# Patient Record
Sex: FEMALE | Race: WHITE | NOT HISPANIC OR LATINO | Employment: OTHER | URBAN - METROPOLITAN AREA
[De-identification: names, ages, dates, MRNs, and addresses within clinical notes are randomized per-mention and may not be internally consistent; named-entity substitution may affect disease eponyms.]

---

## 2017-02-15 ENCOUNTER — OFFICE VISIT (OUTPATIENT)
Dept: PULMONOLOGY | Facility: HOSPICE | Age: 67
End: 2017-02-15
Payer: MEDICARE

## 2017-02-15 VITALS
HEIGHT: 64 IN | SYSTOLIC BLOOD PRESSURE: 124 MMHG | RESPIRATION RATE: 16 BRPM | OXYGEN SATURATION: 90 % | WEIGHT: 136 LBS | DIASTOLIC BLOOD PRESSURE: 80 MMHG | TEMPERATURE: 98.8 F | HEART RATE: 74 BPM | BODY MASS INDEX: 23.22 KG/M2

## 2017-02-15 DIAGNOSIS — R93.89 ABNORMAL CT SCAN, CHEST: ICD-10-CM

## 2017-02-15 DIAGNOSIS — J44.9 CHRONIC OBSTRUCTIVE PULMONARY DISEASE, UNSPECIFIED COPD TYPE (HCC): ICD-10-CM

## 2017-02-15 DIAGNOSIS — Z14.8 ALPHA-1-ANTITRYPSIN DEFICIENCY CARRIER: ICD-10-CM

## 2017-02-15 DIAGNOSIS — J30.9 ALLERGIC RHINITIS, UNSPECIFIED ALLERGIC RHINITIS TRIGGER, UNSPECIFIED RHINITIS SEASONALITY: ICD-10-CM

## 2017-02-15 PROCEDURE — 1036F TOBACCO NON-USER: CPT | Performed by: NURSE PRACTITIONER

## 2017-02-15 PROCEDURE — 4040F PNEUMOC VAC/ADMIN/RCVD: CPT | Mod: 8P | Performed by: NURSE PRACTITIONER

## 2017-02-15 PROCEDURE — 99214 OFFICE O/P EST MOD 30 MIN: CPT | Performed by: NURSE PRACTITIONER

## 2017-02-15 PROCEDURE — G8420 CALC BMI NORM PARAMETERS: HCPCS | Performed by: NURSE PRACTITIONER

## 2017-02-15 PROCEDURE — 3014F SCREEN MAMMO DOC REV: CPT | Mod: 8P | Performed by: NURSE PRACTITIONER

## 2017-02-15 PROCEDURE — G8432 DEP SCR NOT DOC, RNG: HCPCS | Performed by: NURSE PRACTITIONER

## 2017-02-15 PROCEDURE — G8482 FLU IMMUNIZE ORDER/ADMIN: HCPCS | Performed by: NURSE PRACTITIONER

## 2017-02-15 PROCEDURE — 1101F PT FALLS ASSESS-DOCD LE1/YR: CPT | Mod: 8P | Performed by: NURSE PRACTITIONER

## 2017-02-15 PROCEDURE — 3017F COLORECTAL CA SCREEN DOC REV: CPT | Mod: 8P | Performed by: NURSE PRACTITIONER

## 2017-02-15 RX ORDER — RAMIPRIL 10 MG/1
CAPSULE ORAL
COMMUNITY
Start: 2008-12-09 | End: 2017-02-15

## 2017-02-15 RX ORDER — ALENDRONATE SODIUM 70 MG/1
70 TABLET ORAL
COMMUNITY
Start: 2010-08-26 | End: 2017-02-15

## 2017-02-15 RX ORDER — VARENICLINE TARTRATE 1 MG/1
TABLET, FILM COATED ORAL
COMMUNITY
Start: 2010-01-28 | End: 2017-02-15

## 2017-02-15 RX ORDER — ROSUVASTATIN CALCIUM 40 MG/1
TABLET, COATED ORAL
COMMUNITY
Start: 2008-12-09 | End: 2017-02-15

## 2017-02-15 RX ORDER — ALPRAZOLAM 0.5 MG/1
0.5 TABLET ORAL
COMMUNITY
Start: 2010-03-09 | End: 2017-02-15

## 2017-02-15 RX ORDER — ZOLPIDEM TARTRATE 10 MG/1
10 TABLET ORAL
COMMUNITY
Start: 2012-04-05 | End: 2017-02-15

## 2017-02-15 RX ORDER — OMEGA-3-ACID ETHYL ESTERS 1 G/1
CAPSULE, LIQUID FILLED ORAL
COMMUNITY
Start: 2008-12-09 | End: 2017-02-15

## 2017-02-15 RX ORDER — BUPROPION HYDROCHLORIDE 300 MG/1
TABLET ORAL
COMMUNITY
Start: 2010-01-12 | End: 2017-02-15

## 2017-02-15 RX ORDER — OMEGA-3-ACID ETHYL ESTERS 1 G/1
CAPSULE, LIQUID FILLED ORAL
COMMUNITY
End: 2018-01-01 | Stop reason: SDUPTHER

## 2017-02-15 RX ORDER — CARVEDILOL 25 MG/1
TABLET ORAL
COMMUNITY
End: 2017-02-15

## 2017-02-15 NOTE — PROGRESS NOTES
Chief Complaint   Patient presents with   • COPD       HPI:  Zuly Christian is a 66 y.o. year old female here today for follow-up on her chronic obstructive pulmonary disease and abnormal CT scan of the chest. CT scan of the chest was repeated on 8/16/2016 and indicates stable appearance of a 6 mm left upper lobe nodule dating back to August 2014. There is stable diffuse centrilobular emphysema and no pulmonary consolidation or adenopathy. PFT's at her last office visit 8/22/2016 indicated an FEV1 of 1.59 L, 67% predicted with an FEV1/FVC ratio of 59 and a DLCO of 67% predicted. She is compliant on Spiriva respimat daily. She denies significant dyspnea. She states she is somewhat limited in activity due to knee pain. She will note mild dyspnea when carrying heavy objects. She denies current cough, mucous or wheeze. She notes occasional runny nose which she feels is allergy triggered. She does use an OTC antihistamine as needed. She denies any recent respiratory infections.       Past Medical History   Diagnosis Date   • Allergic rhinitis    • COPD (chronic obstructive pulmonary disease) (CMS-HCC)    • Hyperlipidemia    • Hypertension    • Peripheral vascular disease (CMS-HCC)        Past Surgical History   Procedure Laterality Date   • Tonsillectomy         Family History   Problem Relation Age of Onset   • Cancer Father      lung   • Cancer Mother      lung       Social History     Social History   • Marital Status:      Spouse Name: N/A   • Number of Children: N/A   • Years of Education: N/A     Occupational History   • Not on file.     Social History Main Topics   • Smoking status: Former Smoker -- 0.50 packs/day for 40 years     Types: Cigarettes     Quit date: 05/01/2014   • Smokeless tobacco: Never Used   • Alcohol Use: Yes      Comment: Socially   • Drug Use: No   • Sexual Activity: Not on file     Other Topics Concern   • Not on file     Social History Narrative       ROS:  Constitutional: Denies  "fevers, chills, sweats, fatigue, weight loss  Eyes: Denies vision loss, pain, drainage, double vision  Ears/Nose/Mouth/Throat: Denies ear ache, difficulty hearing, sore throat, persistent hoarseness, decayed teeth/toothache  Cardiovascular: Denies chest pain, tightness, palpitations, swelling in feet/legs, fainting, difficulty breathing when laying down  Respiratory: See HPI   GI: Denies heartburn, difficulty swallowing, nausea, vomiting, abdominal pain, diarrhea, constipation  : Denies frequent urination, painful urination  Integumentary: Denies rashes, lumps or color changes  MSK: Knee pain   Neurological: Denies frequent headaches, dizziness, weakness      Current Outpatient Prescriptions on File Prior to Visit   Medication Sig Dispense Refill   • SPIRIVA RESPIMAT 2.5 MCG/ACT Aero Soln USE 2 INHALATIONS ONCE DAILY, ASSEMBLE AND PRIME 3 Inhaler 3   • Multiple Vitamin (MULTI VITAMIN DAILY PO) Take  by mouth.     • Esomeprazole Magnesium 20 MG Pack Take 20 mg by mouth. 1 tab po qd     • aspirin 81 MG tablet Take 81 mg by mouth every day. One tablet by mouth daily     • Coenzyme Q10 (COQ10) 100 MG Cap Take 100 mg by mouth.     • ezetimibe (ZETIA) 10 MG Tab Take 10 mg by mouth every day.     • rosuvastatin (CRESTOR) 40 MG tablet Take 40 mg by mouth every day.     • carvedilol (COREG) 25 MG Tab Take 25 mg by mouth 2 times a day, with meals.     • ramipril (ALTACE) 10 MG capsule Take 10 mg by mouth every day.     • albuterol 108 (90 BASE) MCG/ACT Aero Soln inhalation aerosol Inhale 2 Puffs by mouth every four hours as needed for Shortness of Breath.     • alprazolam (XANAX) 0.5 MG Tab Take 0.5 mg by mouth at bedtime as needed for Sleep.     • tiotropium (SPIRIVA) 18 MCG Cap Inhale 18 mcg by mouth every day.       No current facility-administered medications on file prior to visit.     Sulfa drugs    Blood pressure 124/80, pulse 74, temperature 37.1 °C (98.8 °F), resp. rate 16, height 1.613 m (5' 3.5\"), weight 61.689 " kg (136 lb), SpO2 90 %.  PE:   Appearance: Well developed, well nourished, no acute distress  Eyes: PERRL, EOM intact, sclera white, conjunctiva moist  Ears: no lesions or deformities  Hearing: grossly intact  Nose: no lesions or deformities  Oropharynx: tongue normal, posterior pharynx without erythema or exudate  Neck: supple, trachea midline, no masses   Respiratory effort: no intercostal retractions or use of accessory muscles  Lung auscultation: no rales, rhonchi or wheezes  Heart auscultation: no murmur rub or gallop  Extremities: no cyanosis or edema  Abdomen: soft ,non tender, no masses  Gait and Station: normal  Digits and nails: no clubbing, cyanosis, petechiae or nodes.  Cranial nerves: grossly intact  Skin: no rashes, lesions or ulcers noted  Orientation: Oriented to time, person and place  Mood and affect: mood and affect appropriate, normal interaction with examiner  Judgement: Intact          Assessment:  1. Chronic obstructive pulmonary disease, unspecified COPD type (CMS-HCC)     2. Allergic rhinitis, unspecified allergic rhinitis trigger, unspecified rhinitis seasonality     3. Abnormal CT scan, chest      Nodule, stable x 2 years duration    4. Alpha-1-antitrypsin deficiency carrier (CMS-HCC)           Plan:    1) Change Spiriva to Anoro 1 dose INH daily. Samples and RX provided.   2) Reviewed results of Alpha 1 test indicating alpha 1 carrier with MF phenotype. Order for Alpha 1 level. We discussed carrier status and if her level is significantly low or if her PFT's decline we may consider replacement. She is also going to talk with her daughter about getting tested. Handout provided on Alpha 1.   3) She is up to date on Prevnar 13, Pneumovax 23 and Influenza vaccines.  4) Encouraged routine exercise.   5) CT is stable for 2 years. No further testing unless symptoms warrant.   6) Continue prn OTC antihistamine.  7) 6 month follow up with updated PFT's, sooner if needed.

## 2017-02-15 NOTE — PATIENT INSTRUCTIONS
Plan:    1) Change Spiriva to Anoro 1 dose INH daily. Samples and RX provided.   2) Reviewed results of Alpha 1 test indicating alpha 1 carrier with MF phenotype. Order for Alpha 1 level. We discussed carrier status and if her level is significantly low or if her PFT's decline we may consider replacement. She is also going to talk with her daughter about getting tested. Handout provided on Alpha 1.   3) She is up to date on Prevnar 13, Pneumovax 23 and Influenza vaccines.  4) Encouraged routine exercise.   5) CT is stable for 2 years. No further testing unless symptoms warrant.   6) Continue prn OTC antihistamine.  7) 6 month follow up with updated PFT's, sooner if needed.

## 2017-02-15 NOTE — MR AVS SNAPSHOT
"        Zuly Reilly Gino   2/15/2017 10:00 AM   Office Visit   MRN: [id number]    Department:  [facility]   Dept Phone:  [phone]    Description:  Female : 1950   Provider:  ADRIA Garcia           Reason for Visit     COPD           Allergies as of 2/15/2017     Allergen Noted Reactions    Sulfa Drugs 2016         You were diagnosed with     Chronic obstructive pulmonary disease, unspecified COPD type (CMS-HCC)   [5401669]       Allergic rhinitis, unspecified allergic rhinitis trigger, unspecified rhinitis seasonality   [7739138]       Abnormal CT scan, chest   [422292]   Nodule, stable x 2 years duration     Alpha-1-antitrypsin deficiency carrier (CMS-HCC)   [650727]         Vital Signs     Blood Pressure Pulse Temperature Respirations Height Weight    124/80 mmHg 74 37.1 °C (98.8 °F) 16 1.613 m (5' 3.5\") 61.689 kg (136 lb)    Body Mass Index Oxygen Saturation Smoking Status             23.71 kg/m2 90% Former Smoker         Basic Information     Date Of Birth Sex Race Ethnicity Preferred Language    1950 Female White Non- English      Your appointments     Aug 16, 2017 10:00 AM   Pulmonary Function Test with PFT-RM2   [facility] (--)    [address]  [address]  [place] [id number] [phone]            Aug 16, 2017 11:00 AM   Established Patient Pul with ROBERTO GarciaPPEDRO   [facility] (--)    [address]  [address]  [place] [address] [phone]              Problem List              ICD-10-CM Priority Class Noted - Resolved    Abnormal CT scan, chest R93.8   2016 - Present    COPD (chronic obstructive pulmonary disease) (CMS-HCC) J44.9   2016 - Present    Essential hypertension I10   2016 - Present    PVD (peripheral vascular disease) (CMS-HCC) I73.9   2016 - Present    Dyslipidemia E78.5   2016 - Present    Allergic rhinitis J30.9   2016 - Present      Health " Maintenance        Date Due Completion Dates    IMM DTaP/Tdap/Td Vaccine (1 - Tdap) 9/11/1969 ---    PAP SMEAR 9/11/1971 ---    COLONOSCOPY 9/11/2000 ---    MAMMOGRAM 5/24/2007 5/24/2006, 3/7/2005    IMM ZOSTER VACCINE 9/11/2010 ---    BONE DENSITY 9/11/2015 3/7/2005    IMM PNEUMOCOCCAL 65+ (ADULT) LOW/MEDIUM RISK SERIES (1 of 2 - PCV13) 9/11/2015 ---            Current Immunizations     Influenza Vaccine Adult HD 9/6/2016 10:47 AM      Below and/or attached are the medications your provider expects you to take. Review all of your home medications and newly ordered medications with your provider and/or pharmacist. Follow medication instructions as directed by your provider and/or pharmacist. Please keep your medication list with you and share with your provider. Update the information when medications are discontinued, doses are changed, or new medications (including over-the-counter products) are added; and carry medication information at all times in the event of emergency situations     Allergies:  SULFA DRUGS - (reactions not documented)               Medications  Valid as of: February 15, 2017 - 10:35 AM    Generic Name Brand Name Tablet Size Instructions for use    Albuterol Sulfate (Aero Soln) albuterol 108 (90 BASE) MCG/ACT Inhale 2 Puffs by mouth every four hours as needed for Shortness of Breath.        Alendronate Sodium (Tab) FOSAMAX 70 MG Take 70 mg by mouth.        ALPRAZolam (Tab) XANAX 0.5 MG Take 0.5 mg by mouth at bedtime as needed for Sleep.        ALPRAZolam (Tab) XANAX 0.5 MG Take 0.5 mg by mouth.        Aspirin (Tab) aspirin 81 MG Take 81 mg by mouth every day. One tablet by mouth daily        Aspirin (Tab) aspirin 81 MG Take  by mouth.        BuPROPion HCl (TABLET SR 24 HR) WELLBUTRIN  MG Take  by mouth.        Carvedilol (Tab) COREG 25 MG Take 25 mg by mouth 2 times a day, with meals.        Carvedilol (Tab) COREG 25 MG Take  by mouth.        Coenzyme Q10 (Cap) CoQ10 100 MG Take 100 mg  by mouth.        Esomeprazole Magnesium (Pack) Esomeprazole Magnesium 20 MG Take 20 mg by mouth. 1 tab po qd        Ezetimibe (Tab) ZETIA 10 MG Take 10 mg by mouth every day.        Multiple Vitamin   Take  by mouth.        Omega-3-acid Ethyl Esters (Cap) LOVAZA 1 GM Take  by mouth.        Omega-3-acid Ethyl Esters (Cap) LOVAZA 1 GM Take  by mouth.        Ramipril (Cap) ALTACE 10 MG Take 10 mg by mouth every day.        Ramipril (Cap) ALTACE 10 MG Take  by mouth.        Rosuvastatin Calcium (Tab) CRESTOR 40 MG Take 40 mg by mouth every day.        Rosuvastatin Calcium (Tab) CRESTOR 40 MG Take  by mouth.        Tiotropium Bromide Monohydrate (Cap) SPIRIVA 18 MCG Inhale 18 mcg by mouth every day.        Tiotropium Bromide Monohydrate (Aero Soln) SPIRIVA RESPIMAT 2.5 MCG/ACT USE 2 INHALATIONS ONCE DAILY, ASSEMBLE AND PRIME        Umeclidinium-Vilanterol (AEROSOL POWDER, BREATH ACTIVATED) Umeclidinium-Vilanterol 62.5-25 MCG/INH Take 1 Inhalation by mouth every day.        Varenicline Tartrate (Tab) CHANTIX 1 MG Take  by mouth.        Zolpidem Tartrate (Tab) AMBIEN 10 MG Take 10 mg by mouth.        .                 Medicines prescribed today were sent to:     [facility] - [place], [place] - [address] [facility] [place] [address]    Phone: [phone] Fax: [phone]    Open 24 Hours?: No    [facility] - [address] [address]    Phone: [phone] Fax: [phone]    Open 24 Hours?: No      Medication refill instructions:       If your prescription bottle indicates you have medication refills left, it is not necessary to call your provider’s office. Please contact your pharmacy and they will refill your medication.    If your prescription bottle indicates you do not have any refills left, you may request refills at any time through one of the following ways: The online [employer] system (except Urgent Care), by calling your  provider’s office, or by asking your pharmacy to contact your provider’s office with a refill request. Medication refills are processed only during regular business hours and may not be available until the next business day. Your provider may request additional information or to have a follow-up visit with you prior to refilling your medication.   *Please Note: Medication refills are assigned a new Rx number when refilled electronically. Your pharmacy may indicate that no refills were authorized even though a new prescription for the same medication is available at the pharmacy. Please request the medicine by name with the pharmacy before contacting your provider for a refill.        Your To Do List     Future Labs/Procedures Complete By Expires    AMB PULMONARY FUNCTION TEST/LAB  8/15/2017 (Approximate) 2/15/2018    Comments:    In 6 months at follow up visit         Tymphanyhart Access Code: Activation code not generated  Current [facility] Medical Status: Active

## 2017-07-20 ENCOUNTER — HOSPITAL ENCOUNTER (OUTPATIENT)
Facility: MEDICAL CENTER | Age: 67
End: 2017-07-20
Attending: SURGERY | Admitting: SURGERY
Payer: MEDICARE

## 2017-07-20 ENCOUNTER — APPOINTMENT (OUTPATIENT)
Dept: RADIOLOGY | Facility: MEDICAL CENTER | Age: 67
End: 2017-07-20
Attending: SURGERY
Payer: MEDICARE

## 2017-07-20 VITALS
HEART RATE: 78 BPM | TEMPERATURE: 98.3 F | WEIGHT: 130.73 LBS | BODY MASS INDEX: 23.16 KG/M2 | OXYGEN SATURATION: 95 % | HEIGHT: 63 IN | RESPIRATION RATE: 16 BRPM

## 2017-07-20 PROBLEM — I65.29 OCCLUSION AND STENOSIS OF UNSPECIFIED CAROTID ARTERY: Status: ACTIVE | Noted: 2017-07-20

## 2017-07-20 LAB
ABO GROUP BLD: NORMAL
ANION GAP SERPL CALC-SCNC: 8 MMOL/L (ref 0–11.9)
BLD GP AB SCN SERPL QL: NORMAL
BUN SERPL-MCNC: 13 MG/DL (ref 8–22)
CALCIUM SERPL-MCNC: 9.3 MG/DL (ref 8.5–10.5)
CHLORIDE SERPL-SCNC: 112 MMOL/L (ref 96–112)
CO2 SERPL-SCNC: 23 MMOL/L (ref 20–33)
CREAT SERPL-MCNC: 0.61 MG/DL (ref 0.5–1.4)
EKG IMPRESSION: NORMAL
ERYTHROCYTE [DISTWIDTH] IN BLOOD BY AUTOMATED COUNT: 46.5 FL (ref 35.9–50)
GFR SERPL CREATININE-BSD FRML MDRD: >60 ML/MIN/1.73 M 2
GLUCOSE SERPL-MCNC: 92 MG/DL (ref 65–99)
HCT VFR BLD AUTO: 43.5 % (ref 37–47)
HGB BLD-MCNC: 14.1 G/DL (ref 12–16)
MCH RBC QN AUTO: 29.4 PG (ref 27–33)
MCHC RBC AUTO-ENTMCNC: 32.4 G/DL (ref 33.6–35)
MCV RBC AUTO: 90.6 FL (ref 81.4–97.8)
PLATELET # BLD AUTO: 224 K/UL (ref 164–446)
PMV BLD AUTO: 10.5 FL (ref 9–12.9)
POTASSIUM SERPL-SCNC: 3.9 MMOL/L (ref 3.6–5.5)
RBC # BLD AUTO: 4.8 M/UL (ref 4.2–5.4)
RH BLD: NORMAL
SODIUM SERPL-SCNC: 143 MMOL/L (ref 135–145)
WBC # BLD AUTO: 7.5 K/UL (ref 4.8–10.8)

## 2017-07-20 PROCEDURE — 85027 COMPLETE CBC AUTOMATED: CPT

## 2017-07-20 PROCEDURE — 99153 MOD SED SAME PHYS/QHP EA: CPT

## 2017-07-20 PROCEDURE — 700102 HCHG RX REV CODE 250 W/ 637 OVERRIDE(OP)

## 2017-07-20 PROCEDURE — A9270 NON-COVERED ITEM OR SERVICE: HCPCS

## 2017-07-20 PROCEDURE — 36215 PLACE CATHETER IN ARTERY: CPT

## 2017-07-20 PROCEDURE — 93010 ELECTROCARDIOGRAM REPORT: CPT | Performed by: INTERNAL MEDICINE

## 2017-07-20 PROCEDURE — 160002 HCHG RECOVERY MINUTES (STAT): Performed by: SURGERY

## 2017-07-20 PROCEDURE — 80048 BASIC METABOLIC PNL TOTAL CA: CPT

## 2017-07-20 PROCEDURE — 86901 BLOOD TYPING SEROLOGIC RH(D): CPT

## 2017-07-20 PROCEDURE — 75710 ARTERY X-RAYS ARM/LEG: CPT | Mod: XU

## 2017-07-20 PROCEDURE — 37246 TRLUML BALO ANGIOP 1ST ART: CPT

## 2017-07-20 PROCEDURE — 37236 OPEN/PERQ PLACE STENT 1ST: CPT

## 2017-07-20 PROCEDURE — 700111 HCHG RX REV CODE 636 W/ 250 OVERRIDE (IP)

## 2017-07-20 PROCEDURE — 93005 ELECTROCARDIOGRAM TRACING: CPT | Performed by: SURGERY

## 2017-07-20 PROCEDURE — 86850 RBC ANTIBODY SCREEN: CPT

## 2017-07-20 PROCEDURE — 76937 US GUIDE VASCULAR ACCESS: CPT

## 2017-07-20 PROCEDURE — 86900 BLOOD TYPING SEROLOGIC ABO: CPT

## 2017-07-20 RX ORDER — MIDAZOLAM HYDROCHLORIDE 1 MG/ML
INJECTION INTRAMUSCULAR; INTRAVENOUS
Status: COMPLETED
Start: 2017-07-20 | End: 2017-07-20

## 2017-07-20 RX ORDER — CLOPIDOGREL BISULFATE 75 MG/1
150 TABLET ORAL ONCE
Status: COMPLETED | OUTPATIENT
Start: 2017-07-20 | End: 2017-07-20

## 2017-07-20 RX ORDER — LIDOCAINE AND PRILOCAINE 25; 25 MG/G; MG/G
1 CREAM TOPICAL
Status: DISCONTINUED | OUTPATIENT
Start: 2017-07-20 | End: 2017-07-20 | Stop reason: HOSPADM

## 2017-07-20 RX ORDER — ASCORBIC ACID 500 MG
500 TABLET ORAL DAILY
COMMUNITY
End: 2018-01-01

## 2017-07-20 RX ORDER — PROTAMINE SULFATE 10 MG/ML
INJECTION, SOLUTION INTRAVENOUS
Status: COMPLETED
Start: 2017-07-20 | End: 2017-07-20

## 2017-07-20 RX ORDER — SODIUM CHLORIDE 9 MG/ML
500 INJECTION, SOLUTION INTRAVENOUS
Status: ACTIVE | OUTPATIENT
Start: 2017-07-20 | End: 2017-07-20

## 2017-07-20 RX ORDER — IODIXANOL 270 MG/ML
25 INJECTION, SOLUTION INTRAVASCULAR ONCE
Status: DISCONTINUED | OUTPATIENT
Start: 2017-07-20 | End: 2017-07-20 | Stop reason: HOSPADM

## 2017-07-20 RX ORDER — LIDOCAINE HYDROCHLORIDE 10 MG/ML
0.5 INJECTION, SOLUTION INFILTRATION; PERINEURAL
Status: DISCONTINUED | OUTPATIENT
Start: 2017-07-20 | End: 2017-07-20 | Stop reason: HOSPADM

## 2017-07-20 RX ORDER — IODIXANOL 270 MG/ML
15 INJECTION, SOLUTION INTRAVASCULAR ONCE
Status: COMPLETED | OUTPATIENT
Start: 2017-07-20 | End: 2017-07-20

## 2017-07-20 RX ORDER — MIDAZOLAM HYDROCHLORIDE 1 MG/ML
.5-2 INJECTION INTRAMUSCULAR; INTRAVENOUS PRN
Status: ACTIVE | OUTPATIENT
Start: 2017-07-20 | End: 2017-07-20

## 2017-07-20 RX ORDER — IODIXANOL 270 MG/ML
56 INJECTION, SOLUTION INTRAVASCULAR ONCE
Status: DISCONTINUED | OUTPATIENT
Start: 2017-07-20 | End: 2017-07-20

## 2017-07-20 RX ORDER — CLOPIDOGREL BISULFATE 75 MG/1
TABLET ORAL
Status: COMPLETED
Start: 2017-07-20 | End: 2017-07-20

## 2017-07-20 RX ORDER — SODIUM CHLORIDE, SODIUM LACTATE, POTASSIUM CHLORIDE, CALCIUM CHLORIDE 600; 310; 30; 20 MG/100ML; MG/100ML; MG/100ML; MG/100ML
INJECTION, SOLUTION INTRAVENOUS CONTINUOUS
Status: DISCONTINUED | OUTPATIENT
Start: 2017-07-20 | End: 2017-07-20 | Stop reason: HOSPADM

## 2017-07-20 RX ORDER — SODIUM CHLORIDE 9 MG/ML
INJECTION, SOLUTION INTRAVENOUS CONTINUOUS
Status: DISCONTINUED | OUTPATIENT
Start: 2017-07-20 | End: 2017-07-20 | Stop reason: HOSPADM

## 2017-07-20 RX ADMIN — MIDAZOLAM 1 MG: 1 INJECTION INTRAMUSCULAR; INTRAVENOUS at 10:52

## 2017-07-20 RX ADMIN — CLOPIDOGREL BISULFATE 150 MG: 75 TABLET ORAL at 12:17

## 2017-07-20 RX ADMIN — MIDAZOLAM HYDROCHLORIDE 1 MG: 1 INJECTION INTRAMUSCULAR; INTRAVENOUS at 11:15

## 2017-07-20 RX ADMIN — FENTANYL CITRATE 50 MCG: 50 INJECTION, SOLUTION INTRAMUSCULAR; INTRAVENOUS at 10:52

## 2017-07-20 RX ADMIN — MIDAZOLAM HYDROCHLORIDE 1 MG: 1 INJECTION INTRAMUSCULAR; INTRAVENOUS at 11:28

## 2017-07-20 RX ADMIN — SODIUM CHLORIDE, SODIUM LACTATE, POTASSIUM CHLORIDE, CALCIUM CHLORIDE: 600; 310; 30; 20 INJECTION, SOLUTION INTRAVENOUS at 10:40

## 2017-07-20 RX ADMIN — MIDAZOLAM HYDROCHLORIDE 1 MG: 1 INJECTION INTRAMUSCULAR; INTRAVENOUS at 11:04

## 2017-07-20 RX ADMIN — MIDAZOLAM HYDROCHLORIDE 1 MG: 1 INJECTION INTRAMUSCULAR; INTRAVENOUS at 10:52

## 2017-07-20 RX ADMIN — CLOPIDOGREL 150 MG: 75 TABLET, FILM COATED ORAL at 12:17

## 2017-07-20 RX ADMIN — IODIXANOL 15 ML: 270 INJECTION, SOLUTION INTRAVASCULAR at 11:00

## 2017-07-20 RX ADMIN — MIDAZOLAM HYDROCHLORIDE 1 MG: 1 INJECTION INTRAMUSCULAR; INTRAVENOUS at 10:57

## 2017-07-20 ASSESSMENT — PAIN SCALES - GENERAL
PAINLEVEL_OUTOF10: 0

## 2017-07-20 NOTE — IP AVS SNAPSHOT
7/20/2017    Zuly Christian  [address] [facility] [address]    Dear Zuly:    [facility] wants to ensure your discharge home is safe and you or your loved ones have had all of your questions answered regarding your care after you leave the hospital.    Below is a list of resources and contact information should you have any questions regarding your hospital stay, follow-up instructions, or active medical symptoms.    Questions or Concerns Regarding… Contact   Medical Questions Related to Your Discharge  (7 days a week, 8am-5pm) Contact a Nurse Care Coordinator   [phone]   Medical Questions Not Related to Your Discharge  (24 hours a day / 7 days a week)  Contact the Nurse Health Line   [phone]    Medications or Discharge Instructions Refer to your discharge packet   or contact your [facility] Primary Care Provider   [phone]   Follow-up Appointment(s) Schedule your appointment via [employer]   or contact Scheduling [phone]   Billing Review your statement via [employer]  or contact Billing [phone]   Medical Records Review your records via [employer]   or contact Medical Records [phone]     You may receive a telephone call within two days of discharge. This call is to make certain you understand your discharge instructions and have the opportunity to have any questions answered. You can also easily access your medical information, test results and upcoming appointments via the ContextWeb free online health management tool. You can learn more and sign up at [email or web address]. For assistance setting up your ContextWeb account, please call [phone].    Once again, we want to ensure your discharge home is safe and that you have a clear understanding of any next steps in your care. If you have any questions or concerns, please do not hesitate to contact us, we are here for you. Thank you for choosing [facility] for your healthcare needs.    Sincerely,    Your [facility] Healthcare Team

## 2017-07-20 NOTE — DISCHARGE INSTRUCTIONS
ACTIVITY: Rest and take it easy for the first 24 hours.  A responsible adult is recommended to remain with you during that time.  It is normal to feel sleepy.  We encourage you to not do anything that requires balance, judgment or coordination.    MILD FLU-LIKE SYMPTOMS ARE NORMAL. YOU MAY EXPERIENCE GENERALIZED MUSCLE ACHES, THROAT IRRITATION, HEADACHE AND/OR SOME NAUSEA.    FOR 24 HOURS DO NOT:  Drive, operate machinery or run household appliances.  Drink beer or alcoholic beverages.   Make important decisions or sign legal documents.    SPECIAL INSTRUCTIONS: ***    DIET: To avoid nausea, slowly advance diet as tolerated, avoiding spicy or greasy foods for the first day.  Add more substantial food to your diet according to your physician's instructions.  Babies can be fed formula or breast milk as soon as they are hungry.  INCREASE FLUIDS AND FIBER TO AVOID CONSTIPATION.    SURGICAL DRESSING/BATHING: *May remove dressing in 48 hours and shower**    FOLLOW-UP APPOINTMENT:  A follow-up appointment should be arranged with your doctor in *1-2 weeks**; call to schedule.    You should CALL YOUR PHYSICIAN if you develop:  Fever greater than 101 degrees F.  Pain not relieved by medication, or persistent nausea or vomiting.  Excessive bleeding (blood soaking through dressing) or unexpected drainage from the wound.  Extreme redness or swelling around the incision site, drainage of pus or foul smelling drainage.  Inability to urinate or empty your bladder within 8 hours.  Problems with breathing or chest pain.    You should call 911 if you develop problems with breathing or chest pain.  If you are unable to contact your doctor or surgical center, you should go to the nearest emergency room or urgent care center.  Physician's telephone #: *[phone]**    If any questions arise, call your doctor.  If your doctor is not available, please feel free to call the Surgical Center at [phone].  The Center is open Monday through  Friday from 7AM to 7PM.  You can also call the HEALTH HOTLINE open 24 hours/day, 7 days/week and speak to a nurse at [phone], or toll free at [phone].    A registered nurse may call you a few days after your surgery to see how you are doing after your procedure.    MEDICATIONS: Resume taking daily medication.  Take prescribed pain medication with food.  If no medication is prescribed, you may take non-aspirin pain medication if needed.  PAIN MEDICATION CAN BE VERY CONSTIPATING.  Take a stool softener or laxative such as senokot, pericolace, or milk of magnesia if needed.    Prescription given for ***.  Last pain medication given at ***.    If your physician has prescribed pain medication that includes Acetaminophen (Tylenol), do not take additional Acetaminophen (Tylenol) while taking the prescribed medication.    Depression / Suicide Risk    As you are discharged from this [facility] facility, it is important to learn how to keep safe from harming yourself.    Recognize the warning signs:  · Abrupt changes in personality, positive or negative- including increase in energy   · Giving away possessions  · Change in eating patterns- significant weight changes-  positive or negative  · Change in sleeping patterns- unable to sleep or sleeping all the time   · Unwillingness or inability to communicate  · Depression  · Unusual sadness, discouragement and loneliness  · Talk of wanting to die  · Neglect of personal appearance   · Rebelliousness- reckless behavior  · Withdrawal from people/activities they love  · Confusion- inability to concentrate     If you or a loved one observes any of these behaviors or has concerns about self-harm, here's what you can do:  · Talk about it- your feelings and reasons for harming yourself  · Remove any means that you might use to hurt yourself (examples: pills, rope, extension cords, firearm)  · Get professional help from the community (Mental Health, Substance Abuse,  psychological counseling)  · Do not be alone:Call your Safe Contact- someone whom you trust who will be there for you.  · Call your local CRISIS HOTLINE 107-5624 or 621-752-3224  · Call your local Children's Mobile Crisis Response Team [place] (832) 817-8647 or www.[employer]  · Call the toll free National Suicide Prevention Hotlines   · National Suicide Prevention Lifeline 850-953-RIMR (1026)  · National Hope Line Network 800-SUICIDE (114-2020)

## 2017-07-20 NOTE — PROGRESS NOTES
Pt underwent a Left Subclavian Angiogram with stent placement performed by Dr Saldana. Pt remained hemodynamically stable throughout the procedure. No adverse reaction noted. Band-Aid to procedure site cdi. Report to ***. Pt taken back to [facility] in stable condition.     Left Subclavian Stent - Omnilink Elite, 6mm x 29 mm, ref # [id number], lot # [id number]

## 2017-07-20 NOTE — OP REPORT
Date of procedure: 7/20/2017    Preoperative diagnosis: Bilateral subclavian artery stenoses, left critical    Postprocedure diagnosis: the same    Procedure: Ultrasound-guided brachial artery access  Left subclavian artery angiogram, angioplasty and 6 mm stent placement    Surgeon: Dinora Saldana M.D.    Anesthesia: Conscious sedation    Indication: Jody Christian is a 66-year-old woman who underwent routine follow-up of a known subclavian artery stenosis. Most recent results show progression to a significant stenosis. For preservation of flow to the left arm and to avoid reversed vertebral artery flow she's brought to the Angio suite for treatment. She has some vague symptoms suggestive of left arm claudication. Risks and benefits were explained and include bleeding, infection, access site complications, arterial occlusion and the rare risk of possible surgery.    Description of procedure: Ms. Christian was taken to the angios suite and placed in the supine position. The left arm was extended and prepped with Chloraseptic prep. Cloth towels and paper drapes were placed. A timeout was called and the proposed procedure confirmed with all team members. Using a sterile ultrasound probe, local anesthesia was instilled and using a micropuncture needle the brachial artery entered. 018 wire was used to insert a micropuncture sheath and then used to exchange for a 4 Citizen of [place] sheath. A Kumpe catheter was advanced proximally and using a Glidewire, an angiogram was performed. Hemodynamically significant stenosis was identified. I then passed an 018 wire through the area of stenosis and using a 5 mm x 4 cm balloon, angioplasty was performed. Heparin was requested but unfortunately, not given to due to communication breakdown.    The 4 Citizen of [place] sheath was exchanged for a 6 Citizen [place] sheath after changing for the Glidewire. Using the Kumpe catheter for road mapping, a 6 mm x 29 mm stent was deployed carefully in the subclavian artery,  avoiding the orifice of the vertebral artery. The stent was post dilated both proximally and distally. Final images showed excellent resolution of the area of stenosis with preserved forward flow through the vertebral artery and no evidence of residual stenosis. The 6 [place] sheath was removed and gentle pressure held on the brachial artery. No evidence of hematoma was identified with forward flow through the brachial artery into the radial. A palpable radial pulse was identified at the conclusion of the procedure.    Findings: The left subclavian artery shows an area of stenosis just proximal to the vertebral artery. Bulky mixed plaque is identified in the proximal subclavian artery. This responded well to a balloon expandable stent, dilating the subclavian artery to 6 mm.

## 2017-07-20 NOTE — IP AVS SNAPSHOT
" Home Care Instructions                                                                                                                Name:Zuly Christian  Medical Record Number:[id number]  CSN: [id number]    YOB: 1950   Age: 66 y.o.  Sex: female  HT:1.588 m (5' 2.5\") WT: 59.3 kg (130 lb 11.7 oz)          Admit Date: 7/20/2017     Discharge Date:   Today's Date: 7/20/2017  Attending Doctor:  Dinora Saldana M.D.                  Allergies:  Sulfa drugs                Discharge Instructions         ACTIVITY: Rest and take it easy for the first 24 hours.  A responsible adult is recommended to remain with you during that time.  It is normal to feel sleepy.  We encourage you to not do anything that requires balance, judgment or coordination.    MILD FLU-LIKE SYMPTOMS ARE NORMAL. YOU MAY EXPERIENCE GENERALIZED MUSCLE ACHES, THROAT IRRITATION, HEADACHE AND/OR SOME NAUSEA.    FOR 24 HOURS DO NOT:  Drive, operate machinery or run household appliances.  Drink beer or alcoholic beverages.   Make important decisions or sign legal documents.    SPECIAL INSTRUCTIONS: ***    DIET: To avoid nausea, slowly advance diet as tolerated, avoiding spicy or greasy foods for the first day.  Add more substantial food to your diet according to your physician's instructions.  Babies can be fed formula or breast milk as soon as they are hungry.  INCREASE FLUIDS AND FIBER TO AVOID CONSTIPATION.    SURGICAL DRESSING/BATHING: *May remove dressing in 48 hours and shower**    FOLLOW-UP APPOINTMENT:  A follow-up appointment should be arranged with your doctor in *1-2 weeks**; call to schedule.    You should CALL YOUR PHYSICIAN if you develop:  Fever greater than 101 degrees F.  Pain not relieved by medication, or persistent nausea or vomiting.  Excessive bleeding (blood soaking through dressing) or unexpected drainage from the wound.  Extreme redness or swelling around the incision site, drainage of pus or foul smelling " drainage.  Inability to urinate or empty your bladder within 8 hours.  Problems with breathing or chest pain.    You should call 911 if you develop problems with breathing or chest pain.  If you are unable to contact your doctor or surgical center, you should go to the nearest emergency room or urgent care center.  Physician's telephone #: *[phone]**    If any questions arise, call your doctor.  If your doctor is not available, please feel free to call the Surgical Center at [phone].  The Center is open Monday through Friday from 7AM to 7PM.  You can also call the HEALTH HOTLINE open 24 hours/day, 7 days/week and speak to a nurse at [phone], or toll free at [phone].    A registered nurse may call you a few days after your surgery to see how you are doing after your procedure.    MEDICATIONS: Resume taking daily medication.  Take prescribed pain medication with food.  If no medication is prescribed, you may take non-aspirin pain medication if needed.  PAIN MEDICATION CAN BE VERY CONSTIPATING.  Take a stool softener or laxative such as senokot, pericolace, or milk of magnesia if needed.    Prescription given for ***.  Last pain medication given at ***.    If your physician has prescribed pain medication that includes Acetaminophen (Tylenol), do not take additional Acetaminophen (Tylenol) while taking the prescribed medication.    Depression / Suicide Risk    As you are discharged from this [facility] facility, it is important to learn how to keep safe from harming yourself.    Recognize the warning signs:  · Abrupt changes in personality, positive or negative- including increase in energy   · Giving away possessions  · Change in eating patterns- significant weight changes-  positive or negative  · Change in sleeping patterns- unable to sleep or sleeping all the time   · Unwillingness or inability to communicate  · Depression  · Unusual sadness, discouragement and loneliness  · Talk of wanting  to die  · Neglect of personal appearance   · Rebelliousness- reckless behavior  · Withdrawal from people/activities they love  · Confusion- inability to concentrate     If you or a loved one observes any of these behaviors or has concerns about self-harm, here's what you can do:  · Talk about it- your feelings and reasons for harming yourself  · Remove any means that you might use to hurt yourself (examples: pills, rope, extension cords, firearm)  · Get professional help from the community (Mental Health, Substance Abuse, psychological counseling)  · Do not be alone:Call your Safe Contact- someone whom you trust who will be there for you.  · Call your local CRISIS HOTLINE 707-0806 or 158-540-2226  · Call your local Children's Mobile Crisis Response Team [place] (198) 352-1975 or www.[employer]  · Call the toll free National Suicide Prevention Hotlines   · National Suicide Prevention Lifeline 279-473-NXCF (3433)  · [employer] Line Network 800-SUICIDE (363-4950)       Medication List      ASK your doctor about these medications        Instructions    Morning Afternoon Evening Bedtime    albuterol 108 (90 BASE) MCG/ACT Aers inhalation aerosol        Inhale 2 Puffs by mouth every four hours as needed for Shortness of Breath.   Dose:  2 Puff                        alprazolam 0.5 MG Tabs   Commonly known as:  XANAX        Take 0.5 mg by mouth at bedtime as needed for Sleep.   Dose:  0.5 mg                        ascorbic acid 500 MG Tabs   Commonly known as:  ascorbic acid        Take 500 mg by mouth every day.   Dose:  500 mg                        aspirin 81 MG tablet        Take 81 mg by mouth every day. One tablet by mouth daily   Dose:  81 mg                        carvedilol 25 MG Tabs   Commonly known as:  COREG        Take 25 mg by mouth 2 times a day, with meals.   Dose:  25 mg                        CoQ10 100 MG Caps        Take 100 mg by mouth.   Dose:  100 mg                        ezetimibe 10  MG Tabs   Commonly known as:  ZETIA        Take 10 mg by mouth every day.   Dose:  10 mg                        MULTI VITAMIN DAILY PO        Take  by mouth.                        omega-3 acid ethyl esters 1 GM capsule   Commonly known as:  LOVAZA        Take  by mouth.                        ramipril 10 MG capsule   Commonly known as:  ALTACE        Take 10 mg by mouth every day.   Dose:  10 mg                        rosuvastatin 40 MG tablet   Commonly known as:  CRESTOR        Take 40 mg by mouth every day.   Dose:  40 mg                        Umeclidinium-Vilanterol 62.5-25 MCG/INH Aepb   Commonly known as:  ANORO ELLIPTA        Take 1 Inhalation by mouth every day.   Dose:  1 Inhalation                                Medication Information     Above and/or attached are the medications your physician expects you to take upon discharge. Review all of your home medications and newly ordered medications with your doctor and/or pharmacist. Follow medication instructions as directed by your doctor and/or pharmacist. Please keep your medication list with you and share with your physician. Update the information when medications are discontinued, doses are changed, or new medications (including over-the-counter products) are added; and carry medication information at all times in the event of emergency situations.        Resources     Quit Smoking / Tobacco Use:    I understand the use of any tobacco products increases my chance of suffering from future heart disease or stroke and could cause other illnesses which may shorten my life. Quitting the use of tobacco products is the single most important thing I can do to improve my health. For further information on smoking / tobacco cessation call a Toll Free Quit Line at 1-257.185.6405 (*National [employer] [place]) or 1-392.924.2860 (American Lung Association) or you can access the web based program at www.lungusa.org.    [place] Tobacco Users Help Line:  (654) 612-1995        Toll Free: 7-857-581-6238  Quit Tobacco Program [facility] (741)486-8977    Crisis Hotline:    [place] Crisis Hotline:  2-634-IOPYFQE or 1-935.802.8024    [place] Crisis Hotline:    1-678.346.9010 or 900-874-7999    Discharge Survey:   Thank you for choosing [facility]. We hope we did everything we could to make your hospital stay a pleasant one. You may be receiving a survey and we would appreciate your time and participation in answering the questions. Your input is very valuable to us in our efforts to improve our service to our patients and their families.            Signatures     My signature on this form indicates that:    1. I acknowledge receipt and understanding of these Home Care Instruction.  2. My questions regarding this information have been answered to my satisfaction.  3. I have formulated a plan with my discharge nurse to obtain my prescribed medications for home.    __________________________________      __________________________________                   Patient Signature                                 Guardian/Responsible Adult Signature      __________________________________                 __________       ________                       Nurse Signature                                               Date                 Time

## 2017-07-25 ENCOUNTER — OFFICE VISIT (OUTPATIENT)
Dept: URGENT CARE | Facility: PHYSICIAN GROUP | Age: 67
End: 2017-07-25
Payer: MEDICARE

## 2017-07-25 ENCOUNTER — HOSPITAL ENCOUNTER (OUTPATIENT)
Dept: RADIOLOGY | Facility: MEDICAL CENTER | Age: 67
End: 2017-07-25
Attending: EMERGENCY MEDICINE
Payer: MEDICARE

## 2017-07-25 VITALS
OXYGEN SATURATION: 93 % | HEIGHT: 63 IN | WEIGHT: 131.6 LBS | SYSTOLIC BLOOD PRESSURE: 126 MMHG | RESPIRATION RATE: 17 BRPM | HEART RATE: 85 BPM | BODY MASS INDEX: 23.32 KG/M2 | DIASTOLIC BLOOD PRESSURE: 67 MMHG | TEMPERATURE: 97.7 F

## 2017-07-25 DIAGNOSIS — I80.8 THROMBOPHLEBITIS ARM: ICD-10-CM

## 2017-07-25 DIAGNOSIS — S50.02XA TRAUMATIC HEMATOMA OF LEFT ELBOW, INITIAL ENCOUNTER: ICD-10-CM

## 2017-07-25 PROCEDURE — 93971 EXTREMITY STUDY: CPT | Mod: LT

## 2017-07-25 PROCEDURE — 99203 OFFICE O/P NEW LOW 30 MIN: CPT | Performed by: EMERGENCY MEDICINE

## 2017-07-25 ASSESSMENT — ENCOUNTER SYMPTOMS
SHORTNESS OF BREATH: 0
FEVER: 0
NUMBNESS: 0
SENSORY CHANGE: 0
TINGLING: 0
FOCAL WEAKNESS: 0
MUSCLE WEAKNESS: 0
CHILLS: 0

## 2017-07-25 ASSESSMENT — PAIN SCALES - GENERAL: PAINLEVEL: 2=MINIMAL-SLIGHT

## 2017-07-25 ASSESSMENT — PATIENT HEALTH QUESTIONNAIRE - PHQ9: CLINICAL INTERPRETATION OF PHQ2 SCORE: 0

## 2017-07-26 ENCOUNTER — TELEPHONE (OUTPATIENT)
Dept: URGENT CARE | Facility: PHYSICIAN GROUP | Age: 67
End: 2017-07-26

## 2017-07-26 NOTE — PROGRESS NOTES
Subjective:      Zuly Christian is a 66 y.o. female who presents with Arm Pain            Arm Pain   Incident onset: 5 days ago. Injury mechanism: left elbow arterial puncture. The pain is present in the left forearm and left elbow. The pain does not radiate. The pain is mild. Pertinent negatives include no chest pain, muscle weakness, numbness or tingling. The symptoms are aggravated by movement. She has tried rest for the symptoms. The treatment provided no relief.    patient had left subclavian arterial puncture for carotid procedure; noted swelling and bruising after the procedure. Notes gradually left forearm increased swelling, bruising. No other trauma noted.    Review of Systems   Constitutional: Negative for fever, chills and malaise/fatigue.   Respiratory: Negative for shortness of breath.    Cardiovascular: Negative for chest pain.   Musculoskeletal:        No pain proximal to the site.   Skin: Negative for itching.   Neurological: Negative for tingling, sensory change, focal weakness and numbness.     PMH:  has a past medical history of Allergic rhinitis; COPD (chronic obstructive pulmonary disease) (CMS-HCC); Hyperlipidemia; Hypertension; and Peripheral vascular disease (CMS-HCC).  MEDS:   Current outpatient prescriptions:   •  ascorbic acid (ASCORBIC ACID) 500 MG Tab, Take 500 mg by mouth every day., Disp: , Rfl:   •  omega-3 acid ethyl esters (LOVAZA) 1 GM capsule, Take  by mouth., Disp: , Rfl:   •  Umeclidinium-Vilanterol (ANORO ELLIPTA) 62.5-25 MCG/INH AEROSOL POWDER, BREATH ACTIVATED, Take 1 Inhalation by mouth every day., Disp: 3 Each, Rfl: 3  •  Multiple Vitamin (MULTI VITAMIN DAILY PO), Take  by mouth., Disp: , Rfl:   •  aspirin 81 MG tablet, Take 81 mg by mouth every day. One tablet by mouth daily, Disp: , Rfl:   •  Coenzyme Q10 (COQ10) 100 MG Cap, Take 100 mg by mouth., Disp: , Rfl:   •  ezetimibe (ZETIA) 10 MG Tab, Take 10 mg by mouth every day., Disp: , Rfl:   •  rosuvastatin (CRESTOR) 40  "MG tablet, Take 40 mg by mouth every day., Disp: , Rfl:   •  carvedilol (COREG) 25 MG Tab, Take 25 mg by mouth 2 times a day, with meals., Disp: , Rfl:   •  ramipril (ALTACE) 10 MG capsule, Take 10 mg by mouth every day., Disp: , Rfl:   •  albuterol 108 (90 BASE) MCG/ACT Aero Soln inhalation aerosol, Inhale 2 Puffs by mouth every four hours as needed for Shortness of Breath., Disp: , Rfl:   •  alprazolam (XANAX) 0.5 MG Tab, Take 0.5 mg by mouth at bedtime as needed for Sleep., Disp: , Rfl:   ALLERGIES:   Allergies   Allergen Reactions   • Sulfa Drugs      SURGHX:   Past Surgical History   Procedure Laterality Date   • Tonsillectomy     • Carotid endarterectomy  7/20/2017     Procedure: CAROTID ENDARTERECTOMY FOR : LEFT SUBCLAVIAN ARTERY ANGIOPLASTY / STENT BRACHIAL APPROACH WITH ULTRASOUND;  Surgeon: Dinora Saldana M.D.;  Location: SURGERY [facility];  Service:      SOCHX:  reports that she quit smoking about 3 years ago. Her smoking use included Cigarettes. She has a 20 pack-year smoking history. She has never used smokeless tobacco. She reports that she drinks alcohol. She reports that she does not use illicit drugs.  FH: family history includes Cancer in her father and mother.       Objective:     /67 mmHg  Pulse 85  Temp(Src) 36.5 °C (97.7 °F)  Resp 17  Ht 1.594 m (5' 2.75\")  Wt 59.693 kg (131 lb 9.6 oz)  BMI 23.49 kg/m2  SpO2 93%     Physical Exam   Constitutional: Vital signs are normal. She appears well-developed and well-nourished. She is cooperative. She does not have a sickly appearance. She does not appear ill. No distress.   Neck: Normal range of motion and phonation normal. Neck supple.   Cardiovascular: Normal rate, regular rhythm and normal heart sounds.    Pulses:       Carotid pulses are 1+ on the left side.  No left carotid bruit noted. No significant wrist/hand edema.   Pulmonary/Chest: Effort normal and breath sounds normal.   Musculoskeletal:        Left elbow: She exhibits " swelling. She exhibits normal range of motion, no effusion and no deformity. Tenderness found. Medial epicondyle tenderness noted.        Left forearm: She exhibits tenderness, swelling and edema. She exhibits no bony tenderness.   Lymphadenopathy:        Left axillary: No pectoral and no lateral adenopathy present.  No lymphangitis proximally.   Neurological: She is alert. She has normal strength. No sensory deficit.   Distal sensation to light touch and pressure intact.   Skin:               Suspect thrombophlebitis secondary to hematoma compression; low suspicion for septic process.     Assessment/Plan:     1. Traumatic hematoma of left elbow, initial encounter  Advised compression sleeve, rest and elevate    2. Thrombophlebitis arm  - US-EXTREMITY VENOUS UNILATERAL-UPPER LEFT; per radiologist:  1. No evidence of left upper extremity deep venous thrombosis.  2. Thrombus in the superficial vein (medial cubital vein).  Advised need for recheck in 2-3 days; advised contact vascular surgeon for follow-up, if not available return to urgent care.

## 2017-07-26 NOTE — PATIENT INSTRUCTIONS
Go to the nearest hospital Emergency Department if any worsening condition.  Plan recheck with your physician in 2 days; return to Urgent Care if needed.  You should contact your vascular surgeon for follow-up as soon as available.    Phlebitis  Phlebitis is soreness and puffiness (swelling) in a vein.   HOME CARE  · Only take medicine as told by your doctor.  · Raise (elevate) the affected limb on a pillow as told by your doctor.  · Keep a warm pack on the affected vein as told by your doctor. Do not sleep with a heating pad.  · Use special stockings or bandages around the area of the affected vein as told by your doctor. These will speed healing and keep the condition from coming back.  · Talk to your doctor about all the medicines you take.  · Get follow-up blood tests as told by your doctor.  · If the phlebitis is in your legs:  ¨ Avoid standing or resting for long periods.  ¨ Keep your legs moving. Raise your legs when you sit or lie.  · Do not smoke.  · Follow-up with your doctor as told.  GET HELP IF:  · You have strange bruises or bleeding.  · Your puffiness or pain in the affected area is not getting better.  · You are taking medicine to lessen puffiness (anti-inflammatory medicine), and you get belly pain.  · You have a fever.  GET HELP RIGHT AWAY IF:   · The phlebitis gets worse and you have more pain, puffiness (swelling), or redness.  · You have trouble breathing or have chest pain.  MAKE SURE YOU:   · Understand these instructions.  · Will watch your condition.  · Will get help right away if you are not doing well or get worse.     This information is not intended to replace advice given to you by your health care provider. Make sure you discuss any questions you have with your health care provider.     Document Released: 12/06/2010 Document Revised: 12/23/2014 Document Reviewed: 08/25/2014  Elsevier Interactive Patient Education ©2016 [employer] Inc[employer]

## 2017-08-16 ENCOUNTER — NON-PROVIDER VISIT (OUTPATIENT)
Dept: PULMONOLOGY | Facility: HOSPICE | Age: 67
End: 2017-08-16
Payer: MEDICARE

## 2017-08-16 ENCOUNTER — OFFICE VISIT (OUTPATIENT)
Dept: PULMONOLOGY | Facility: HOSPICE | Age: 67
End: 2017-08-16
Payer: MEDICARE

## 2017-08-16 VITALS
RESPIRATION RATE: 16 BRPM | DIASTOLIC BLOOD PRESSURE: 76 MMHG | SYSTOLIC BLOOD PRESSURE: 122 MMHG | WEIGHT: 130 LBS | HEIGHT: 63 IN | OXYGEN SATURATION: 96 % | BODY MASS INDEX: 23.04 KG/M2 | HEART RATE: 72 BPM

## 2017-08-16 DIAGNOSIS — Z87.891 HISTORY OF TOBACCO ABUSE: ICD-10-CM

## 2017-08-16 DIAGNOSIS — Z14.8 ALPHA-1-ANTITRYPSIN DEFICIENCY CARRIER: ICD-10-CM

## 2017-08-16 DIAGNOSIS — J44.9 CHRONIC OBSTRUCTIVE PULMONARY DISEASE, UNSPECIFIED COPD TYPE (HCC): ICD-10-CM

## 2017-08-16 DIAGNOSIS — R93.89 ABNORMAL CT SCAN, CHEST: ICD-10-CM

## 2017-08-16 DIAGNOSIS — J30.9 ALLERGIC RHINITIS, UNSPECIFIED ALLERGIC RHINITIS TRIGGER, UNSPECIFIED RHINITIS SEASONALITY: ICD-10-CM

## 2017-08-16 PROCEDURE — 99214 OFFICE O/P EST MOD 30 MIN: CPT | Performed by: NURSE PRACTITIONER

## 2017-08-16 PROCEDURE — 94060 EVALUATION OF WHEEZING: CPT | Performed by: INTERNAL MEDICINE

## 2017-08-16 PROCEDURE — 94726 PLETHYSMOGRAPHY LUNG VOLUMES: CPT | Performed by: INTERNAL MEDICINE

## 2017-08-16 PROCEDURE — 94729 DIFFUSING CAPACITY: CPT | Performed by: INTERNAL MEDICINE

## 2017-08-16 RX ORDER — ALBUTEROL SULFATE 90 UG/1
2 AEROSOL, METERED RESPIRATORY (INHALATION) EVERY 4 HOURS PRN
Qty: 1 INHALER | Refills: 2 | Status: SHIPPED | OUTPATIENT
Start: 2017-08-16 | End: 2018-01-01 | Stop reason: SDUPTHER

## 2017-08-16 ASSESSMENT — PULMONARY FUNCTION TESTS
FEV1/FVC_PERCENT_PREDICTED: 74
FVC_PERCENT_PREDICTED: 100
FEV1: 1.57
FEV1/FVC_PERCENT_CHANGE: -80
FEV1_PREDICTED: 2.31
FEV1/FVC_PREDICTED: 75.99
FEV1/FVC: 51
FEV1_PERCENT_CHANGE: 4
FEV1_PREDICTED: 67
FEV1/FVC_PERCENT_PREDICTED: 67
FEV1_PERCENT_PREDICTED: 67
FEV1/FVC: 51
FEV1_PREDICTED: 2.31
FEV1: 1.57
FEV1/FVC_PERCENT_PREDICTED: 76
FVC: 3.06
FVC_PERCENT_PREDICTED: 100
FEV1_PERCENT_CHANGE: -5
FEV1: 1.64
FEV1_PERCENT_PREDICTED: 70
FEV1/FVC: 56.75
FVC_PERCENT_PREDICTED: 94
FEV1/FVC_PERCENT_PREDICTED: 67
FVC_PREDICTED: 3.04
FVC: 2.89
FVC_PREDICTED: 3.04
FVC: 3.06

## 2017-08-16 NOTE — MR AVS SNAPSHOT
"        Zuly Reilly Gino   2017 11:00 AM   Office Visit   MRN: [id number]    Department:  [facility]   Dept Phone:  [phone]    Description:  Female : 1950   Provider:  CALLI Garcia.           Reason for Visit     COPD           Allergies as of 2017     Allergen Noted Reactions    Sulfa Drugs 2016         You were diagnosed with     Chronic obstructive pulmonary disease, unspecified COPD type (CMS-HCC)   [4773083]       Allergic rhinitis, unspecified allergic rhinitis trigger, unspecified rhinitis seasonality   [0175417]       Abnormal CT scan, chest   [438100]   Nodule stable x 2 years     Alpha-1-antitrypsin deficiency carrier   [873242]       History of tobacco abuse   [606298]         Vital Signs     Blood Pressure Pulse Respirations Height Weight Body Mass Index    122/76 mmHg 72 16 1.594 m (5' 2.75\") 58.968 kg (130 lb) 23.21 kg/m2    Oxygen Saturation Smoking Status                96% Former Smoker          Basic Information     Date Of Birth Sex Race Ethnicity Preferred Language    1950 Female White Non- English      Problem List              ICD-10-CM Priority Class Noted - Resolved    Abnormal CT scan, chest R93.8   2016 - Present    COPD (chronic obstructive pulmonary disease) (CMS-HCC) J44.9   2016 - Present    Essential hypertension I10   2016 - Present    PVD (peripheral vascular disease) (CMS-HCC) I73.9   2016 - Present    Dyslipidemia E78.5   2016 - Present    Allergic rhinitis J30.9   2016 - Present    Occlusion and stenosis of unspecified carotid artery I65.29   2017 - Present      Health Maintenance        Date Due Completion Dates    IMM DTaP/Tdap/Td Vaccine (1 - Tdap) 1969 ---    PAP SMEAR 1971 ---    COLONOSCOPY 2000 ---    MAMMOGRAM 2007, 3/7/2005    IMM ZOSTER VACCINE 2010 ---    BONE DENSITY 2015 3/7/2005    IMM PNEUMOCOCCAL 65+ (ADULT) LOW/MEDIUM RISK " SERIES (1 of 2 - PCV13) 9/11/2015 ---    IMM INFLUENZA (1) 9/1/2017 9/6/2016            Current Immunizations     13-VALENT PCV PREVNAR 11/1/2015    Influenza Vaccine Adult HD 9/6/2016 10:47 AM      Below and/or attached are the medications your provider expects you to take. Review all of your home medications and newly ordered medications with your provider and/or pharmacist. Follow medication instructions as directed by your provider and/or pharmacist. Please keep your medication list with you and share with your provider. Update the information when medications are discontinued, doses are changed, or new medications (including over-the-counter products) are added; and carry medication information at all times in the event of emergency situations     Allergies:  SULFA DRUGS - (reactions not documented)               Medications  Valid as of: August 16, 2017 - 11:50 AM    Generic Name Brand Name Tablet Size Instructions for use    Albuterol Sulfate (Aero Soln) albuterol 108 (90 BASE) MCG/ACT Inhale 2 Puffs by mouth every four hours as needed for Shortness of Breath (wheezing).        ALPRAZolam (Tab) XANAX 0.5 MG Take 0.5 mg by mouth at bedtime as needed for Sleep.        Ascorbic Acid (Tab) ascorbic acid 500 MG Take 500 mg by mouth every day.        Aspirin (Tab) aspirin 81 MG Take 81 mg by mouth every day. One tablet by mouth daily        Carvedilol (Tab) COREG 25 MG Take 25 mg by mouth 2 times a day, with meals.        Coenzyme Q10 (Cap) CoQ10 100 MG Take 100 mg by mouth.        Ezetimibe (Tab) ZETIA 10 MG Take 10 mg by mouth every day.        Multiple Vitamin   Take  by mouth.        Omega-3-acid Ethyl Esters (Cap) LOVAZA 1 GM Take  by mouth.        Ramipril (Cap) ALTACE 10 MG Take 10 mg by mouth every day.        Rosuvastatin Calcium (Tab) CRESTOR 40 MG Take 40 mg by mouth every day.        Umeclidinium-Vilanterol (AEROSOL POWDER, BREATH ACTIVATED) Umeclidinium-Vilanterol 62.5-25 MCG/INH Take 1 Inhalation by  mouth every day.        .                 Medicines prescribed today were sent to:     [facility] - [place], [place] - [address]    [address] [place] [address]    Phone: [phone] Fax: [phone]    Open 24 Hours?: No    [facility] - [place], [place] - [address]    [address] [address]    Phone: [phone] Fax: [phone]    Open 24 Hours?: No      Medication refill instructions:       If your prescription bottle indicates you have medication refills left, it is not necessary to call your provider’s office. Please contact your pharmacy and they will refill your medication.    If your prescription bottle indicates you do not have any refills left, you may request refills at any time through one of the following ways: The online [employer] system (except Urgent Care), by calling your provider’s office, or by asking your pharmacy to contact your provider’s office with a refill request. Medication refills are processed only during regular business hours and may not be available until the next business day. Your provider may request additional information or to have a follow-up visit with you prior to refilling your medication.   *Please Note: Medication refills are assigned a new Rx number when refilled electronically. Your pharmacy may indicate that no refills were authorized even though a new prescription for the same medication is available at the pharmacy. Please request the medicine by name with the pharmacy before contacting your provider for a refill.        Instructions    Plan:    1) Her CT scan has been stable x 2 years. However, patient states she smoked 1 PPD for 40 years and only quit 3 years ago. She should continue to have annual low dose screening CT's. This was discussed with the patient and a repeat CT chest was ordered. She does have a family history for lung cancer.   2) Continue Anoro 1 dose INH daily. Continue Proair HFA inhaler 2  puffs Q 4 hours prn. RX refill sent to local pharmacy.   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 However, level was not performed. Lab slip was provided at her last office visit for Alpha 1 level. She states she had this performed at [facility] lab. Requested results.  4) Encouraged routine walking/exercise.  5) She is up to date on Prevnar 13 and Pneumovax 23 vaccinations. Recommend an updated Influenza vaccine in the Fall.   6) Continue prn OTC antihistamine.  7) Follow up in 4 weeks after CT chest and will Alpha 1 level results, sooner if needed.           [employer] Access Code: Activation code not generated  Current [employer] Status: Active

## 2017-08-16 NOTE — MR AVS SNAPSHOT
Zuly Christian   2017 10:00 AM   Non-Provider Visit   MRN: [id number]    Department:  [facility]   Dept Phone:  [phone]    Description:  Female : 1950   Provider:  [id number]           Reason for Visit     COPD           Allergies as of 2017     Allergen Noted Reactions    Sulfa Drugs 2016         Vital Signs     Smoking Status                   Former Smoker           Basic Information     Date Of Birth Sex Race Ethnicity Preferred Language    1950 Female White Non- English      Problem List              ICD-10-CM Priority Class Noted - Resolved    Abnormal CT scan, chest R93.8   2016 - Present    COPD (chronic obstructive pulmonary disease) (CMS-HCC) J44.9   2016 - Present    Essential hypertension I10   2016 - Present    PVD (peripheral vascular disease) (CMS-HCC) I73.9   2016 - Present    Dyslipidemia E78.5   2016 - Present    Allergic rhinitis J30.9   2016 - Present    Occlusion and stenosis of unspecified carotid artery I65.29   2017 - Present      Health Maintenance        Date Due Completion Dates    IMM DTaP/Tdap/Td Vaccine (1 - Tdap) 1969 ---    PAP SMEAR 1971 ---    COLONOSCOPY 2000 ---    MAMMOGRAM 2007, 3/7/2005    IMM ZOSTER VACCINE 2010 ---    BONE DENSITY 2015 3/7/2005    IMM PNEUMOCOCCAL 65+ (ADULT) LOW/MEDIUM RISK SERIES (1 of 2 - PCV13) 2015 ---    IMM INFLUENZA (1) 2017            Current Immunizations     Influenza Vaccine Adult HD 2016 10:47 AM      Below and/or attached are the medications your provider expects you to take. Review all of your home medications and newly ordered medications with your provider and/or pharmacist. Follow medication instructions as directed by your provider and/or pharmacist. Please keep your medication list with you and share with your provider. Update the information when medications are discontinued, doses are  changed, or new medications (including over-the-counter products) are added; and carry medication information at all times in the event of emergency situations     Allergies:  SULFA DRUGS - (reactions not documented)               Medications  Valid as of: August 16, 2017 - 11:03 AM    Generic Name Brand Name Tablet Size Instructions for use    Albuterol Sulfate (Aero Soln) albuterol 108 (90 BASE) MCG/ACT Inhale 2 Puffs by mouth every four hours as needed for Shortness of Breath.        ALPRAZolam (Tab) XANAX 0.5 MG Take 0.5 mg by mouth at bedtime as needed for Sleep.        Ascorbic Acid (Tab) ascorbic acid 500 MG Take 500 mg by mouth every day.        Aspirin (Tab) aspirin 81 MG Take 81 mg by mouth every day. One tablet by mouth daily        Carvedilol (Tab) COREG 25 MG Take 25 mg by mouth 2 times a day, with meals.        Coenzyme Q10 (Cap) CoQ10 100 MG Take 100 mg by mouth.        Ezetimibe (Tab) ZETIA 10 MG Take 10 mg by mouth every day.        Multiple Vitamin   Take  by mouth.        Omega-3-acid Ethyl Esters (Cap) LOVAZA 1 GM Take  by mouth.        Ramipril (Cap) ALTACE 10 MG Take 10 mg by mouth every day.        Rosuvastatin Calcium (Tab) CRESTOR 40 MG Take 40 mg by mouth every day.        Umeclidinium-Vilanterol (AEROSOL POWDER, BREATH ACTIVATED) Umeclidinium-Vilanterol 62.5-25 MCG/INH Take 1 Inhalation by mouth every day.        .                 Medicines prescribed today were sent to:     [facility] - [place], [place] - [id number] [place] [address]    Phone: [phone] Fax: [phone]    Open 24 Hours?: No    [facility] - [place], [place] - [address]    [address] [address]    Phone: [phone] Fax: [phone]    Open 24 Hours?: No      Medication refill instructions:       If your prescription bottle indicates you have medication refills left, it is not necessary to call your provider’s office. Please contact your pharmacy  and they will refill your medication.    If your prescription bottle indicates you do not have any refills left, you may request refills at any time through one of the following ways: The online to be system (except Urgent Care), by calling your provider’s office, or by asking your pharmacy to contact your provider’s office with a refill request. Medication refills are processed only during regular business hours and may not be available until the next business day. Your provider may request additional information or to have a follow-up visit with you prior to refilling your medication.   *Please Note: Medication refills are assigned a new Rx number when refilled electronically. Your pharmacy may indicate that no refills were authorized even though a new prescription for the same medication is available at the pharmacy. Please request the medicine by name with the pharmacy before contacting your provider for a refill.           to be Access Code: Activation code not generated  Current to be Status: Active

## 2017-08-16 NOTE — PATIENT INSTRUCTIONS
Plan:    1) Her CT scan has been stable x 2 years. However, patient states she smoked 1 PPD for 40 years and only quit 3 years ago. She should continue to have annual low dose screening CT's. This was discussed with the patient and a repeat CT chest was ordered. She does have a family history for lung cancer.   2) Continue Anoro 1 dose INH daily. Continue Proair HFA inhaler 2 puffs Q 4 hours prn. RX refill sent to local pharmacy.   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 However, level was not performed. Lab slip was provided at her last office visit for Alpha 1 level. She states she had this performed at [facility] lab. Requested results.  4) Encouraged routine walking/exercise.  5) She is up to date on Prevnar 13 and Pneumovax 23 vaccinations. Recommend an updated Influenza vaccine in the Fall.   6) Continue prn OTC antihistamine.  7) Follow up in 4 weeks after CT chest and will Alpha 1 level results, sooner if needed.

## 2017-08-16 NOTE — PROGRESS NOTES
Chief Complaint   Patient presents with   • COPD       HPI:  Zuly Christian is a 66 y.o. year old female here today for follow-up on her chronic obstructive pulmonary disease and abnormal CT scan of the chest. CT scan of the chest was repeated on 8/16/2016 and indicates stable appearance of a 6 mm left upper lobe nodule dating back to August 2014. There is stable diffuse centrilobular emphysema and no pulmonary consolidation or adenopathy. Alpha 1 testing indicates an MF phenotype. Level was ordered through the lab. However, I do not see this has been completed. PFT's 8/22/2016 indicated an FEV1 of 1.59 L, 67% predicted with an FEV1/FVC ratio of 59 and a DLCO of 67% predicted. PFT's were updated 8/16/2017 and indicates an FEV1 of 1.57 L, 67% predicted with an FEV1/FVC ratio of 51 with a DLCO of 82% predicted. She has a history of tobacco abuse and quit smoking 3 years ago. She was switched from Spiriva to Anoro at her last office visit. She feels this is working well for her. She rarely requires use of her Albuterol inhaler.     She denies significant dyspnea with normal activity. She notes dyspnea with exercise or when carrying heavy objects. She denies current cough, mucous or wheeze. She notes occasional runny nose which she feels is allergy triggered. She does use an OTC antihistamine as needed.       Past Medical History   Diagnosis Date   • Allergic rhinitis    • COPD (chronic obstructive pulmonary disease) (CMS-[facility])    • Hyperlipidemia    • Hypertension    • Peripheral vascular disease (CMS-[facility])        Past Surgical History   Procedure Laterality Date   • Tonsillectomy     • Carotid endarterectomy  7/20/2017     Procedure: CAROTID ENDARTERECTOMY FOR : LEFT SUBCLAVIAN ARTERY ANGIOPLASTY / STENT BRACHIAL APPROACH WITH ULTRASOUND;  Surgeon: Dinora Saldana M.D.;  Location: SURGERY [facility];  Service:        Family History   Problem Relation Age of Onset   • Cancer Father      lung   • Cancer Mother       lung       Social History     Social History   • Marital Status:      Spouse Name: N/A   • Number of Children: N/A   • Years of Education: N/A     Occupational History   • Not on file.     Social History Main Topics   • Smoking status: Former Smoker -- 0.50 packs/day for 40 years     Types: Cigarettes     Quit date: 05/01/2014   • Smokeless tobacco: Never Used   • Alcohol Use: Yes      Comment: Socially   • Drug Use: No   • Sexual Activity: No     Other Topics Concern   • Not on file     Social History Narrative     ROS:  Constitutional: Denies fevers, chills, sweats, fatigue, weight loss  Eyes: Denies vision loss, pain, drainage, double vision. Wears glasses  Ears/Nose/Mouth/Throat: Denies ear ache, difficulty hearing, sore throat, persistent hoarseness, decayed teeth/toothache. Positive rhinitis   Cardiovascular: Denies chest pain, tightness, palpitations, swelling in feet/legs, fainting, difficulty breathing when laying down  Respiratory: See HPI   GI: Denies heartburn, difficulty swallowing, nausea, vomiting, abdominal pain, diarrhea, constipation  : Denies frequent urination, painful urination  Integumentary: Denies rashes, lumps or color changes  MSK: Denies painful joints, sore muscles, and back pain.   Neurological: Denies frequent headaches, dizziness, weakness        Current Outpatient Prescriptions on File Prior to Visit   Medication Sig Dispense Refill   • ascorbic acid (ASCORBIC ACID) 500 MG Tab Take 500 mg by mouth every day.     • omega-3 acid ethyl esters (LOVAZA) 1 GM capsule Take  by mouth.     • Umeclidinium-Vilanterol (ANORO ELLIPTA) 62.5-25 MCG/INH AEROSOL POWDER, BREATH ACTIVATED Take 1 Inhalation by mouth every day. 3 Each 3   • Multiple Vitamin (MULTI VITAMIN DAILY PO) Take  by mouth.     • albuterol 108 (90 BASE) MCG/ACT Aero Soln inhalation aerosol Inhale 2 Puffs by mouth every four hours as needed for Shortness of Breath.     • aspirin 81 MG tablet Take 81 mg by mouth every  "day. One tablet by mouth daily     • Coenzyme Q10 (COQ10) 100 MG Cap Take 100 mg by mouth.     • alprazolam (XANAX) 0.5 MG Tab Take 0.5 mg by mouth at bedtime as needed for Sleep.     • ezetimibe (ZETIA) 10 MG Tab Take 10 mg by mouth every day.     • rosuvastatin (CRESTOR) 40 MG tablet Take 40 mg by mouth every day.     • carvedilol (COREG) 25 MG Tab Take 25 mg by mouth 2 times a day, with meals.     • ramipril (ALTACE) 10 MG capsule Take 10 mg by mouth every day.       No current facility-administered medications on file prior to visit.     Sulfa drugs    Blood pressure 122/76, pulse 72, resp. rate 16, height 1.594 m (5' 2.75\"), weight 58.968 kg (130 lb), SpO2 96 %.  PE:   Appearance: Well developed, well nourished, no acute distress  Eyes: PERRL, EOM intact, sclera white, conjunctiva moist  Ears: no lesions or deformities  Hearing: grossly intact  Nose: no lesions or deformities  Oropharynx: tongue normal, posterior pharynx without erythema or exudate  Neck: supple, trachea midline, no masses   Respiratory effort: no intercostal retractions or use of accessory muscles  Lung auscultation: no rales, rhonchi or wheezes  Heart auscultation: no murmur rub or gallop  Extremities: no cyanosis or edema  Abdomen: soft ,non tender, no masses  Gait and Station: normal  Digits and nails: no clubbing, cyanosis, petechiae or nodes.  Cranial nerves: grossly intact  Skin: no rashes, lesions or ulcers noted  Orientation: Oriented to time, person and place  Mood and affect: mood and affect appropriate, normal interaction with examiner  Judgement: Intact          Assessment:  1. Chronic obstructive pulmonary disease, unspecified COPD type (CMS-[facility])  albuterol (PROAIR HFA) 108 (90 BASE) MCG/ACT Aero Soln inhalation aerosol   2. Allergic rhinitis, unspecified allergic rhinitis trigger, unspecified rhinitis seasonality     3. Abnormal CT scan, chest  CT-CHEST W/O LOW DOSE    Nodule stable x 2 years    4. Alpha-1-antitrypsin deficiency " carrier     5. History of tobacco abuse  CT-CHEST W/O LOW DOSE         Plan:    1) Her CT scan has been stable x 2 years. However, patient states she smoked 1 PPD for 40 years and only quit 3 years ago. She should continue to have annual low dose screening CT's. This was discussed with the patient and a repeat CT chest was ordered. She does have a family history for lung cancer.   2) Continue Anoro 1 dose INH daily. Continue Proair HFA inhaler 2 puffs Q 4 hours prn. RX refill sent to local pharmacy.   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 However, level was not performed. Lab slip was provided at her last office visit for Alpha 1 level. She states she had this performed at [facility] lab. Requested results.  4) Encouraged routine walking/exercise.  5) She is up to date on Prevnar 13 and Pneumovax 23 vaccinations. Recommend an updated Influenza vaccine in the Fall.   6) Continue prn OTC antihistamine.  7) Follow up in 4 weeks after CT chest and will Alpha 1 level results, sooner if needed.

## 2017-08-16 NOTE — PROCEDURES
Good patient effort & cooperation.  The results of this test meet the ATS standards for acceptability and repeatability.  A bronchodilator of Ventolin HFA- 2puffs via spacer was administered.    The FVC is 2.89 L or 94%, FEV1 is 1.64 L or 70%, FEV1/FVC 57%. Increased TLC and RV. DLCO of 82%. No bronchodilator response.    Interpretation:  PFTs are consistent with stage II COPD.

## 2017-10-05 ENCOUNTER — TELEPHONE (OUTPATIENT)
Dept: HEMATOLOGY ONCOLOGY | Facility: MEDICAL CENTER | Age: 67
End: 2017-10-05

## 2017-10-05 DIAGNOSIS — F17.211 CIGARETTE NICOTINE DEPENDENCE IN REMISSION: ICD-10-CM

## 2017-10-05 NOTE — TELEPHONE ENCOUNTER
Received referral to lung cancer screening program.  Chart review to assess for lung cancer screening program eligibility.   1. Age 55-77 yrs of age? Yes 67 y.o.  2. 30 pack year hx of smoking, or greater? Yes 1 ppdx 40 yrs= 40 pkyr hx  3. Current smoker or if quit, has pt quit within last 15 yrs?Yes  Quit 2016  4. Any signs or symptoms of lung cancer? None noted- COPD  5. Previous history of lung cancer? None noted  6. Chest CT within past 12 mos.? None noted  Patient does meet eligibility criteria. [facility] scheduling notified to schedule the shared decision making visit.

## 2017-10-11 ENCOUNTER — OFFICE VISIT (OUTPATIENT)
Dept: HEMATOLOGY ONCOLOGY | Facility: MEDICAL CENTER | Age: 67
End: 2017-10-11
Payer: MEDICARE

## 2017-10-11 VITALS
HEART RATE: 78 BPM | DIASTOLIC BLOOD PRESSURE: 71 MMHG | WEIGHT: 124 LBS | SYSTOLIC BLOOD PRESSURE: 118 MMHG | OXYGEN SATURATION: 93 % | TEMPERATURE: 98.1 F | HEIGHT: 62 IN | BODY MASS INDEX: 22.82 KG/M2 | RESPIRATION RATE: 18 BRPM

## 2017-10-11 DIAGNOSIS — Z87.891 PERSONAL HISTORY OF TOBACCO USE, PRESENTING HAZARDS TO HEALTH: ICD-10-CM

## 2017-10-11 PROCEDURE — G0296 VISIT TO DETERM LDCT ELIG: HCPCS | Performed by: NURSE PRACTITIONER

## 2017-10-11 ASSESSMENT — ENCOUNTER SYMPTOMS
WHEEZING: 0
SPUTUM PRODUCTION: 0
SHORTNESS OF BREATH: 0
HEMOPTYSIS: 0
COUGH: 0
WEIGHT LOSS: 0

## 2017-10-11 NOTE — PROGRESS NOTES
Subjective:      Zuly Christian is a 67 y.o. female who presents for Follow-Up (lung CA INT Screen- ref Lauri DX: former smoker.), for lung cancer screening shared decision making visit.         HPI   Patient seen today for initial lung cancer screening visit. Patient referred by Arlen Carreon, Pulmonology.     The patient meets eligibility criteria including age, smoking history (30+ pack years), if former smoker, quit in the last 15 years, and absence of signs or symptoms of lung cancer.    - Age - 67  - Smoking history - Patient has smoked for 50 years at an average of 1 ppd = 50 pack year smoking history.  - Current smoking status - Fomer smoker, quit in 2014  - No symptoms of lung cancer and no previous history of lung cancer       Patient reports distant history of monitoring lung nodules per pulmonary, no malignancy reported.      Allergies   Allergen Reactions   • Sulfa Drugs        Current Outpatient Prescriptions on File Prior to Visit   Medication Sig Dispense Refill   • ascorbic acid (ASCORBIC ACID) 500 MG Tab Take 500 mg by mouth every day.     • omega-3 acid ethyl esters (LOVAZA) 1 GM capsule Take  by mouth.     • Umeclidinium-Vilanterol (ANORO ELLIPTA) 62.5-25 MCG/INH AEROSOL POWDER, BREATH ACTIVATED Take 1 Inhalation by mouth every day. 3 Each 3   • Multiple Vitamin (MULTI VITAMIN DAILY PO) Take  by mouth.     • aspirin 81 MG tablet Take 81 mg by mouth every day. One tablet by mouth daily     • Coenzyme Q10 (COQ10) 100 MG Cap Take 100 mg by mouth.     • alprazolam (XANAX) 0.5 MG Tab Take 0.5 mg by mouth at bedtime as needed for Sleep.     • ezetimibe (ZETIA) 10 MG Tab Take 10 mg by mouth every day.     • carvedilol (COREG) 25 MG Tab Take 25 mg by mouth 2 times a day, with meals.     • ramipril (ALTACE) 10 MG capsule Take 10 mg by mouth every day.     • albuterol (PROAIR HFA) 108 (90 BASE) MCG/ACT Aero Soln inhalation aerosol Inhale 2 Puffs by mouth every four hours as needed for Shortness  "of Breath (wheezing). 1 Inhaler 2   • rosuvastatin (CRESTOR) 40 MG tablet Take 40 mg by mouth every day.       No current facility-administered medications on file prior to visit.        Review of Systems   Constitutional: Negative for malaise/fatigue and weight loss.   Respiratory: Negative for cough, hemoptysis, sputum production, shortness of breath and wheezing.           Objective:     /71   Pulse 78   Temp 36.7 °C (98.1 °F)   Resp 18   Ht 1.575 m (5' 2\")   Wt 56.2 kg (124 lb)   SpO2 93%   BMI 22.68 kg/m²      Physical Exam   Constitutional: She is oriented to person, place, and time. She appears well-developed and well-nourished. No distress.   Cardiovascular: Normal rate, regular rhythm and normal heart sounds.  Exam reveals no gallop and no friction rub.    No murmur heard.  Pulmonary/Chest: Effort normal and breath sounds normal. No respiratory distress. She has no wheezes.   Musculoskeletal: Normal range of motion.   Neurological: She is alert and oriented to person, place, and time.   Skin: Skin is warm and dry. She is not diaphoretic.   Vitals reviewed.          Assessment/Plan:     1. Personal history of tobacco use, presenting hazards to health  CT-LUNG CANCER-SCREENING       We conducted a shared decision-making process using a decision aid. We reviewed benefits and harms of screening, including false positives and potential need for additional diagnostic testing, the possibility of over diagnosis, and total radiation exposure.    We discussed the importance of adhering to annual LDCT screening. We also discussed the impact of comorbities on the patient's the ability or willingness to undergo diagnostic procedure(s) and treatment.    Counseling on the importance of maintaining cigarette smoking abstinence if former smoker; or the importance of smoking cessation if current smoker and, if appropriate, furnishing of information about tobacco cessation interventions.    Based on our " discussion, we have decided to begin annual lung cancer screening starting now.

## 2017-10-25 ENCOUNTER — PATIENT MESSAGE (OUTPATIENT)
Dept: HEALTH INFORMATION MANAGEMENT | Facility: OTHER | Age: 67
End: 2017-10-25

## 2017-10-25 NOTE — TELEPHONE ENCOUNTER
Pt phoned Oncology Nurse Navigation on call line.  She states that she was told she qualified for the lung cancer screening program but has not heard back to schedule.     Offered to assist and told pt to expect a call back once more information was obtained.  Spoke with scheduling who did indeed try to contact the pt but the number she has provided is disconnected.    Sent message via [employer] to make pt aware and to request an alternate number.

## 2017-11-02 ENCOUNTER — HOSPITAL ENCOUNTER (OUTPATIENT)
Dept: RADIOLOGY | Facility: MEDICAL CENTER | Age: 67
End: 2017-11-02
Attending: NURSE PRACTITIONER
Payer: MEDICARE

## 2017-11-02 DIAGNOSIS — Z87.891 PERSONAL HISTORY OF TOBACCO USE, PRESENTING HAZARDS TO HEALTH: ICD-10-CM

## 2017-11-02 PROCEDURE — G0297 LDCT FOR LUNG CA SCREEN: HCPCS

## 2017-11-07 ENCOUNTER — TELEPHONE (OUTPATIENT)
Dept: HEMATOLOGY ONCOLOGY | Facility: MEDICAL CENTER | Age: 67
End: 2017-11-07

## 2017-11-07 DIAGNOSIS — R91.8 ABNORMAL CT LUNG SCREENING: ICD-10-CM

## 2017-11-07 NOTE — TELEPHONE ENCOUNTER
Call placed to patient to update as to Lung Cancer Screening CT results: after discussion of results with MD, she will be referred to [facility]  Her home number is disconnected, mobile is same as home number    Message left on her emergency contact (Jose D Beaver): [phone]

## 2017-11-07 NOTE — TELEPHONE ENCOUNTER
Spoke with patient and updated as to referral to [facility] - she expresses understanding    She will add the outgoing [facility] line to her phone so that calls can get through  Her grandson is still a good back-up though

## 2017-11-09 ENCOUNTER — OFFICE VISIT (OUTPATIENT)
Dept: PULMONOLOGY | Facility: HOSPICE | Age: 67
End: 2017-11-09
Payer: MEDICARE

## 2017-11-09 VITALS
BODY MASS INDEX: 23.15 KG/M2 | HEART RATE: 83 BPM | RESPIRATION RATE: 16 BRPM | WEIGHT: 125.8 LBS | OXYGEN SATURATION: 95 % | DIASTOLIC BLOOD PRESSURE: 66 MMHG | HEIGHT: 62 IN | SYSTOLIC BLOOD PRESSURE: 124 MMHG

## 2017-11-09 DIAGNOSIS — Z87.891 HISTORY OF TOBACCO ABUSE: ICD-10-CM

## 2017-11-09 DIAGNOSIS — Z14.8 ALPHA-1-ANTITRYPSIN DEFICIENCY CARRIER: ICD-10-CM

## 2017-11-09 DIAGNOSIS — R93.89 ABNORMAL CT SCAN, CHEST: ICD-10-CM

## 2017-11-09 DIAGNOSIS — J44.9 CHRONIC OBSTRUCTIVE PULMONARY DISEASE, UNSPECIFIED COPD TYPE (HCC): ICD-10-CM

## 2017-11-09 DIAGNOSIS — J30.9 ALLERGIC RHINITIS, UNSPECIFIED CHRONICITY, UNSPECIFIED SEASONALITY, UNSPECIFIED TRIGGER: ICD-10-CM

## 2017-11-09 PROCEDURE — 99214 OFFICE O/P EST MOD 30 MIN: CPT | Performed by: NURSE PRACTITIONER

## 2017-11-09 NOTE — PROGRESS NOTES
Chief Complaint   Patient presents with   • COPD       HPI:  Zuly Christian is a 67 y.o. year old female here today for follow-up on her chronic obstructive pulmonary disease and abnormal CT scan of the chest. CT scan of the chest was repeated on 8/16/2016 and indicates stable appearance of a 6 mm left upper lobe nodule dating back to August 2014. There is stable diffuse centrilobular emphysema and no pulmonary consolidation or adenopathy. She has over a 40 PYH of tobacco abuse, quitting in 2014. She was recommend annual CT imaging. She also has a family history of lung cancer. CT chest was updated 11/2/2017 and indicates;   Despite suspicious features, lingular 7 mm noncalcified nodule is stable  New left subclavian artery origin stent  Small pericardial effusion, borderline slight enlargement  Advanced emphysema    Alpha 1 testing indicates an MF phenotype. Level was ordered through the lab and is normal 154 mg/dl. PFT's 8/22/2016 indicated an FEV1 of 1.59 L, 67% predicted with an FEV1/FVC ratio of 59 and a DLCO of 67% predicted. PFT's were updated 8/16/2017 and indicates an FEV1 of 1.57 L, 67% predicted with an FEV1/FVC ratio of 51 with a DLCO of 82% predicted. She was switched from Spiriva to Anoro at her last office visit. She feels this is working well for her. She rarely requires use of her Albuterol inhaler.      She denies significant dyspnea with normal activity. She notes dyspnea with exercise or when carrying heavy objects. She recently purchased a treadmill and is walking more routinely. She feels this has helped. She denies current cough, mucous or wheeze. She notes occasional runny nose which she feels is allergy triggered. She does use an OTC antihistamine as needed. .       Past Medical History:   Diagnosis Date   • Allergic rhinitis    • COPD (chronic obstructive pulmonary disease) (CMS-HCC)    • Hyperlipidemia    • Hypertension    • Peripheral vascular disease (CMS-[facility])        Past Surgical  History:   Procedure Laterality Date   • CAROTID ENDARTERECTOMY  7/20/2017    Procedure: CAROTID ENDARTERECTOMY FOR : LEFT SUBCLAVIAN ARTERY ANGIOPLASTY / STENT BRACHIAL APPROACH WITH ULTRASOUND;  Surgeon: Dinora Saldana M.D.;  Location: SURGERY [facility];  Service:    • TONSILLECTOMY         Family History   Problem Relation Age of Onset   • Cancer Father      lung   • Cancer Mother      lung       Social History     Social History   • Marital status:      Spouse name: N/A   • Number of children: N/A   • Years of education: N/A     Occupational History   • Not on file.     Social History Main Topics   • Smoking status: Former Smoker     Packs/day: 1.00     Years: 50.00     Types: Cigarettes     Quit date: 5/1/2014   • Smokeless tobacco: Never Used   • Alcohol use Yes      Comment: Socially   • Drug use: No   • Sexual activity: No     Other Topics Concern   • Not on file     Social History Narrative   • No narrative on file         ROS:  Constitutional: Denies fevers, chills, sweats, fatigue, weight loss  Eyes: Denies vision loss, pain, drainage, double vision. Wears glasses  Ears/Nose/Mouth/Throat: Denies ear ache, difficulty hearing, sore throat, persistent hoarseness, decayed teeth/toothache  Cardiovascular: Denies chest pain, tightness, palpitations, swelling in feet/legs, fainting, difficulty breathing when laying down  Respiratory: See HPI   GI: Denies heartburn, difficulty swallowing, nausea, vomiting, abdominal pain, diarrhea, constipation  : Denies frequent urination, painful urination  Integumentary: Denies rashes, lumps or color changes  MSK: Denies painful joints, sore muscles, and back pain.   Neurological: Denies frequent headaches, dizziness, weakness        Current Outpatient Prescriptions   Medication Sig Dispense Refill   • ascorbic acid (ASCORBIC ACID) 500 MG Tab Take 500 mg by mouth every day.     • omega-3 acid ethyl esters (LOVAZA) 1 GM capsule Take  by mouth.     •  "Umeclidinium-Vilanterol (ANORO ELLIPTA) 62.5-25 MCG/INH AEROSOL POWDER, BREATH ACTIVATED Take 1 Inhalation by mouth every day. 3 Each 3   • Multiple Vitamin (MULTI VITAMIN DAILY PO) Take  by mouth.     • aspirin 81 MG tablet Take 81 mg by mouth every day. One tablet by mouth daily     • Coenzyme Q10 (COQ10) 100 MG Cap Take 100 mg by mouth.     • alprazolam (XANAX) 0.5 MG Tab Take 0.5 mg by mouth at bedtime as needed for Sleep.     • ezetimibe (ZETIA) 10 MG Tab Take 10 mg by mouth every day.     • rosuvastatin (CRESTOR) 40 MG tablet Take 40 mg by mouth every day.     • carvedilol (COREG) 25 MG Tab Take 25 mg by mouth 2 times a day, with meals.     • ramipril (ALTACE) 10 MG capsule Take 10 mg by mouth every day.     • albuterol (PROAIR HFA) 108 (90 BASE) MCG/ACT Aero Soln inhalation aerosol Inhale 2 Puffs by mouth every four hours as needed for Shortness of Breath (wheezing). 1 Inhaler 2     No current facility-administered medications for this visit.        Allergies   Allergen Reactions   • Sulfa Drugs        Blood pressure 124/66, pulse 83, resp. rate 16, height 1.575 m (5' 2\"), weight 57.1 kg (125 lb 12.8 oz), SpO2 95 %.    PE:   Appearance: Well developed, well nourished, no acute distress  Eyes: PERRL, EOM intact, sclera white, conjunctiva moist  Ears: no lesions or deformities  Hearing: grossly intact  Nose: no lesions or deformities  Oropharynx: tongue normal, posterior pharynx without erythema or exudate  Neck: supple, trachea midline, no masses   Respiratory effort: no intercostal retractions or use of accessory muscles  Lung auscultation: no rales, rhonchi or wheezes  Heart auscultation: no murmur rub or gallop  Extremities: no cyanosis or edema  Abdomen: soft ,non tender, no masses  Gait and Station: normal  Digits and nails: no clubbing, cyanosis, petechiae or nodes.  Cranial nerves: grossly intact  Skin: no rashes, lesions or ulcers noted  Orientation: Oriented to time, person and place  Mood and affect: " mood and affect appropriate, normal interaction with examiner  Judgement: Intact          Assessment:  1. Chronic obstructive pulmonary disease, unspecified COPD type (CMS-[facility])     2. Allergic rhinitis, unspecified chronicity, unspecified seasonality, unspecified trigger     3. Abnormal CT scan, chest  CT-CHEST W/O LOW DOSE   4. Alpha-1-antitrypsin deficiency carrier (CMS-[facility])     5. History of tobacco abuse  CT-CHEST W/O LOW DOSE         Plan:    1) Her CT scan has been stable for over 2 years. However, patient states she smoked 1 PPD for 40 years and only quit 3 years ago. She should continue to have annual low dose screening CT's. She does have a family history for lung cancer. Repeat low dose CT chest ordered for 1 year.   2) Continue Anoro 1 dose INH daily. Continue Proair HFA inhaler 2 puffs Q 4 hours prn.   3) Alpha 1 test indicated an MF phenotype with a normal level.   4) Encouraged to continue routine walking/exercise. She is walking daily on her treadmill.   5) She is up to date on Prevnar 13, Pneumovax 23 and Influenza vaccinations.   6) Continue prn OTC antihistamine.  7) Follow up in 6 months, sooner if needed.

## 2017-11-09 NOTE — PATIENT INSTRUCTIONS
Plan:    1) Her CT scan has been stable for over 2 years. However, patient states she smoked 1 PPD for 40 years and only quit 3 years ago. She should continue to have annual low dose screening CT's. She does have a family history for lung cancer. Repeat low dose CT chest ordered for 1 year.   2) Continue Anoro 1 dose INH daily. Continue Proair HFA inhaler 2 puffs Q 4 hours prn.   3) Alpha 1 test indicated an MF phenotype with a normal level.   4) Encouraged to continue routine walking/exercise. She is walking daily on her treadmill.   5) She is up to date on Prevnar 13, Pneumovax 23 and Influenza vaccinations.   6) Continue prn OTC antihistamine.  7) Follow up in 6 months, sooner if needed.

## 2018-01-01 ENCOUNTER — HOSPITAL ENCOUNTER (OUTPATIENT)
Dept: RADIATION ONCOLOGY | Facility: MEDICAL CENTER | Age: 68
End: 2018-06-30
Attending: RADIOLOGY
Payer: MEDICARE

## 2018-01-01 ENCOUNTER — OUTPATIENT INFUSION SERVICES (OUTPATIENT)
Dept: ONCOLOGY | Facility: MEDICAL CENTER | Age: 68
End: 2018-01-01
Attending: RADIOLOGY
Payer: MEDICARE

## 2018-01-01 ENCOUNTER — NON-PROVIDER VISIT (OUTPATIENT)
Dept: HEMATOLOGY ONCOLOGY | Facility: MEDICAL CENTER | Age: 68
End: 2018-01-01
Payer: MEDICARE

## 2018-01-01 ENCOUNTER — HOSPITAL ENCOUNTER (OUTPATIENT)
Facility: MEDICAL CENTER | Age: 68
End: 2018-07-31
Attending: NURSE PRACTITIONER
Payer: MEDICARE

## 2018-01-01 ENCOUNTER — TELEPHONE (OUTPATIENT)
Dept: HEMATOLOGY ONCOLOGY | Facility: MEDICAL CENTER | Age: 68
End: 2018-01-01

## 2018-01-01 ENCOUNTER — PATIENT OUTREACH (OUTPATIENT)
Dept: OTHER | Facility: MEDICAL CENTER | Age: 68
End: 2018-01-01

## 2018-01-01 ENCOUNTER — HOSPITAL ENCOUNTER (OUTPATIENT)
Dept: RADIATION ONCOLOGY | Facility: MEDICAL CENTER | Age: 68
End: 2018-07-11

## 2018-01-01 ENCOUNTER — HOME STUDY (OUTPATIENT)
Dept: SLEEP MEDICINE | Facility: MEDICAL CENTER | Age: 68
End: 2018-01-01
Attending: NURSE PRACTITIONER
Payer: MEDICARE

## 2018-01-01 ENCOUNTER — OFFICE VISIT (OUTPATIENT)
Dept: HEMATOLOGY ONCOLOGY | Facility: MEDICAL CENTER | Age: 68
End: 2018-01-01
Payer: MEDICARE

## 2018-01-01 ENCOUNTER — DOCUMENTATION (OUTPATIENT)
Dept: HEMATOLOGY ONCOLOGY | Facility: MEDICAL CENTER | Age: 68
End: 2018-01-01

## 2018-01-01 ENCOUNTER — PHYSICAL THERAPY (OUTPATIENT)
Dept: PHYSICAL THERAPY | Facility: REHABILITATION | Age: 68
End: 2018-01-01
Attending: RADIOLOGY
Payer: MEDICARE

## 2018-01-01 ENCOUNTER — HOSPITAL ENCOUNTER (OUTPATIENT)
Dept: RADIATION ONCOLOGY | Facility: MEDICAL CENTER | Age: 68
End: 2018-12-31
Attending: RADIOLOGY
Payer: MEDICARE

## 2018-01-01 ENCOUNTER — HOSPITAL ENCOUNTER (OUTPATIENT)
Dept: RADIOLOGY | Facility: MEDICAL CENTER | Age: 68
End: 2018-06-06
Attending: FAMILY MEDICINE
Payer: MEDICARE

## 2018-01-01 ENCOUNTER — OUTPATIENT INFUSION SERVICES (OUTPATIENT)
Dept: ONCOLOGY | Facility: MEDICAL CENTER | Age: 68
End: 2018-01-01
Attending: INTERNAL MEDICINE
Payer: MEDICARE

## 2018-01-01 ENCOUNTER — HOSPITAL ENCOUNTER (OUTPATIENT)
Facility: MEDICAL CENTER | Age: 68
End: 2018-09-11
Attending: INTERNAL MEDICINE
Payer: MEDICARE

## 2018-01-01 ENCOUNTER — PHYSICAL THERAPY (OUTPATIENT)
Dept: PHYSICAL THERAPY | Facility: REHABILITATION | Age: 68
End: 2018-01-01
Attending: NURSE PRACTITIONER
Payer: MEDICARE

## 2018-01-01 ENCOUNTER — PATIENT MESSAGE (OUTPATIENT)
Dept: HEALTH INFORMATION MANAGEMENT | Facility: OTHER | Age: 68
End: 2018-01-01

## 2018-01-01 ENCOUNTER — HOSPITAL ENCOUNTER (OUTPATIENT)
Dept: RADIATION ONCOLOGY | Facility: MEDICAL CENTER | Age: 68
End: 2018-07-24

## 2018-01-01 ENCOUNTER — OFFICE VISIT (OUTPATIENT)
Dept: PULMONOLOGY | Facility: HOSPICE | Age: 68
End: 2018-01-01
Payer: MEDICARE

## 2018-01-01 ENCOUNTER — HOSPITAL ENCOUNTER (OUTPATIENT)
Dept: RADIATION ONCOLOGY | Facility: MEDICAL CENTER | Age: 68
End: 2018-07-13

## 2018-01-01 ENCOUNTER — APPOINTMENT (OUTPATIENT)
Dept: RADIOLOGY | Facility: MEDICAL CENTER | Age: 68
End: 2018-01-01
Attending: INTERNAL MEDICINE
Payer: MEDICARE

## 2018-01-01 ENCOUNTER — OFFICE VISIT (OUTPATIENT)
Dept: URGENT CARE | Facility: PHYSICIAN GROUP | Age: 68
End: 2018-01-01
Payer: MEDICARE

## 2018-01-01 ENCOUNTER — NON-PROVIDER VISIT (OUTPATIENT)
Dept: PULMONOLOGY | Facility: HOSPICE | Age: 68
End: 2018-01-01
Payer: MEDICARE

## 2018-01-01 ENCOUNTER — HOSPITAL ENCOUNTER (OUTPATIENT)
Facility: MEDICAL CENTER | Age: 68
End: 2018-08-21
Attending: INTERNAL MEDICINE
Payer: MEDICARE

## 2018-01-01 ENCOUNTER — HOSPITAL ENCOUNTER (OUTPATIENT)
Facility: MEDICAL CENTER | Age: 68
End: 2018-11-14
Attending: INTERNAL MEDICINE
Payer: MEDICARE

## 2018-01-01 ENCOUNTER — HOSPITAL ENCOUNTER (OUTPATIENT)
Facility: MEDICAL CENTER | Age: 68
End: 2018-08-07
Attending: INTERNAL MEDICINE
Payer: MEDICARE

## 2018-01-01 ENCOUNTER — HOSPITAL ENCOUNTER (OUTPATIENT)
Dept: RADIOLOGY | Facility: MEDICAL CENTER | Age: 68
End: 2018-06-27
Attending: INTERNAL MEDICINE
Payer: MEDICARE

## 2018-01-01 ENCOUNTER — PATIENT MESSAGE (OUTPATIENT)
Dept: PULMONOLOGY | Facility: HOSPICE | Age: 68
End: 2018-01-01

## 2018-01-01 ENCOUNTER — HOSPITAL ENCOUNTER (OUTPATIENT)
Dept: RADIOLOGY | Facility: MEDICAL CENTER | Age: 68
End: 2018-06-29

## 2018-01-01 ENCOUNTER — HOSPITAL ENCOUNTER (OUTPATIENT)
Dept: RADIATION ONCOLOGY | Facility: MEDICAL CENTER | Age: 68
End: 2018-07-10

## 2018-01-01 ENCOUNTER — OFFICE VISIT (OUTPATIENT)
Dept: CARDIOLOGY | Facility: MEDICAL CENTER | Age: 68
End: 2018-01-01
Payer: MEDICARE

## 2018-01-01 ENCOUNTER — HOSPITAL ENCOUNTER (OUTPATIENT)
Dept: RADIATION ONCOLOGY | Facility: MEDICAL CENTER | Age: 68
End: 2018-07-31
Attending: RADIOLOGY
Payer: MEDICARE

## 2018-01-01 ENCOUNTER — HOSPITAL ENCOUNTER (OUTPATIENT)
Dept: RADIOLOGY | Facility: MEDICAL CENTER | Age: 68
End: 2018-09-18
Attending: INTERNAL MEDICINE
Payer: MEDICARE

## 2018-01-01 ENCOUNTER — PATIENT OUTREACH (OUTPATIENT)
Dept: HEALTH INFORMATION MANAGEMENT | Facility: OTHER | Age: 68
End: 2018-01-01

## 2018-01-01 ENCOUNTER — HOSPITAL ENCOUNTER (OUTPATIENT)
Dept: RADIATION ONCOLOGY | Facility: MEDICAL CENTER | Age: 68
End: 2018-07-23

## 2018-01-01 ENCOUNTER — HOSPITAL ENCOUNTER (OUTPATIENT)
Facility: MEDICAL CENTER | Age: 68
End: 2018-12-05
Attending: NURSE PRACTITIONER
Payer: MEDICARE

## 2018-01-01 ENCOUNTER — HOSPITAL ENCOUNTER (OUTPATIENT)
Dept: RADIOLOGY | Facility: MEDICAL CENTER | Age: 68
End: 2018-11-19
Attending: INTERNAL MEDICINE
Payer: MEDICARE

## 2018-01-01 ENCOUNTER — HOSPITAL ENCOUNTER (OUTPATIENT)
Dept: CARDIOLOGY | Facility: MEDICAL CENTER | Age: 68
End: 2018-06-27
Attending: INTERNAL MEDICINE
Payer: MEDICARE

## 2018-01-01 ENCOUNTER — HOSPITAL ENCOUNTER (OUTPATIENT)
Facility: MEDICAL CENTER | Age: 68
End: 2018-07-13
Attending: NURSE PRACTITIONER
Payer: MEDICARE

## 2018-01-01 ENCOUNTER — HOSPITAL ENCOUNTER (OUTPATIENT)
Facility: MEDICAL CENTER | Age: 68
End: 2018-10-02
Attending: INTERNAL MEDICINE
Payer: MEDICARE

## 2018-01-01 ENCOUNTER — HOSPITAL ENCOUNTER (OUTPATIENT)
Dept: RADIATION ONCOLOGY | Facility: MEDICAL CENTER | Age: 68
End: 2018-07-20

## 2018-01-01 ENCOUNTER — HOSPITAL ENCOUNTER (OUTPATIENT)
Facility: MEDICAL CENTER | Age: 68
End: 2018-06-19
Attending: INTERNAL MEDICINE
Payer: MEDICARE

## 2018-01-01 ENCOUNTER — HOSPITAL ENCOUNTER (OUTPATIENT)
Dept: RADIOLOGY | Facility: MEDICAL CENTER | Age: 68
End: 2018-10-02
Attending: INTERNAL MEDICINE
Payer: MEDICARE

## 2018-01-01 ENCOUNTER — HOSPITAL ENCOUNTER (OUTPATIENT)
Dept: RADIOLOGY | Facility: MEDICAL CENTER | Age: 68
End: 2018-12-31
Attending: RADIOLOGY
Payer: MEDICARE

## 2018-01-01 ENCOUNTER — APPOINTMENT (OUTPATIENT)
Dept: RADIOLOGY | Facility: MEDICAL CENTER | Age: 68
End: 2018-01-01
Attending: EMERGENCY MEDICINE
Payer: MEDICARE

## 2018-01-01 ENCOUNTER — NON-PROVIDER VISIT (OUTPATIENT)
Dept: CARDIOLOGY | Facility: MEDICAL CENTER | Age: 68
End: 2018-01-01
Payer: MEDICARE

## 2018-01-01 ENCOUNTER — HOSPITAL ENCOUNTER (OUTPATIENT)
Dept: RADIOLOGY | Facility: MEDICAL CENTER | Age: 68
End: 2018-07-12
Attending: RADIOLOGY
Payer: MEDICARE

## 2018-01-01 ENCOUNTER — HOSPITAL ENCOUNTER (OUTPATIENT)
Dept: RADIATION ONCOLOGY | Facility: MEDICAL CENTER | Age: 68
End: 2018-07-19

## 2018-01-01 ENCOUNTER — TELEPHONE (OUTPATIENT)
Dept: RADIATION ONCOLOGY | Facility: MEDICAL CENTER | Age: 68
End: 2018-01-01

## 2018-01-01 ENCOUNTER — HOSPITAL ENCOUNTER (OUTPATIENT)
Dept: RADIATION ONCOLOGY | Facility: MEDICAL CENTER | Age: 68
End: 2018-07-09

## 2018-01-01 ENCOUNTER — HOSPITAL ENCOUNTER (OUTPATIENT)
Dept: RADIATION ONCOLOGY | Facility: MEDICAL CENTER | Age: 68
End: 2018-07-17

## 2018-01-01 ENCOUNTER — HOSPITAL ENCOUNTER (OUTPATIENT)
Dept: RADIATION ONCOLOGY | Facility: MEDICAL CENTER | Age: 68
End: 2018-07-12

## 2018-01-01 ENCOUNTER — HOSPITAL ENCOUNTER (OUTPATIENT)
Dept: CARDIOLOGY | Facility: MEDICAL CENTER | Age: 68
End: 2018-10-02
Attending: INTERNAL MEDICINE
Payer: MEDICARE

## 2018-01-01 ENCOUNTER — SLEEP STUDY (OUTPATIENT)
Dept: SLEEP MEDICINE | Facility: MEDICAL CENTER | Age: 68
End: 2018-01-01
Attending: PHYSICIAN ASSISTANT
Payer: MEDICARE

## 2018-01-01 ENCOUNTER — HOSPITAL ENCOUNTER (OUTPATIENT)
Dept: RADIATION ONCOLOGY | Facility: MEDICAL CENTER | Age: 68
End: 2018-07-16

## 2018-01-01 ENCOUNTER — HOSPITAL ENCOUNTER (OUTPATIENT)
Facility: MEDICAL CENTER | Age: 68
End: 2018-10-24
Attending: NURSE PRACTITIONER
Payer: MEDICARE

## 2018-01-01 ENCOUNTER — HOSPITAL ENCOUNTER (OUTPATIENT)
Facility: MEDICAL CENTER | Age: 68
End: 2018-12-27
Attending: NURSE PRACTITIONER
Payer: MEDICARE

## 2018-01-01 ENCOUNTER — HOSPITAL ENCOUNTER (OUTPATIENT)
Facility: MEDICAL CENTER | Age: 68
End: 2018-08-14
Attending: NURSE PRACTITIONER
Payer: MEDICARE

## 2018-01-01 ENCOUNTER — APPOINTMENT (OUTPATIENT)
Dept: HEMATOLOGY ONCOLOGY | Facility: MEDICAL CENTER | Age: 68
End: 2018-01-01
Payer: MEDICARE

## 2018-01-01 ENCOUNTER — HOSPITAL ENCOUNTER (OUTPATIENT)
Dept: RADIATION ONCOLOGY | Facility: MEDICAL CENTER | Age: 68
End: 2018-09-30
Attending: RADIOLOGY
Payer: MEDICARE

## 2018-01-01 ENCOUNTER — HOSPITAL ENCOUNTER (OUTPATIENT)
Facility: MEDICAL CENTER | Age: 68
End: 2018-08-14
Attending: INTERNAL MEDICINE
Payer: MEDICARE

## 2018-01-01 ENCOUNTER — OFFICE VISIT (OUTPATIENT)
Dept: INTERNAL MEDICINE | Facility: MEDICAL CENTER | Age: 68
End: 2018-01-01
Payer: MEDICARE

## 2018-01-01 ENCOUNTER — HOSPITAL ENCOUNTER (OUTPATIENT)
Dept: LAB | Facility: MEDICAL CENTER | Age: 68
End: 2018-12-27
Attending: NURSE PRACTITIONER
Payer: MEDICARE

## 2018-01-01 ENCOUNTER — HOSPITAL ENCOUNTER (OUTPATIENT)
Facility: MEDICAL CENTER | Age: 68
End: 2018-06-25
Attending: INTERNAL MEDICINE | Admitting: INTERNAL MEDICINE
Payer: MEDICARE

## 2018-01-01 ENCOUNTER — APPOINTMENT (OUTPATIENT)
Dept: PULMONOLOGY | Facility: HOSPICE | Age: 68
End: 2018-01-01
Payer: MEDICARE

## 2018-01-01 ENCOUNTER — HOSPITAL ENCOUNTER (OUTPATIENT)
Facility: MEDICAL CENTER | Age: 68
End: 2018-07-24
Attending: INTERNAL MEDICINE
Payer: MEDICARE

## 2018-01-01 ENCOUNTER — HOSPITAL ENCOUNTER (OUTPATIENT)
Dept: RADIATION ONCOLOGY | Facility: MEDICAL CENTER | Age: 68
End: 2018-07-02

## 2018-01-01 ENCOUNTER — HOSPITAL ENCOUNTER (OUTPATIENT)
Dept: RADIOLOGY | Facility: MEDICAL CENTER | Age: 68
End: 2018-12-27
Attending: NURSE PRACTITIONER
Payer: MEDICARE

## 2018-01-01 ENCOUNTER — APPOINTMENT (OUTPATIENT)
Dept: ADMISSIONS | Facility: MEDICAL CENTER | Age: 68
End: 2018-01-01
Attending: INTERNAL MEDICINE
Payer: MEDICARE

## 2018-01-01 ENCOUNTER — HOSPITAL ENCOUNTER (EMERGENCY)
Facility: MEDICAL CENTER | Age: 68
End: 2018-11-02
Attending: EMERGENCY MEDICINE
Payer: MEDICARE

## 2018-01-01 VITALS
TEMPERATURE: 97.3 F | SYSTOLIC BLOOD PRESSURE: 113 MMHG | WEIGHT: 130.29 LBS | BODY MASS INDEX: 23.98 KG/M2 | HEART RATE: 81 BPM | RESPIRATION RATE: 16 BRPM | DIASTOLIC BLOOD PRESSURE: 65 MMHG | OXYGEN SATURATION: 94 % | HEIGHT: 62 IN

## 2018-01-01 VITALS
WEIGHT: 123.02 LBS | HEART RATE: 85 BPM | TEMPERATURE: 97.6 F | OXYGEN SATURATION: 91 % | TEMPERATURE: 97.6 F | WEIGHT: 121.8 LBS | BODY MASS INDEX: 22.41 KG/M2 | SYSTOLIC BLOOD PRESSURE: 114 MMHG | RESPIRATION RATE: 18 BRPM | RESPIRATION RATE: 19 BRPM | DIASTOLIC BLOOD PRESSURE: 66 MMHG | HEART RATE: 93 BPM | OXYGEN SATURATION: 92 % | HEIGHT: 62 IN | SYSTOLIC BLOOD PRESSURE: 102 MMHG | HEIGHT: 61 IN | DIASTOLIC BLOOD PRESSURE: 50 MMHG | BODY MASS INDEX: 23.23 KG/M2

## 2018-01-01 VITALS
HEIGHT: 61 IN | RESPIRATION RATE: 18 BRPM | RESPIRATION RATE: 18 BRPM | SYSTOLIC BLOOD PRESSURE: 104 MMHG | HEART RATE: 107 BPM | BODY MASS INDEX: 23.1 KG/M2 | DIASTOLIC BLOOD PRESSURE: 40 MMHG | OXYGEN SATURATION: 93 % | WEIGHT: 122.36 LBS | TEMPERATURE: 97.9 F | BODY MASS INDEX: 22.31 KG/M2 | WEIGHT: 118.17 LBS | DIASTOLIC BLOOD PRESSURE: 48 MMHG | HEIGHT: 61 IN | TEMPERATURE: 97.6 F | OXYGEN SATURATION: 94 % | SYSTOLIC BLOOD PRESSURE: 107 MMHG | HEART RATE: 101 BPM

## 2018-01-01 VITALS
HEART RATE: 94 BPM | HEIGHT: 62 IN | SYSTOLIC BLOOD PRESSURE: 106 MMHG | BODY MASS INDEX: 23.27 KG/M2 | WEIGHT: 126.43 LBS | OXYGEN SATURATION: 94 % | DIASTOLIC BLOOD PRESSURE: 40 MMHG | RESPIRATION RATE: 16 BRPM | TEMPERATURE: 97.5 F

## 2018-01-01 VITALS
DIASTOLIC BLOOD PRESSURE: 47 MMHG | RESPIRATION RATE: 18 BRPM | HEART RATE: 97 BPM | SYSTOLIC BLOOD PRESSURE: 92 MMHG | WEIGHT: 119.71 LBS | OXYGEN SATURATION: 93 % | TEMPERATURE: 97.9 F | BODY MASS INDEX: 23.28 KG/M2 | HEART RATE: 61 BPM | RESPIRATION RATE: 18 BRPM | TEMPERATURE: 97.3 F | DIASTOLIC BLOOD PRESSURE: 53 MMHG | WEIGHT: 125.22 LBS | HEIGHT: 61 IN | SYSTOLIC BLOOD PRESSURE: 108 MMHG | BODY MASS INDEX: 22.6 KG/M2

## 2018-01-01 VITALS
RESPIRATION RATE: 18 BRPM | TEMPERATURE: 97.2 F | SYSTOLIC BLOOD PRESSURE: 144 MMHG | HEART RATE: 69 BPM | BODY MASS INDEX: 23.96 KG/M2 | OXYGEN SATURATION: 99 % | HEIGHT: 62 IN | DIASTOLIC BLOOD PRESSURE: 89 MMHG | WEIGHT: 130.18 LBS

## 2018-01-01 VITALS
SYSTOLIC BLOOD PRESSURE: 98 MMHG | HEART RATE: 86 BPM | OXYGEN SATURATION: 90 % | RESPIRATION RATE: 16 BRPM | DIASTOLIC BLOOD PRESSURE: 53 MMHG | BODY MASS INDEX: 22.31 KG/M2 | HEIGHT: 62 IN | TEMPERATURE: 97.2 F | WEIGHT: 121.25 LBS

## 2018-01-01 VITALS
SYSTOLIC BLOOD PRESSURE: 120 MMHG | BODY MASS INDEX: 22.45 KG/M2 | OXYGEN SATURATION: 94 % | HEIGHT: 62 IN | WEIGHT: 122 LBS | TEMPERATURE: 97.3 F | RESPIRATION RATE: 18 BRPM | HEART RATE: 122 BPM | DIASTOLIC BLOOD PRESSURE: 55 MMHG

## 2018-01-01 VITALS
SYSTOLIC BLOOD PRESSURE: 109 MMHG | HEIGHT: 62 IN | WEIGHT: 122.25 LBS | BODY MASS INDEX: 22.5 KG/M2 | HEART RATE: 94 BPM | OXYGEN SATURATION: 92 % | RESPIRATION RATE: 16 BRPM | TEMPERATURE: 97.7 F | DIASTOLIC BLOOD PRESSURE: 64 MMHG

## 2018-01-01 VITALS
TEMPERATURE: 97.3 F | RESPIRATION RATE: 18 BRPM | WEIGHT: 122.58 LBS | DIASTOLIC BLOOD PRESSURE: 55 MMHG | HEART RATE: 112 BPM | OXYGEN SATURATION: 94 % | HEIGHT: 62 IN | BODY MASS INDEX: 22.56 KG/M2 | SYSTOLIC BLOOD PRESSURE: 120 MMHG

## 2018-01-01 VITALS
WEIGHT: 124.34 LBS | HEIGHT: 62 IN | HEART RATE: 97 BPM | HEART RATE: 87 BPM | SYSTOLIC BLOOD PRESSURE: 120 MMHG | DIASTOLIC BLOOD PRESSURE: 76 MMHG | OXYGEN SATURATION: 96 % | OXYGEN SATURATION: 94 % | WEIGHT: 123.79 LBS | RESPIRATION RATE: 16 BRPM | BODY MASS INDEX: 23.48 KG/M2 | TEMPERATURE: 97.6 F | SYSTOLIC BLOOD PRESSURE: 98 MMHG | HEIGHT: 61 IN | BODY MASS INDEX: 22.78 KG/M2 | RESPIRATION RATE: 16 BRPM | TEMPERATURE: 96.8 F | DIASTOLIC BLOOD PRESSURE: 46 MMHG

## 2018-01-01 VITALS
BODY MASS INDEX: 23.96 KG/M2 | SYSTOLIC BLOOD PRESSURE: 144 MMHG | WEIGHT: 130.18 LBS | RESPIRATION RATE: 18 BRPM | DIASTOLIC BLOOD PRESSURE: 89 MMHG | HEART RATE: 69 BPM | OXYGEN SATURATION: 99 % | HEIGHT: 62 IN | TEMPERATURE: 97.2 F

## 2018-01-01 VITALS
HEIGHT: 61 IN | BODY MASS INDEX: 24.14 KG/M2 | OXYGEN SATURATION: 94 % | RESPIRATION RATE: 18 BRPM | DIASTOLIC BLOOD PRESSURE: 73 MMHG | HEIGHT: 61 IN | BODY MASS INDEX: 23.22 KG/M2 | OXYGEN SATURATION: 94 % | HEART RATE: 96 BPM | WEIGHT: 123 LBS | HEART RATE: 87 BPM | TEMPERATURE: 98 F | SYSTOLIC BLOOD PRESSURE: 93 MMHG | DIASTOLIC BLOOD PRESSURE: 46 MMHG | RESPIRATION RATE: 16 BRPM | TEMPERATURE: 97.7 F | WEIGHT: 127.87 LBS | SYSTOLIC BLOOD PRESSURE: 125 MMHG

## 2018-01-01 VITALS
OXYGEN SATURATION: 92 % | WEIGHT: 122.47 LBS | TEMPERATURE: 97.4 F | HEIGHT: 62 IN | HEART RATE: 91 BPM | WEIGHT: 122 LBS | DIASTOLIC BLOOD PRESSURE: 63 MMHG | RESPIRATION RATE: 18 BRPM | BODY MASS INDEX: 22.45 KG/M2 | BODY MASS INDEX: 22.54 KG/M2 | HEART RATE: 92 BPM | DIASTOLIC BLOOD PRESSURE: 62 MMHG | TEMPERATURE: 97.7 F | SYSTOLIC BLOOD PRESSURE: 94 MMHG | SYSTOLIC BLOOD PRESSURE: 114 MMHG | RESPIRATION RATE: 16 BRPM | HEIGHT: 62 IN | OXYGEN SATURATION: 97 %

## 2018-01-01 VITALS
SYSTOLIC BLOOD PRESSURE: 108 MMHG | BODY MASS INDEX: 23.69 KG/M2 | HEIGHT: 62 IN | TEMPERATURE: 97.2 F | DIASTOLIC BLOOD PRESSURE: 68 MMHG | RESPIRATION RATE: 16 BRPM | HEART RATE: 105 BPM | WEIGHT: 128.75 LBS | OXYGEN SATURATION: 92 %

## 2018-01-01 VITALS
DIASTOLIC BLOOD PRESSURE: 70 MMHG | SYSTOLIC BLOOD PRESSURE: 118 MMHG | RESPIRATION RATE: 16 BRPM | HEIGHT: 62 IN | HEART RATE: 91 BPM | OXYGEN SATURATION: 93 % | BODY MASS INDEX: 22.76 KG/M2 | TEMPERATURE: 98 F | WEIGHT: 123.68 LBS

## 2018-01-01 VITALS
BODY MASS INDEX: 23.27 KG/M2 | TEMPERATURE: 97.8 F | HEART RATE: 99 BPM | WEIGHT: 123.24 LBS | RESPIRATION RATE: 18 BRPM | OXYGEN SATURATION: 92 % | SYSTOLIC BLOOD PRESSURE: 99 MMHG | HEIGHT: 61 IN | DIASTOLIC BLOOD PRESSURE: 42 MMHG

## 2018-01-01 VITALS
BODY MASS INDEX: 23.48 KG/M2 | SYSTOLIC BLOOD PRESSURE: 98 MMHG | WEIGHT: 124.34 LBS | HEART RATE: 97 BPM | TEMPERATURE: 97.6 F | OXYGEN SATURATION: 96 % | DIASTOLIC BLOOD PRESSURE: 52 MMHG | HEIGHT: 61 IN | RESPIRATION RATE: 16 BRPM

## 2018-01-01 VITALS
BODY MASS INDEX: 21.66 KG/M2 | DIASTOLIC BLOOD PRESSURE: 50 MMHG | TEMPERATURE: 98.8 F | SYSTOLIC BLOOD PRESSURE: 98 MMHG | HEIGHT: 62 IN | WEIGHT: 117.73 LBS | OXYGEN SATURATION: 93 % | RESPIRATION RATE: 16 BRPM | HEART RATE: 108 BPM

## 2018-01-01 VITALS
WEIGHT: 126.43 LBS | HEART RATE: 94 BPM | TEMPERATURE: 97.5 F | DIASTOLIC BLOOD PRESSURE: 40 MMHG | SYSTOLIC BLOOD PRESSURE: 106 MMHG | HEIGHT: 61 IN | BODY MASS INDEX: 23.87 KG/M2 | RESPIRATION RATE: 16 BRPM | OXYGEN SATURATION: 94 %

## 2018-01-01 VITALS
BODY MASS INDEX: 22.45 KG/M2 | HEIGHT: 62 IN | DIASTOLIC BLOOD PRESSURE: 70 MMHG | HEART RATE: 110 BPM | WEIGHT: 122 LBS | SYSTOLIC BLOOD PRESSURE: 120 MMHG

## 2018-01-01 VITALS
BODY MASS INDEX: 23.89 KG/M2 | WEIGHT: 129.8 LBS | OXYGEN SATURATION: 90 % | DIASTOLIC BLOOD PRESSURE: 65 MMHG | HEIGHT: 62 IN | TEMPERATURE: 97.5 F | RESPIRATION RATE: 17 BRPM | HEART RATE: 85 BPM | SYSTOLIC BLOOD PRESSURE: 151 MMHG

## 2018-01-01 VITALS
OXYGEN SATURATION: 93 % | BODY MASS INDEX: 23.92 KG/M2 | WEIGHT: 130 LBS | HEIGHT: 62 IN | HEART RATE: 87 BPM | TEMPERATURE: 98.3 F | DIASTOLIC BLOOD PRESSURE: 88 MMHG | SYSTOLIC BLOOD PRESSURE: 124 MMHG | HEART RATE: 86 BPM | SYSTOLIC BLOOD PRESSURE: 138 MMHG | RESPIRATION RATE: 16 BRPM | OXYGEN SATURATION: 94 % | TEMPERATURE: 97.8 F | DIASTOLIC BLOOD PRESSURE: 78 MMHG | WEIGHT: 125 LBS | HEIGHT: 62 IN | BODY MASS INDEX: 23 KG/M2

## 2018-01-01 VITALS
BODY MASS INDEX: 22.08 KG/M2 | WEIGHT: 120 LBS | DIASTOLIC BLOOD PRESSURE: 62 MMHG | TEMPERATURE: 97.7 F | RESPIRATION RATE: 15 BRPM | HEIGHT: 62 IN | OXYGEN SATURATION: 95 % | SYSTOLIC BLOOD PRESSURE: 104 MMHG | HEART RATE: 102 BPM

## 2018-01-01 VITALS
WEIGHT: 123.4 LBS | HEIGHT: 61 IN | SYSTOLIC BLOOD PRESSURE: 125 MMHG | OXYGEN SATURATION: 94 % | DIASTOLIC BLOOD PRESSURE: 73 MMHG | TEMPERATURE: 97.7 F | HEART RATE: 87 BPM | BODY MASS INDEX: 23.3 KG/M2

## 2018-01-01 VITALS
HEIGHT: 63 IN | RESPIRATION RATE: 18 BRPM | SYSTOLIC BLOOD PRESSURE: 102 MMHG | DIASTOLIC BLOOD PRESSURE: 70 MMHG | WEIGHT: 124.34 LBS | BODY MASS INDEX: 22.03 KG/M2 | HEART RATE: 119 BPM | TEMPERATURE: 97.9 F | OXYGEN SATURATION: 95 %

## 2018-01-01 VITALS
HEIGHT: 62 IN | SYSTOLIC BLOOD PRESSURE: 98 MMHG | RESPIRATION RATE: 16 BRPM | TEMPERATURE: 98.8 F | DIASTOLIC BLOOD PRESSURE: 50 MMHG | OXYGEN SATURATION: 93 % | BODY MASS INDEX: 21.9 KG/M2 | HEART RATE: 108 BPM | WEIGHT: 119 LBS

## 2018-01-01 VITALS
OXYGEN SATURATION: 97 % | BODY MASS INDEX: 21.66 KG/M2 | HEART RATE: 104 BPM | DIASTOLIC BLOOD PRESSURE: 46 MMHG | TEMPERATURE: 98.4 F | HEIGHT: 62 IN | WEIGHT: 117.7 LBS | SYSTOLIC BLOOD PRESSURE: 120 MMHG

## 2018-01-01 VITALS
BODY MASS INDEX: 23.85 KG/M2 | HEIGHT: 62 IN | HEART RATE: 78 BPM | RESPIRATION RATE: 16 BRPM | OXYGEN SATURATION: 91 % | DIASTOLIC BLOOD PRESSURE: 74 MMHG | WEIGHT: 129.63 LBS | SYSTOLIC BLOOD PRESSURE: 138 MMHG | TEMPERATURE: 98.1 F

## 2018-01-01 VITALS — BODY MASS INDEX: 22.31 KG/M2 | WEIGHT: 122 LBS

## 2018-01-01 VITALS
TEMPERATURE: 97.4 F | HEIGHT: 62 IN | RESPIRATION RATE: 16 BRPM | WEIGHT: 130.4 LBS | SYSTOLIC BLOOD PRESSURE: 113 MMHG | DIASTOLIC BLOOD PRESSURE: 65 MMHG | BODY MASS INDEX: 24 KG/M2 | OXYGEN SATURATION: 94 % | HEART RATE: 81 BPM

## 2018-01-01 VITALS
HEART RATE: 99 BPM | DIASTOLIC BLOOD PRESSURE: 52 MMHG | BODY MASS INDEX: 22.88 KG/M2 | TEMPERATURE: 97.6 F | OXYGEN SATURATION: 92 % | HEIGHT: 62 IN | RESPIRATION RATE: 18 BRPM | SYSTOLIC BLOOD PRESSURE: 124 MMHG | WEIGHT: 124.34 LBS

## 2018-01-01 VITALS
HEART RATE: 91 BPM | RESPIRATION RATE: 16 BRPM | TEMPERATURE: 97.9 F | HEIGHT: 62 IN | OXYGEN SATURATION: 94 % | DIASTOLIC BLOOD PRESSURE: 78 MMHG | BODY MASS INDEX: 23.55 KG/M2 | SYSTOLIC BLOOD PRESSURE: 148 MMHG | WEIGHT: 128 LBS

## 2018-01-01 VITALS
BODY MASS INDEX: 22.84 KG/M2 | DIASTOLIC BLOOD PRESSURE: 80 MMHG | HEIGHT: 61 IN | RESPIRATION RATE: 16 BRPM | HEART RATE: 114 BPM | OXYGEN SATURATION: 94 % | SYSTOLIC BLOOD PRESSURE: 120 MMHG | WEIGHT: 121 LBS | TEMPERATURE: 97.9 F

## 2018-01-01 VITALS
BODY MASS INDEX: 22.08 KG/M2 | RESPIRATION RATE: 12 BRPM | OXYGEN SATURATION: 95 % | HEIGHT: 62 IN | SYSTOLIC BLOOD PRESSURE: 112 MMHG | DIASTOLIC BLOOD PRESSURE: 59 MMHG | WEIGHT: 120 LBS | HEART RATE: 18 BPM

## 2018-01-01 VITALS
DIASTOLIC BLOOD PRESSURE: 40 MMHG | HEIGHT: 61 IN | TEMPERATURE: 97.8 F | WEIGHT: 125.66 LBS | RESPIRATION RATE: 18 BRPM | SYSTOLIC BLOOD PRESSURE: 92 MMHG | BODY MASS INDEX: 23.73 KG/M2 | HEART RATE: 94 BPM | OXYGEN SATURATION: 93 %

## 2018-01-01 VITALS
HEART RATE: 97 BPM | SYSTOLIC BLOOD PRESSURE: 92 MMHG | DIASTOLIC BLOOD PRESSURE: 53 MMHG | BODY MASS INDEX: 23.64 KG/M2 | RESPIRATION RATE: 18 BRPM | TEMPERATURE: 97.9 F | WEIGHT: 125.22 LBS | HEIGHT: 61 IN | OXYGEN SATURATION: 93 %

## 2018-01-01 VITALS
WEIGHT: 122 LBS | DIASTOLIC BLOOD PRESSURE: 56 MMHG | HEART RATE: 107 BPM | RESPIRATION RATE: 16 BRPM | SYSTOLIC BLOOD PRESSURE: 144 MMHG | BODY MASS INDEX: 22.45 KG/M2 | TEMPERATURE: 98.1 F | OXYGEN SATURATION: 96 % | HEIGHT: 62 IN

## 2018-01-01 VITALS
WEIGHT: 129.41 LBS | RESPIRATION RATE: 18 BRPM | TEMPERATURE: 97.2 F | DIASTOLIC BLOOD PRESSURE: 55 MMHG | OXYGEN SATURATION: 92 % | HEART RATE: 91 BPM | HEIGHT: 62 IN | SYSTOLIC BLOOD PRESSURE: 115 MMHG | BODY MASS INDEX: 23.81 KG/M2

## 2018-01-01 VITALS
WEIGHT: 125.66 LBS | BODY MASS INDEX: 23.73 KG/M2 | HEART RATE: 91 BPM | OXYGEN SATURATION: 93 % | TEMPERATURE: 97.7 F | RESPIRATION RATE: 18 BRPM | DIASTOLIC BLOOD PRESSURE: 51 MMHG | SYSTOLIC BLOOD PRESSURE: 107 MMHG | HEIGHT: 61 IN

## 2018-01-01 VITALS
HEIGHT: 62 IN | OXYGEN SATURATION: 92 % | SYSTOLIC BLOOD PRESSURE: 108 MMHG | WEIGHT: 128.75 LBS | TEMPERATURE: 97.2 F | RESPIRATION RATE: 16 BRPM | HEART RATE: 105 BPM | BODY MASS INDEX: 23.69 KG/M2 | DIASTOLIC BLOOD PRESSURE: 68 MMHG

## 2018-01-01 VITALS
BODY MASS INDEX: 22.64 KG/M2 | SYSTOLIC BLOOD PRESSURE: 126 MMHG | DIASTOLIC BLOOD PRESSURE: 62 MMHG | OXYGEN SATURATION: 96 % | TEMPERATURE: 98 F | HEART RATE: 84 BPM | WEIGHT: 121.8 LBS

## 2018-01-01 VITALS
HEIGHT: 62 IN | RESPIRATION RATE: 16 BRPM | OXYGEN SATURATION: 92 % | SYSTOLIC BLOOD PRESSURE: 109 MMHG | WEIGHT: 122.24 LBS | DIASTOLIC BLOOD PRESSURE: 64 MMHG | TEMPERATURE: 97.7 F | BODY MASS INDEX: 22.5 KG/M2 | HEART RATE: 94 BPM

## 2018-01-01 VITALS
OXYGEN SATURATION: 95 % | SYSTOLIC BLOOD PRESSURE: 102 MMHG | RESPIRATION RATE: 18 BRPM | WEIGHT: 124.34 LBS | BODY MASS INDEX: 22.03 KG/M2 | DIASTOLIC BLOOD PRESSURE: 70 MMHG | HEART RATE: 119 BPM | HEIGHT: 63 IN | TEMPERATURE: 97.9 F

## 2018-01-01 VITALS
RESPIRATION RATE: 16 BRPM | BODY MASS INDEX: 22.26 KG/M2 | HEART RATE: 86 BPM | SYSTOLIC BLOOD PRESSURE: 98 MMHG | DIASTOLIC BLOOD PRESSURE: 53 MMHG | TEMPERATURE: 97.2 F | HEIGHT: 62 IN | WEIGHT: 121 LBS | OXYGEN SATURATION: 90 %

## 2018-01-01 VITALS
HEART RATE: 69 BPM | TEMPERATURE: 97.8 F | WEIGHT: 128.75 LBS | RESPIRATION RATE: 18 BRPM | BODY MASS INDEX: 23.69 KG/M2 | OXYGEN SATURATION: 90 % | HEIGHT: 62 IN | DIASTOLIC BLOOD PRESSURE: 62 MMHG | SYSTOLIC BLOOD PRESSURE: 133 MMHG

## 2018-01-01 DIAGNOSIS — C79.51 BONE METASTASES: ICD-10-CM

## 2018-01-01 DIAGNOSIS — J44.9 CHRONIC OBSTRUCTIVE PULMONARY DISEASE, UNSPECIFIED COPD TYPE (HCC): ICD-10-CM

## 2018-01-01 DIAGNOSIS — C79.51 SECONDARY MALIGNANT NEOPLASM OF BONE AND BONE MARROW (HCC): ICD-10-CM

## 2018-01-01 DIAGNOSIS — R12 HEARTBURN: ICD-10-CM

## 2018-01-01 DIAGNOSIS — Z51.11 ENCOUNTER FOR ANTINEOPLASTIC CHEMOTHERAPY: ICD-10-CM

## 2018-01-01 DIAGNOSIS — I89.0 LYMPHEDEMA OF LEFT UPPER EXTREMITY: ICD-10-CM

## 2018-01-01 DIAGNOSIS — C50.919 METASTATIC BREAST CANCER: ICD-10-CM

## 2018-01-01 DIAGNOSIS — C79.51 MALIGNANT NEOPLASM METASTATIC TO BONE (HCC): ICD-10-CM

## 2018-01-01 DIAGNOSIS — M25.612 STIFFNESS OF LEFT SHOULDER JOINT: ICD-10-CM

## 2018-01-01 DIAGNOSIS — M25.551 RIGHT HIP PAIN: ICD-10-CM

## 2018-01-01 DIAGNOSIS — R30.0 DYSURIA: ICD-10-CM

## 2018-01-01 DIAGNOSIS — R93.89 ABNORMAL CT SCAN, CHEST: ICD-10-CM

## 2018-01-01 DIAGNOSIS — Z78.9 POOR TOLERANCE FOR ACTIVITY: ICD-10-CM

## 2018-01-01 DIAGNOSIS — M84.58XG: ICD-10-CM

## 2018-01-01 DIAGNOSIS — R06.02 SOB (SHORTNESS OF BREATH): ICD-10-CM

## 2018-01-01 DIAGNOSIS — R11.2 CHEMOTHERAPY INDUCED NAUSEA AND VOMITING: Primary | ICD-10-CM
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
## 2018-01-01 DIAGNOSIS — J30.9 ALLERGIC RHINITIS, UNSPECIFIED SEASONALITY, UNSPECIFIED TRIGGER: ICD-10-CM

## 2018-01-01 DIAGNOSIS — E78.5 HYPERLIPIDEMIA, UNSPECIFIED HYPERLIPIDEMIA TYPE: ICD-10-CM

## 2018-01-01 DIAGNOSIS — C50.912: ICD-10-CM

## 2018-01-01 DIAGNOSIS — Z65.8 PSYCHOSOCIAL DISTRESS: ICD-10-CM

## 2018-01-01 DIAGNOSIS — J43.2 CENTRILOBULAR EMPHYSEMA (HCC): ICD-10-CM

## 2018-01-01 DIAGNOSIS — Z92.21 S/P CHEMOTHERAPY, TIME SINCE LESS THAN 4 WEEKS: ICD-10-CM

## 2018-01-01 DIAGNOSIS — T45.1X5A CHEMOTHERAPY INDUCED NAUSEA AND VOMITING: Primary | ICD-10-CM

## 2018-01-01 DIAGNOSIS — J44.1 COPD EXACERBATION (HCC): ICD-10-CM

## 2018-01-01 DIAGNOSIS — C50.919 METASTATIC BREAST CANCER: Primary | ICD-10-CM

## 2018-01-01 DIAGNOSIS — J22 LRTI (LOWER RESPIRATORY TRACT INFECTION): ICD-10-CM

## 2018-01-01 DIAGNOSIS — C79.51 BONE METASTASIS: ICD-10-CM

## 2018-01-01 DIAGNOSIS — Z79.891 CHRONIC USE OF OPIATE DRUGS THERAPEUTIC PURPOSES: ICD-10-CM

## 2018-01-01 DIAGNOSIS — C79.52 SECONDARY MALIGNANT NEOPLASM OF BONE AND BONE MARROW (HCC): ICD-10-CM

## 2018-01-01 DIAGNOSIS — Z99.81 DEPENDENCE ON NOCTURNAL OXYGEN THERAPY: ICD-10-CM

## 2018-01-01 DIAGNOSIS — G89.3 PAIN DUE TO MALIGNANT NEOPLASM METASTATIC TO BONE (HCC): ICD-10-CM

## 2018-01-01 DIAGNOSIS — R91.1 SOLITARY PULMONARY NODULE: ICD-10-CM

## 2018-01-01 DIAGNOSIS — G47.34 NOCTURNAL HYPOXIA: ICD-10-CM

## 2018-01-01 DIAGNOSIS — T45.1X5A CHEMOTHERAPY-INDUCED NAUSEA: ICD-10-CM

## 2018-01-01 DIAGNOSIS — C79.51 PAIN DUE TO MALIGNANT NEOPLASM METASTATIC TO BONE (HCC): ICD-10-CM

## 2018-01-01 DIAGNOSIS — M25.512 ACUTE PAIN OF LEFT SHOULDER: ICD-10-CM

## 2018-01-01 DIAGNOSIS — M94.0 COSTOCHONDRITIS: Primary | ICD-10-CM

## 2018-01-01 DIAGNOSIS — M84.454A PATHOLOGICAL FRACTURE OF PELVIS, UNSPECIFIED PATHOLOGICAL CAUSE, INITIAL ENCOUNTER: ICD-10-CM

## 2018-01-01 DIAGNOSIS — F41.8 MIXED ANXIETY DEPRESSIVE DISORDER: ICD-10-CM

## 2018-01-01 DIAGNOSIS — M79.89 ARM SWELLING: ICD-10-CM

## 2018-01-01 DIAGNOSIS — C50.912 MALIGNANT NEOPLASM OF LEFT FEMALE BREAST, UNSPECIFIED ESTROGEN RECEPTOR STATUS, UNSPECIFIED SITE OF BREAST (HCC): ICD-10-CM

## 2018-01-01 DIAGNOSIS — S42.032A DISPLACED FRACTURE OF LATERAL END OF LEFT CLAVICLE, INITIAL ENCOUNTER FOR CLOSED FRACTURE: ICD-10-CM

## 2018-01-01 DIAGNOSIS — I10 ESSENTIAL HYPERTENSION: ICD-10-CM

## 2018-01-01 DIAGNOSIS — I27.20 PULMONARY HYPERTENSION (HCC): ICD-10-CM

## 2018-01-01 DIAGNOSIS — Z14.8 ALPHA-1-ANTITRYPSIN DEFICIENCY CARRIER: ICD-10-CM

## 2018-01-01 DIAGNOSIS — G47.33 OSA (OBSTRUCTIVE SLEEP APNEA): ICD-10-CM

## 2018-01-01 DIAGNOSIS — S42.021A CLOSED DISPLACED FRACTURE OF SHAFT OF RIGHT CLAVICLE, INITIAL ENCOUNTER: ICD-10-CM

## 2018-01-01 DIAGNOSIS — S09.90XA CLOSED HEAD INJURY, INITIAL ENCOUNTER: ICD-10-CM

## 2018-01-01 DIAGNOSIS — R09.02 HYPOXEMIA: ICD-10-CM

## 2018-01-01 DIAGNOSIS — I71.40 ABDOMINAL AORTIC ANEURYSM (AAA) WITHOUT RUPTURE (HCC): ICD-10-CM

## 2018-01-01 DIAGNOSIS — Z87.891 HISTORY OF TOBACCO ABUSE: ICD-10-CM

## 2018-01-01 DIAGNOSIS — Z87.891 HISTORY OF TOBACCO USE: ICD-10-CM

## 2018-01-01 DIAGNOSIS — G89.3 NEOPLASM RELATED PAIN: ICD-10-CM

## 2018-01-01 DIAGNOSIS — Z01.812 PRE-OPERATIVE LABORATORY EXAMINATION: ICD-10-CM

## 2018-01-01 DIAGNOSIS — C79.51 PAIN FROM BONE METASTASES (HCC): ICD-10-CM

## 2018-01-01 DIAGNOSIS — R40.20 LOSS OF CONSCIOUSNESS (HCC): ICD-10-CM

## 2018-01-01 DIAGNOSIS — R55 SYNCOPE, UNSPECIFIED SYNCOPE TYPE: ICD-10-CM

## 2018-01-01 DIAGNOSIS — G45.8 SUBCLAVIAN STEAL SYNDROME: ICD-10-CM

## 2018-01-01 DIAGNOSIS — M85.89 OSTEOPENIA OF MULTIPLE SITES: ICD-10-CM

## 2018-01-01 DIAGNOSIS — R06.09 DYSPNEA ON EXERTION: ICD-10-CM

## 2018-01-01 DIAGNOSIS — R05.9 COUGH: ICD-10-CM

## 2018-01-01 DIAGNOSIS — G89.3 PAIN FROM BONE METASTASES (HCC): ICD-10-CM

## 2018-01-01 DIAGNOSIS — I65.29 STENOSIS OF CAROTID ARTERY, UNSPECIFIED LATERALITY: ICD-10-CM

## 2018-01-01 DIAGNOSIS — R00.0 SINUS TACHYCARDIA: ICD-10-CM

## 2018-01-01 DIAGNOSIS — R53.83 FATIGUE, UNSPECIFIED TYPE: ICD-10-CM

## 2018-01-01 DIAGNOSIS — R11.0 CHEMOTHERAPY-INDUCED NAUSEA: ICD-10-CM

## 2018-01-01 DIAGNOSIS — K59.03 CONSTIPATION DUE TO OPIOID THERAPY: ICD-10-CM

## 2018-01-01 DIAGNOSIS — T40.2X5A CONSTIPATION DUE TO OPIOID THERAPY: ICD-10-CM

## 2018-01-01 LAB
ALBUMIN SERPL BCP-MCNC: 3.4 G/DL (ref 3.2–4.9)
ALBUMIN SERPL BCP-MCNC: 3.5 G/DL (ref 3.2–4.9)
ALBUMIN SERPL BCP-MCNC: 3.5 G/DL (ref 3.2–4.9)
ALBUMIN SERPL BCP-MCNC: 3.6 G/DL (ref 3.2–4.9)
ALBUMIN SERPL BCP-MCNC: 3.7 G/DL (ref 3.2–4.9)
ALBUMIN SERPL BCP-MCNC: 3.7 G/DL (ref 3.2–4.9)
ALBUMIN SERPL BCP-MCNC: 3.8 G/DL (ref 3.2–4.9)
ALBUMIN SERPL BCP-MCNC: 3.9 G/DL (ref 3.2–4.9)
ALBUMIN SERPL BCP-MCNC: 3.9 G/DL (ref 3.2–4.9)
ALBUMIN SERPL BCP-MCNC: 4.1 G/DL (ref 3.2–4.9)
ALBUMIN SERPL BCP-MCNC: 4.1 G/DL (ref 3.2–4.9)
ALBUMIN SERPL BCP-MCNC: 4.2 G/DL (ref 3.2–4.9)
ALBUMIN/GLOB SERPL: 1 G/DL
ALBUMIN/GLOB SERPL: 1.3 G/DL
ALBUMIN/GLOB SERPL: 1.4 G/DL
ALBUMIN/GLOB SERPL: 1.5 G/DL
ALBUMIN/GLOB SERPL: 1.6 G/DL
ALBUMIN/GLOB SERPL: 1.7 G/DL
ALBUMIN/GLOB SERPL: 1.8 G/DL
ALP SERPL-CCNC: 103 U/L (ref 30–99)
ALP SERPL-CCNC: 104 U/L (ref 30–99)
ALP SERPL-CCNC: 107 U/L (ref 30–99)
ALP SERPL-CCNC: 109 U/L (ref 30–99)
ALP SERPL-CCNC: 83 U/L (ref 30–99)
ALP SERPL-CCNC: 87 U/L (ref 30–99)
ALP SERPL-CCNC: 88 U/L (ref 30–99)
ALP SERPL-CCNC: 93 U/L (ref 30–99)
ALP SERPL-CCNC: 93 U/L (ref 30–99)
ALP SERPL-CCNC: 94 U/L (ref 30–99)
ALP SERPL-CCNC: 95 U/L (ref 30–99)
ALP SERPL-CCNC: 98 U/L (ref 30–99)
ALT SERPL-CCNC: 10 U/L (ref 2–50)
ALT SERPL-CCNC: 11 U/L (ref 2–50)
ALT SERPL-CCNC: 12 U/L (ref 2–50)
ALT SERPL-CCNC: 12 U/L (ref 2–50)
ALT SERPL-CCNC: 13 U/L (ref 2–50)
ALT SERPL-CCNC: 14 U/L (ref 2–50)
ALT SERPL-CCNC: 15 U/L (ref 2–50)
ALT SERPL-CCNC: 17 U/L (ref 2–50)
ALT SERPL-CCNC: 9 U/L (ref 2–50)
ANION GAP SERPL CALC-SCNC: 10 MMOL/L (ref 0–11.9)
ANION GAP SERPL CALC-SCNC: 11 MMOL/L (ref 0–11.9)
ANION GAP SERPL CALC-SCNC: 13 MMOL/L (ref 0–11.9)
ANION GAP SERPL CALC-SCNC: 7 MMOL/L (ref 0–11.9)
ANION GAP SERPL CALC-SCNC: 8 MMOL/L (ref 0–11.9)
ANION GAP SERPL CALC-SCNC: 9 MMOL/L (ref 0–11.9)
ANISOCYTOSIS BLD QL SMEAR: ABNORMAL
APPEARANCE UR: ABNORMAL
APPEARANCE UR: CLEAR
AST SERPL-CCNC: 14 U/L (ref 12–45)
AST SERPL-CCNC: 15 U/L (ref 12–45)
AST SERPL-CCNC: 17 U/L (ref 12–45)
AST SERPL-CCNC: 18 U/L (ref 12–45)
AST SERPL-CCNC: 18 U/L (ref 12–45)
AST SERPL-CCNC: 20 U/L (ref 12–45)
AST SERPL-CCNC: 20 U/L (ref 12–45)
AST SERPL-CCNC: 24 U/L (ref 12–45)
BACTERIA #/AREA URNS HPF: NEGATIVE /HPF
BASOPHILS # BLD AUTO: 0 % (ref 0–1.8)
BASOPHILS # BLD AUTO: 0.3 % (ref 0–1.8)
BASOPHILS # BLD AUTO: 0.4 % (ref 0–1.8)
BASOPHILS # BLD AUTO: 0.5 % (ref 0–1.8)
BASOPHILS # BLD AUTO: 0.8 % (ref 0–1.8)
BASOPHILS # BLD AUTO: 0.8 % (ref 0–1.8)
BASOPHILS # BLD AUTO: 1 % (ref 0–1.8)
BASOPHILS # BLD: 0 K/UL (ref 0–0.12)
BASOPHILS # BLD: 0.02 K/UL (ref 0–0.12)
BASOPHILS # BLD: 0.02 K/UL (ref 0–0.12)
BASOPHILS # BLD: 0.03 K/UL (ref 0–0.12)
BASOPHILS # BLD: 0.03 K/UL (ref 0–0.12)
BASOPHILS # BLD: 0.04 K/UL (ref 0–0.12)
BASOPHILS # BLD: 0.05 K/UL (ref 0–0.12)
BASOPHILS # BLD: 0.07 K/UL (ref 0–0.12)
BILIRUB SERPL-MCNC: 0.3 MG/DL (ref 0.1–1.5)
BILIRUB SERPL-MCNC: 0.4 MG/DL (ref 0.1–1.5)
BILIRUB SERPL-MCNC: 0.5 MG/DL (ref 0.1–1.5)
BILIRUB UR QL STRIP.AUTO: NEGATIVE
BILIRUB UR STRIP-MCNC: NORMAL MG/DL
BUN SERPL-MCNC: 15 MG/DL (ref 8–22)
BUN SERPL-MCNC: 18 MG/DL (ref 8–22)
BUN SERPL-MCNC: 18 MG/DL (ref 8–22)
BUN SERPL-MCNC: 19 MG/DL (ref 8–22)
BUN SERPL-MCNC: 19 MG/DL (ref 8–22)
BUN SERPL-MCNC: 20 MG/DL (ref 8–22)
BUN SERPL-MCNC: 21 MG/DL (ref 8–22)
BUN SERPL-MCNC: 22 MG/DL (ref 8–22)
BUN SERPL-MCNC: 22 MG/DL (ref 8–22)
BUN SERPL-MCNC: 24 MG/DL (ref 8–22)
BUN SERPL-MCNC: 25 MG/DL (ref 8–22)
BUN SERPL-MCNC: 26 MG/DL (ref 8–22)
BURR CELLS BLD QL SMEAR: NORMAL
BURR CELLS BLD QL SMEAR: NORMAL
CALCIUM SERPL-MCNC: 10 MG/DL (ref 8.5–10.5)
CALCIUM SERPL-MCNC: 10.3 MG/DL (ref 8.5–10.5)
CALCIUM SERPL-MCNC: 8.4 MG/DL (ref 8.5–10.5)
CALCIUM SERPL-MCNC: 8.9 MG/DL (ref 8.5–10.5)
CALCIUM SERPL-MCNC: 8.9 MG/DL (ref 8.5–10.5)
CALCIUM SERPL-MCNC: 9 MG/DL (ref 8.5–10.5)
CALCIUM SERPL-MCNC: 9 MG/DL (ref 8.5–10.5)
CALCIUM SERPL-MCNC: 9.2 MG/DL (ref 8.5–10.5)
CALCIUM SERPL-MCNC: 9.4 MG/DL (ref 8.5–10.5)
CALCIUM SERPL-MCNC: 9.5 MG/DL (ref 8.5–10.5)
CALCIUM SERPL-MCNC: 9.7 MG/DL (ref 8.5–10.5)
CALCIUM SERPL-MCNC: 9.9 MG/DL (ref 8.5–10.5)
CANCER AG15-3 SERPL-ACNC: 36 U/ML (ref 0–31)
CANCER AG27-29 SERPL-ACNC: 100.9 U/ML (ref 0–40)
CANCER AG27-29 SERPL-ACNC: 102.9 U/ML (ref 0–40)
CANCER AG27-29 SERPL-ACNC: 144.9 U/ML (ref 0–40)
CANCER AG27-29 SERPL-ACNC: 49.8 U/ML (ref 0–40)
CANCER AG27-29 SERPL-ACNC: 62.2 U/ML (ref 0–40)
CANCER AG27-29 SERPL-ACNC: 62.6 U/ML (ref 0–40)
CANCER AG27-29 SERPL-ACNC: 71.9 U/ML (ref 0–40)
CEA SERPL-MCNC: 2.3 NG/ML (ref 0–3)
CHLORIDE SERPL-SCNC: 102 MMOL/L (ref 96–112)
CHLORIDE SERPL-SCNC: 102 MMOL/L (ref 96–112)
CHLORIDE SERPL-SCNC: 103 MMOL/L (ref 96–112)
CHLORIDE SERPL-SCNC: 103 MMOL/L (ref 96–112)
CHLORIDE SERPL-SCNC: 104 MMOL/L (ref 96–112)
CHLORIDE SERPL-SCNC: 104 MMOL/L (ref 96–112)
CHLORIDE SERPL-SCNC: 105 MMOL/L (ref 96–112)
CHLORIDE SERPL-SCNC: 106 MMOL/L (ref 96–112)
CHLORIDE SERPL-SCNC: 106 MMOL/L (ref 96–112)
CHLORIDE SERPL-SCNC: 107 MMOL/L (ref 96–112)
CHLORIDE SERPL-SCNC: 107 MMOL/L (ref 96–112)
CO2 SERPL-SCNC: 18 MMOL/L (ref 20–33)
CO2 SERPL-SCNC: 20 MMOL/L (ref 20–33)
CO2 SERPL-SCNC: 20 MMOL/L (ref 20–33)
CO2 SERPL-SCNC: 21 MMOL/L (ref 20–33)
CO2 SERPL-SCNC: 22 MMOL/L (ref 20–33)
CO2 SERPL-SCNC: 23 MMOL/L (ref 20–33)
CO2 SERPL-SCNC: 23 MMOL/L (ref 20–33)
CO2 SERPL-SCNC: 25 MMOL/L (ref 20–33)
CO2 SERPL-SCNC: 25 MMOL/L (ref 20–33)
CO2 SERPL-SCNC: 26 MMOL/L (ref 20–33)
COLOR UR AUTO: YELLOW
COLOR UR: YELLOW
COMMENT 1642: NORMAL
CREAT SERPL-MCNC: 0.47 MG/DL (ref 0.5–1.4)
CREAT SERPL-MCNC: 0.48 MG/DL (ref 0.5–1.4)
CREAT SERPL-MCNC: 0.48 MG/DL (ref 0.5–1.4)
CREAT SERPL-MCNC: 0.51 MG/DL (ref 0.5–1.4)
CREAT SERPL-MCNC: 0.53 MG/DL (ref 0.5–1.4)
CREAT SERPL-MCNC: 0.55 MG/DL (ref 0.5–1.4)
CREAT SERPL-MCNC: 0.55 MG/DL (ref 0.5–1.4)
CREAT SERPL-MCNC: 0.57 MG/DL (ref 0.5–1.4)
CREAT SERPL-MCNC: 0.58 MG/DL (ref 0.5–1.4)
CREAT SERPL-MCNC: 0.58 MG/DL (ref 0.5–1.4)
CREAT SERPL-MCNC: 0.6 MG/DL (ref 0.5–1.4)
CREAT SERPL-MCNC: 0.61 MG/DL (ref 0.5–1.4)
CREAT SERPL-MCNC: 0.61 MG/DL (ref 0.5–1.4)
CREAT SERPL-MCNC: 0.62 MG/DL (ref 0.5–1.4)
EKG IMPRESSION: NORMAL
EOSINOPHIL # BLD AUTO: 0.12 K/UL (ref 0–0.51)
EOSINOPHIL # BLD AUTO: 0.16 K/UL (ref 0–0.51)
EOSINOPHIL # BLD AUTO: 0.2 K/UL (ref 0–0.51)
EOSINOPHIL # BLD AUTO: 0.22 K/UL (ref 0–0.51)
EOSINOPHIL # BLD AUTO: 0.31 K/UL (ref 0–0.51)
EOSINOPHIL # BLD AUTO: 0.34 K/UL (ref 0–0.51)
EOSINOPHIL # BLD AUTO: 0.42 K/UL (ref 0–0.51)
EOSINOPHIL # BLD AUTO: 0.44 K/UL (ref 0–0.51)
EOSINOPHIL # BLD AUTO: 0.44 K/UL (ref 0–0.51)
EOSINOPHIL # BLD AUTO: 0.5 K/UL (ref 0–0.51)
EOSINOPHIL # BLD AUTO: 0.52 K/UL (ref 0–0.51)
EOSINOPHIL # BLD AUTO: 0.62 K/UL (ref 0–0.51)
EOSINOPHIL # BLD AUTO: 0.81 K/UL (ref 0–0.51)
EOSINOPHIL # BLD AUTO: 1.26 K/UL (ref 0–0.51)
EOSINOPHIL NFR BLD: 1.7 % (ref 0–6.9)
EOSINOPHIL NFR BLD: 1.7 % (ref 0–6.9)
EOSINOPHIL NFR BLD: 1.9 % (ref 0–6.9)
EOSINOPHIL NFR BLD: 11.6 % (ref 0–6.9)
EOSINOPHIL NFR BLD: 3.2 % (ref 0–6.9)
EOSINOPHIL NFR BLD: 3.5 % (ref 0–6.9)
EOSINOPHIL NFR BLD: 4.3 % (ref 0–6.9)
EOSINOPHIL NFR BLD: 4.7 % (ref 0–6.9)
EOSINOPHIL NFR BLD: 4.8 % (ref 0–6.9)
EOSINOPHIL NFR BLD: 5.3 % (ref 0–6.9)
EOSINOPHIL NFR BLD: 5.3 % (ref 0–6.9)
EOSINOPHIL NFR BLD: 6.1 % (ref 0–6.9)
EOSINOPHIL NFR BLD: 7.2 % (ref 0–6.9)
EOSINOPHIL NFR BLD: 7.9 % (ref 0–6.9)
EPI CELLS #/AREA URNS HPF: NEGATIVE /HPF
ERYTHROCYTE [DISTWIDTH] IN BLOOD BY AUTOMATED COUNT: 43.5 FL (ref 35.9–50)
ERYTHROCYTE [DISTWIDTH] IN BLOOD BY AUTOMATED COUNT: 43.8 FL (ref 35.9–50)
ERYTHROCYTE [DISTWIDTH] IN BLOOD BY AUTOMATED COUNT: 45.2 FL (ref 35.9–50)
ERYTHROCYTE [DISTWIDTH] IN BLOOD BY AUTOMATED COUNT: 46 FL (ref 35.9–50)
ERYTHROCYTE [DISTWIDTH] IN BLOOD BY AUTOMATED COUNT: 46.4 FL (ref 35.9–50)
ERYTHROCYTE [DISTWIDTH] IN BLOOD BY AUTOMATED COUNT: 47.3 FL (ref 35.9–50)
ERYTHROCYTE [DISTWIDTH] IN BLOOD BY AUTOMATED COUNT: 47.9 FL (ref 35.9–50)
ERYTHROCYTE [DISTWIDTH] IN BLOOD BY AUTOMATED COUNT: 53.1 FL (ref 35.9–50)
ERYTHROCYTE [DISTWIDTH] IN BLOOD BY AUTOMATED COUNT: 53.7 FL (ref 35.9–50)
ERYTHROCYTE [DISTWIDTH] IN BLOOD BY AUTOMATED COUNT: 54 FL (ref 35.9–50)
ERYTHROCYTE [DISTWIDTH] IN BLOOD BY AUTOMATED COUNT: 54.1 FL (ref 35.9–50)
ERYTHROCYTE [DISTWIDTH] IN BLOOD BY AUTOMATED COUNT: 54.1 FL (ref 35.9–50)
ERYTHROCYTE [DISTWIDTH] IN BLOOD BY AUTOMATED COUNT: 54.2 FL (ref 35.9–50)
ERYTHROCYTE [DISTWIDTH] IN BLOOD BY AUTOMATED COUNT: 56.4 FL (ref 35.9–50)
GLOBULIN SER CALC-MCNC: 2.1 G/DL (ref 1.9–3.5)
GLOBULIN SER CALC-MCNC: 2.4 G/DL (ref 1.9–3.5)
GLOBULIN SER CALC-MCNC: 2.5 G/DL (ref 1.9–3.5)
GLOBULIN SER CALC-MCNC: 2.6 G/DL (ref 1.9–3.5)
GLOBULIN SER CALC-MCNC: 2.7 G/DL (ref 1.9–3.5)
GLOBULIN SER CALC-MCNC: 2.7 G/DL (ref 1.9–3.5)
GLOBULIN SER CALC-MCNC: 2.8 G/DL (ref 1.9–3.5)
GLOBULIN SER CALC-MCNC: 2.9 G/DL (ref 1.9–3.5)
GLOBULIN SER CALC-MCNC: 3.3 G/DL (ref 1.9–3.5)
GLUCOSE SERPL-MCNC: 103 MG/DL (ref 65–99)
GLUCOSE SERPL-MCNC: 67 MG/DL (ref 65–99)
GLUCOSE SERPL-MCNC: 68 MG/DL (ref 65–99)
GLUCOSE SERPL-MCNC: 73 MG/DL (ref 65–99)
GLUCOSE SERPL-MCNC: 73 MG/DL (ref 65–99)
GLUCOSE SERPL-MCNC: 74 MG/DL (ref 65–99)
GLUCOSE SERPL-MCNC: 79 MG/DL (ref 65–99)
GLUCOSE SERPL-MCNC: 79 MG/DL (ref 65–99)
GLUCOSE SERPL-MCNC: 85 MG/DL (ref 65–99)
GLUCOSE SERPL-MCNC: 87 MG/DL (ref 65–99)
GLUCOSE SERPL-MCNC: 89 MG/DL (ref 65–99)
GLUCOSE SERPL-MCNC: 92 MG/DL (ref 65–99)
GLUCOSE SERPL-MCNC: 93 MG/DL (ref 65–99)
GLUCOSE SERPL-MCNC: 99 MG/DL (ref 65–99)
GLUCOSE UR STRIP.AUTO-MCNC: NEGATIVE MG/DL
GLUCOSE UR STRIP.AUTO-MCNC: NORMAL MG/DL
HCT VFR BLD AUTO: 33.9 % (ref 37–47)
HCT VFR BLD AUTO: 34 % (ref 37–47)
HCT VFR BLD AUTO: 34.9 % (ref 37–47)
HCT VFR BLD AUTO: 35.1 % (ref 37–47)
HCT VFR BLD AUTO: 37 % (ref 37–47)
HCT VFR BLD AUTO: 37.9 % (ref 37–47)
HCT VFR BLD AUTO: 38.7 % (ref 37–47)
HCT VFR BLD AUTO: 39.2 % (ref 37–47)
HCT VFR BLD AUTO: 41.3 % (ref 37–47)
HCT VFR BLD AUTO: 43.2 % (ref 37–47)
HCT VFR BLD AUTO: 45.5 % (ref 37–47)
HCT VFR BLD AUTO: 45.8 % (ref 37–47)
HGB BLD-MCNC: 10.1 G/DL (ref 12–16)
HGB BLD-MCNC: 10.3 G/DL (ref 12–16)
HGB BLD-MCNC: 11.1 G/DL (ref 12–16)
HGB BLD-MCNC: 11.1 G/DL (ref 12–16)
HGB BLD-MCNC: 11.2 G/DL (ref 12–16)
HGB BLD-MCNC: 11.9 G/DL (ref 12–16)
HGB BLD-MCNC: 12.1 G/DL (ref 12–16)
HGB BLD-MCNC: 12.5 G/DL (ref 12–16)
HGB BLD-MCNC: 13.4 G/DL (ref 12–16)
HGB BLD-MCNC: 14 G/DL (ref 12–16)
HGB BLD-MCNC: 14.4 G/DL (ref 12–16)
HGB BLD-MCNC: 14.4 G/DL (ref 12–16)
HYALINE CASTS #/AREA URNS LPF: NORMAL /LPF
IMM GRANULOCYTES # BLD AUTO: 0.04 K/UL (ref 0–0.11)
IMM GRANULOCYTES # BLD AUTO: 0.04 K/UL (ref 0–0.11)
IMM GRANULOCYTES # BLD AUTO: 0.06 K/UL (ref 0–0.11)
IMM GRANULOCYTES # BLD AUTO: 0.1 K/UL (ref 0–0.11)
IMM GRANULOCYTES # BLD AUTO: 0.11 K/UL (ref 0–0.11)
IMM GRANULOCYTES # BLD AUTO: 0.13 K/UL (ref 0–0.11)
IMM GRANULOCYTES # BLD AUTO: 0.18 K/UL (ref 0–0.11)
IMM GRANULOCYTES # BLD AUTO: 0.3 K/UL (ref 0–0.11)
IMM GRANULOCYTES # BLD AUTO: 0.3 K/UL (ref 0–0.11)
IMM GRANULOCYTES # BLD AUTO: 0.46 K/UL (ref 0–0.11)
IMM GRANULOCYTES # BLD AUTO: 0.47 K/UL (ref 0–0.11)
IMM GRANULOCYTES NFR BLD AUTO: 0.5 % (ref 0–0.9)
IMM GRANULOCYTES NFR BLD AUTO: 0.6 % (ref 0–0.9)
IMM GRANULOCYTES NFR BLD AUTO: 0.6 % (ref 0–0.9)
IMM GRANULOCYTES NFR BLD AUTO: 1 % (ref 0–0.9)
IMM GRANULOCYTES NFR BLD AUTO: 1.2 % (ref 0–0.9)
IMM GRANULOCYTES NFR BLD AUTO: 1.3 % (ref 0–0.9)
IMM GRANULOCYTES NFR BLD AUTO: 2.9 % (ref 0–0.9)
IMM GRANULOCYTES NFR BLD AUTO: 3.3 % (ref 0–0.9)
IMM GRANULOCYTES NFR BLD AUTO: 3.9 % (ref 0–0.9)
IMM GRANULOCYTES NFR BLD AUTO: 6 % (ref 0–0.9)
IMM GRANULOCYTES NFR BLD AUTO: 6.4 % (ref 0–0.9)
INR PPP: 0.96 (ref 0.87–1.13)
KETONES UR STRIP.AUTO-MCNC: NEGATIVE MG/DL
KETONES UR STRIP.AUTO-MCNC: NORMAL MG/DL
LEUKOCYTE ESTERASE UR QL STRIP.AUTO: NEGATIVE
LEUKOCYTE ESTERASE UR QL STRIP.AUTO: NORMAL
LV EJECT FRACT  99904: 60
LV EJECT FRACT  99904: 60
LV EJECT FRACT MOD 2C 99903: 59.55
LV EJECT FRACT MOD 2C 99903: 64.67
LV EJECT FRACT MOD 4C 99902: 53.96
LV EJECT FRACT MOD 4C 99902: 59.1
LV EJECT FRACT MOD BP 99901: 55.76
LV EJECT FRACT MOD BP 99901: 58.62
LYMPHOCYTES # BLD AUTO: 0.28 K/UL (ref 1–4.8)
LYMPHOCYTES # BLD AUTO: 0.51 K/UL (ref 1–4.8)
LYMPHOCYTES # BLD AUTO: 0.67 K/UL (ref 1–4.8)
LYMPHOCYTES # BLD AUTO: 0.71 K/UL (ref 1–4.8)
LYMPHOCYTES # BLD AUTO: 0.86 K/UL (ref 1–4.8)
LYMPHOCYTES # BLD AUTO: 0.96 K/UL (ref 1–4.8)
LYMPHOCYTES # BLD AUTO: 0.98 K/UL (ref 1–4.8)
LYMPHOCYTES # BLD AUTO: 1.02 K/UL (ref 1–4.8)
LYMPHOCYTES # BLD AUTO: 1.31 K/UL (ref 1–4.8)
LYMPHOCYTES # BLD AUTO: 1.38 K/UL (ref 1–4.8)
LYMPHOCYTES # BLD AUTO: 1.45 K/UL (ref 1–4.8)
LYMPHOCYTES # BLD AUTO: 1.46 K/UL (ref 1–4.8)
LYMPHOCYTES # BLD AUTO: 2 K/UL (ref 1–4.8)
LYMPHOCYTES # BLD AUTO: 2.96 K/UL (ref 1–4.8)
LYMPHOCYTES NFR BLD: 10.4 % (ref 22–41)
LYMPHOCYTES NFR BLD: 10.5 % (ref 22–41)
LYMPHOCYTES NFR BLD: 10.8 % (ref 22–41)
LYMPHOCYTES NFR BLD: 11.4 % (ref 22–41)
LYMPHOCYTES NFR BLD: 11.6 % (ref 22–41)
LYMPHOCYTES NFR BLD: 12 % (ref 22–41)
LYMPHOCYTES NFR BLD: 12.5 % (ref 22–41)
LYMPHOCYTES NFR BLD: 13.3 % (ref 22–41)
LYMPHOCYTES NFR BLD: 14.8 % (ref 22–41)
LYMPHOCYTES NFR BLD: 25.3 % (ref 22–41)
LYMPHOCYTES NFR BLD: 31.4 % (ref 22–41)
LYMPHOCYTES NFR BLD: 4.2 % (ref 22–41)
LYMPHOCYTES NFR BLD: 4.8 % (ref 22–41)
LYMPHOCYTES NFR BLD: 9.4 % (ref 22–41)
MACROCYTES BLD QL SMEAR: ABNORMAL
MANUAL DIFF BLD: ABNORMAL
MANUAL DIFF BLD: NORMAL
MANUAL DIFF BLD: NORMAL
MCH RBC QN AUTO: 25.1 PG (ref 27–33)
MCH RBC QN AUTO: 25.3 PG (ref 27–33)
MCH RBC QN AUTO: 25.9 PG (ref 27–33)
MCH RBC QN AUTO: 27.1 PG (ref 27–33)
MCH RBC QN AUTO: 27.3 PG (ref 27–33)
MCH RBC QN AUTO: 27.8 PG (ref 27–33)
MCH RBC QN AUTO: 27.9 PG (ref 27–33)
MCH RBC QN AUTO: 28 PG (ref 27–33)
MCH RBC QN AUTO: 28 PG (ref 27–33)
MCH RBC QN AUTO: 28.1 PG (ref 27–33)
MCH RBC QN AUTO: 28.2 PG (ref 27–33)
MCH RBC QN AUTO: 28.3 PG (ref 27–33)
MCH RBC QN AUTO: 28.3 PG (ref 27–33)
MCH RBC QN AUTO: 28.5 PG (ref 27–33)
MCHC RBC AUTO-ENTMCNC: 29.7 G/DL (ref 33.6–35)
MCHC RBC AUTO-ENTMCNC: 30 G/DL (ref 33.6–35)
MCHC RBC AUTO-ENTMCNC: 30.4 G/DL (ref 33.6–35)
MCHC RBC AUTO-ENTMCNC: 31.3 G/DL (ref 33.6–35)
MCHC RBC AUTO-ENTMCNC: 31.4 G/DL (ref 33.6–35)
MCHC RBC AUTO-ENTMCNC: 31.6 G/DL (ref 33.6–35)
MCHC RBC AUTO-ENTMCNC: 31.8 G/DL (ref 33.6–35)
MCHC RBC AUTO-ENTMCNC: 31.9 G/DL (ref 33.6–35)
MCHC RBC AUTO-ENTMCNC: 32.2 G/DL (ref 33.6–35)
MCHC RBC AUTO-ENTMCNC: 32.4 G/DL (ref 33.6–35)
MCHC RBC AUTO-ENTMCNC: 32.4 G/DL (ref 33.6–35)
MCHC RBC AUTO-ENTMCNC: 32.7 G/DL (ref 33.6–35)
MCV RBC AUTO: 82.7 FL (ref 81.4–97.8)
MCV RBC AUTO: 85 FL (ref 81.4–97.8)
MCV RBC AUTO: 86 FL (ref 81.4–97.8)
MCV RBC AUTO: 86.2 FL (ref 81.4–97.8)
MCV RBC AUTO: 86.2 FL (ref 81.4–97.8)
MCV RBC AUTO: 86.6 FL (ref 81.4–97.8)
MCV RBC AUTO: 86.7 FL (ref 81.4–97.8)
MCV RBC AUTO: 87.1 FL (ref 81.4–97.8)
MCV RBC AUTO: 87.3 FL (ref 81.4–97.8)
MCV RBC AUTO: 87.5 FL (ref 81.4–97.8)
MCV RBC AUTO: 87.9 FL (ref 81.4–97.8)
MCV RBC AUTO: 88.5 FL (ref 81.4–97.8)
MCV RBC AUTO: 88.6 FL (ref 81.4–97.8)
MCV RBC AUTO: 89.8 FL (ref 81.4–97.8)
METAMYELOCYTES NFR BLD MANUAL: 1.7 %
MICRO URNS: ABNORMAL
MICROCYTES BLD QL SMEAR: ABNORMAL
MONOCYTES # BLD AUTO: 0.57 K/UL (ref 0–0.85)
MONOCYTES # BLD AUTO: 0.6 K/UL (ref 0–0.85)
MONOCYTES # BLD AUTO: 0.62 K/UL (ref 0–0.85)
MONOCYTES # BLD AUTO: 0.68 K/UL (ref 0–0.85)
MONOCYTES # BLD AUTO: 0.72 K/UL (ref 0–0.85)
MONOCYTES # BLD AUTO: 0.84 K/UL (ref 0–0.85)
MONOCYTES # BLD AUTO: 0.93 K/UL (ref 0–0.85)
MONOCYTES # BLD AUTO: 1 K/UL (ref 0–0.85)
MONOCYTES # BLD AUTO: 1.02 K/UL (ref 0–0.85)
MONOCYTES # BLD AUTO: 1.07 K/UL (ref 0–0.85)
MONOCYTES # BLD AUTO: 1.13 K/UL (ref 0–0.85)
MONOCYTES # BLD AUTO: 1.16 K/UL (ref 0–0.85)
MONOCYTES # BLD AUTO: 1.68 K/UL (ref 0–0.85)
MONOCYTES # BLD AUTO: 1.75 K/UL (ref 0–0.85)
MONOCYTES NFR BLD AUTO: 10.1 % (ref 0–13.4)
MONOCYTES NFR BLD AUTO: 10.6 % (ref 0–13.4)
MONOCYTES NFR BLD AUTO: 11.1 % (ref 0–13.4)
MONOCYTES NFR BLD AUTO: 11.8 % (ref 0–13.4)
MONOCYTES NFR BLD AUTO: 13.1 % (ref 0–13.4)
MONOCYTES NFR BLD AUTO: 13.8 % (ref 0–13.4)
MONOCYTES NFR BLD AUTO: 16.7 % (ref 0–13.4)
MONOCYTES NFR BLD AUTO: 19.1 % (ref 0–13.4)
MONOCYTES NFR BLD AUTO: 5.8 % (ref 0–13.4)
MONOCYTES NFR BLD AUTO: 6.1 % (ref 0–13.4)
MONOCYTES NFR BLD AUTO: 8.6 % (ref 0–13.4)
MONOCYTES NFR BLD AUTO: 8.7 % (ref 0–13.4)
MONOCYTES NFR BLD AUTO: 8.8 % (ref 0–13.4)
MONOCYTES NFR BLD AUTO: 9.9 % (ref 0–13.4)
MORPHOLOGY BLD-IMP: NORMAL
MYELOCYTES NFR BLD MANUAL: 0.9 %
MYELOCYTES NFR BLD MANUAL: 3.5 %
NEUTROPHILS # BLD AUTO: 10 K/UL (ref 2–7.15)
NEUTROPHILS # BLD AUTO: 3.02 K/UL (ref 2–7.15)
NEUTROPHILS # BLD AUTO: 4.07 K/UL (ref 2–7.15)
NEUTROPHILS # BLD AUTO: 4.49 K/UL (ref 2–7.15)
NEUTROPHILS # BLD AUTO: 4.86 K/UL (ref 2–7.15)
NEUTROPHILS # BLD AUTO: 4.92 K/UL (ref 2–7.15)
NEUTROPHILS # BLD AUTO: 5.07 K/UL (ref 2–7.15)
NEUTROPHILS # BLD AUTO: 5.4 K/UL (ref 2–7.15)
NEUTROPHILS # BLD AUTO: 5.64 K/UL (ref 2–7.15)
NEUTROPHILS # BLD AUTO: 7.15 K/UL (ref 2–7.15)
NEUTROPHILS # BLD AUTO: 7.37 K/UL (ref 2–7.15)
NEUTROPHILS # BLD AUTO: 8.12 K/UL (ref 2–7.15)
NEUTROPHILS # BLD AUTO: 9.13 K/UL (ref 2–7.15)
NEUTROPHILS # BLD AUTO: 9.26 K/UL (ref 2–7.15)
NEUTROPHILS NFR BLD: 46.1 % (ref 44–72)
NEUTROPHILS NFR BLD: 51.6 % (ref 44–72)
NEUTROPHILS NFR BLD: 56.8 % (ref 44–72)
NEUTROPHILS NFR BLD: 60 % (ref 44–72)
NEUTROPHILS NFR BLD: 61.8 % (ref 44–72)
NEUTROPHILS NFR BLD: 64.9 % (ref 44–72)
NEUTROPHILS NFR BLD: 66.7 % (ref 44–72)
NEUTROPHILS NFR BLD: 66.8 % (ref 44–72)
NEUTROPHILS NFR BLD: 67.8 % (ref 44–72)
NEUTROPHILS NFR BLD: 68.8 % (ref 44–72)
NEUTROPHILS NFR BLD: 72.2 % (ref 44–72)
NEUTROPHILS NFR BLD: 78.1 % (ref 44–72)
NEUTROPHILS NFR BLD: 81.1 % (ref 44–72)
NEUTROPHILS NFR BLD: 85.8 % (ref 44–72)
NEUTS BAND NFR BLD MANUAL: 0.8 % (ref 0–10)
NEUTS BAND NFR BLD MANUAL: 23.5 % (ref 0–10)
NITRITE UR QL STRIP.AUTO: NEGATIVE
NITRITE UR QL STRIP.AUTO: NORMAL
NRBC # BLD AUTO: 0 K/UL
NRBC # BLD AUTO: 0.02 K/UL
NRBC # BLD AUTO: 0.04 K/UL
NRBC # BLD AUTO: 0.05 K/UL
NRBC BLD-RTO: 0 /100 WBC
NRBC BLD-RTO: 0.2 /100 WBC
NRBC BLD-RTO: 0.4 /100 WBC
NRBC BLD-RTO: 0.4 /100 WBC
OVALOCYTES BLD QL SMEAR: NORMAL
PH UR STRIP.AUTO: 6.5 [PH]
PH UR STRIP.AUTO: 7 [PH] (ref 5–8)
PLATELET # BLD AUTO: 283 K/UL (ref 164–446)
PLATELET # BLD AUTO: 315 K/UL (ref 164–446)
PLATELET # BLD AUTO: 319 K/UL (ref 164–446)
PLATELET # BLD AUTO: 322 K/UL (ref 164–446)
PLATELET # BLD AUTO: 328 K/UL (ref 164–446)
PLATELET # BLD AUTO: 375 K/UL (ref 164–446)
PLATELET # BLD AUTO: 385 K/UL (ref 164–446)
PLATELET # BLD AUTO: 393 K/UL (ref 164–446)
PLATELET # BLD AUTO: 406 K/UL (ref 164–446)
PLATELET # BLD AUTO: 408 K/UL (ref 164–446)
PLATELET # BLD AUTO: 434 K/UL (ref 164–446)
PLATELET # BLD AUTO: 458 K/UL (ref 164–446)
PLATELET # BLD AUTO: 471 K/UL (ref 164–446)
PLATELET # BLD AUTO: 488 K/UL (ref 164–446)
PLATELET BLD QL SMEAR: NORMAL
PMV BLD AUTO: 10 FL (ref 9–12.9)
PMV BLD AUTO: 10.1 FL (ref 9–12.9)
PMV BLD AUTO: 10.1 FL (ref 9–12.9)
PMV BLD AUTO: 10.2 FL (ref 9–12.9)
PMV BLD AUTO: 10.7 FL (ref 9–12.9)
PMV BLD AUTO: 9.1 FL (ref 9–12.9)
PMV BLD AUTO: 9.3 FL (ref 9–12.9)
PMV BLD AUTO: 9.4 FL (ref 9–12.9)
PMV BLD AUTO: 9.4 FL (ref 9–12.9)
PMV BLD AUTO: 9.5 FL (ref 9–12.9)
PMV BLD AUTO: 9.6 FL (ref 9–12.9)
PMV BLD AUTO: 9.6 FL (ref 9–12.9)
POIKILOCYTOSIS BLD QL SMEAR: NORMAL
POLYCHROMASIA BLD QL SMEAR: NORMAL
POTASSIUM SERPL-SCNC: 4.1 MMOL/L (ref 3.6–5.5)
POTASSIUM SERPL-SCNC: 4.2 MMOL/L (ref 3.6–5.5)
POTASSIUM SERPL-SCNC: 4.3 MMOL/L (ref 3.6–5.5)
POTASSIUM SERPL-SCNC: 4.5 MMOL/L (ref 3.6–5.5)
POTASSIUM SERPL-SCNC: 4.6 MMOL/L (ref 3.6–5.5)
POTASSIUM SERPL-SCNC: 4.7 MMOL/L (ref 3.6–5.5)
POTASSIUM SERPL-SCNC: 4.8 MMOL/L (ref 3.6–5.5)
POTASSIUM SERPL-SCNC: 4.8 MMOL/L (ref 3.6–5.5)
POTASSIUM SERPL-SCNC: 4.9 MMOL/L (ref 3.6–5.5)
PROMYELOCYTES NFR BLD MANUAL: 1.7 %
PROT SERPL-MCNC: 5.9 G/DL (ref 6–8.2)
PROT SERPL-MCNC: 6.2 G/DL (ref 6–8.2)
PROT SERPL-MCNC: 6.2 G/DL (ref 6–8.2)
PROT SERPL-MCNC: 6.3 G/DL (ref 6–8.2)
PROT SERPL-MCNC: 6.5 G/DL (ref 6–8.2)
PROT SERPL-MCNC: 6.6 G/DL (ref 6–8.2)
PROT SERPL-MCNC: 6.7 G/DL (ref 6–8.2)
PROT SERPL-MCNC: 6.7 G/DL (ref 6–8.2)
PROT SERPL-MCNC: 6.9 G/DL (ref 6–8.2)
PROT SERPL-MCNC: 7 G/DL (ref 6–8.2)
PROT UR QL STRIP: NEGATIVE MG/DL
PROT UR QL STRIP: NORMAL MG/DL
PROTHROMBIN TIME: 12.5 SEC (ref 12–14.6)
RBC # BLD AUTO: 4 M/UL (ref 4.2–5.4)
RBC # BLD AUTO: 4.01 M/UL (ref 4.2–5.4)
RBC # BLD AUTO: 4.06 M/UL (ref 4.2–5.4)
RBC # BLD AUTO: 4.1 M/UL (ref 4.2–5.4)
RBC # BLD AUTO: 4.21 M/UL (ref 4.2–5.4)
RBC # BLD AUTO: 4.25 M/UL (ref 4.2–5.4)
RBC # BLD AUTO: 4.28 M/UL (ref 4.2–5.4)
RBC # BLD AUTO: 4.29 M/UL (ref 4.2–5.4)
RBC # BLD AUTO: 4.47 M/UL (ref 4.2–5.4)
RBC # BLD AUTO: 4.49 M/UL (ref 4.2–5.4)
RBC # BLD AUTO: 4.79 M/UL (ref 4.2–5.4)
RBC # BLD AUTO: 4.98 M/UL (ref 4.2–5.4)
RBC # BLD AUTO: 5.1 M/UL (ref 4.2–5.4)
RBC # BLD AUTO: 5.14 M/UL (ref 4.2–5.4)
RBC # URNS HPF: NORMAL /HPF
RBC BLD AUTO: PRESENT
RBC UR QL AUTO: NEGATIVE
RBC UR QL AUTO: NORMAL
SCHISTOCYTES BLD QL SMEAR: NORMAL
SODIUM SERPL-SCNC: 131 MMOL/L (ref 135–145)
SODIUM SERPL-SCNC: 132 MMOL/L (ref 135–145)
SODIUM SERPL-SCNC: 133 MMOL/L (ref 135–145)
SODIUM SERPL-SCNC: 135 MMOL/L (ref 135–145)
SODIUM SERPL-SCNC: 135 MMOL/L (ref 135–145)
SODIUM SERPL-SCNC: 136 MMOL/L (ref 135–145)
SODIUM SERPL-SCNC: 137 MMOL/L (ref 135–145)
SODIUM SERPL-SCNC: 138 MMOL/L (ref 135–145)
SODIUM SERPL-SCNC: 140 MMOL/L (ref 135–145)
SODIUM SERPL-SCNC: 142 MMOL/L (ref 135–145)
SP GR UR STRIP.AUTO: 1.01
SP GR UR STRIP.AUTO: 1.01
UROBILINOGEN UR STRIP-MCNC: NORMAL MG/DL
UROBILINOGEN UR STRIP.AUTO-MCNC: 0.2 MG/DL
WBC # BLD AUTO: 10.6 K/UL (ref 4.8–10.8)
WBC # BLD AUTO: 10.9 K/UL (ref 4.8–10.8)
WBC # BLD AUTO: 12.2 K/UL (ref 4.8–10.8)
WBC # BLD AUTO: 12.8 K/UL (ref 4.8–10.8)
WBC # BLD AUTO: 13.3 K/UL (ref 4.8–10.8)
WBC # BLD AUTO: 5 K/UL (ref 4.8–10.8)
WBC # BLD AUTO: 6.1 K/UL (ref 4.8–10.8)
WBC # BLD AUTO: 6.7 K/UL (ref 4.8–10.8)
WBC # BLD AUTO: 7.2 K/UL (ref 4.8–10.8)
WBC # BLD AUTO: 7.8 K/UL (ref 4.8–10.8)
WBC # BLD AUTO: 7.9 K/UL (ref 4.8–10.8)
WBC # BLD AUTO: 9.1 K/UL (ref 4.8–10.8)
WBC # BLD AUTO: 9.4 K/UL (ref 4.8–10.8)
WBC # BLD AUTO: 9.8 K/UL (ref 4.8–10.8)
WBC #/AREA URNS HPF: NORMAL /HPF

## 2018-01-01 PROCEDURE — 85025 COMPLETE CBC W/AUTO DIFF WBC: CPT

## 2018-01-01 PROCEDURE — 73000 X-RAY EXAM OF COLLAR BONE: CPT | Mod: LT

## 2018-01-01 PROCEDURE — 77334 RADIATION TREATMENT AID(S): CPT | Mod: 26 | Performed by: RADIOLOGY

## 2018-01-01 PROCEDURE — 77336 RADIATION PHYSICS CONSULT: CPT | Mod: XU | Performed by: RADIOLOGY

## 2018-01-01 PROCEDURE — 86300 IMMUNOASSAY TUMOR CA 15-3: CPT

## 2018-01-01 PROCEDURE — 99203 OFFICE O/P NEW LOW 30 MIN: CPT | Performed by: INTERNAL MEDICINE

## 2018-01-01 PROCEDURE — 160002 HCHG RECOVERY MINUTES (STAT)

## 2018-01-01 PROCEDURE — 77336 RADIATION PHYSICS CONSULT: CPT | Performed by: RADIOLOGY

## 2018-01-01 PROCEDURE — 77014 PR CT GUIDANCE PLACEMENT RAD THERAPY FIELDS: CPT | Mod: 26 | Performed by: RADIOLOGY

## 2018-01-01 PROCEDURE — 80053 COMPREHEN METABOLIC PANEL: CPT

## 2018-01-01 PROCEDURE — 85027 COMPLETE CBC AUTOMATED: CPT

## 2018-01-01 PROCEDURE — 99205 OFFICE O/P NEW HI 60 MIN: CPT | Performed by: INTERNAL MEDICINE

## 2018-01-01 PROCEDURE — 77387 GUIDANCE FOR RADJ TX DLVR: CPT | Performed by: RADIOLOGY

## 2018-01-01 PROCEDURE — 700105 HCHG RX REV CODE 258: Performed by: NURSE PRACTITIONER

## 2018-01-01 PROCEDURE — 97140 MANUAL THERAPY 1/> REGIONS: CPT

## 2018-01-01 PROCEDURE — 96375 TX/PRO/DX INJ NEW DRUG ADDON: CPT

## 2018-01-01 PROCEDURE — 96415 CHEMO IV INFUSION ADDL HR: CPT

## 2018-01-01 PROCEDURE — 36415 COLL VENOUS BLD VENIPUNCTURE: CPT | Performed by: INTERNAL MEDICINE

## 2018-01-01 PROCEDURE — 97161 PT EVAL LOW COMPLEX 20 MIN: CPT

## 2018-01-01 PROCEDURE — A4212 NON CORING NEEDLE OR STYLET: HCPCS

## 2018-01-01 PROCEDURE — G8978 MOBILITY CURRENT STATUS: HCPCS | Mod: CL

## 2018-01-01 PROCEDURE — 99214 OFFICE O/P EST MOD 30 MIN: CPT | Performed by: NURSE PRACTITIONER

## 2018-01-01 PROCEDURE — 304540 HCHG NITRO SET VENT 2ND TUB

## 2018-01-01 PROCEDURE — 96413 CHEMO IV INFUSION 1 HR: CPT

## 2018-01-01 PROCEDURE — 97110 THERAPEUTIC EXERCISES: CPT

## 2018-01-01 PROCEDURE — 96401 CHEMO ANTI-NEOPL SQ/IM: CPT | Mod: XU

## 2018-01-01 PROCEDURE — 99214 OFFICE O/P EST MOD 30 MIN: CPT | Performed by: INTERNAL MEDICINE

## 2018-01-01 PROCEDURE — 700117 HCHG RX CONTRAST REV CODE 255: Performed by: RADIOLOGY

## 2018-01-01 PROCEDURE — 77295 3-D RADIOTHERAPY PLAN: CPT | Performed by: RADIOLOGY

## 2018-01-01 PROCEDURE — 77334 RADIATION TREATMENT AID(S): CPT | Performed by: RADIOLOGY

## 2018-01-01 PROCEDURE — 77280 THER RAD SIMULAJ FIELD SMPL: CPT | Performed by: RADIOLOGY

## 2018-01-01 PROCEDURE — 77412 RADIATION TX DELIVERY LVL 3: CPT | Performed by: RADIOLOGY

## 2018-01-01 PROCEDURE — 700111 HCHG RX REV CODE 636 W/ 250 OVERRIDE (IP): Performed by: INTERNAL MEDICINE

## 2018-01-01 PROCEDURE — 93306 TTE W/DOPPLER COMPLETE: CPT

## 2018-01-01 PROCEDURE — 99214 OFFICE O/P EST MOD 30 MIN: CPT | Performed by: FAMILY MEDICINE

## 2018-01-01 PROCEDURE — 93306 TTE W/DOPPLER COMPLETE: CPT | Mod: 26,GZ | Performed by: INTERNAL MEDICINE

## 2018-01-01 PROCEDURE — 99213 OFFICE O/P EST LOW 20 MIN: CPT | Mod: GC | Performed by: INTERNAL MEDICINE

## 2018-01-01 PROCEDURE — 700105 HCHG RX REV CODE 258

## 2018-01-01 PROCEDURE — 99212 OFFICE O/P EST SF 10 MIN: CPT | Performed by: RADIOLOGY

## 2018-01-01 PROCEDURE — 88341 IMHCHEM/IMCYTCHM EA ADD ANTB: CPT

## 2018-01-01 PROCEDURE — 700111 HCHG RX REV CODE 636 W/ 250 OVERRIDE (IP)

## 2018-01-01 PROCEDURE — 77300 RADIATION THERAPY DOSE PLAN: CPT | Mod: 26 | Performed by: RADIOLOGY

## 2018-01-01 PROCEDURE — 77280 THER RAD SIMULAJ FIELD SMPL: CPT | Mod: 26 | Performed by: RADIOLOGY

## 2018-01-01 PROCEDURE — 77263 THER RADIOLOGY TX PLNG CPLX: CPT | Performed by: RADIOLOGY

## 2018-01-01 PROCEDURE — 700117 HCHG RX CONTRAST REV CODE 255: Performed by: INTERNAL MEDICINE

## 2018-01-01 PROCEDURE — 93000 ELECTROCARDIOGRAM COMPLETE: CPT | Performed by: INTERNAL MEDICINE

## 2018-01-01 PROCEDURE — 700111 HCHG RX REV CODE 636 W/ 250 OVERRIDE (IP): Mod: JG | Performed by: NURSE PRACTITIONER

## 2018-01-01 PROCEDURE — 85007 BL SMEAR W/DIFF WBC COUNT: CPT

## 2018-01-01 PROCEDURE — 77427 RADIATION TX MANAGEMENT X5: CPT | Performed by: RADIOLOGY

## 2018-01-01 PROCEDURE — 700105 HCHG RX REV CODE 258: Performed by: INTERNAL MEDICINE

## 2018-01-01 PROCEDURE — 96376 TX/PRO/DX INJ SAME DRUG ADON: CPT

## 2018-01-01 PROCEDURE — 99205 OFFICE O/P NEW HI 60 MIN: CPT | Performed by: RADIOLOGY

## 2018-01-01 PROCEDURE — 71046 X-RAY EXAM CHEST 2 VIEWS: CPT

## 2018-01-01 PROCEDURE — 70450 CT HEAD/BRAIN W/O DYE: CPT

## 2018-01-01 PROCEDURE — 97530 THERAPEUTIC ACTIVITIES: CPT | Mod: XU

## 2018-01-01 PROCEDURE — 96401 CHEMO ANTI-NEOPL SQ/IM: CPT

## 2018-01-01 PROCEDURE — 77290 THER RAD SIMULAJ FIELD CPLX: CPT | Mod: 26 | Performed by: RADIOLOGY

## 2018-01-01 PROCEDURE — 99215 OFFICE O/P EST HI 40 MIN: CPT | Mod: Q6 | Performed by: INTERNAL MEDICINE

## 2018-01-01 PROCEDURE — G8979 MOBILITY GOAL STATUS: HCPCS | Mod: CK

## 2018-01-01 PROCEDURE — 77290 THER RAD SIMULAJ FIELD CPLX: CPT | Performed by: RADIOLOGY

## 2018-01-01 PROCEDURE — G8988 SELF CARE GOAL STATUS: HCPCS | Mod: CI

## 2018-01-01 PROCEDURE — 700111 HCHG RX REV CODE 636 W/ 250 OVERRIDE (IP): Performed by: NURSE PRACTITIONER

## 2018-01-01 PROCEDURE — 99215 OFFICE O/P EST HI 40 MIN: CPT | Performed by: NURSE PRACTITIONER

## 2018-01-01 PROCEDURE — 36415 COLL VENOUS BLD VENIPUNCTURE: CPT

## 2018-01-01 PROCEDURE — 20225 BONE BIOPSY TROCAR/NDL DEEP: CPT

## 2018-01-01 PROCEDURE — 77300 RADIATION THERAPY DOSE PLAN: CPT | Mod: 59 | Performed by: RADIOLOGY

## 2018-01-01 PROCEDURE — 77295 3-D RADIOTHERAPY PLAN: CPT | Mod: 26 | Performed by: RADIOLOGY

## 2018-01-01 PROCEDURE — 99153 MOD SED SAME PHYS/QHP EA: CPT

## 2018-01-01 PROCEDURE — 700111 HCHG RX REV CODE 636 W/ 250 OVERRIDE (IP): Performed by: RADIOLOGY

## 2018-01-01 PROCEDURE — 99214 OFFICE O/P EST MOD 30 MIN: CPT | Performed by: PHYSICIAN ASSISTANT

## 2018-01-01 PROCEDURE — 700101 HCHG RX REV CODE 250

## 2018-01-01 PROCEDURE — 99284 EMERGENCY DEPT VISIT MOD MDM: CPT

## 2018-01-01 PROCEDURE — 97530 THERAPEUTIC ACTIVITIES: CPT

## 2018-01-01 PROCEDURE — 36415 COLL VENOUS BLD VENIPUNCTURE: CPT | Performed by: NURSE PRACTITIONER

## 2018-01-01 PROCEDURE — 77295 3-D RADIOTHERAPY PLAN: CPT | Mod: 59 | Performed by: RADIOLOGY

## 2018-01-01 PROCEDURE — 99215 OFFICE O/P EST HI 40 MIN: CPT | Performed by: INTERNAL MEDICINE

## 2018-01-01 PROCEDURE — 0298T PR EXT ECG > 48HR TO 21 DAY REVIEW AND INTERPRETATN: CPT | Performed by: INTERNAL MEDICINE

## 2018-01-01 PROCEDURE — 86300 IMMUNOASSAY TUMOR CA 15-3: CPT | Mod: GZ

## 2018-01-01 PROCEDURE — G0399 HOME SLEEP TEST/TYPE 3 PORTA: HCPCS | Performed by: FAMILY MEDICINE

## 2018-01-01 PROCEDURE — 99213 OFFICE O/P EST LOW 20 MIN: CPT | Performed by: INTERNAL MEDICINE

## 2018-01-01 PROCEDURE — 96372 THER/PROPH/DIAG INJ SC/IM: CPT | Mod: XU

## 2018-01-01 PROCEDURE — 72197 MRI PELVIS W/O & W/DYE: CPT

## 2018-01-01 PROCEDURE — 99215 OFFICE O/P EST HI 40 MIN: CPT | Performed by: RADIOLOGY

## 2018-01-01 PROCEDURE — 93306 TTE W/DOPPLER COMPLETE: CPT | Mod: 26 | Performed by: INTERNAL MEDICINE

## 2018-01-01 PROCEDURE — 77334 RADIATION TREATMENT AID(S): CPT | Mod: 26,59 | Performed by: RADIOLOGY

## 2018-01-01 PROCEDURE — 99215 OFFICE O/P EST HI 40 MIN: CPT | Mod: GC | Performed by: INTERNAL MEDICINE

## 2018-01-01 PROCEDURE — 78306 BONE IMAGING WHOLE BODY: CPT

## 2018-01-01 PROCEDURE — 94060 EVALUATION OF WHEEZING: CPT | Performed by: INTERNAL MEDICINE

## 2018-01-01 PROCEDURE — 36415 COLL VENOUS BLD VENIPUNCTURE: CPT | Mod: Q6 | Performed by: INTERNAL MEDICINE

## 2018-01-01 PROCEDURE — 88307 TISSUE EXAM BY PATHOLOGIST: CPT

## 2018-01-01 PROCEDURE — 97535 SELF CARE MNGMENT TRAINING: CPT

## 2018-01-01 PROCEDURE — 88342 IMHCHEM/IMCYTCHM 1ST ANTB: CPT

## 2018-01-01 PROCEDURE — G8987 SELF CARE CURRENT STATUS: HCPCS | Mod: CJ

## 2018-01-01 PROCEDURE — A9585 GADOBUTROL INJECTION: HCPCS | Performed by: RADIOLOGY

## 2018-01-01 PROCEDURE — 77470 SPECIAL RADIATION TREATMENT: CPT | Mod: 26 | Performed by: RADIOLOGY

## 2018-01-01 PROCEDURE — 94729 DIFFUSING CAPACITY: CPT | Performed by: INTERNAL MEDICINE

## 2018-01-01 PROCEDURE — 77300 RADIATION THERAPY DOSE PLAN: CPT | Performed by: RADIOLOGY

## 2018-01-01 PROCEDURE — 81002 URINALYSIS NONAUTO W/O SCOPE: CPT | Performed by: INTERNAL MEDICINE

## 2018-01-01 PROCEDURE — 77470 SPECIAL RADIATION TREATMENT: CPT | Performed by: RADIOLOGY

## 2018-01-01 PROCEDURE — 99000 SPECIMEN HANDLING OFFICE-LAB: CPT | Performed by: INTERNAL MEDICINE

## 2018-01-01 PROCEDURE — 82378 CARCINOEMBRYONIC ANTIGEN: CPT | Mod: GZ

## 2018-01-01 PROCEDURE — A9579 GAD-BASE MR CONTRAST NOS,1ML: HCPCS | Performed by: INTERNAL MEDICINE

## 2018-01-01 PROCEDURE — 77334 RADIATION TREATMENT AID(S): CPT | Mod: 59 | Performed by: RADIOLOGY

## 2018-01-01 PROCEDURE — 0296T PR EXT ECG > 48HR TO 21 DAY RCRD W/CONECT INTL RCRD: CPT | Performed by: INTERNAL MEDICINE

## 2018-01-01 PROCEDURE — 99214 OFFICE O/P EST MOD 30 MIN: CPT | Mod: 25 | Performed by: PHYSICIAN ASSISTANT

## 2018-01-01 PROCEDURE — 77295 3-D RADIOTHERAPY PLAN: CPT | Mod: 26,59 | Performed by: RADIOLOGY

## 2018-01-01 PROCEDURE — A9503 TC99M MEDRONATE: HCPCS

## 2018-01-01 PROCEDURE — 99213 OFFICE O/P EST LOW 20 MIN: CPT | Mod: GE | Performed by: INTERNAL MEDICINE

## 2018-01-01 PROCEDURE — 97535 SELF CARE MNGMENT TRAINING: CPT | Mod: XU

## 2018-01-01 PROCEDURE — 95811 POLYSOM 6/>YRS CPAP 4/> PARM: CPT | Performed by: INTERNAL MEDICINE

## 2018-01-01 PROCEDURE — 73030 X-RAY EXAM OF SHOULDER: CPT | Mod: LT

## 2018-01-01 PROCEDURE — 88311 DECALCIFY TISSUE: CPT

## 2018-01-01 PROCEDURE — 99214 OFFICE O/P EST MOD 30 MIN: CPT | Performed by: RADIOLOGY

## 2018-01-01 PROCEDURE — 77300 RADIATION THERAPY DOSE PLAN: CPT | Mod: 26,59 | Performed by: RADIOLOGY

## 2018-01-01 PROCEDURE — 70553 MRI BRAIN STEM W/O & W/DYE: CPT

## 2018-01-01 PROCEDURE — 71275 CT ANGIOGRAPHY CHEST: CPT

## 2018-01-01 PROCEDURE — A9552 F18 FDG: HCPCS

## 2018-01-01 PROCEDURE — 85610 PROTHROMBIN TIME: CPT

## 2018-01-01 PROCEDURE — 94726 PLETHYSMOGRAPHY LUNG VOLUMES: CPT | Performed by: INTERNAL MEDICINE

## 2018-01-01 PROCEDURE — 81001 URINALYSIS AUTO W/SCOPE: CPT

## 2018-01-01 RX ORDER — 0.9 % SODIUM CHLORIDE 0.9 %
5 VIAL (ML) INJECTION PRN
Status: CANCELLED | OUTPATIENT
Start: 2018-01-01

## 2018-01-01 RX ORDER — ONDANSETRON 2 MG/ML
4 INJECTION INTRAMUSCULAR; INTRAVENOUS
Status: CANCELLED | OUTPATIENT
Start: 2018-01-01

## 2018-01-01 RX ORDER — PROCHLORPERAZINE MALEATE 10 MG
10 TABLET ORAL EVERY 6 HOURS PRN
Status: CANCELLED | OUTPATIENT
Start: 2018-01-01

## 2018-01-01 RX ORDER — 0.9 % SODIUM CHLORIDE 0.9 %
VIAL (ML) INJECTION PRN
Status: CANCELLED | OUTPATIENT
Start: 2018-01-01

## 2018-01-01 RX ORDER — AZITHROMYCIN 250 MG/1
TABLET, FILM COATED ORAL
Qty: 1 QUANTITY SUFFICIENT | Refills: 0 | Status: SHIPPED | OUTPATIENT
Start: 2018-01-01 | End: 2018-01-01

## 2018-01-01 RX ORDER — SODIUM CHLORIDE 9 MG/ML
INJECTION, SOLUTION INTRAVENOUS CONTINUOUS
Status: DISCONTINUED | OUTPATIENT
Start: 2018-01-01 | End: 2018-01-01 | Stop reason: HOSPADM

## 2018-01-01 RX ORDER — LIDOCAINE HYDROCHLORIDE 10 MG/ML
INJECTION, SOLUTION EPIDURAL; INFILTRATION; INTRACAUDAL; PERINEURAL
Status: COMPLETED
Start: 2018-01-01 | End: 2018-01-01

## 2018-01-01 RX ORDER — LIDOCAINE HYDROCHLORIDE AND EPINEPHRINE 10; 10 MG/ML; UG/ML
INJECTION, SOLUTION INFILTRATION; PERINEURAL
Status: COMPLETED
Start: 2018-01-01 | End: 2018-01-01

## 2018-01-01 RX ORDER — ONDANSETRON 8 MG/1
8 TABLET, ORALLY DISINTEGRATING ORAL
Status: CANCELLED | OUTPATIENT
Start: 2018-01-01

## 2018-01-01 RX ORDER — OXYCODONE HYDROCHLORIDE 5 MG/1
5 TABLET ORAL
Status: DISCONTINUED | OUTPATIENT
Start: 2018-01-01 | End: 2018-01-01 | Stop reason: HOSPADM

## 2018-01-01 RX ORDER — NAPROXEN 500 MG/1
500 TABLET ORAL 2 TIMES DAILY WITH MEALS
Qty: 60 TAB | Refills: 1 | Status: SHIPPED | OUTPATIENT
Start: 2018-01-01

## 2018-01-01 RX ORDER — AMITRIPTYLINE HYDROCHLORIDE 100 MG/1
100 TABLET ORAL
Qty: 90 TAB | Refills: 1 | Status: SHIPPED | OUTPATIENT
Start: 2018-01-01

## 2018-01-01 RX ORDER — PROCHLORPERAZINE MALEATE 10 MG
10 TABLET ORAL EVERY 6 HOURS PRN
Qty: 30 TAB | Refills: 6 | Status: SHIPPED | OUTPATIENT
Start: 2018-01-01 | End: 2018-01-01

## 2018-01-01 RX ORDER — AMITRIPTYLINE HYDROCHLORIDE 75 MG/1
TABLET ORAL
COMMUNITY
Start: 2018-01-01 | End: 2018-01-01 | Stop reason: SDUPTHER

## 2018-01-01 RX ORDER — SODIUM CHLORIDE 9 MG/ML
INJECTION, SOLUTION INTRAVENOUS CONTINUOUS
Status: CANCELLED | OUTPATIENT
Start: 2018-01-01

## 2018-01-01 RX ORDER — LIFITEGRAST 50 MG/ML
SOLUTION/ DROPS OPHTHALMIC
COMMUNITY
Start: 2018-01-01 | End: 2018-01-01

## 2018-01-01 RX ORDER — FLUOXETINE HYDROCHLORIDE 20 MG/1
20 CAPSULE ORAL DAILY
Qty: 90 CAP | Refills: 0 | Status: SHIPPED | OUTPATIENT
Start: 2018-01-01 | End: 2018-01-01 | Stop reason: SDUPTHER

## 2018-01-01 RX ORDER — POLYETHYLENE GLYCOL 3350 17 G/17G
17 POWDER, FOR SOLUTION ORAL DAILY
Qty: 30 EACH | Refills: 0 | Status: SHIPPED | OUTPATIENT
Start: 2018-01-01

## 2018-01-01 RX ORDER — ALBUTEROL SULFATE 90 UG/1
2 AEROSOL, METERED RESPIRATORY (INHALATION) EVERY 4 HOURS PRN
Qty: 3 INHALER | Refills: 3 | Status: SHIPPED | OUTPATIENT
Start: 2018-01-01

## 2018-01-01 RX ORDER — ONDANSETRON 4 MG/1
4 TABLET, FILM COATED ORAL EVERY 4 HOURS PRN
Qty: 30 TAB | Refills: 6 | Status: SHIPPED | OUTPATIENT
Start: 2018-01-01 | End: 2018-01-01 | Stop reason: SDUPTHER

## 2018-01-01 RX ORDER — 0.9 % SODIUM CHLORIDE 0.9 %
VIAL (ML) INJECTION PRN
Status: DISCONTINUED | OUTPATIENT
Start: 2018-01-01 | End: 2018-01-01 | Stop reason: HOSPADM

## 2018-01-01 RX ORDER — ONDANSETRON 2 MG/ML
4 INJECTION INTRAMUSCULAR; INTRAVENOUS PRN
Status: ACTIVE | OUTPATIENT
Start: 2018-01-01 | End: 2018-01-01

## 2018-01-01 RX ORDER — SENNA AND DOCUSATE SODIUM 50; 8.6 MG/1; MG/1
1 TABLET, FILM COATED ORAL
Qty: 30 TAB | Refills: 3 | Status: SHIPPED | OUTPATIENT
Start: 2018-01-01

## 2018-01-01 RX ORDER — BENZONATATE 100 MG/1
100 CAPSULE ORAL 3 TIMES DAILY PRN
Qty: 60 CAP | Refills: 0 | Status: SHIPPED | OUTPATIENT
Start: 2018-01-01

## 2018-01-01 RX ORDER — HYDROCODONE BITARTRATE AND ACETAMINOPHEN 5; 325 MG/1; MG/1
1 TABLET ORAL EVERY 6 HOURS PRN
Qty: 15 TAB | Refills: 0 | Status: SHIPPED | OUTPATIENT
Start: 2018-01-01 | End: 2018-01-01

## 2018-01-01 RX ORDER — OMEPRAZOLE 20 MG/1
20 CAPSULE, DELAYED RELEASE ORAL DAILY
Qty: 30 CAP | Refills: 4 | Status: SHIPPED | OUTPATIENT
Start: 2018-01-01 | End: 2018-01-01 | Stop reason: CLARIF

## 2018-01-01 RX ORDER — RANITIDINE 150 MG/1
150 TABLET ORAL 2 TIMES DAILY
Qty: 60 TAB | Refills: 3 | Status: SHIPPED | OUTPATIENT
Start: 2018-01-01 | End: 2019-01-01 | Stop reason: SDUPTHER

## 2018-01-01 RX ORDER — SODIUM CHLORIDE 9 MG/ML
500 INJECTION, SOLUTION INTRAVENOUS
Status: DISCONTINUED | OUTPATIENT
Start: 2018-01-01 | End: 2018-01-01 | Stop reason: HOSPADM

## 2018-01-01 RX ORDER — PREDNISONE 10 MG/1
TABLET ORAL
Qty: 18 TAB | Refills: 1 | Status: SHIPPED | OUTPATIENT
Start: 2018-01-01 | End: 2018-01-01

## 2018-01-01 RX ORDER — GLUCOSAMINE SULFATE 500 MG
CAPSULE ORAL DAILY
COMMUNITY
End: 2018-01-01

## 2018-01-01 RX ORDER — ONDANSETRON 2 MG/ML
4 INJECTION INTRAMUSCULAR; INTRAVENOUS EVERY 8 HOURS PRN
Status: DISCONTINUED | OUTPATIENT
Start: 2018-01-01 | End: 2018-01-01 | Stop reason: HOSPADM

## 2018-01-01 RX ORDER — AMITRIPTYLINE HYDROCHLORIDE 25 MG/1
TABLET, FILM COATED ORAL
COMMUNITY
Start: 2018-01-01 | End: 2018-01-01

## 2018-01-01 RX ORDER — TRAMADOL HYDROCHLORIDE 50 MG/1
TABLET ORAL
COMMUNITY
Start: 2018-01-01 | End: 2019-01-01

## 2018-01-01 RX ORDER — GADOBUTROL 604.72 MG/ML
5 INJECTION INTRAVENOUS ONCE
Status: COMPLETED | OUTPATIENT
Start: 2018-01-01 | End: 2018-01-01

## 2018-01-01 RX ORDER — LIDOCAINE HYDROCHLORIDE 10 MG/ML
INJECTION, SOLUTION INFILTRATION; PERINEURAL
Status: COMPLETED
Start: 2018-01-01 | End: 2018-01-01

## 2018-01-01 RX ORDER — DIPHENOXYLATE HYDROCHLORIDE AND ATROPINE SULFATE 2.5; .025 MG/1; MG/1
1 TABLET ORAL 4 TIMES DAILY PRN
COMMUNITY
End: 2018-01-01

## 2018-01-01 RX ORDER — ONDANSETRON 4 MG/1
4 TABLET, FILM COATED ORAL EVERY 4 HOURS PRN
Qty: 30 TAB | Refills: 6 | Status: SHIPPED | OUTPATIENT
Start: 2018-01-01 | End: 2018-01-01

## 2018-01-01 RX ORDER — GUAIFENESIN 600 MG/1
600 TABLET, EXTENDED RELEASE ORAL EVERY 12 HOURS
COMMUNITY
End: 2018-01-01

## 2018-01-01 RX ORDER — AZITHROMYCIN 250 MG/1
TABLET, FILM COATED ORAL
Qty: 6 TAB | Refills: 0 | Status: SHIPPED | OUTPATIENT
Start: 2018-01-01 | End: 2018-01-01

## 2018-01-01 RX ORDER — MIDAZOLAM HYDROCHLORIDE 1 MG/ML
INJECTION INTRAMUSCULAR; INTRAVENOUS
Status: COMPLETED
Start: 2018-01-01 | End: 2018-01-01

## 2018-01-01 RX ORDER — MORPHINE SULFATE 4 MG/ML
4 INJECTION, SOLUTION INTRAMUSCULAR; INTRAVENOUS
Status: DISCONTINUED | OUTPATIENT
Start: 2018-01-01 | End: 2018-01-01 | Stop reason: HOSPADM

## 2018-01-01 RX ORDER — PREDNISONE 10 MG/1
20 TABLET ORAL DAILY
Qty: 10 TAB | Refills: 0 | Status: SHIPPED | OUTPATIENT
Start: 2018-01-01 | End: 2018-01-01

## 2018-01-01 RX ORDER — SODIUM CHLORIDE 9 MG/ML
500 INJECTION, SOLUTION INTRAVENOUS
Status: ACTIVE | OUTPATIENT
Start: 2018-01-01 | End: 2018-01-01

## 2018-01-01 RX ORDER — FLUOXETINE HYDROCHLORIDE 20 MG/1
20 CAPSULE ORAL DAILY
COMMUNITY
End: 2018-01-01 | Stop reason: SDUPTHER

## 2018-01-01 RX ORDER — TRAMADOL HYDROCHLORIDE 50 MG/1
50 TABLET ORAL EVERY 12 HOURS PRN
Qty: 60 TAB | Refills: 0 | Status: SHIPPED | OUTPATIENT
Start: 2018-01-01 | End: 2018-01-01

## 2018-01-01 RX ORDER — SODIUM CHLORIDE 9 MG/ML
INJECTION, SOLUTION INTRAVENOUS
Status: DISCONTINUED | OUTPATIENT
Start: 2018-01-01 | End: 2018-01-01 | Stop reason: HOSPADM

## 2018-01-01 RX ORDER — MIDAZOLAM HYDROCHLORIDE 1 MG/ML
.5-2 INJECTION INTRAMUSCULAR; INTRAVENOUS PRN
Status: DISCONTINUED | OUTPATIENT
Start: 2018-01-01 | End: 2018-01-01 | Stop reason: HOSPADM

## 2018-01-01 RX ORDER — ONDANSETRON 4 MG/1
4 TABLET, FILM COATED ORAL EVERY 4 HOURS PRN
Qty: 30 TAB | Refills: 3 | Status: SHIPPED | OUTPATIENT
Start: 2018-01-01 | End: 2018-01-01

## 2018-01-01 RX ORDER — MIDAZOLAM HYDROCHLORIDE 1 MG/ML
.5-2 INJECTION INTRAMUSCULAR; INTRAVENOUS PRN
Status: ACTIVE | OUTPATIENT
Start: 2018-01-01 | End: 2018-01-01

## 2018-01-01 RX ORDER — AMITRIPTYLINE HYDROCHLORIDE 75 MG/1
TABLET ORAL
COMMUNITY
Start: 2018-01-01 | End: 2018-01-01

## 2018-01-01 RX ORDER — ZOLPIDEM TARTRATE 5 MG/1
TABLET ORAL
Qty: 2 TAB | Refills: 0 | Status: SHIPPED
Start: 2018-01-01 | End: 2018-01-01

## 2018-01-01 RX ORDER — OMEGA-3-ACID ETHYL ESTERS 1 G/1
1 CAPSULE, LIQUID FILLED ORAL 2 TIMES DAILY
Qty: 180 CAP | Refills: 3 | Status: SHIPPED | OUTPATIENT
Start: 2018-01-01 | End: 2019-01-01

## 2018-01-01 RX ORDER — OXYCODONE HYDROCHLORIDE 5 MG/1
10 TABLET ORAL
Status: DISCONTINUED | OUTPATIENT
Start: 2018-01-01 | End: 2018-01-01 | Stop reason: HOSPADM

## 2018-01-01 RX ORDER — TRAMADOL HYDROCHLORIDE 50 MG/1
50 TABLET ORAL EVERY 6 HOURS PRN
COMMUNITY
End: 2018-01-01 | Stop reason: SDUPTHER

## 2018-01-01 RX ORDER — LIDOCAINE AND PRILOCAINE 25; 25 MG/G; MG/G
CREAM TOPICAL
Qty: 1 TUBE | Refills: 3 | Status: SHIPPED | OUTPATIENT
Start: 2018-01-01

## 2018-01-01 RX ORDER — GUAIFENESIN 600 MG/1
600 TABLET, EXTENDED RELEASE ORAL EVERY 12 HOURS
COMMUNITY

## 2018-01-01 RX ORDER — ONDANSETRON 2 MG/ML
4 INJECTION INTRAMUSCULAR; INTRAVENOUS PRN
Status: DISCONTINUED | OUTPATIENT
Start: 2018-01-01 | End: 2018-01-01 | Stop reason: HOSPADM

## 2018-01-01 RX ORDER — METHYLPREDNISOLONE 4 MG/1
TABLET ORAL
Qty: 21 TAB | Refills: 1 | Status: SHIPPED | OUTPATIENT
Start: 2018-01-01 | End: 2018-01-01

## 2018-01-01 RX ORDER — PROCHLORPERAZINE MALEATE 10 MG
10 TABLET ORAL EVERY 6 HOURS PRN
Qty: 30 TAB | Refills: 3 | Status: SHIPPED | OUTPATIENT
Start: 2018-01-01 | End: 2018-01-01

## 2018-01-01 RX ADMIN — DEXAMETHASONE SODIUM PHOSPHATE 12 MG: 4 INJECTION, SOLUTION INTRAMUSCULAR; INTRAVENOUS at 14:12

## 2018-01-01 RX ADMIN — DENOSUMAB 120 MG: 120 INJECTION SUBCUTANEOUS at 14:26

## 2018-01-01 RX ADMIN — DENOSUMAB 120 MG: 120 INJECTION SUBCUTANEOUS at 16:32

## 2018-01-01 RX ADMIN — DIPHENHYDRAMINE HYDROCHLORIDE 25 MG: 50 INJECTION INTRAMUSCULAR; INTRAVENOUS at 14:00

## 2018-01-01 RX ADMIN — PACLITAXEL 125.6 MG: 6 INJECTION, SOLUTION INTRAVENOUS at 14:45

## 2018-01-01 RX ADMIN — SODIUM CHLORIDE: 9 INJECTION, SOLUTION INTRAVENOUS at 11:51

## 2018-01-01 RX ADMIN — DEXAMETHASONE SODIUM PHOSPHATE 20 MG: 4 INJECTION, SOLUTION INTRAMUSCULAR; INTRAVENOUS at 14:20

## 2018-01-01 RX ADMIN — FAMOTIDINE 20 MG: 10 INJECTION INTRAVENOUS at 13:53

## 2018-01-01 RX ADMIN — MIDAZOLAM 2 MG: 1 INJECTION INTRAMUSCULAR; INTRAVENOUS at 13:13

## 2018-01-01 RX ADMIN — DEXAMETHASONE SODIUM PHOSPHATE 20 MG: 4 INJECTION, SOLUTION INTRAMUSCULAR; INTRAVENOUS at 14:34

## 2018-01-01 RX ADMIN — PACLITAXEL 273 MG: 6 INJECTION, SOLUTION INTRAVENOUS at 14:53

## 2018-01-01 RX ADMIN — DIPHENHYDRAMINE HYDROCHLORIDE 25 MG: 50 INJECTION INTRAMUSCULAR; INTRAVENOUS at 14:10

## 2018-01-01 RX ADMIN — GADODIAMIDE 10 ML: 287 INJECTION INTRAVENOUS at 13:45

## 2018-01-01 RX ADMIN — FAMOTIDINE 20 MG: 10 INJECTION INTRAVENOUS at 13:44

## 2018-01-01 RX ADMIN — PACLITAXEL 273 MG: 6 INJECTION, SOLUTION INTRAVENOUS at 12:27

## 2018-01-01 RX ADMIN — PACLITAXEL 267.8 MG: 300 INJECTION, SOLUTION INTRAVENOUS at 13:13

## 2018-01-01 RX ADMIN — DIPHENHYDRAMINE HYDROCHLORIDE 25 MG: 50 INJECTION INTRAMUSCULAR; INTRAVENOUS at 14:07

## 2018-01-01 RX ADMIN — DIPHENHYDRAMINE HYDROCHLORIDE 25 MG: 50 INJECTION INTRAMUSCULAR; INTRAVENOUS at 13:49

## 2018-01-01 RX ADMIN — DEXAMETHASONE SODIUM PHOSPHATE 20 MG: 4 INJECTION, SOLUTION INTRAMUSCULAR; INTRAVENOUS at 11:49

## 2018-01-01 RX ADMIN — HEPARIN 500 UNITS: 100 SYRINGE at 15:46

## 2018-01-01 RX ADMIN — PACLITAXEL 127.2 MG: 6 INJECTION, SOLUTION INTRAVENOUS at 14:20

## 2018-01-01 RX ADMIN — FAMOTIDINE 20 MG: 10 INJECTION INTRAVENOUS at 12:24

## 2018-01-01 RX ADMIN — FAMOTIDINE 20 MG: 10 INJECTION INTRAVENOUS at 11:13

## 2018-01-01 RX ADMIN — PACLITAXEL 125.6 MG: 6 INJECTION, SOLUTION INTRAVENOUS at 14:26

## 2018-01-01 RX ADMIN — FAMOTIDINE 20 MG: 10 INJECTION INTRAVENOUS at 13:43

## 2018-01-01 RX ADMIN — LIDOCAINE HYDROCHLORIDE: 10 INJECTION, SOLUTION EPIDURAL; INFILTRATION; INTRACAUDAL; PERINEURAL at 10:50

## 2018-01-01 RX ADMIN — HEPARIN: 100 SYRINGE at 18:10

## 2018-01-01 RX ADMIN — DIPHENHYDRAMINE HYDROCHLORIDE 25 MG: 50 INJECTION INTRAMUSCULAR; INTRAVENOUS at 14:41

## 2018-01-01 RX ADMIN — HEPARIN 500 UNITS: 100 SYRINGE at 16:35

## 2018-01-01 RX ADMIN — FAMOTIDINE 20 MG: 10 INJECTION INTRAVENOUS at 14:06

## 2018-01-01 RX ADMIN — DEXAMETHASONE SODIUM PHOSPHATE 20 MG: 4 INJECTION, SOLUTION INTRAMUSCULAR; INTRAVENOUS at 14:10

## 2018-01-01 RX ADMIN — Medication 0.5 ML: at 10:22

## 2018-01-01 RX ADMIN — HEPARIN 500 UNITS: 100 SYRINGE at 15:29

## 2018-01-01 RX ADMIN — DENOSUMAB 120 MG: 120 INJECTION SUBCUTANEOUS at 15:41

## 2018-01-01 RX ADMIN — DEXAMETHASONE SODIUM PHOSPHATE 12 MG: 4 INJECTION, SOLUTION INTRAMUSCULAR; INTRAVENOUS at 14:01

## 2018-01-01 RX ADMIN — HEPARIN 500 UNITS: 100 SYRINGE at 18:10

## 2018-01-01 RX ADMIN — HEPARIN 500 UNITS: 100 SYRINGE at 14:45

## 2018-01-01 RX ADMIN — PACLITAXEL 267.8 MG: 300 INJECTION, SOLUTION INTRAVENOUS at 14:48

## 2018-01-01 RX ADMIN — DIPHENHYDRAMINE HYDROCHLORIDE 25 MG: 50 INJECTION INTRAMUSCULAR; INTRAVENOUS at 12:24

## 2018-01-01 RX ADMIN — FENTANYL CITRATE 50 MCG: 50 INJECTION, SOLUTION INTRAMUSCULAR; INTRAVENOUS at 14:34

## 2018-01-01 RX ADMIN — DIPHENHYDRAMINE HYDROCHLORIDE 25 MG: 50 INJECTION INTRAMUSCULAR; INTRAVENOUS at 13:45

## 2018-01-01 RX ADMIN — FAMOTIDINE 20 MG: 10 INJECTION INTRAVENOUS at 14:02

## 2018-01-01 RX ADMIN — PACLITAXEL 271.3 MG: 300 INJECTION, SOLUTION INTRAVENOUS at 14:59

## 2018-01-01 RX ADMIN — HEPARIN 500 UNITS: 100 SYRINGE at 18:58

## 2018-01-01 RX ADMIN — MIDAZOLAM HYDROCHLORIDE 2 MG: 1 INJECTION INTRAMUSCULAR; INTRAVENOUS at 13:17

## 2018-01-01 RX ADMIN — DEXAMETHASONE SODIUM PHOSPHATE 20 MG: 4 INJECTION, SOLUTION INTRAMUSCULAR; INTRAVENOUS at 12:35

## 2018-01-01 RX ADMIN — FAMOTIDINE 20 MG: 10 INJECTION INTRAVENOUS at 13:52

## 2018-01-01 RX ADMIN — SODIUM CHLORIDE: 9 INJECTION, SOLUTION INTRAVENOUS at 14:02

## 2018-01-01 RX ADMIN — MIDAZOLAM HYDROCHLORIDE 2 MG: 1 INJECTION INTRAMUSCULAR; INTRAVENOUS at 13:13

## 2018-01-01 RX ADMIN — SODIUM CHLORIDE: 9 INJECTION, SOLUTION INTRAVENOUS at 12:15

## 2018-01-01 RX ADMIN — FENTANYL CITRATE 25 MCG: 50 INJECTION, SOLUTION INTRAMUSCULAR; INTRAVENOUS at 13:13

## 2018-01-01 RX ADMIN — GADOBUTROL 7.5 ML: 604.72 INJECTION INTRAVENOUS at 16:08

## 2018-01-01 RX ADMIN — PACLITAXEL 273 MG: 6 INJECTION, SOLUTION INTRAVENOUS at 15:20

## 2018-01-01 RX ADMIN — HEPARIN 500 UNITS: 100 SYRINGE at 18:13

## 2018-01-01 RX ADMIN — LIDOCAINE HYDROCHLORIDE,EPINEPHRINE BITARTRATE: 10; .01 INJECTION, SOLUTION INFILTRATION; PERINEURAL at 14:40

## 2018-01-01 RX ADMIN — FAMOTIDINE 20 MG: 10 INJECTION INTRAVENOUS at 14:33

## 2018-01-01 RX ADMIN — MIDAZOLAM 1 MG: 1 INJECTION INTRAMUSCULAR; INTRAVENOUS at 14:34

## 2018-01-01 RX ADMIN — DIPHENHYDRAMINE HYDROCHLORIDE 25 MG: 50 INJECTION INTRAMUSCULAR; INTRAVENOUS at 11:13

## 2018-01-01 RX ADMIN — LIDOCAINE HYDROCHLORIDE: 10 INJECTION, SOLUTION INFILTRATION; PERINEURAL at 13:28

## 2018-01-01 RX ADMIN — SODIUM CHLORIDE: 9 INJECTION, SOLUTION INTRAVENOUS at 13:40

## 2018-01-01 RX ADMIN — PACLITAXEL 128 MG: 6 INJECTION, SOLUTION INTRAVENOUS at 14:31

## 2018-01-01 RX ADMIN — HEPARIN 500 UNITS: 100 SYRINGE at 15:50

## 2018-01-01 RX ADMIN — DEXAMETHASONE SODIUM PHOSPHATE 12 MG: 4 INJECTION, SOLUTION INTRAMUSCULAR; INTRAVENOUS at 13:59

## 2018-01-01 RX ADMIN — IOHEXOL 50 ML: 350 INJECTION, SOLUTION INTRAVENOUS at 18:38

## 2018-01-01 RX ADMIN — DENOSUMAB 120 MG: 120 INJECTION SUBCUTANEOUS at 14:47

## 2018-01-01 RX ADMIN — HEPARIN 500 UNITS: 100 SYRINGE at 16:15

## 2018-01-01 RX ADMIN — MIDAZOLAM 1 MG: 1 INJECTION INTRAMUSCULAR; INTRAVENOUS at 14:40

## 2018-01-01 RX ADMIN — DEXAMETHASONE SODIUM PHOSPHATE 20 MG: 4 INJECTION, SOLUTION INTRAMUSCULAR; INTRAVENOUS at 13:53

## 2018-01-01 RX ADMIN — DIPHENHYDRAMINE HYDROCHLORIDE 25 MG: 50 INJECTION INTRAMUSCULAR; INTRAVENOUS at 13:44

## 2018-01-01 RX ADMIN — DENOSUMAB 120 MG: 120 INJECTION SUBCUTANEOUS at 12:29

## 2018-01-01 RX ADMIN — DIPHENHYDRAMINE HYDROCHLORIDE 25 MG: 50 INJECTION INTRAMUSCULAR; INTRAVENOUS at 14:53

## 2018-01-01 RX ADMIN — FAMOTIDINE 20 MG: 10 INJECTION INTRAVENOUS at 14:10

## 2018-01-01 RX ADMIN — HEPARIN 500 UNITS: 100 SYRINGE at 16:08

## 2018-01-01 SDOH — ECONOMIC STABILITY: GENERAL: QUALITY OF LIFE: FAIR

## 2018-01-01 ASSESSMENT — ENCOUNTER SYMPTOMS
SHORTNESS OF BREATH: 1
MYALGIAS: 0
DIZZINESS: 0
ABDOMINAL PAIN: 0
INSOMNIA: 1
MYALGIAS: 0
SHORTNESS OF BREATH: 1
PALPITATIONS: 0
PALPITATIONS: 0
CONSTIPATION: 0
FEVER: 0
PALPITATIONS: 0
CHILLS: 0
NERVOUS/ANXIOUS: 1
FEVER: 0
WHEEZING: 1
INSOMNIA: 0
COUGH: 1
INSOMNIA: 1
DIZZINESS: 1
TINGLING: 1
HEADACHES: 0
COUGH: 0
QUALITY: ACHING
QUALITY: DISCOMFORT
NECK PAIN: 0
PAIN SCALE AT LOWEST: 3
FEVER: 0
ALLEVIATING FACTORS: ELEVATION
VOMITING: 0
FALLS: 0
CHILLS: 0
WEIGHT LOSS: 0
DIAPHORESIS: 0
MYALGIAS: 0
FEVER: 0
DIARRHEA: 0
SHORTNESS OF BREATH: 0
PALPITATIONS: 0
ABDOMINAL PAIN: 1
CHILLS: 0
FEVER: 0
DIARRHEA: 0
VOMITING: 0
CHILLS: 0
WHEEZING: 0
PALPITATIONS: 0
SORE THROAT: 1
PAIN SCALE: 2
PALPITATIONS: 0
NAUSEA: 0
TINGLING: 0
HEARTBURN: 1
SPUTUM PRODUCTION: 1
SORE THROAT: 1
BACK PAIN: 0
VOMITING: 0
COUGH: 0
NAUSEA: 0
WHEEZING: 0
DIARRHEA: 0
SORE THROAT: 0
TINGLING: 1
CHILLS: 0
DIARRHEA: 0
HEADACHES: 0
WEIGHT LOSS: 0
SHORTNESS OF BREATH: 1
WHEEZING: 0
CONSTIPATION: 0
EYES NEGATIVE: 1
LOSS OF CONSCIOUSNESS: 0
MYALGIAS: 0
PALPITATIONS: 0
DIARRHEA: 0
BLOOD IN STOOL: 0
BLURRED VISION: 0
MYALGIAS: 0
HEADACHES: 0
EXACERBATED BY: HOUSEWORK
FEVER: 0
DEPRESSION: 0
DIARRHEA: 0
MYALGIAS: 0
MYALGIAS: 0
DIZZINESS: 0
VOMITING: 1
CONSTIPATION: 0
VOMITING: 0
DIZZINESS: 0
ABDOMINAL PAIN: 0
SHORTNESS OF BREATH: 1
COUGH: 0
COUGH: 0
PND: 0
PAIN TIMING: CONTINUOUSLY
RHINORRHEA: 0
EXACERBATED BY: DRESSING
EXACERBATED BY: ACTIVITY
HEARTBURN: 0
HEADACHES: 1
VOMITING: 0
EXACERBATED BY: OVERHEAD ACTIVITY
SHORTNESS OF BREATH: 0
DIARRHEA: 0
FEVER: 0
ORTHOPNEA: 0
WEIGHT LOSS: 1
CONSTIPATION: 0
NERVOUS/ANXIOUS: 1
BLOOD IN STOOL: 0
WHEEZING: 0
WEIGHT LOSS: 0
WEIGHT LOSS: 0
NECK PAIN: 1
DIZZINESS: 0
NAUSEA: 1
CONSTIPATION: 1
COUGH: 1
CHILLS: 0
ABDOMINAL PAIN: 0
FEVER: 0
CONSTITUTIONAL NEGATIVE: 1
NAUSEA: 0
SHORTNESS OF BREATH: 0
TINGLING: 0
WEAKNESS: 1
DIZZINESS: 0
NAUSEA: 0
NAUSEA: 0
WHEEZING: 0
COUGH: 1
CONSTIPATION: 1
HEADACHES: 0
SHORTNESS OF BREATH: 0
DIZZINESS: 1
SORE THROAT: 0
DOUBLE VISION: 0
HEADACHES: 0
PAIN SCALE AT HIGHEST: 6
COUGH: 0
SHORTNESS OF BREATH: 1
VOMITING: 0
CHILLS: 0
NAUSEA: 0
CHILLS: 0

## 2018-01-01 ASSESSMENT — PULMONARY FUNCTION TESTS
FEV1/FVC_PERCENT_CHANGE: 0
FEV1/FVC: 59.77
FEV1_LLN: 1.90
FEV1: 1.48
FVC_PREDICTED: 3
FVC: 2.47
FVC_LLN: 2.51
FEV1/FVC_PREDICTED: 76
FEV1_PERCENT_CHANGE: 7
FEV1_PERCENT_PREDICTED: 65
FVC: 2.66
FEV1/FVC: 60
FVC_PERCENT_PREDICTED: 88
FEV1: 1.59
FEV1/FVC_PERCENT_CHANGE: 100
FEV1_PERCENT_CHANGE: 7
FEV1/FVC_PERCENT_PREDICTED: 76
FEV1/FVC_PERCENT_PREDICTED: 78
FEV1/FVC: 60
FEV1_PREDICTED: 2.28
FEV1_PERCENT_PREDICTED: 69
FEV1/FVC: 60
FVC_PERCENT_PREDICTED: 82
FEV1/FVC_PERCENT_PREDICTED: 78
FEV1/FVC_PERCENT_PREDICTED: 79
FEV1/FVC_PERCENT_LLN: 63
FEV1/FVC_PERCENT_PREDICTED: 79

## 2018-01-01 ASSESSMENT — PAIN SCALES - GENERAL
PAINLEVEL: 7=MODERATE-SEVERE PAIN
PAINLEVEL: 6=MODERATE PAIN
PAINLEVEL_OUTOF10: 3
PAINLEVEL: 4=SLIGHT-MODERATE PAIN
PAINLEVEL_OUTOF10: 0
PAINLEVEL_OUTOF10: 3
PAINLEVEL: 5=MODERATE PAIN
PAINLEVEL: 4=SLIGHT-MODERATE PAIN
PAINLEVEL: NO PAIN
PAINLEVEL_OUTOF10: 0
PAINLEVEL: 6=MODERATE PAIN
PAINLEVEL_OUTOF10: 5
PAINLEVEL_OUTOF10: 4
PAINLEVEL_OUTOF10: 0
PAINLEVEL: 7=MODERATE-SEVERE PAIN
PAINLEVEL: 7=MODERATE-SEVERE PAIN
PAINLEVEL: 3=SLIGHT PAIN
PAINLEVEL: NO PAIN
PAINLEVEL: NO PAIN
PAINLEVEL_OUTOF10: 0
PAINLEVEL: NO PAIN
PAINLEVEL: NO PAIN
PAINLEVEL_OUTOF10: 5
PAINLEVEL_OUTOF10: 5
PAINLEVEL_OUTOF10: 0
PAINLEVEL: 4=SLIGHT-MODERATE PAIN
PAINLEVEL: 6=MODERATE PAIN
PAINLEVEL: 8=MODERATE-SEVERE PAIN
PAINLEVEL: 7=MODERATE-SEVERE PAIN
PAINLEVEL_OUTOF10: 0
PAINLEVEL: 6=MODERATE PAIN
PAINLEVEL: NO PAIN
PAINLEVEL: NO PAIN
PAINLEVEL_OUTOF10: 8
PAINLEVEL: 6=MODERATE PAIN
PAINLEVEL_OUTOF10: 0
PAINLEVEL: 4=SLIGHT-MODERATE PAIN
PAINLEVEL_OUTOF10: 0
PAINLEVEL: 8=MODERATE-SEVERE PAIN
PAINLEVEL: NO PAIN
PAINLEVEL: NO PAIN
PAINLEVEL_OUTOF10: 5
PAINLEVEL: 6=MODERATE PAIN
PAINLEVEL: 6=MODERATE PAIN
PAINLEVEL: 4=SLIGHT-MODERATE PAIN
PAINLEVEL: 7=MODERATE-SEVERE PAIN
PAINLEVEL_OUTOF10: 3
PAINLEVEL: NO PAIN
PAINLEVEL: 6=MODERATE PAIN
PAINLEVEL: 5=MODERATE PAIN
PAINLEVEL_OUTOF10: 5
PAINLEVEL_OUTOF10: 0

## 2018-01-01 ASSESSMENT — LIFESTYLE VARIABLES: DO YOU DRINK ALCOHOL: NO

## 2018-01-01 ASSESSMENT — PATIENT HEALTH QUESTIONNAIRE - PHQ9
CLINICAL INTERPRETATION OF PHQ2 SCORE: 0

## 2018-02-18 ENCOUNTER — OFFICE VISIT (OUTPATIENT)
Dept: URGENT CARE | Facility: PHYSICIAN GROUP | Age: 68
End: 2018-02-18
Payer: MEDICARE

## 2018-02-18 VITALS
TEMPERATURE: 97.4 F | WEIGHT: 125 LBS | HEART RATE: 84 BPM | DIASTOLIC BLOOD PRESSURE: 86 MMHG | SYSTOLIC BLOOD PRESSURE: 132 MMHG | OXYGEN SATURATION: 96 % | RESPIRATION RATE: 16 BRPM | HEIGHT: 62 IN | BODY MASS INDEX: 23 KG/M2

## 2018-02-18 DIAGNOSIS — J44.9 CHRONIC OBSTRUCTIVE PULMONARY DISEASE, UNSPECIFIED COPD TYPE (HCC): ICD-10-CM

## 2018-02-18 DIAGNOSIS — J01.00 ACUTE NON-RECURRENT MAXILLARY SINUSITIS: ICD-10-CM

## 2018-02-18 PROCEDURE — 99214 OFFICE O/P EST MOD 30 MIN: CPT | Performed by: FAMILY MEDICINE

## 2018-02-18 RX ORDER — DOXYCYCLINE HYCLATE 100 MG
100 TABLET ORAL 2 TIMES DAILY
Qty: 20 TAB | Refills: 0 | Status: SHIPPED | OUTPATIENT
Start: 2018-02-18 | End: 2018-02-28

## 2018-02-18 RX ORDER — AMOXICILLIN 500 MG/1
500 CAPSULE ORAL 3 TIMES DAILY
COMMUNITY
End: 2018-01-01

## 2018-02-18 ASSESSMENT — ENCOUNTER SYMPTOMS
HEADACHES: 1
SINUS PRESSURE: 1

## 2018-02-18 NOTE — PROGRESS NOTES
"Subjective:   Zuly Christian is a 67 y.o. female who presents for Sinus Problem ([id number], sinus pain/pressure, R ear plugged, )        Sinus Problem   This is a new problem. The current episode started 1 to 4 weeks ago. The problem has been gradually worsening since onset. There has been no fever. The pain is moderate. Associated symptoms include congestion, headaches and sinus pressure. Pertinent negatives include no ear pain.     Review of Systems   HENT: Positive for congestion and sinus pressure. Negative for ear pain.    Neurological: Positive for headaches.     Allergies   Allergen Reactions   • Sulfa Drugs       Objective:   /86   Pulse 84   Temp 36.3 °C (97.4 °F)   Resp 16   Ht 1.575 m (5' 2\")   Wt 56.7 kg (125 lb)   SpO2 96%   BMI 22.86 kg/m²   Physical Exam   Constitutional: She is oriented to person, place, and time. She appears well-developed and well-nourished. No distress.   HENT:   Head: Normocephalic and atraumatic.   Nose: Mucosal edema and rhinorrhea present. Right sinus exhibits maxillary sinus tenderness. Left sinus exhibits maxillary sinus tenderness.   Eyes: Conjunctivae and EOM are normal. Pupils are equal, round, and reactive to light.   Cardiovascular: Normal rate, regular rhythm, normal heart sounds and intact distal pulses.    No murmur heard.  Pulmonary/Chest: Effort normal and breath sounds normal. No respiratory distress.   Abdominal: Soft. Bowel sounds are normal. She exhibits no distension. There is no tenderness.   Musculoskeletal: Normal range of motion.   Neurological: She is alert and oriented to person, place, and time. She has normal reflexes. No sensory deficit.   Skin: Skin is warm and dry.   Psychiatric: She has a normal mood and affect. Her behavior is normal.         Assessment/Plan:   Assessment    1. Acute non-recurrent maxillary sinusitis  - doxycycline (VIBRAMYCIN) 100 MG Tab; Take 1 Tab by mouth 2 times a day for 10 days.  Dispense: 20 Tab; Refill: " 0  Differential diagnosis, natural history, supportive care, and indications for immediate follow-up discussed.

## 2018-02-18 NOTE — PATIENT INSTRUCTIONS

## 2018-04-13 NOTE — PROGRESS NOTES
Zuly Christian is a 67 y.o. female who presents for Pain (left side pain off and on since january )       She is a 67-year-old female presents today with left-sided pain. Patient states the pain has been intermittent for the last several months. Patient states it's worse when she moves in certain directions. Patient states that she's had no cough, no shortness of breath, no calf tenderness. No dysuria. No blood in her urine. No diarrhea. Patient has had a colonoscopy and has had the diagnosis of diverticulosis. Patient denies any fever shakes or chills. No cough. No chest pain or palpitations. No other complaints.      PMH:  has a past medical history of Allergic rhinitis; COPD (chronic obstructive pulmonary disease) (CMS-HCC); Hyperlipidemia; Hypertension; and Peripheral vascular disease (CMS-HCC).  MEDS:   Current Outpatient Prescriptions:   •  Lifitegrast (XIIDRA OP), by Ophthalmic route., Disp: , Rfl:   •  naproxen (NAPROSYN) 500 MG Tab, Take 1 Tab by mouth 2 times a day, with meals., Disp: 60 Tab, Rfl: 1  •  ascorbic acid (ASCORBIC ACID) 500 MG Tab, Take 500 mg by mouth every day., Disp: , Rfl:   •  omega-3 acid ethyl esters (LOVAZA) 1 GM capsule, Take  by mouth., Disp: , Rfl:   •  Multiple Vitamin (MULTI VITAMIN DAILY PO), Take  by mouth., Disp: , Rfl:   •  aspirin 81 MG tablet, Take 81 mg by mouth every day. One tablet by mouth daily, Disp: , Rfl:   •  Coenzyme Q10 (COQ10) 100 MG Cap, Take 100 mg by mouth., Disp: , Rfl:   •  alprazolam (XANAX) 0.5 MG Tab, Take 0.5 mg by mouth at bedtime as needed for Sleep., Disp: , Rfl:   •  ezetimibe (ZETIA) 10 MG Tab, Take 10 mg by mouth every day., Disp: , Rfl:   •  rosuvastatin (CRESTOR) 40 MG tablet, Take 40 mg by mouth every day., Disp: , Rfl:   •  carvedilol (COREG) 25 MG Tab, Take 25 mg by mouth 2 times a day, with meals., Disp: , Rfl:   •  ramipril (ALTACE) 10 MG capsule, Take 10 mg by mouth every day., Disp: , Rfl:   •  ANORO ELLIPTA 62.5-25 MCG/INH AEROSOL  "POWDER, BREATH ACTIVATED inhaler, USE 1 INHALATION EVERY DAY, Disp: 3 Inhaler, Rfl: 3  •  amoxicillin (AMOXIL) 500 MG Cap, Take 500 mg by mouth 3 times a day., Disp: , Rfl:   •  albuterol (PROAIR HFA) 108 (90 BASE) MCG/ACT Aero Soln inhalation aerosol, Inhale 2 Puffs by mouth every four hours as needed for Shortness of Breath (wheezing)., Disp: 1 Inhaler, Rfl: 2  ALLERGIES:   Allergies   Allergen Reactions   • Sulfa Drugs      SURGHX:   Past Surgical History:   Procedure Laterality Date   • CAROTID ENDARTERECTOMY  7/20/2017    Procedure: CAROTID ENDARTERECTOMY FOR : LEFT SUBCLAVIAN ARTERY ANGIOPLASTY / STENT BRACHIAL APPROACH WITH ULTRASOUND;  Surgeon: Dinora Saldana M.D.;  Location: SURGERY [facility];  Service:    • TONSILLECTOMY       SOCHX:  reports that she quit smoking about 3 years ago. Her smoking use included Cigarettes. She has a 50.00 pack-year smoking history. She has never used smokeless tobacco. She reports that she drinks alcohol. She reports that she does not use drugs.  FH: family history includes Cancer in her father and mother.    Review of Systems   Constitutional: Negative.  Negative for chills, fever and weight loss.   HENT: Negative for sore throat.    Eyes: Negative.    Respiratory: Negative for cough and shortness of breath.    Cardiovascular: Negative for chest pain, palpitations and leg swelling.   Gastrointestinal: Positive for abdominal pain. Negative for blood in stool, constipation, diarrhea, melena, nausea and vomiting.   Genitourinary: Negative for dysuria.   Musculoskeletal: Negative for myalgias.   Skin: Negative for rash.   Neurological: Negative for dizziness (negative headache) and headaches.   All other systems reviewed and are negative.    Allergies   Allergen Reactions   • Sulfa Drugs       Objective:   /78   Pulse 87   Temp 36.6 °C (97.8 °F)   Resp 16   Ht 1.575 m (5' 2\")   Wt 56.7 kg (125 lb)   SpO2 93%   BMI 22.86 kg/m²   Physical Exam "   Constitutional: She is oriented to person, place, and time. She appears well-developed and well-nourished. She is active. No distress.   HENT:   Head: Normocephalic and atraumatic.   Right Ear: External ear normal.   Left Ear: External ear normal.   Mouth/Throat: Oropharynx is clear and moist. No oropharyngeal exudate.   Eyes: Conjunctivae and EOM are normal. Pupils are equal, round, and reactive to light. Right eye exhibits no discharge. Left eye exhibits no discharge. No scleral icterus.   Neck: Normal range of motion. Neck supple. No JVD present. Carotid bruit is not present. No thyroid mass and no thyromegaly present.   Cardiovascular: Normal rate, regular rhythm, S1 normal, S2 normal and normal heart sounds.  Exam reveals no friction rub.    No murmur heard.  Pulmonary/Chest: Effort normal and breath sounds normal. No respiratory distress. She has no wheezes. She has no rales. Chest wall is not dull to percussion. She exhibits tenderness and bony tenderness. She exhibits no mass, no laceration, no crepitus, no edema, no deformity and no retraction.       Abdominal: Soft. Bowel sounds are normal. She exhibits no distension and no mass. There is no hepatosplenomegaly. There is no tenderness. There is no rebound and no guarding.   Musculoskeletal: Normal range of motion. She exhibits no edema.        Cervical back: Normal.   Lymphadenopathy:        Head (right side): No submental, no submandibular and no occipital adenopathy present.        Head (left side): No submental, no submandibular and no occipital adenopathy present.     She has no cervical adenopathy.   Neurological: She is alert and oriented to person, place, and time. She has normal strength. No cranial nerve deficit.   Skin: Skin is warm and dry. No rash noted. No erythema.   Psychiatric: She has a normal mood and affect. Her behavior is normal. Thought content normal.   Nursing note and vitals reviewed.        Assessment/Plan:   Assessment    1.  Costochondritis  - naproxen (NAPROSYN) 500 MG Tab; Take 1 Tab by mouth 2 times a day, with meals.  Dispense: 60 Tab; Refill: 1  - POCT Urinalysis  Differential diagnosis, natural history, supportive care, and indications for immediate follow-up discussed.    Follow-up with PCP  Return to clinic for any worsening symptoms or ER for any worsening chest pain, shortness of breath etc.

## 2018-04-27 NOTE — PROGRESS NOTES
"Subjective:   Zuly Christian is a 67 y.o. female who presents for Cough (Congestion. Onset last friday. Not improving.)        Cough   This is a new problem. The current episode started in the past 7 days. The problem has been gradually worsening. The problem occurs every few minutes. The cough is productive of sputum. Associated symptoms include shortness of breath and wheezing. Pertinent negatives include no chills, fever, nasal congestion, postnasal drip or rhinorrhea.     Review of Systems   Constitutional: Negative for chills and fever.   HENT: Negative for postnasal drip and rhinorrhea.    Respiratory: Positive for cough, shortness of breath and wheezing.      Allergies   Allergen Reactions   • Sulfa Drugs       Objective:   /88   Pulse 86   Temp 36.8 °C (98.3 °F)   Ht 1.575 m (5' 2\")   Wt 59 kg (130 lb)   SpO2 94%   BMI 23.78 kg/m²   Physical Exam   Constitutional: She is oriented to person, place, and time. She appears well-developed and well-nourished. No distress.   HENT:   Head: Normocephalic and atraumatic.   Eyes: Conjunctivae and EOM are normal. Pupils are equal, round, and reactive to light.   Cardiovascular: Normal rate and regular rhythm.    No murmur heard.  Pulmonary/Chest: Effort normal. No respiratory distress. She has wheezes. She has no rales.   Abdominal: Soft. She exhibits no distension. There is no tenderness.   Neurological: She is alert and oriented to person, place, and time. She has normal reflexes. No sensory deficit.   Skin: Skin is warm and dry.   Psychiatric: She has a normal mood and affect.         Assessment/Plan:   Assessment    1. LRTI (lower respiratory tract infection)  - azithromycin (ZITHROMAX) 250 MG Tab; Take 2 tablets by mouth on day one. Take one tablet by mouth the remaining days until gone  Dispense: 6 Tab; Refill: 0  - predniSONE (DELTASONE) 10 MG Tab; Take 2 Tabs by mouth every day for 5 days.  Dispense: 10 Tab; Refill: 0  Differential diagnosis, " natural history, supportive care, and indications for immediate follow-up discussed.

## 2018-05-02 NOTE — PATIENT INSTRUCTIONS
Plan:    1) Prednisone taper 30 mg x 3 days, 20 mg x 3 days then 10 mg x 3 days. Add Mucinex OTC and recommend use of her Proair with spacer every 4-6 hours to help assist in mucous clearance.   2) Continue Anoro 1 dose INH daily. Continue Proair HFA inhaler 2 puffs Q 4 hours prn.   3) Her CT scan has been stable for over 2 years. However, patient states she smoked 1 PPD for 40 years and only quit 3 years ago. She should continue to have annual low dose screening CT's. She does have a family history for lung cancer. Repeat low dose CT chest ordered for 1 year which is November 2018.   4) Encouraged routine walking/exercise. She would like to get back on her treadmill once she gets her hip pain under better control.   5) She is up to date on Prevnar 13, Pneumovax 23 and Influenza vaccinations.   6) Continue prn OTC antihistamine.  7) Follow up in 6 months after CT chest, sooner if needed. She will call the office if her cough persists once she completes her Prednisone taper.

## 2018-05-07 NOTE — TELEPHONE ENCOUNTER
RX for Medrol dose pack signed. Courtney, Can someone call her pharmacy to clarify? Prednisone is generally cheaper and should be covered.

## 2018-05-07 NOTE — TELEPHONE ENCOUNTER
From: Zuly Christian  To: ADRIA Garcia  Sent: 5/7/2018 9:31 AM PDT  Subject: Prescription Question    I sent you a message on 5/2 after my appt. stating that my insurance would not cover the Prednisone you prescribed. I am still fighting with them. Could we see if they would cover MethylPREDNisolone Tablets, USP 4mg? A friend has taken this.    My breathing is still raspy, my right lung and shoulder is painful and my right sinus throbs. Does this mean the infection is coming back? Do I need another antibiotic? Please respond.

## 2018-05-07 NOTE — TELEPHONE ENCOUNTER
Called pharmacy. Insurance did deny the Prednisone but approved the medrol. Pt has already picked it up

## 2018-05-08 NOTE — TELEPHONE ENCOUNTER
From: Zuly Christian  To: ADRIA Garcia  Sent: 5/8/2018 8:52 AM PDT  Subject: Prescription Question    My breathing is still raspy, my right lung and shoulder is painful and my right sinus throbs. Does this mean the infection is coming back? Do I need another antibiotic? Please respond.     This am the phlegm I coughed up had a trace of yellow.    Thank you for the Methylprednisone prescription. I was able to pick it up yesterday.

## 2018-05-08 NOTE — TELEPHONE ENCOUNTER
Continue Proair HFA 2 puffs every 4-6 hours and Mucinex OTC to help with mucous clearance. It is going to take a few days on the Medrol dose pack before symptoms improve. If symptoms do not improve by end of week I recommend scheduling an office visit.

## 2018-05-14 NOTE — TELEPHONE ENCOUNTER
Have we ever prescribed this med? Yes.  If yes, what date? 8/16/17    Last OV: 5/2/18- TOOTIE Carreon    Next OV: 12/5/18    DX: COPD    Medications: Albuterol (Proair) HFA

## 2018-06-12 NOTE — TELEPHONE ENCOUNTER
1st attempt to contact the patient- Left voicemail for patient requesting a return call to schedule New Oncology appointment. NP/ breast cancer, bone lesions/ biopsies are being done Thursday 6/14.  Padma is requesting pt be seen the week of 6/18/2018

## 2018-06-18 NOTE — TELEPHONE ENCOUNTER
[facility] (referring provider) and requested they fax the biopsy results.  Per her she would give to MA to request and send

## 2018-06-19 PROBLEM — C50.919 METASTATIC BREAST CANCER: Status: ACTIVE | Noted: 2018-01-01

## 2018-06-19 NOTE — TELEPHONE ENCOUNTER
No response from the office- called and spoke to [facility] they are faxing the pathology results to [phone]

## 2018-06-19 NOTE — PROGRESS NOTES
Consult Note: Oncology    Date of consultation: 6/19/2018     Referring provider: The patient is here by the kind referral of Brooke Brown M.D.    Reason for consultation:   CC: Triple negative breast cancer    Oncologic history to date:  -April 6, 2018. Patient was seen by primary care physician. Patient is complaining of hip pain and nipple lesion. Patient was documented to have a normal mammogram in June 2017. X-rays of the hip and mammogram were ordered. X-ray shows advanced degenerative disease of the hip  -May 23, 2018. MRI of the hip shows numerous lesions worrisome for metastatic disease. There is a 3.8 cm lesion along the right superior acetabulum with subtle break in the bony cortex worrisome for pathological fracture. Patient is at risk for developing full-thickness cortical fracture  -June 6, 2018. PET CT scan.Small spiculated mass in the outer left breast is highly suspicious for malignancy.FDG avid adenopathy in the left axilla is consistent with metastasis.Extensive FDG avid lytic and sclerotic lesions in the axial and appendicular skeleton, consistent with bony metastasis  -June 14, 2018. Left breast biopsy revealed ER/SC HER-2 negative invasive ductal carcinoma    History of presenting illness:   First seen in on 6/19/2018  All relevant medical records available in [facility] were reviewed and are summarized above.    Zuly Christian  is a 67 y.o. female seen in clinic today for evaluation of metastatic triple negative breast cancer. The patient states she understands her diagnosis and would like to start treatment as soon as possible. She currently denies any acute complaints except for some right-sided hip pain      Review of Systems:  Constitutional: No fever, chills, weight loss, malaise/fatigue.    All other review of systems are negative except what was mentioned above in the HPI.    Past Medical History:    Past Medical History:   Diagnosis Date   • Allergic rhinitis    • COPD (chronic  obstructive pulmonary disease) (HCC)    • Hyperlipidemia    • Hypertension    • Peripheral vascular disease (HCC)        Past surgical history:    Past Surgical History:   Procedure Laterality Date   • CAROTID ENDARTERECTOMY  7/20/2017    Procedure: CAROTID ENDARTERECTOMY FOR : LEFT SUBCLAVIAN ARTERY ANGIOPLASTY / STENT BRACHIAL APPROACH WITH ULTRASOUND;  Surgeon: Dinora Saldana M.D.;  Location: SURGERY [facility];  Service:    • TONSILLECTOMY         Allergies:  Sulfa drugs    Medications:    Current Outpatient Prescriptions   Medication Sig Dispense Refill   • ANORO ELLIPTA 62.5-25 MCG/INH AEROSOL POWDER, BREATH ACTIVATED inhaler USE 1 INHALATION EVERY DAY 3 Inhaler 3   • ascorbic acid (ASCORBIC ACID) 500 MG Tab Take 500 mg by mouth every day.     • ezetimibe (ZETIA) 10 MG Tab Take 10 mg by mouth every day.     • carvedilol (COREG) 25 MG Tab Take 25 mg by mouth 2 times a day, with meals.     • albuterol (PROAIR HFA) 108 (90 Base) MCG/ACT Aero Soln inhalation aerosol Inhale 2 Puffs by mouth every four hours as needed for Shortness of Breath (wheezing). 3 Inhaler 3   • MethylPREDNISolone (MEDROL DOSEPAK) 4 MG Tablet Therapy Pack Take as directed. 21 Tab 1   • predniSONE (DELTASONE) 10 MG Tab Take 30mg x 3 days, then take 20mg x 3 days, then take 10mg x 3 days, with food, then discontinue. 18 Tab 1   • guaiFENesin LA (MUCINEX) 600 MG TABLET SR 12 HR Take 600 mg by mouth every 12 hours.     • azithromycin (ZITHROMAX) 250 MG Tab Take 2 tablets by mouth on day one. Take one tablet by mouth the remaining days until gone 6 Tab 0   • Lifitegrast (XIIDRA OP) by Ophthalmic route.     • naproxen (NAPROSYN) 500 MG Tab Take 1 Tab by mouth 2 times a day, with meals. 60 Tab 1   • omega-3 acid ethyl esters (LOVAZA) 1 GM capsule Take  by mouth.     • Multiple Vitamin (MULTI VITAMIN DAILY PO) Take  by mouth.     • aspirin 81 MG tablet Take 81 mg by mouth every day. One tablet by mouth daily     • Coenzyme Q10 (COQ10) 100 MG  "Cap Take 100 mg by mouth.     • alprazolam (XANAX) 0.5 MG Tab Take 0.5 mg by mouth at bedtime as needed for Sleep.     • rosuvastatin (CRESTOR) 40 MG tablet Take 40 mg by mouth every day.     • ramipril (ALTACE) 10 MG capsule Take 10 mg by mouth every day.       No current facility-administered medications for this visit.        Social History:     Social History     Social History   • Marital status:      Spouse name: N/A   • Number of children: N/A   • Years of education: N/A     Occupational History   • Not on file.     Social History Main Topics   • Smoking status: Former Smoker     Packs/day: 1.00     Years: 50.00     Types: Cigarettes     Quit date: 5/1/2014   • Smokeless tobacco: Never Used   • Alcohol use Yes      Comment: Socially   • Drug use: No   • Sexual activity: No     Other Topics Concern   • Not on file     Social History Narrative   • No narrative on file       Family History:     Family History   Problem Relation Age of Onset   • Cancer Father      lung   • Cancer Mother      lung       Physical Exam:  Vitals:   /89   Pulse 69   Temp 36.2 °C (97.2 °F)   Resp 18   Ht 1.575 m (5' 2\")   Wt 59.1 kg (130 lb 2.9 oz)   SpO2 99%   BMI 23.81 kg/m²     General: Not in acute distress,   HEENT: No pallor,No icterus. Oropharynx clear.   Neck: Supple, no palpable masses.  Lymph nodes: No palpable cervical, supraclavicular, axillary or inguinal lymphadenopathy.    CVS: regular rate and rhythm, no rubs or gallops  Chest: Clear to auscultate bilaterally, no wheezing or crackles.   ABD: Soft, non tender, non distended, positive bowel sounds, no palpable organomegaly  EXT: No edema or cyanosis  CNS: Alert and oriented x3, No focal deficits.  Skin- No rash      Labs:       Pathology:          Imaging:  Ct-petct-whole Body    Result Date: 6/6/2018 6/6/2018 1:14 PM HISTORY/REASON FOR EXAM:  Pathological fracture of pelvis, unspecified pathological cause, initial encounter TECHNIQUE/EXAM DESCRIPTION " AND NUMBER OF VIEWS: PET body imaging. Initially, 14.76 mCi F-18 FDG was administered intravenously under standardized conditions. Approximately 45 minutes after FDG administration, the patient was placed in the supine position on the PET CT table. Blood glucose level was 65 mg/dL. Low dose spiral CT imaging was performed from the skull base to the mid thighs. PET imaging was then performed from the skull base to the mid thighs. CT images, PET images, and PET/CT fused images were reviewed on a PACS 3D workstation. The limited non-contrast CT data are used primarily for attenuation correction and anatomic correlation.  Evaluation of solid organs and bowel are especially limited utilizing this technique. COMPARISON: None. FINDINGS: Head and neck: No abnormal focal FDG activity. Chest: A left breast mass measures 14 mm and is FDG avid with a maximum SUV of 8.2. A left axillary lymph node measures approximately 8 mm short axis with a maximum SUV of 8.1. There is mild activity in several additional left axillary lymph nodes. An irregular left axillary lymph node on image 80 measures 9 mm short axis with a maximum  SUV of 7.3. Abdomen and pelvis: Patchy activity in the right colon is likely physiologic or inflammatory. Musculoskeletal: There are scattered lytic lesions throughout the appendicular and axial skeleton are FDG avid. A focal activity in the C2 vertebral body has a maximum SUV of 8.5. Focal activity in the distal left clavicle has a maximum SUV of 7.1. Focal  activity in the T5 vertebral body is present with a maximum SUV of 6.2. A lytic lesion in the right ilium is FDG avid with a maximum SUV of 10.7. A lytic and sclerotic lesion is noted in the right acetabulum with cortical disruption. The lesion is FDG avid with a maximum SUV of 8.8 Incidental findings on CT: Scattered arterial calcifications with ectasia of the abdominal aorta. Maximum diameter is approximately 3 cm. A 7 mm nodule in the left upper lobe is  unchanged and does not demonstrate FDG activity. There are scattered diverticula in the colon.     1.  Small spiculated mass in the outer left breast is highly suspicious for malignancy. 2.  FDG avid adenopathy in the left axilla is consistent with metastasis. 3.  Extensive FDG avid lytic and sclerotic lesions in the axial and appendicular skeleton, consistent with bony metastasis. 4.  Atherosclerosis with ectasia of the abdominal aorta. 5.  Stable 7 mm left upper lobe nodule is not FDG avid. 6.  Diverticulosis.      Assessment and Plan:  -Breast cancer  -Triple negative  -Stage IV, metastatic to bone clinically    -MRI of brain to rule out CNS disease  -Bone biopsy to confirm metastatic disease  -Port placement and echocardiogram  -We discussed the natural history of disease. All treatment options will be palliative for metastatic disease  -Patient is not a candidate for hormonal manipulation  -We discussed first-line therapy with taxanes versus Xeloda. The patient states she would rather have the IV medication while she has good functional capacity and is able to drive herself.    -Bone metastases  -Patient had recent evaluation from dentistry. Her teeth are in good health, no workup needed. Patient had x-ray of her teeth past month  -We'll plan on starting Xgeva  -MRI of hip revealed possible impending fracture of the right hip, referral placed to radiation oncology      -All medical records available in [facility] were reviewed and are summarized above.  -All labs and/or imaging studies mentioned in the note above were reviewed with and explained to the patient as they pertain to medical decision making.    She agreed and verbalized her agreement and understanding with the current plan.  I answered all questions and concerns she has at this time.    Dear  Brooke Brown M.D., Thank you very much for allowing me to see  Zuly Christian today .     Please note that this dictation was created using voice  recognition software. I have made every reasonable attempt to correct obvious errors, but I expect that there are errors of grammar and possibly content that I did not discover before finalizing the note.      SIGNATURES:  Laurie Rouse    CC:  NIRMALA Chen Molly M, M.D.

## 2018-06-19 NOTE — PROGRESS NOTES
Zuly Christian is a 67 y.o. female here for a non-provider visit for: Lab Draws  on 6/19/2018 at 4:34 PM    Procedure Performed: Venipuncture     Anatomical site: Right Antecubital Area (AC)    Equipment used: 21G    Labs drawn: CBC w/diff, CMP, Estimated GFR and CA 27.29,     Ordering Provider: Dr. JESSY Rouse    Juan By: Roxana Joyner, Niall Ass't  No complications and  was present in the office the entire time I was doing the patients blood draw.

## 2018-06-20 NOTE — TELEPHONE ENCOUNTER
----- Message from Zuly Christian sent at 6/20/2018  3:17 PM PDT -----  Regarding: Procedure Question  Contact: [phone]  Re:  Echo & MRI of head    The list I print out from my chart has the MRI of head scheduled 6/27 @1:30 and the Echo @ 4:15.    When I print out the messages it shows the MRI of head scheduled 7/6 @ 9:30 (at [facility]) and the Echo @ 8:15 (at [facility]).    I am hoping the 6/27 dates are correct.  I do not want to go to [facility].  Please advise.

## 2018-06-20 NOTE — TELEPHONE ENCOUNTER
New Oncology Patient 48 hour follow up:    Called to welcome patient to [facility] and to review next steps in the patient’s plan of care.  Dr. Rouse ordered a MRI,Echo,Needle biopsy, and a port placement. Patient stated that she already saw her appointments on her mychart.I did inform the patient that [facility] her Needle biopsy and Port placement. I informed her that she needs to check in at 1100, nothing to eat or drink 6 hours prior, home, and to call [facility] pre admit to [phone].  Dr. Rouse also requested a follow up after all imaging and procedures ordered, patient scheduled to see Dr. Rouse on 6/28/18 1200. In addition, patient is scheduled for chemotherapy education class with our nurse practitioner, TIMBO Santos. Advised patient to expect this visit to be 60-90 minutes and the APRN would be educating patient on managing side effects of treatment at home and reviewing their treatment schedule. Encouraged patient to bring a family or friend to that appointment. Patient confirmed, answered all patients questions and thanked them for their time. Provided our phone number, [phone], for patient should they have any further questions or concerns.

## 2018-06-20 NOTE — TELEPHONE ENCOUNTER
"Called patient to clarify her appointments for her MRI and Echo. I explained to her that they are scheduled for 6/27/18 and that MRI is scheduled at [address] and her Echo is scheduled at [address] Patient stated \"I'm sorry honey I was all mixed up, I'm a  and a molly so I like to have everything in order\" I informed the patient that all her appointments on [facility] are accurate and she can now print out her schedule so it can be a little less confusing. Patient verbally understood and thankful for my call.  "

## 2018-06-25 NOTE — OR NURSING
1520 Patient arrived from IR, awake and alert.  Dressing CDI, soft.  1545 Patient denies any discomfort, updated on plan of care.  1620 Discharge orders received. All lines and monitors discontinued. Reviewed discharge paperwork with pt and friend. Discussed diet, activity, medications, follow up care and worsening symptoms. No questions at this time. Pt to be discharged home with friend via wheelchair by volunteer.

## 2018-06-25 NOTE — DISCHARGE INSTRUCTIONS
ACTIVITY: Rest and take it easy for the first 24 hours.  A responsible adult is recommended to remain with you during that time.  It is normal to feel sleepy.  We encourage you to not do anything that requires balance, judgment or coordination.    MILD FLU-LIKE SYMPTOMS ARE NORMAL. YOU MAY EXPERIENCE GENERALIZED MUSCLE ACHES, THROAT IRRITATION, HEADACHE AND/OR SOME NAUSEA.    FOR 24 HOURS DO NOT:  Drive, operate machinery or run household appliances.  Drink beer or alcoholic beverages.   Make important decisions or sign legal documents.    DIET: To avoid nausea, slowly advance diet as tolerated, avoiding spicy or greasy foods for the first day.  Add more substantial food to your diet according to your physician's instructions.  Babies can be fed formula or breast milk as soon as they are hungry.  INCREASE FLUIDS AND FIBER TO AVOID CONSTIPATION.    SURGICAL DRESSING/BATHING: Keep dressings and incisions dry for 24 hours, may shower and removes dressings after 3 PM on 6/26, let steri strips fall off, do not need to replace dressings    FOLLOW-UP APPOINTMENT:  A follow-up appointment should be arranged with your doctor Padma [phone]; call to schedule.    You should CALL YOUR PHYSICIAN if you develop:  Fever greater than 101 degrees F.  Pain not relieved by medication, or persistent nausea or vomiting.  Excessive bleeding (blood soaking through dressing) or unexpected drainage from the wound.  Extreme redness or swelling around the incision site, drainage of pus or foul smelling drainage.  Inability to urinate or empty your bladder within 8 hours.  Problems with breathing or chest pain.    You should call 911 if you develop problems with breathing or chest pain.  If you are unable to contact your doctor or surgical center, you should go to the nearest emergency room or urgent care center.  Physician's telephone #: [phone]    If any questions arise, call your doctor.  If your doctor is not available, please feel  free to call the Surgical Center at {Surgical Dept Numbers:[id number]}.  The Center is open Monday through Friday from 7AM to 7PM.  You can also call the HEALTH HOTLINE open 24 hours/day, 7 days/week and speak to a nurse at [phone], or toll free at [phone].    A registered nurse may call you a few days after your surgery to see how you are doing after your procedure.    MEDICATIONS: Resume taking daily medication.  Take prescribed pain medication with food.  If no medication is prescribed, you may take non-aspirin pain medication if needed.  PAIN MEDICATION CAN BE VERY CONSTIPATING.  Take a stool softener or laxative such as senokot, pericolace, or milk of magnesia if needed.    If your physician has prescribed pain medication that includes Acetaminophen (Tylenol), do not take additional Acetaminophen (Tylenol) while taking the prescribed medication.    Depression / Suicide Risk    As you are discharged from this [facility] facility, it is important to learn how to keep safe from harming yourself.    Recognize the warning signs:  · Abrupt changes in personality, positive or negative- including increase in energy   · Giving away possessions  · Change in eating patterns- significant weight changes-  positive or negative  · Change in sleeping patterns- unable to sleep or sleeping all the time   · Unwillingness or inability to communicate  · Depression  · Unusual sadness, discouragement and loneliness  · Talk of wanting to die  · Neglect of personal appearance   · Rebelliousness- reckless behavior  · Withdrawal from people/activities they love  · Confusion- inability to concentrate     If you or a loved one observes any of these behaviors or has concerns about self-harm, here's what you can do:  · Talk about it- your feelings and reasons for harming yourself  · Remove any means that you might use to hurt yourself (examples: pills, rope, extension cords, firearm)  · Get professional help from the community  (Mental Health, Substance Abuse, psychological counseling)  · Do not be alone:Call your Safe Contact- someone whom you trust who will be there for you.  · Call your local CRISIS HOTLINE 457-8465 or 070-661-2308  · Call your local Children's Mobile Crisis Response Team [place] (386) 979-8373 or www.ArtBinder  · Call the toll free National Suicide Prevention Hotlines   · National Suicide Prevention Lifeline 757-434-ICTK (5962)  · National Hope Line Network 800-SUICIDE (788-9254)

## 2018-06-25 NOTE — OR SURGEON
Immediate Post- Operative Note        PostOp Diagnosis: Osseous mets.       Procedure(s): CT guided bone biopsy.       Estimated Blood Loss: Less than 5 ml        Complications: None            6/25/2018     1315 PM     Miguel Harris

## 2018-06-25 NOTE — PROGRESS NOTES
IR RN note:    Site Marked and Confirmed with MD, patient and RN pre procedure   Right iliac bone biopsy by MD Harris assisted by RT/CT Melvin,Right posterior iliac access site;  x4 cores in formalin  End Tidal CO2 range 26-36 during procedure   Patient tolerated procedure, hemodynamically stable; pt awake and talking post procedure; report given to PPU RN Dominique;   patient transported back to PPU via IR RN monitored then transferred care to report RN

## 2018-06-25 NOTE — OR SURGEON
Immediate Post- Operative Note        PostOp Diagnosis: Probable breast ca.       Procedure(s): Port placement for chemo.       Estimated Blood Loss: Less than 5 ml        Complications: None            6/25/2018     1:29 PM     Miguel Harris

## 2018-06-27 NOTE — PROGRESS NOTES
On June 26, 2018, pt. attended Newly Diagnosed Breast Cancer class and stated her distress to be 9 related to excessive fear, excessive worry, constipation, skin dry/itchy, and sleep.  OSW Torey provided pt. with resources and OSW's contact information.

## 2018-06-27 NOTE — PROGRESS NOTES
Nurse Navigation Assessment      DX: Stage IV Breast Cancer    POC:  Chemotherapy Cycle 1 on 7/11/18    FAMHX:  Mother lung cancer.    PATIENT GOALS OF CARE:  To live to see my grandson graduate.             BARRIERS ASSESSMENT:    TRANSPORTATION denies barriers  EMPLOYMENT   retired  FINANCIAL  Denies barriers  INSURANCE  Medicare w/secondary  SUPPORT SYSTEM  Friends, brother and grandson  PSYCHOLOGICAL   Pt reports she is very overwhelmed.  She has been the primary caregiver for her grandson since the age of [age].  He is now [age].  He lives with the pt and is currently a student at [employer].  She is concerned about him as she moves forward with her tx.  The family has experienced loss and has been to counseling in years past.  Pt expresses interest in counseling for both she and her grandson.  Referral to OSW placed.    COMMUNICATION n/a   FAMILY CARE Support for grandson requested.  SELF CARE  Good performance status currently.  Pt is active despite some hip and leg pain.  Pt exhibits s/s of lymphedema and is awaiting a referral for lymphedema evaluation.  She reports she has a hard time staying asleep.  She has taken Xanax prn in the past.  However since her recent dx she has started taking it daily and she finds it is not as effective.           INTERVENTION:  Referral to OSW  Pt is registered for Mind Body Workshop next Monday.  Virtual attendance.

## 2018-06-28 NOTE — PROGRESS NOTES
Consult Note: Oncology    Date of consultation: 6/28/2018     Referring provider: The patient is here by the kind referral of Brooke Brown M.D.    Reason for consultation:   CC: Triple negative breast cancer    Oncologic history to date:  -April 6, 2018. Patient was seen by primary care physician. Patient is complaining of hip pain and nipple lesion. Patient was documented to have a normal mammogram in June 2017. X-rays of the hip and mammogram were ordered. X-ray shows advanced degenerative disease of the hip  -May 23, 2018. MRI of the hip shows numerous lesions worrisome for metastatic disease. There is a 3.8 cm lesion along the right superior acetabulum with subtle break in the bony cortex worrisome for pathological fracture. Patient is at risk for developing full-thickness cortical fracture  -June 6, 2018. PET CT scan.Small spiculated mass in the outer left breast is highly suspicious for malignancy.FDG avid adenopathy in the left axilla is consistent with metastasis.Extensive FDG avid lytic and sclerotic lesions in the axial and appendicular skeleton, consistent with bony metastasis  -June 14, 2018. Left breast biopsy revealed ER/AL HER-2 negative invasive ductal carcinoma  -June 24,2018. A. Right iliac bone biopsy, 4 cores: Metastatic carcinoma in a background of fibrotic tissue demonstrating immunohistochemical profile most consistent with breast origin.    History of presenting illness:   First seen in on 6/19/2018  All relevant medical records available in [facility] were reviewed and are summarized above.    Zuly Christian  is a 67 y.o. female seen in clinic today for evaluation of metastatic triple negative breast cancer. The patient states she understands her diagnosis and would like to start treatment as soon as possible. She currently denies any acute complaints except for some right-sided hip pain      Interval History: 6/28/2018  Zuly Christian is a 67 y.o. female seen in clinic today for  follow up. She is accompanied by family and a friend who is voice recording the consultation today. She is here for follow up of MRI brain and biopsy of bone. She denies any new complaints.      Review of Systems:  Constitutional: No fever, chills, weight loss, malaise/fatigue.    All other review of systems are negative except what was mentioned above in the HPI.    Past Medical History:    Past Medical History:   Diagnosis Date   • Allergic rhinitis    • Cancer (HCC)     breast   • Cataract    • Congestive heart failure (HCC)    • COPD (chronic obstructive pulmonary disease) (HCC)    • Emphysema of lung (HCC)    • Heart burn    • Hyperlipidemia    • Hypertension    • Peripheral vascular disease (HCC)        Past surgical history:    Past Surgical History:   Procedure Laterality Date   • CAROTID ENDARTERECTOMY  7/20/2017    Procedure: CAROTID ENDARTERECTOMY FOR : LEFT SUBCLAVIAN ARTERY ANGIOPLASTY / STENT BRACHIAL APPROACH WITH ULTRASOUND;  Surgeon: Dinora Saldana M.D.;  Location: SURGERY [facility];  Service:    • TUBAL LIGATION  1977   • GYN SURGERY      d+c 1979, 1980   • OTHER ORTHOPEDIC SURGERY      l knee   • TONSILLECTOMY         Allergies:  Sulfa drugs    Medications:    Current Outpatient Prescriptions   Medication Sig Dispense Refill   • glucosamine 500 MG Cap Take  by mouth every day.     • Calcium Polycarbophil (FIBER-CAPS PO) Take  by mouth.     • Oxymetazoline HCl (NASAL SPRAY NA) Neeses  in nose.     • CHONDROITIN SULFATE PO Take  by mouth.     • Lifitegrast (XIIDRA OP) by Ophthalmic route.     • ANORO ELLIPTA 62.5-25 MCG/INH AEROSOL POWDER, BREATH ACTIVATED inhaler USE 1 INHALATION EVERY DAY 3 Inhaler 3   • ascorbic acid (ASCORBIC ACID) 500 MG Tab Take 500 mg by mouth every day.     • omega-3 acid ethyl esters (LOVAZA) 1 GM capsule Take  by mouth.     • Multiple Vitamin (MULTI VITAMIN DAILY PO) Take  by mouth.     • aspirin 81 MG tablet Take 81 mg by mouth every day. One tablet by mouth daily  "    • Coenzyme Q10 (COQ10) 100 MG Cap Take 100 mg by mouth.     • ezetimibe (ZETIA) 10 MG Tab Take 10 mg by mouth every day.     • rosuvastatin (CRESTOR) 40 MG tablet Take 40 mg by mouth every day.     • carvedilol (COREG) 25 MG Tab Take 25 mg by mouth 2 times a day, with meals.     • ramipril (ALTACE) 10 MG capsule Take 10 mg by mouth every day.     • NON SPECIFIED l-agrinine     • albuterol (PROAIR HFA) 108 (90 Base) MCG/ACT Aero Soln inhalation aerosol Inhale 2 Puffs by mouth every four hours as needed for Shortness of Breath (wheezing). 3 Inhaler 3   • naproxen (NAPROSYN) 500 MG Tab Take 1 Tab by mouth 2 times a day, with meals. 60 Tab 1   • alprazolam (XANAX) 0.5 MG Tab Take 0.5 mg by mouth at bedtime as needed for Sleep.       No current facility-administered medications for this visit.        Social History:     Social History     Social History   • Marital status:      Spouse name: N/A   • Number of children: N/A   • Years of education: N/A     Occupational History   •  Retired     Social History Main Topics   • Smoking status: Former Smoker     Packs/day: 1.00     Years: 50.00     Types: Cigarettes     Quit date: 5/1/2014   • Smokeless tobacco: Never Used   • Alcohol use Yes      Comment: 3 a month   • Drug use: No   • Sexual activity: No     Other Topics Concern   • Not on file     Social History Narrative   • No narrative on file       Family History:     Family History   Problem Relation Age of Onset   • Lung Cancer Father    • Lung Cancer Mother        Physical Exam:  Vitals:   /74   Pulse 78   Temp 36.7 °C (98.1 °F)   Resp 16   Ht 1.575 m (5' 2\")   Wt 58.8 kg (129 lb 10.1 oz)   SpO2 91%   BMI 23.71 kg/m²     General: Not in acute distress,   HEENT: No pallor,No icterus. Oropharynx clear.   Neck: Supple, no palpable masses.  Lymph nodes: No palpable cervical, supraclavicular, axillary or inguinal lymphadenopathy.    CVS: regular rate and rhythm, no rubs or gallops  Chest: Clear to " auscultate bilaterally, no wheezing or crackles.   ABD: Soft, non tender, non distended, positive bowel sounds, no palpable organomegaly  EXT: No edema or cyanosis  CNS: Alert and oriented x3, No focal deficits.  Skin- No rash      Labs:   Results for DILEEP RODRÍGUEZ (MRN [id number]) as of 6/28/2018 14:59   6/19/2018 11:30   WBC 9.4   RBC 5.10   Hemoglobin 14.4   Hematocrit 45.8   MCV 89.8   MCH 28.2   MCHC 31.4 (L)   RDW 46.0   Platelet Count 315   MPV 10.7   Neutrophils-Polys 51.60   Neutrophils (Absolute) 4.86   Lymphocytes 31.40   Lymphs (Absolute) 2.96   Monocytes 10.60   Monos (Absolute) 1.00 (H)   Eosinophils 5.30   Eos (Absolute) 0.50   Basophils 0.50   Baso (Absolute) 0.05   Immature Granulocytes 0.60   Immature Granulocytes (abs) 0.06   Nucleated RBC 0.00   NRBC (Absolute) 0.00   Sodium 142   Potassium 4.1   Chloride 106   Co2 25   Anion Gap 11.0   Glucose 67   Bun 18   Creatinine 0.55   GFR If  >60   GFR If Non African American >60   Calcium 10.0   AST(SGOT) 15   ALT(SGPT) 13   Alkaline Phosphatase 88   Total Bilirubin 0.3   Albumin 4.1   Total Protein 6.5   Globulin 2.4   A-G Ratio 1.7       Pathology:  -June 24,2018. A. Right iliac bone biopsy, 4 cores: Metastatic carcinoma in a background of fibrotic tissue demonstrating immunohistochemical profile most consistent with breast origin.              Imaging:  Ct-petct-whole Body    Result Date: 6/6/2018 6/6/2018 1:14 PM HISTORY/REASON FOR EXAM:  Pathological fracture of pelvis, unspecified pathological cause, initial encounter TECHNIQUE/EXAM DESCRIPTION AND NUMBER OF VIEWS: PET body imaging. Initially, 14.76 mCi F-18 FDG was administered intravenously under standardized conditions. Approximately 45 minutes after FDG administration, the patient was placed in the supine position on the PET CT table. Blood glucose level was 65 mg/dL. Low dose spiral CT imaging was performed from the skull base to the mid thighs. PET imaging was then  performed from the skull base to the mid thighs. CT images, PET images, and PET/CT fused images were reviewed on a PACS 3D workstation. The limited non-contrast CT data are used primarily for attenuation correction and anatomic correlation.  Evaluation of solid organs and bowel are especially limited utilizing this technique. COMPARISON: None. FINDINGS: Head and neck: No abnormal focal FDG activity. Chest: A left breast mass measures 14 mm and is FDG avid with a maximum SUV of 8.2. A left axillary lymph node measures approximately 8 mm short axis with a maximum SUV of 8.1. There is mild activity in several additional left axillary lymph nodes. An irregular left axillary lymph node on image 80 measures 9 mm short axis with a maximum  SUV of 7.3. Abdomen and pelvis: Patchy activity in the right colon is likely physiologic or inflammatory. Musculoskeletal: There are scattered lytic lesions throughout the appendicular and axial skeleton are FDG avid. A focal activity in the C2 vertebral body has a maximum SUV of 8.5. Focal activity in the distal left clavicle has a maximum SUV of 7.1. Focal  activity in the T5 vertebral body is present with a maximum SUV of 6.2. A lytic lesion in the right ilium is FDG avid with a maximum SUV of 10.7. A lytic and sclerotic lesion is noted in the right acetabulum with cortical disruption. The lesion is FDG avid with a maximum SUV of 8.8 Incidental findings on CT: Scattered arterial calcifications with ectasia of the abdominal aorta. Maximum diameter is approximately 3 cm. A 7 mm nodule in the left upper lobe is unchanged and does not demonstrate FDG activity. There are scattered diverticula in the colon.     1.  Small spiculated mass in the outer left breast is highly suspicious for malignancy. 2.  FDG avid adenopathy in the left axilla is consistent with metastasis. 3.  Extensive FDG avid lytic and sclerotic lesions in the axial and appendicular skeleton, consistent with bony  metastasis. 4.  Atherosclerosis with ectasia of the abdominal aorta. 5.  Stable 7 mm left upper lobe nodule is not FDG avid. 6.  Diverticulosis.      Assessment and Plan:  -Breast cancer  -Triple negative  -Stage IV, metastatic to bone clinically  6/28/2018   -MRI of brain neg for CNS disease  -Bone biopsy to confirm metastatic disease  -We discussed the natural history of disease. All treatment options will be palliative for metastatic disease-Patient is not a candidate for hormonal manipulation or HER2 therapy because of triple neg status  -Patient scheduled for first available slot to start weekly paclitaxel    -Bone metastases  -Patient had recent evaluation from dentistry. Her teeth are in good health, no workup needed. Patient had x-ray of her teeth past month  6/28/2018   -Starting Xgeva q 4 weeks. Calcium and cr wnl  -MRI of hip revealed possible impending fracture of the right hip, to see radiation oncology in the am        She agreed and verbalized her agreement and understanding with the current plan.  I answered all questions and concerns she has at this time.    Dear  Brooke Brown M.D., Thank you very much for allowing me to see  Zuly Christian today .     Please note that this dictation was created using voice recognition software. I have made every reasonable attempt to correct obvious errors, but I expect that there are errors of grammar and possibly content that I did not discover before finalizing the note.      SIGNATURES:  Laurie Rouse    CC:  NIRMALA Chen Molly M, M.D.

## 2018-06-29 NOTE — CONSULTS
RADIATION ONCOLOGY CONSULT    DATE OF SERVICE: 6/29/2018    IDENTIFICATION: A 67 y.o. female with metastatic triple negative cancer with right hip pain.  She is here at the kind request of Dr. Rouse.      HISTORY OF PRESENT ILLNESS: Patient's history dates back to earlier this month when she was complaining of right hip pain. Incidentally she had a normal mammogram 6/16/2013. She underwent an MRI scan of the right hip revealing numerous lesions in the bony skeleton worrisome for metastatic disease. There was a 3.8 cm lesion along the right superior acetabulum with a subtle break in the bony cortex worrisome for a pathological fracture which is incompletely rate further workup included a PET CT 6/6/2018 showing a small spiculated mass in the outer left breast highly suspicious for malignancy there was also adenopathy in the left axilla consistent with metastases and avid lytic and sclerotic lesions in the axial and appendicular skeleton consistent with bony metastasis. On 614 she underwent a needle core biopsy of the left breast revealing a high grade invasive ductal carcinoma grade 3 estrogen progesterone and HER-2/shanell negative. She also underwent a biopsy of the right iliac bone on 625 and that was consistent with metastatic carcinoma consistent with breast primary. She seen in consultation today about palliative radiation therapy to the hip . She is going to also be to Taxol and X Chiba.    PAST MEDICAL HISTORY:   Past Medical History:   Diagnosis Date   • Allergic rhinitis    • Cancer (HCC)     breast   • Cataract    • Congestive heart failure (HCC)    • COPD (chronic obstructive pulmonary disease) (HCC)    • Emphysema of lung (HCC)    • Heart burn    • Hyperlipidemia    • Hypertension    • Osteopenia    • Peripheral vascular disease (HCC)        PAST SURGICAL HISTORY:  Past Surgical History:   Procedure Laterality Date   • CAROTID ENDARTERECTOMY  7/20/2017    Procedure: CAROTID ENDARTERECTOMY FOR : LEFT  SUBCLAVIAN ARTERY ANGIOPLASTY / STENT BRACHIAL APPROACH WITH ULTRASOUND;  Surgeon: Dinora Saldana M.D.;  Location: SURGERY [facility];  Service:    • TUBAL LIGATION     • GYN SURGERY      d+c ,    • OTHER ORTHOPEDIC SURGERY      left knee surgery   • TONSILLECTOMY         GYNECOLOGICAL STATUS:  : 1, Para: 1 and Number of Interrupted Pregnancies: 0    HORMONE USE:  Contraceptive hormone use 5 years and Post-menopause use 0 years    CURRENT MEDICATIONS:  Current Outpatient Prescriptions   Medication Sig Dispense Refill   • Oxymetazoline HCl (NASAL SPRAY NA) Spray  in nose.     • albuterol (PROAIR HFA) 108 (90 Base) MCG/ACT Aero Soln inhalation aerosol Inhale 2 Puffs by mouth every four hours as needed for Shortness of Breath (wheezing). 3 Inhaler 3   • Lifitegrast (XIIDRA OP) by Ophthalmic route.     • naproxen (NAPROSYN) 500 MG Tab Take 1 Tab by mouth 2 times a day, with meals. 60 Tab 1   • ANORO ELLIPTA 62.5-25 MCG/INH AEROSOL POWDER, BREATH ACTIVATED inhaler USE 1 INHALATION EVERY DAY 3 Inhaler 3   • ascorbic acid (ASCORBIC ACID) 500 MG Tab Take 500 mg by mouth every day.     • omega-3 acid ethyl esters (LOVAZA) 1 GM capsule Take  by mouth.     • aspirin 81 MG tablet Take 81 mg by mouth every day. One tablet by mouth daily     • ezetimibe (ZETIA) 10 MG Tab Take 10 mg by mouth every day.     • rosuvastatin (CRESTOR) 40 MG tablet Take 40 mg by mouth every day.     • carvedilol (COREG) 25 MG Tab Take 25 mg by mouth 2 times a day, with meals.     • ramipril (ALTACE) 10 MG capsule Take 10 mg by mouth every day.     • glucosamine 500 MG Cap Take  by mouth every day.     • Calcium Polycarbophil (FIBER-CAPS PO) Take  by mouth.     • CHONDROITIN SULFATE PO Take  by mouth.     • NON SPECIFIED l-agrinine     • Multiple Vitamin (MULTI VITAMIN DAILY PO) Take  by mouth.     • Coenzyme Q10 (COQ10) 100 MG Cap Take 100 mg by mouth.     • alprazolam (XANAX) 0.5 MG Tab Take 0.5 mg by mouth at bedtime as  "needed for Sleep.       No current facility-administered medications for this encounter.        ALLERGIES:    Sulfa drugs    FAMILY HISTORY:    Family History   Problem Relation Age of Onset   • Lung Cancer Father    • Lung Cancer Mother    [unfilled]        SOCIAL HISTORY:     reports that she quit smoking about 4 years ago. Her smoking use included Cigarettes. She has a 50.00 pack-year smoking history. She has never used smokeless tobacco. She reports that she drinks alcohol. She reports that she does not use drugs.    Patient is , lives in [place], [place] and has 1 child. Patient is retired - used to work in a cardiology office.    REVIEW OF SYSTEMS: Pertinent positives consist of 5 pound weight loss due to stress, fatigue she is not sleeping well, dry mouth, hoarseness from her acid reflux. She occasionally has constipation and heartburn but not now. She has right hip pain neck pain neck stiffness unsteady gait history of stroke with no residual and anxiety.  The rest of the review of systems is negative and has been reviewed by me. It is in the nursing note dated 6/29/2018 in [facility]    PHYSICAL EXAM:    1= Restricted in physically strenuous activity, but ambulatory and able to carry out work of a light sedentary nature, e.g., light housework, office work.  Vitals:    06/29/18 1038   BP: 151/65   Pulse: 85   Resp: 17   Temp: 36.4 °C (97.5 °F)   SpO2: 90%   Weight: 58.9 kg (129 lb 12.8 oz)   Height: 1.575 m (5' 2\")          Pain Scale: 0-10  Pain Assessement: 1 while sitting 6 when ambulating  Pain Location, Orientation and Scale: right groin   What makes the pain better: aleve  What makes the pain worse: ambulation  GENERAL: Well-appearing alert and oriented ×3 in no apparent distress  Breasts: Just superior to the right breast is the port placement no masses are appreciated in that breast in the left breast in the lateral aspect at about 3:00 she has about a 1 cm mass which is been biopsied. I do not " appreciate the axilla bilaterally.  Musculoskeletal: She does have pain in the right hip and some in the sacrum.  HEENT:  Pupils are equal, round, and reactive to light.  Extraocular muscles   are intact. Sclerae nonicteric.  Conjunctivae pink.  Oral cavity, tongue   protrudes midline.   NECK:  Supple without evidence of thyromegaly.  NODES:  No peripheral adenopathy of the neck, supraclavicular fossa or axillae   bilaterally.  LUNGS:  Clear to ascultation   HEART:  Regular rate and rhythm.  No murmur appreciated  ABDOMEN:  Soft. No evidence of hepatosplenomegaly.  Positive bowel sounds.  EXTREMITIES:  Without Edema.  NEUROLOGIC:  Cranial nerves II through XII were intact. Normal stance and gait motor and sensory grossly within normal limits              IMPRESSION:    A 67 y.o. with metastatic triple negative carcinoma with a symptomatic metastatic lesion at the right hip but also with substantial disease in the left hip.      RECOMMENDATIONS:   Because of these 2 sites I recommended treating both sides with a short palliative course of radiation therapy.  I've described the details of radiation along with the side effects both acute and chronic, including but not exclusive to fatigue, skin reaction, local soreness, swelling, delayed healing, scarring fibrosis discoloration. Ample time was allowed for questions, and patient understands.    She is scheduled for simulation on Monday to start soon thereafter    Thank you for the opportunity to participate in her care.  If any questions or comments, please do not hesitate in calling.    Please note that this dictation was created using voice recognition software. I have made every reasonable attempt to correct obvious errors, but I expect that there are errors of grammar and possibly content that I did not discover before finalizing the note.

## 2018-06-29 NOTE — NON-PROVIDER
"Patient was seen today in clinic with Dr. Eng for Metastatic Breast Cancer.  Vitals signs and weight were obtained and pain assessment was completed.  Allergies and medications were reviewed with the patient.  Review of systems completed.     Vitals/Pain:  Vitals:    06/29/18 1038   BP: 151/65   Pulse: 85   Resp: 17   Temp: 36.4 °C (97.5 °F)   SpO2: 90%   Weight: 58.9 kg (129 lb 12.8 oz)   Height: 1.575 m (5' 2\")        Pain Scale: 0-10  Pain Assessement: 1 while sitting 6 when ambulating  Pain Location, Orientation and Scale: right groin   What makes the pain better: aleve  What makes the pain worse: ambulation      Allergies:   Sulfa drugs    Current Medications:  Current Outpatient Prescriptions   Medication Sig Dispense Refill   • Oxymetazoline HCl (NASAL SPRAY NA) Spray  in nose.     • albuterol (PROAIR HFA) 108 (90 Base) MCG/ACT Aero Soln inhalation aerosol Inhale 2 Puffs by mouth every four hours as needed for Shortness of Breath (wheezing). 3 Inhaler 3   • Lifitegrast (XIIDRA OP) by Ophthalmic route.     • naproxen (NAPROSYN) 500 MG Tab Take 1 Tab by mouth 2 times a day, with meals. 60 Tab 1   • ANORO ELLIPTA 62.5-25 MCG/INH AEROSOL POWDER, BREATH ACTIVATED inhaler USE 1 INHALATION EVERY DAY 3 Inhaler 3   • ascorbic acid (ASCORBIC ACID) 500 MG Tab Take 500 mg by mouth every day.     • omega-3 acid ethyl esters (LOVAZA) 1 GM capsule Take  by mouth.     • aspirin 81 MG tablet Take 81 mg by mouth every day. One tablet by mouth daily     • ezetimibe (ZETIA) 10 MG Tab Take 10 mg by mouth every day.     • rosuvastatin (CRESTOR) 40 MG tablet Take 40 mg by mouth every day.     • carvedilol (COREG) 25 MG Tab Take 25 mg by mouth 2 times a day, with meals.     • ramipril (ALTACE) 10 MG capsule Take 10 mg by mouth every day.     • glucosamine 500 MG Cap Take  by mouth every day.     • Calcium Polycarbophil (FIBER-CAPS PO) Take  by mouth.     • CHONDROITIN SULFATE PO Take  by mouth.     • NON SPECIFIED l-agrinine   "   • Multiple Vitamin (MULTI VITAMIN DAILY PO) Take  by mouth.     • Coenzyme Q10 (COQ10) 100 MG Cap Take 100 mg by mouth.     • alprazolam (XANAX) 0.5 MG Tab Take 0.5 mg by mouth at bedtime as needed for Sleep.       No current facility-administered medications for this encounter.          PCP:  Stephanie Adler R.N.

## 2018-07-06 NOTE — TELEPHONE ENCOUNTER
I left patient a message to return my call in regards to getting her scheduled for chemo education, as we canceled her last one due to her treatment starting later.

## 2018-07-10 NOTE — PROGRESS NOTES
LM on home and cell phone.  Calling pt in response to request from Dr. Rouse re cooling cap and gloves. dsw

## 2018-07-12 NOTE — PROGRESS NOTES
"Pt called back to get information about cooling cap and to report a \"snap\" and new pain to her shoulder.  Pt asked several questions about the cap reported that infusion services does not have or support cooling caps or similar devices, but it is OK for pt to bring/supply and support her own device during the infusion. Reinforced that there is no data to support the use of cooling caps.  Pt verbalized understanding.  Pt then mentioned about the \"snap\" and new pain in her shoulder.  She also stated that she was told that there is cancer in her shoulder, but she didn't believe it because she thinks it is arthritis.  Strongly encouraged pt to see doctor today.  Pt stated that she would have to go to Urgent Care after her Radiation.  Enc pt to go to Radiation 30 min early to see MD prior to tx.  Called XRT and spoke with PEGGY Kellogg, and JIM Holbrook, re above.  Sheron stated she spoke with Dr. Nolasco in Dr. Eng's absence to ensure availability.  Pt is aware and is planning to arrive 30 min early.  dsw  "

## 2018-07-13 NOTE — PROGRESS NOTES
Pt called and stated that she had received the results from the scan yesterday of her clavicle, and it is not broken, but she stated that her pain there is consistently at a 4/10 whether she is perfectly still or not.  She stated that when she moves around, the pain escalates to an 8 or 9 of 10.  She asked what her next step is to help with the pain.  Enc pt to plan to meet with Dr. Eng today after radiation, and someone would call if instructions for her will change.  Spoke with Dr. Eng who stated she is planning to SIM the clavicle today after her XRT.  Dr. Eng then called the pt to inform her of the above.  Dr. Eng also stated she will plan to treat the clavicle is as few treatments as possible.  yeni

## 2018-07-13 NOTE — PROGRESS NOTES
Subjective:      Zuly Christian is a 67 y.o. female who presents with Chemo Education (Chemo Education- Taxol, Xgeva ([place])).        HPI    Patient is established with Dr. Rouse for metastatic breast cancer.  She will be starting on weekly Taxol as well as XGeva and is here today for a pre-chemotherapy teaching appointment. Patient is accompanied by her friend.     Patient is currently undergoing radiation therapy for her metastatic disease to bones. She is tolerating treatment fairly well. She is starting new treatment with radiation to her left clavicle area starting next Tuesday, July 17.    Allergies   Allergen Reactions   • Sulfa Drugs Hives     Current Outpatient Prescriptions on File Prior to Visit   Medication Sig Dispense Refill   • glucosamine 500 MG Cap Take  by mouth every day.     • Calcium Polycarbophil (FIBER-CAPS PO) Take  by mouth.     • Oxymetazoline HCl (NASAL SPRAY NA) [place]  in nose.     • CHONDROITIN SULFATE PO Take  by mouth.     • NON SPECIFIED l-agrinine     • albuterol (PROAIR HFA) 108 (90 Base) MCG/ACT Aero Soln inhalation aerosol Inhale 2 Puffs by mouth every four hours as needed for Shortness of Breath (wheezing). 3 Inhaler 3   • Lifitegrast (XIIDRA OP) by Ophthalmic route.     • naproxen (NAPROSYN) 500 MG Tab Take 1 Tab by mouth 2 times a day, with meals. 60 Tab 1   • ANORO ELLIPTA 62.5-25 MCG/INH AEROSOL POWDER, BREATH ACTIVATED inhaler USE 1 INHALATION EVERY DAY 3 Inhaler 3   • ascorbic acid (ASCORBIC ACID) 500 MG Tab Take 500 mg by mouth every day.     • omega-3 acid ethyl esters (LOVAZA) 1 GM capsule Take  by mouth.     • Multiple Vitamin (MULTI VITAMIN DAILY PO) Take  by mouth.     • aspirin 81 MG tablet Take 81 mg by mouth every day. One tablet by mouth daily     • Coenzyme Q10 (COQ10) 100 MG Cap Take 100 mg by mouth.     • alprazolam (XANAX) 0.5 MG Tab Take 0.5 mg by mouth at bedtime as needed for Sleep.     • ezetimibe (ZETIA) 10 MG Tab Take 10 mg by mouth every day.    "  • rosuvastatin (CRESTOR) 40 MG tablet Take 40 mg by mouth every day.     • carvedilol (COREG) 25 MG Tab Take 25 mg by mouth 2 times a day, with meals.     • ramipril (ALTACE) 10 MG capsule Take 10 mg by mouth every day.       No current facility-administered medications on file prior to visit.        Review of Systems   Constitutional: Positive for malaise/fatigue.   Musculoskeletal:        Bone pain from metastatic disease   Psychiatric/Behavioral: The patient is nervous/anxious (anxious to start treatment).           Objective:     /65   Pulse 81   Temp 36.3 °C (97.3 °F)   Resp 16   Ht 1.575 m (5' 2.01\")   Wt 59.1 kg (130 lb 4.7 oz)   SpO2 94%   Breastfeeding? No   BMI 23.82 kg/m²      Physical Exam   Constitutional: She is oriented to person, place, and time. She appears well-developed and well-nourished. No distress.   Cardiovascular: Normal rate.    Pulmonary/Chest: Effort normal.   Musculoskeletal: Normal range of motion.   Neurological: She is alert and oriented to person, place, and time.   Skin: Skin is warm and dry. She is not diaphoretic.   Psychiatric: She has a normal mood and affect. Her behavior is normal.   Vitals reviewed.              Assessment/Plan:     1. Metastatic breast cancer (HCC)  prochlorperazine (COMPAZINE) 10 MG Tab    ondansetron (ZOFRAN) 4 MG Tab tablet    REFERRAL TO ONCOLOGY NURSE NAVIGATOR    REFERRAL TO ONCOLOGY NUTRITION SERVICES       Patient has metastatic breast cance and will be starting on weekly Taxol with XGeva. Educated patient on chemotherapy including but not limited to: Infusion Policies and procedures, cycling of chemotherapy, length of treatment, alopecia, myelosuppression, infection prevention, fatigue, GI distress, use of specific nausea medications, avoidance of alcoholic beverages and supplement medications, use of sunscreen, chemotherapy precautions at home, and invasive procedures. Patient was provided with drug specific handouts, NCI " chemotherapy and you book, NCI eating hints book, [facility] side effects sheet.     Additional teaching includes: XGeva treatment    The following appointments, labs, and medications have been provided to the patient:    Line: Peripheral   Labs: CBC, CMP  Lab Appointments: Prior to her start of treatment  Follow up appts: Scheduled to see Dr. Rouse on July 24  Chemotherapy class: Completed today  Nausea medication: Zofran and Compazine  Nurse [place]: Referral placed and already met with [facility] nurse navigators  Dietician: Referral placed      Spent 75 minutes of continuous, non-interrupted, face-to-face patient contact in which greater than 50% of the visit was spent counseling and coordinating of care.

## 2018-07-13 NOTE — PROGRESS NOTES
Zuly Christian is a 67 y.o. female here for a non-provider visit for: Lab Draws  on 7/13/2018 at 12:04 PM    Procedure Performed: Venipuncture     Anatomical site: Right Antecubital Area (AC)    Equipment used: 21g Butterfly     Labs drawn: CBC w/diff and CMP    Ordering Provider: TIMBO Low By: Kay Raygoza R.N.

## 2018-07-24 NOTE — PROGRESS NOTES
Zuly Christian is a 67 y.o. female here for a non-provider visit for: Lab Draws  on 7/24/2018 at 11:22 AM    Procedure Performed: Venipuncture     Anatomical site: Right Antecubital Area (AC)    Equipment used: 21g Butterfly     Labs drawn: CBC w/diff and CMP    Ordering Provider: Dr. JESSY Martinez By: Arlen Rangel, Med Ass't    First attempt missed with 21g. Second attempt successful with 21g buttefly.

## 2018-07-24 NOTE — PROGRESS NOTES
Consult Note: Oncology    Date of consultation: 7/24/2018     Referring provider: The patient is here by the kind referral of Brooke Brown M.D.    Reason for consultation:   CC: Triple negative breast cancer    Oncologic history to date:  -April 6, 2018. Patient was seen by primary care physician. Patient is complaining of hip pain and nipple lesion. Patient was documented to have a normal mammogram in June 2017. X-rays of the hip and mammogram were ordered. X-ray shows advanced degenerative disease of the hip  -May 23, 2018. MRI of the hip shows numerous lesions worrisome for metastatic disease. There is a 3.8 cm lesion along the right superior acetabulum with subtle break in the bony cortex worrisome for pathological fracture. Patient is at risk for developing full-thickness cortical fracture  -June 6, 2018. PET CT scan.Small spiculated mass in the outer left breast is highly suspicious for malignancy.FDG avid adenopathy in the left axilla is consistent with metastasis.Extensive FDG avid lytic and sclerotic lesions in the axial and appendicular skeleton, consistent with bony metastasis  -June 14, 2018. Left breast biopsy revealed [place] HER-2 negative invasive ductal carcinoma  -June 24,2018. A. Right iliac bone biopsy, 4 cores: Metastatic carcinoma in a background of fibrotic tissue demonstrating immunohistochemical profile most consistent with breast origin.  -July 9-24, 2018. Radiation therapy to hip    History of presenting illness:   First seen in on 6/19/2018  All relevant medical records available in [facility] were reviewed and are summarized above.    Zuly Christian  is a 67 y.o. female seen in clinic today for evaluation of metastatic triple negative breast cancer. The patient states she understands her diagnosis and would like to start treatment as soon as possible. She currently denies any acute complaints except for some right-sided hip pain      Interval History: 6/28/2018  Zuly Christian is a  67 y.o. female seen in clinic today for follow up. She is accompanied by family and a friend who is voice recording the consultation today. She is here for follow up of MRI brain and biopsy of bone. She denies any new complaints.    Interval History: 7/24/2018  Zuly Christian is a 67 y.o. female seen in clinic today for follow up and pre chemotherapy appt. She is finishing her radiation therapy today which was extended       Review of Systems:  Constitutional: No fever, chills, weight loss, malaise/fatigue.    All other review of systems are negative except what was mentioned above in the HPI.    Past Medical History:    Past Medical History:   Diagnosis Date   • Allergic rhinitis    • Cancer (HCC)     breast   • Cataract    • Congestive heart failure (HCC)    • COPD (chronic obstructive pulmonary disease) (HCC)    • Emphysema of lung (HCC)    • Heart burn    • Hyperlipidemia    • Hypertension    • Osteopenia    • Peripheral vascular disease (HCC)        Past surgical history:    Past Surgical History:   Procedure Laterality Date   • CAROTID ENDARTERECTOMY  7/20/2017    Procedure: CAROTID ENDARTERECTOMY FOR : LEFT SUBCLAVIAN ARTERY ANGIOPLASTY / STENT BRACHIAL APPROACH WITH ULTRASOUND;  Surgeon: Dinora Saldana M.D.;  Location: SURGERY [facility];  Service:    • TUBAL LIGATION  1977   • GYN SURGERY      d+c 1979, 1980   • OTHER ORTHOPEDIC SURGERY      left knee surgery   • TONSILLECTOMY         Allergies:  Sulfa drugs    Medications:    Current Outpatient Prescriptions   Medication Sig Dispense Refill   • ondansetron (ZOFRAN) 4 MG Tab tablet Take 1 Tab by mouth every four hours as needed for Nausea/Vomiting (for nausea, vomiting). 30 Tab 6   • prochlorperazine (COMPAZINE) 10 MG Tab Take 1 Tab by mouth every 6 hours as needed (for nausea, vomiting). 30 Tab 6   • lidocaine-prilocaine (EMLA) 2.5-2.5 % Cream Apply to port one hour prior to access and cover with plastic wrap. 1 Tube 3   •  HYDROcodone-acetaminophen (NORCO) 5-325 MG Tab per tablet Take 1 Tab by mouth every 6 hours as needed for up to 30 days. 15 Tab 0   • prochlorperazine (COMPAZINE) 10 MG Tab Take 1 Tab by mouth every 6 hours as needed. 30 Tab 3   • ondansetron (ZOFRAN) 4 MG Tab tablet Take 1 Tab by mouth every four hours as needed for Nausea/Vomiting. 30 Tab 3   • glucosamine 500 MG Cap Take  by mouth every day.     • Calcium Polycarbophil (FIBER-CAPS PO) Take  by mouth.     • Oxymetazoline HCl (NASAL SPRAY NA) [place]  in nose.     • CHONDROITIN SULFATE PO Take  by mouth.     • NON SPECIFIED l-agrinine     • albuterol (PROAIR HFA) 108 (90 Base) MCG/ACT Aero Soln inhalation aerosol Inhale 2 Puffs by mouth every four hours as needed for Shortness of Breath (wheezing). 3 Inhaler 3   • Lifitegrast (XIIDRA OP) by Ophthalmic route.     • naproxen (NAPROSYN) 500 MG Tab Take 1 Tab by mouth 2 times a day, with meals. 60 Tab 1   • ANORO ELLIPTA 62.5-25 MCG/INH AEROSOL POWDER, BREATH ACTIVATED inhaler USE 1 INHALATION EVERY DAY 3 Inhaler 3   • ascorbic acid (ASCORBIC ACID) 500 MG Tab Take 500 mg by mouth every day.     • omega-3 acid ethyl esters (LOVAZA) 1 GM capsule Take  by mouth.     • Multiple Vitamin (MULTI VITAMIN DAILY PO) Take  by mouth.     • aspirin 81 MG tablet Take 81 mg by mouth every day. One tablet by mouth daily     • Coenzyme Q10 (COQ10) 100 MG Cap Take 100 mg by mouth.     • alprazolam (XANAX) 0.5 MG Tab Take 0.5 mg by mouth at bedtime as needed for Sleep.     • ezetimibe (ZETIA) 10 MG Tab Take 10 mg by mouth every day.     • rosuvastatin (CRESTOR) 40 MG tablet Take 40 mg by mouth every day.     • carvedilol (COREG) 25 MG Tab Take 25 mg by mouth 2 times a day, with meals.     • ramipril (ALTACE) 10 MG capsule Take 10 mg by mouth every day.       No current facility-administered medications for this visit.        Social History:     Social History     Social History   • Marital status:      Spouse name: N/A   • Number of  "children: N/A   • Years of education: N/A     Occupational History   • work in cardiology office Retired     Social History Main Topics   • Smoking status: Former Smoker     Packs/day: 1.00     Years: 50.00     Types: Cigarettes     Quit date: 5/1/2014   • Smokeless tobacco: Never Used   • Alcohol use Yes      Comment: 3 a month   • Drug use: No   • Sexual activity: No     Other Topics Concern   • Not on file     Social History Narrative   • No narrative on file       Family History:     Family History   Problem Relation Age of Onset   • Lung Cancer Father    • Lung Cancer Mother        Physical Exam:  Vitals:   /68   Pulse (!) 105   Temp 36.2 °C (97.2 °F)   Resp 16   Ht 1.575 m (5' 2.01\")   Wt 58.4 kg (128 lb 12 oz)   SpO2 92%   BMI 23.54 kg/m²     General: Not in acute distress,   HEENT: No pallor,No icterus. Oropharynx clear.   Neck: Supple, no palpable masses.  Lymph nodes: No palpable cervical, supraclavicular, axillary or inguinal lymphadenopathy.    CVS: regular rate and rhythm, no rubs or gallops  Chest: Clear to auscultate bilaterally, no wheezing or crackles.   ABD: Soft, non tender, non distended, positive bowel sounds, no palpable organomegaly  EXT: No edema or cyanosis  CNS: Alert and oriented x3, No focal deficits.  Skin- No rash      Labs:   Hospital Outpatient Visit on 07/24/2018   Component Date Value Ref Range Status   • WBC 07/24/2018 6.7  4.8 - 10.8 K/uL Final   • RBC 07/24/2018 4.98  4.20 - 5.40 M/uL Final   • Hemoglobin 07/24/2018 14.0  12.0 - 16.0 g/dL Final   • Hematocrit 07/24/2018 43.2  37.0 - 47.0 % Final   • MCV 07/24/2018 86.7  81.4 - 97.8 fL Final   • MCH 07/24/2018 28.1  27.0 - 33.0 pg Final   • MCHC 07/24/2018 32.4* 33.6 - 35.0 g/dL Final   • RDW 07/24/2018 43.8  35.9 - 50.0 fL Final   • Platelet Count 07/24/2018 283  164 - 446 K/uL Final   • MPV 07/24/2018 10.1  9.0 - 12.9 fL Final   • Neutrophils-Polys 07/24/2018 81.10* 44.00 - 72.00 % Final   • Lymphocytes 07/24/2018 " 4.20* 22.00 - 41.00 % Final   • Monocytes 07/24/2018 8.60  0.00 - 13.40 % Final   • Eosinophils 07/24/2018 4.70  0.00 - 6.90 % Final   • Basophils 07/24/2018 0.80  0.00 - 1.80 % Final   • Immature Granulocytes 07/24/2018 0.60  0.00 - 0.90 % Final   • Nucleated RBC 07/24/2018 0.00  /100 WBC Final   • Neutrophils (Absolute) 07/24/2018 5.40  2.00 - 7.15 K/uL Final    Includes immature neutrophils, if present.   • Lymphs (Absolute) 07/24/2018 0.28* 1.00 - 4.80 K/uL Final   • Monos (Absolute) 07/24/2018 0.57  0.00 - 0.85 K/uL Final   • Eos (Absolute) 07/24/2018 0.31  0.00 - 0.51 K/uL Final   • Baso (Absolute) 07/24/2018 0.05  0.00 - 0.12 K/uL Final   • Immature Granulocytes (abs) 07/24/2018 0.04  0.00 - 0.11 K/uL Final   • NRBC (Absolute) 07/24/2018 0.00  K/uL Final   • Sodium 07/24/2018 137  135 - 145 mmol/L Final   • Potassium 07/24/2018 4.2  3.6 - 5.5 mmol/L Final   • Chloride 07/24/2018 103  96 - 112 mmol/L Final   • Co2 07/24/2018 25  20 - 33 mmol/L Final   • Anion Gap 07/24/2018 9.0  0.0 - 11.9 Final   • Glucose 07/24/2018 99  65 - 99 mg/dL Final   • Bun 07/24/2018 21  8 - 22 mg/dL Final   • Creatinine 07/24/2018 0.58  0.50 - 1.40 mg/dL Final   • Calcium 07/24/2018 9.5  8.5 - 10.5 mg/dL Final   • AST(SGOT) 07/24/2018 17  12 - 45 U/L Final   • ALT(SGPT) 07/24/2018 13  2 - 50 U/L Final   • Alkaline Phosphatase 07/24/2018 87  30 - 99 U/L Final   • Total Bilirubin 07/24/2018 0.5  0.1 - 1.5 mg/dL Final   • Albumin 07/24/2018 4.1  3.2 - 4.9 g/dL Final   • Total Protein 07/24/2018 7.0  6.0 - 8.2 g/dL Final   • Globulin 07/24/2018 2.9  1.9 - 3.5 g/dL Final   • A-G Ratio 07/24/2018 1.4  g/dL Final   • GFR If  07/24/2018 >60  >60 mL/min/1.73 m 2 Final   • GFR If Non  07/24/2018 >60  >60 mL/min/1.73 m 2 Final   Hospital Outpatient Visit on 07/13/2018   Component Date Value Ref Range Status   • WBC 07/13/2018 7.9  4.8 - 10.8 K/uL Final   • RBC 07/13/2018 5.14  4.20 - 5.40 M/uL Final   •  Hemoglobin 07/13/2018 14.4  12.0 - 16.0 g/dL Final   • Hematocrit 07/13/2018 45.5  37.0 - 47.0 % Final   • MCV 07/13/2018 88.5  81.4 - 97.8 fL Final   • MCH 07/13/2018 28.0  27.0 - 33.0 pg Final   • MCHC 07/13/2018 31.6* 33.6 - 35.0 g/dL Final   • RDW 07/13/2018 45.2  35.9 - 50.0 fL Final   • Platelet Count 07/13/2018 328  164 - 446 K/uL Final   • MPV 07/13/2018 10.2  9.0 - 12.9 fL Final   • Neutrophils-Polys 07/13/2018 56.80  44.00 - 72.00 % Final   • Lymphocytes 07/13/2018 25.30  22.00 - 41.00 % Final   • Monocytes 07/13/2018 11.80  0.00 - 13.40 % Final   • Eosinophils 07/13/2018 5.30  0.00 - 6.90 % Final   • Basophils 07/13/2018 0.30  0.00 - 1.80 % Final   • Immature Granulocytes 07/13/2018 0.50  0.00 - 0.90 % Final   • Nucleated RBC 07/13/2018 0.00  /100 WBC Final   • Neutrophils (Absolute) 07/13/2018 4.49  2.00 - 7.15 K/uL Final    Includes immature neutrophils, if present.   • Lymphs (Absolute) 07/13/2018 2.00  1.00 - 4.80 K/uL Final   • Monos (Absolute) 07/13/2018 0.93* 0.00 - 0.85 K/uL Final   • Eos (Absolute) 07/13/2018 0.42  0.00 - 0.51 K/uL Final   • Baso (Absolute) 07/13/2018 0.02  0.00 - 0.12 K/uL Final   • Immature Granulocytes (abs) 07/13/2018 0.04  0.00 - 0.11 K/uL Final   • NRBC (Absolute) 07/13/2018 0.00  K/uL Final   • Sodium 07/13/2018 140  135 - 145 mmol/L Final   • Potassium 07/13/2018 4.5  3.6 - 5.5 mmol/L Final   • Chloride 07/13/2018 105  96 - 112 mmol/L Final   • Co2 07/13/2018 26  20 - 33 mmol/L Final   • Anion Gap 07/13/2018 9.0  0.0 - 11.9 Final   • Glucose 07/13/2018 74  65 - 99 mg/dL Final   • Bun 07/13/2018 19  8 - 22 mg/dL Final   • Creatinine 07/13/2018 0.61  0.50 - 1.40 mg/dL Final   • Calcium 07/13/2018 10.3  8.5 - 10.5 mg/dL Final   • AST(SGOT) 07/13/2018 15  12 - 45 U/L Final   • ALT(SGPT) 07/13/2018 11  2 - 50 U/L Final   • Alkaline Phosphatase 07/13/2018 95  30 - 99 U/L Final   • Total Bilirubin 07/13/2018 0.4  0.1 - 1.5 mg/dL Final   • Albumin 07/13/2018 4.2  3.2 - 4.9 g/dL  Final   • Total Protein 07/13/2018 6.9  6.0 - 8.2 g/dL Final   • Globulin 07/13/2018 2.7  1.9 - 3.5 g/dL Final   • A-G Ratio 07/13/2018 1.6  g/dL Final   • GFR If  07/13/2018 >60  >60 mL/min/1.73 m 2 Final   • GFR If Non  07/13/2018 >60  >60 mL/min/1.73 m 2 Final             Pathology:  -June 24,2018. A. Right iliac bone biopsy, 4 cores: Metastatic carcinoma in a background of fibrotic tissue demonstrating immunohistochemical profile most consistent with breast origin.              Imaging:  Ct-petct-whole Body    Result Date: 6/6/2018 6/6/2018 1:14 PM HISTORY/REASON FOR EXAM:  Pathological fracture of pelvis, unspecified pathological cause, initial encounter TECHNIQUE/EXAM DESCRIPTION AND NUMBER OF VIEWS: PET body imaging. Initially, 14.76 mCi F-18 FDG was administered intravenously under standardized conditions. Approximately 45 minutes after FDG administration, the patient was placed in the supine position on the PET CT table. Blood glucose level was 65 mg/dL. Low dose spiral CT imaging was performed from the skull base to the mid thighs. PET imaging was then performed from the skull base to the mid thighs. CT images, PET images, and PET/CT fused images were reviewed on a PACS 3D workstation. The limited non-contrast CT data are used primarily for attenuation correction and anatomic correlation.  Evaluation of solid organs and bowel are especially limited utilizing this technique. COMPARISON: None. FINDINGS: Head and neck: No abnormal focal FDG activity. Chest: A left breast mass measures 14 mm and is FDG avid with a maximum SUV of 8.2. A left axillary lymph node measures approximately 8 mm short axis with a maximum SUV of 8.1. There is mild activity in several additional left axillary lymph nodes. An irregular left axillary lymph node on image 80 measures 9 mm short axis with a maximum  SUV of 7.3. Abdomen and pelvis: Patchy activity in the right colon is likely physiologic or  inflammatory. Musculoskeletal: There are scattered lytic lesions throughout the appendicular and axial skeleton are FDG avid. A focal activity in the C2 vertebral body has a maximum SUV of 8.5. Focal activity in the distal left clavicle has a maximum SUV of 7.1. Focal  activity in the T5 vertebral body is present with a maximum SUV of 6.2. A lytic lesion in the right ilium is FDG avid with a maximum SUV of 10.7. A lytic and sclerotic lesion is noted in the right acetabulum with cortical disruption. The lesion is FDG avid with a maximum SUV of 8.8 Incidental findings on CT: Scattered arterial calcifications with ectasia of the abdominal aorta. Maximum diameter is approximately 3 cm. A 7 mm nodule in the left upper lobe is unchanged and does not demonstrate FDG activity. There are scattered diverticula in the colon.     1.  Small spiculated mass in the outer left breast is highly suspicious for malignancy. 2.  FDG avid adenopathy in the left axilla is consistent with metastasis. 3.  Extensive FDG avid lytic and sclerotic lesions in the axial and appendicular skeleton, consistent with bony metastasis. 4.  Atherosclerosis with ectasia of the abdominal aorta. 5.  Stable 7 mm left upper lobe nodule is not FDG avid. 6.  Diverticulosis.      Assessment and Plan:  -Breast cancer  -Triple negative  -Stage IV, metastatic to bone clinically  6/28/2018   -MRI of brain neg for CNS disease  -Bone biopsy to confirm metastatic disease  -We discussed the natural history of disease. All treatment options will be palliative for metastatic disease-Patient is not a candidate for hormonal manipulation or HER2 therapy because of triple neg status  7/24/2018   -She is finishing radiation to the hips today and we discussed delaying the chemotherapy for a week as she has some fatigue, but she would like to start ASAP    -Bone metastases  -July 9-24, 2018. Radiation therapy to hip  -Patient had recent evaluation from dentistry. Her teeth are  in good health, no workup needed. Patient had x-ray of her teeth past month  7/24/2018    -Starting Xgeva q 4 weeks.        She agreed and verbalized her agreement and understanding with the current plan.  I answered all questions and concerns she has at this time.    Dear  Brooke Brown M.D., Thank you very much for allowing me to see  Zuly Reilly Gino today .     Please note that this dictation was created using voice recognition software. I have made every reasonable attempt to correct obvious errors, but I expect that there are errors of grammar and possibly content that I did not discover before finalizing the note.      SIGNATURES:  Laurie Rouse    CC:  NIRMALA Chen Molly M, M.D.

## 2018-07-24 NOTE — TELEPHONE ENCOUNTER
Spoke to patient directly in office today 7/24/18 that me and Eh Marie, Medical Assistant are still trying to figure out why the ICD 10 code is not going through for her insurance. Per patient she stated that the pharmacists stated to send a new prescription, I will inform .

## 2018-07-25 NOTE — PROGRESS NOTES
"Pharmacy Chemotherapy Calculation    Patient Name: Zuly Christian   Dx: breast cancer, metastatic         Protocol: weekly Taxol     *Dosing Reference*  Paclitaxel 80 mg/m² IV over 60 min on Day 1  Weekly cycle until disease progression or unacceptable toxicity  NCCN Guidelines for Breast Cancer V.1.2017  Todd EA, et al. J Clin Oncol. 2001;19(22):4216-23    Allergies:  Sulfa drugs     /55   Pulse 91   Temp 36.2 °C (97.2 °F)   Resp 18   Ht 1.562 m (5' 1.5\")   Wt 58.7 kg (129 lb 6.6 oz)   SpO2 92%   BMI 24.06 kg/m²  Body surface area is 1.6 meters squared.    Labs 7/24/18:  ANC~ 5400   Plt = 283 k   Hgb = 14   SCr = 0.58 mg/dL  CrCl ~ 72 mL/min (min SCr 0.7 used)  AST/ALT/AP = 17/13/87 Tbili = 0.5    Xgeva 120  Mg given 7/25/18 (q28d per supportive care plan)       Drug Order   (Drug name, dose, route, IV Fluid & volume, frequency, number of doses) Cycle: 1, Day 1      Previous treatment: XRT to bone lesions   Medication = Paclitaxel (Taxol)  Base Dose = 80 mg/m²  Calc Dose: Base Dose x 1.6 m² = 128 mg  Final Dose = 128 mg  Route = IV  Fluid & Volume =  mL  Admin Duration = Over 60 min          <5% difference, okay to treat with final dose     By my signature below, I confirm this process was performed independently with the BSA and all final chemotherapy dosing calculations congruent. I have reviewed the above chemotherapy order and that my calculation of the final dose and BSA (when applicable) corroborate those calculations of the  pharmacist. Discrepancies of 5% or greater in the written dose have been addressed and documented within the [facility] Progress notes.      Radha Hall, PharmD  "

## 2018-07-25 NOTE — PROGRESS NOTES
Chemotherapy Verification - SECONDARY RN       Height = 156.2 cm  Weight = 58.7 kg  BSA = 1.6 m2       Medication: Taxol  Dose: 80 mg/m2  Calculated Dose: 128 mg                             (In mg/m2, AUC, mg/kg)       I confirm that this process was performed independently.

## 2018-07-25 NOTE — PROGRESS NOTES
"Pharmacy Chemotherapy calculation:    DX: metastatic Triple negative breast cancer    Cycle 1  Previous treatment = xrt to bone lesions    Regimen: weekly taxol  Paclitaxel 80mg/m2 IV over 60 min on day 1  Weekly until DP/UT  NCCN Guidelines for Breast Cancer V.1.2017  Todd EA, et al - J Clin Oncol. 2001 Nov 15;19(22):4219-23.       /55   Pulse 91   Temp 36.2 °C (97.2 °F)   Resp 18   Ht 1.562 m (5' 1.5\")   Wt 58.7 kg (129 lb 6.6 oz)   SpO2 92%   BMI 24.06 kg/m²   Body surface area is 1.6 meters squared.     7/24/18-  ANC ~ 5400  PLT = 283k   Hgb = 14     Scr =  0.58/  Est crcl ~ 72ml/min (min Scr = 0.7)    LFt's = WNL        Tbili = 0.5   Calcium = 9.5      Paclitaxel 80mg/m2 x 1.6m2 = 128mg    <5% difference, ok to treat with final dose = 128mg IV    xgeva 120mg-  q28 days per supportive care plan- given 7/25/18        AMERICA Melchor, Pharm.D.      "

## 2018-07-25 NOTE — PROGRESS NOTES
Chemotherapy Verification - PRIMARY RN      Height = 156.2cm  Weight = 58.7kg  BSA = 1.6m2       Medication: Taxol  Dose: 80mg/m2  Calculated Dose: 128mg                             (In mg/m2, AUC, mg/kg)       I confirm this process was performed independently with the BSA and all final chemotherapy dosing calculations congruent.  Any discrepancies of 5% or greater have been addressed with the chemotherapy pharmacist. The resolution of the discrepancy has been documented in the EPIC progress notes.

## 2018-07-25 NOTE — PROGRESS NOTES
Pt is here for her scheduled weekly Taxol. Labs reviewed. Port accessed in sterile fashion (EMLA in use). Premedicated as ordered. LOYDA lymphedema - pt reports having an appointment with lymphedema clinic. Premedicated as ordered. Pt reports having prescription for Zofran and Compazine. Taxol titrated up to the max rate starting from 50ml/h - well tolerated. Discharged home to self care. Next appointment confirmed.

## 2018-07-26 NOTE — PROGRESS NOTES
"Nutrition services: RD consultation/new chemo start   Zuly Christian is a 67 y.o. female is a new chemo start 2' dx of metastatic breast cancer w/ known past medical hx significant for PVD, osteopenia, COPD, CHF, hyperlipidemia, and allergic rhinitis per H&P. Therefore, met w/ pt this date to discuss current intake and appetite. Pt reports that her intake remains good w/ no current c/o GI distress, taste changes, nor swallowing difficulty. Pt's weight stable @~129# since the start of treatment; otherwise, p appears well nourished w/ no further questions or concerns at this time.     Dx: metastatic breast cancer  PMHx: PVD, osteopenia, COPD, CHF, hyperlipidemia, and allergic rhinitis  Labs (7/24): glucose: 99 Na: 137 K: 4.2 albumin: 4.1     Assessment:  Ht: 62\" Weight: 58.6kg (129#) BMI: 23.7     Recommendations/Plan:  1. Provided pt w/ tips to ensure adequate intake if side effects of treatment should side effects of treatment should occur.   2. Also provided pt w/ a list of healthy snack ideas and foods that are high in protein to promote wt maintenance and preservation of lean body mass.     Pt receptive and reports understanding. RD to continue to monitor ongoing PO adequacy and wt status to leave further recommendations accordingly.      Nutrition Needs Assessment:     Total Calorie Needs per HBE x.1.2 1450 1750   Total Protein Needs (1.2 - 1.5g/kg of CBW) 71 88   Daily Fluids (30ml/kg of CBW) 1760      "

## 2018-07-31 NOTE — PROGRESS NOTES
"Pharmacy Chemotherapy Calculation    Patient Name: Zuly Christian   Dx: breast cancer, metastatic         Protocol: weekly Taxol     *Dosing Reference*  Paclitaxel 80 mg/m² IV over 60 min on Day 1  Weekly cycle until disease progression or unacceptable toxicity  NCCN Guidelines for Breast Cancer V.1.2017  Todd EA, et al. J Clin Oncol. 2001;19(22):4216-23    Allergies:  Sulfa drugs     /51   Pulse 91   Temp 36.5 °C (97.7 °F)   Resp 18   Ht 1.562 m (5' 1.5\")   Wt 57 kg (125 lb 10.6 oz)   SpO2 93%   BMI 23.36 kg/m²  Body surface area is 1.57 meters squared.    Labs 7/31/18:  ANC~ 4070   Plt = 319 k     Hgb = 13.4  SCr = 0.62 mg/dL  CrCl ~ 70 mL/min (min SCr 0.7 used)  AST/ALT/AP = 17/15/93 TBili = 0.3     Xgeva 120  Mg given 7/25/18 (q28d per supportive care plan)       Drug Order   (Drug name, dose, route, IV Fluid & volume, frequency, number of doses) Cycle: 2     Previous treatment: C1 on 7/25/18   Medication = Paclitaxel (Taxol)  Base Dose = 80 mg/m²  Calc Dose: Base Dose x 1.57 m² = 125.6 mg  Final Dose = 125.6 mg  Route = IV  Fluid & Volume =  mL  Admin Duration = Over 60 min          <5% difference, okay to treat with final dose     By my signature below, I confirm this process was performed independently with the BSA and all final chemotherapy dosing calculations congruent. I have reviewed the above chemotherapy order and that my calculation of the final dose and BSA (when applicable) corroborate those calculations of the  pharmacist. Discrepancies of 5% or greater in the written dose have been addressed and documented within the [facility] Progress notes.      Radha Hall, PharmD  "

## 2018-07-31 NOTE — PROGRESS NOTES
Subjective:      Zuly Christian is a 67 y.o. female who presents for Follow-Up (Prechemo ([place])) value operation prior to week 2 of weekly Taxol for breast cancer      HPI   Ms. 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  She is accompanied by her friend for today's visit.    She completed 10 fractions of radiation to bilateral hips and 5 fractions to the left clavicle, for metastatic bone disease, from 7/9/18-7/24/18 with improved bone pain relief.  She was subsequently initiated on Xgeva every 4 weeks.    She continues with postradiation fatigue with her heart is days following infusion being days 3 and 4.  She notes stable left upper extremity lymphedema.  She experienced mild heartburn over the weekend and nausea that was well relieved with Zofran.  She experiences constipation with pain medication that is well relieved with MOM and headaches with Norco that are self-limiting.       Allergies   Allergen Reactions   • Sulfa Drugs Hives       Current Outpatient Prescriptions on File Prior to Visit   Medication Sig Dispense Refill   • ondansetron (ZOFRAN) 4 MG Tab tablet Take 1 Tab by mouth every four hours as needed for Nausea/Vomiting (for nausea, vomiting). 30 Tab 6   • prochlorperazine (COMPAZINE) 10 MG Tab Take 1 Tab by mouth every 6 hours as needed (for nausea, vomiting). 30 Tab 6   • HYDROcodone-acetaminophen (NORCO) 5-325 MG Tab per tablet Take 1 Tab by mouth every 6 hours as needed for up to 30 days. 15 Tab 0   • prochlorperazine (COMPAZINE) 10 MG Tab Take 1 Tab by mouth every 6 hours as needed. 30 Tab 3   • Calcium Polycarbophil (FIBER-CAPS PO) Take  by mouth.     • CHONDROITIN SULFATE PO Take  by mouth.     • NON SPECIFIED l-agrinine     • Lifitegrast (XIIDRA OP) by Ophthalmic route.     • naproxen (NAPROSYN) 500 MG Tab Take 1 Tab by mouth 2 times a day, with meals. 60 Tab  1   • ascorbic acid (ASCORBIC ACID) 500 MG Tab Take 500 mg by mouth every day.     • omega-3 acid ethyl esters (LOVAZA) 1 GM capsule Take  by mouth.     • Multiple Vitamin (MULTI VITAMIN DAILY PO) Take  by mouth.     • aspirin 81 MG tablet Take 81 mg by mouth every day. One tablet by mouth daily     • alprazolam (XANAX) 0.5 MG Tab Take 0.5 mg by mouth at bedtime as needed for Sleep.     • ezetimibe (ZETIA) 10 MG Tab Take 10 mg by mouth every day.     • carvedilol (COREG) 25 MG Tab Take 25 mg by mouth 2 times a day, with meals.     • diphenoxylate-atropine (LOMOTIL) 2.5-0.025 MG Tab Take 1 Tab by mouth 4 times a day as needed for Diarrhea.     • lidocaine-prilocaine (EMLA) 2.5-2.5 % Cream Apply to port one hour prior to access and cover with plastic wrap. 1 Tube 3   • glucosamine 500 MG Cap Take  by mouth every day.     • Oxymetazoline HCl (NASAL SPRAY NA) Spray  in nose.     • albuterol (PROAIR HFA) 108 (90 Base) MCG/ACT Aero Soln inhalation aerosol Inhale 2 Puffs by mouth every four hours as needed for Shortness of Breath (wheezing). 3 Inhaler 3   • ANORO ELLIPTA 62.5-25 MCG/INH AEROSOL POWDER, BREATH ACTIVATED inhaler USE 1 INHALATION EVERY DAY 3 Inhaler 3   • Coenzyme Q10 (COQ10) 100 MG Cap Take 100 mg by mouth.     • rosuvastatin (CRESTOR) 40 MG tablet Take 40 mg by mouth every day.     • ramipril (ALTACE) 10 MG capsule Take 10 mg by mouth every day.       No current facility-administered medications on file prior to visit.        Review of Systems   Constitutional: Positive for malaise/fatigue (better today than over the weekend  (days 3 & 4 were the hardest days)) and weight loss (3 lbs dwn since last week). Negative for chills and fever.   HENT: Positive for sore throat (with smoke in [place] and since XRT to left clavicle).    Respiratory: Negative for cough, shortness of breath and wheezing.    Cardiovascular: Negative for chest pain, palpitations and leg swelling.        Baseline LUE lymphedema  "  Gastrointestinal: Positive for heartburn (over the weekedn - resolved with tums) and nausea (zofran resolved it on day 2). Negative for constipation (baseline - MOM with pain meds), diarrhea and vomiting.   Genitourinary: Negative for dysuria.   Musculoskeletal: Negative for myalgias.        Generalized and well managed with Norco but it gives her a headache   Neurological: Positive for headaches (with Norco). Negative for dizziness and tingling.   Psychiatric/Behavioral: The patient has insomnia (not well - baseline. helped with xanax, started amitryptaline).           Objective:     BP (!) 92/53   Pulse 97   Temp 36.6 °C (97.9 °F)   Resp 18   Ht 1.562 m (5' 1.5\")   Wt 56.8 kg (125 lb 3.5 oz)   SpO2 93%   Breastfeeding? No   BMI 23.28 kg/m²      Physical Exam   Constitutional: She is oriented to person, place, and time. She appears well-developed and well-nourished. No distress.   HENT:   Head: Normocephalic and atraumatic.   Mouth/Throat: Oropharynx is clear and moist. No oropharyngeal exudate.   Eyes: Pupils are equal, round, and reactive to light. Conjunctivae and EOM are normal. Right eye exhibits no discharge. Left eye exhibits no discharge. No scleral icterus.   Neck: Normal range of motion. Neck supple. No thyromegaly present.   Cardiovascular: Normal rate, regular rhythm, normal heart sounds and intact distal pulses.  Exam reveals no gallop and no friction rub.    No murmur heard.  Pulmonary/Chest: Effort normal and breath sounds normal. No respiratory distress. She has no wheezes.   Abdominal: Soft. Bowel sounds are normal. She exhibits no distension. There is no tenderness.   Musculoskeletal: Normal range of motion. She exhibits edema (LUE trace lymphedema at axilla). She exhibits no tenderness.   Lymphadenopathy:        Head (right side): No submental, no submandibular, no tonsillar, no preauricular, no posterior auricular and no occipital adenopathy present.        Head (left side): No " submental, no submandibular, no tonsillar, no preauricular, no posterior auricular and no occipital adenopathy present.     She has no cervical adenopathy.        Right cervical: No superficial cervical and no deep cervical adenopathy present.       Left cervical: No superficial cervical and no deep cervical adenopathy present.        Right: No supraclavicular adenopathy present.        Left: No supraclavicular adenopathy present.   Neurological: She is alert and oriented to person, place, and time.   Skin: Skin is warm and dry. No rash noted. She is not diaphoretic. No erythema. No pallor.   Psychiatric: She has a normal mood and affect. Her behavior is normal.   Vitals reviewed.      Hospital Outpatient Visit on 07/31/2018   Component Date Value Ref Range Status   • WBC 07/31/2018 6.1  4.8 - 10.8 K/uL Final   • RBC 07/31/2018 4.79  4.20 - 5.40 M/uL Final   • Hemoglobin 07/31/2018 13.4  12.0 - 16.0 g/dL Final   • Hematocrit 07/31/2018 41.3  37.0 - 47.0 % Final   • MCV 07/31/2018 86.2  81.4 - 97.8 fL Final   • MCH 07/31/2018 28.0  27.0 - 33.0 pg Final   • MCHC 07/31/2018 32.4* 33.6 - 35.0 g/dL Final   • RDW 07/31/2018 43.5  35.9 - 50.0 fL Final   • Platelet Count 07/31/2018 319  164 - 446 K/uL Final   • MPV 07/31/2018 10.0  9.0 - 12.9 fL Final   • Neutrophils-Polys 07/31/2018 66.70  44.00 - 72.00 % Final   • Lymphocytes 07/31/2018 11.60* 22.00 - 41.00 % Final   • Monocytes 07/31/2018 11.10  0.00 - 13.40 % Final   • Eosinophils 07/31/2018 7.20* 0.00 - 6.90 % Final   • Basophils 07/31/2018 0.50  0.00 - 1.80 % Final   • Immature Granulocytes 07/31/2018 2.90* 0.00 - 0.90 % Final   • Nucleated RBC 07/31/2018 0.00  /100 WBC Final   • Neutrophils (Absolute) 07/31/2018 4.07  2.00 - 7.15 K/uL Final    Includes immature neutrophils, if present.   • Lymphs (Absolute) 07/31/2018 0.71* 1.00 - 4.80 K/uL Final   • Monos (Absolute) 07/31/2018 0.68  0.00 - 0.85 K/uL Final   • Eos (Absolute) 07/31/2018 0.44  0.00 - 0.51 K/uL Final    • Baso (Absolute) 07/31/2018 0.03  0.00 - 0.12 K/uL Final   • Immature Granulocytes (abs) 07/31/2018 0.18* 0.00 - 0.11 K/uL Final   • NRBC (Absolute) 07/31/2018 0.00  K/uL Final   • Sodium 07/31/2018 135  135 - 145 mmol/L Final   • Potassium 07/31/2018 4.9  3.6 - 5.5 mmol/L Final   • Chloride 07/31/2018 105  96 - 112 mmol/L Final   • Co2 07/31/2018 22  20 - 33 mmol/L Final   • Anion Gap 07/31/2018 8.0  0.0 - 11.9 Final   • Glucose 07/31/2018 103* 65 - 99 mg/dL Final   • Bun 07/31/2018 22  8 - 22 mg/dL Final   • Creatinine 07/31/2018 0.62  0.50 - 1.40 mg/dL Final   • Calcium 07/31/2018 9.9  8.5 - 10.5 mg/dL Final   • AST(SGOT) 07/31/2018 17  12 - 45 U/L Final   • ALT(SGPT) 07/31/2018 15  2 - 50 U/L Final   • Alkaline Phosphatase 07/31/2018 93  30 - 99 U/L Final   • Total Bilirubin 07/31/2018 0.3  0.1 - 1.5 mg/dL Final   • Albumin 07/31/2018 3.9  3.2 - 4.9 g/dL Final   • Total Protein 07/31/2018 6.5  6.0 - 8.2 g/dL Final   • Globulin 07/31/2018 2.6  1.9 - 3.5 g/dL Final   • A-G Ratio 07/31/2018 1.5  g/dL Final   • GFR If  07/31/2018 >60  >60 mL/min/1.73 m 2 Final   • GFR If Non  07/31/2018 >60  >60 mL/min/1.73 m 2 Final       Dx-clavicle Left    Result Date: 7/12/2018 7/12/2018 11:52 AM HISTORY/REASON FOR EXAM:  Pain/Deformity Following Trauma. Left midclavicular pain. History of metastatic breast cancer. TECHNIQUE/EXAM DESCRIPTION AND NUMBER OF VIEWS:  2 views of the LEFT clavicle. COMPARISON: PET/CT 6/6/2018 FINDINGS: No acute fracture is identified. A permeative lytic lesion is in the left clavicular head and mid clavicle. A stent projects over the superior mediastinum and system with left subclavian artery stent.     1.  No pathologic fracture is identified. 2.  Permeative lytic lesion in the left clavicular head and mid left clavicle.       Assessment/Plan:     1. Metastatic breast cancer (HCC)  REFERRAL TO ONCOLOGY NURSE NAVIGATOR This patient has a screening score of (must be  6 or above): 0   2. Encounter for antineoplastic chemotherapy     3. Bone metastases (HCC)     4. Neoplasm related pain     5. Chemotherapy-induced nausea     6. Heartburn     7. Constipation due to opioid therapy         1.  Bone mets/pain: Patient is status post radiation therapy to bilateral hips and left clavicle with improvement in pain.  She continues on Norco as needed.    2.  Constipation: Improved following milk of magnesia.    3.  Heartburn/nausea: Stable with Zofran as needed    4.  Breast cancer: Patient is tolerating treatment fairly well.  CBC and CMP have been evaluated and found to be within acceptable limits.  She will proceed with week 2 of weekly Taxol and return next week for evaluation prior to week 3, sooner as needed.        The patient verbalized agreement and understanding of current plan. All questions and concerns were addressed at time of visit.    Please note that this dictation was created using voice recognition software. I have made every reasonable attempt to correct obvious errors, but I expect that there are errors of grammar and possibly content that I did not discover before finalizing the note.

## 2018-08-01 NOTE — PROGRESS NOTES
Chemotherapy Verification - PRIMARY RN      Height = 156.2 cm  Weight = 57 kg  BSA = 1.57 m2       Medication: Taxol  Dose: 80 mg/m2  Calculated Dose: 125.6 mg                             (In mg/m2, AUC, mg/kg)         I confirm this process was performed independently with the BSA and all final chemotherapy dosing calculations congruent.  Any discrepancies of 5% or greater have been addressed with the chemotherapy pharmacist. The resolution of the discrepancy has been documented in the EPIC progress notes.

## 2018-08-01 NOTE — PROGRESS NOTES
Late entry: patient seen by RN for lab draw.  Discussed peripheral lab draw vs port access. Pt in agreement to have labs drawn peripherally to reduce risk of central line infection. Labs drawn with 23g butterfly on right AC.  Successful draw on first attempt. Pt tolerated well.

## 2018-08-01 NOTE — PROGRESS NOTES
Chemotherapy Verification - SECONDARY RN       Height = 61.5 in  Weight = 57 kg  BSA = 1.57 m2       Medication: Taxol  Dose: 80 mg/m2  Calculated Dose: 125.6 mg                             (In mg/m2, AUC, mg/kg)     I confirm that this process was performed independently.

## 2018-08-01 NOTE — PROGRESS NOTES
Patient presents for day one cycle two Taxol chemotherapy. Reviewed plan of care, questions answered as needed, patient verbalizes understanding. Port accessed using sterile technique, flushes well with brisk blood return. Pre-medications administered as ordered. Chemotherapy infused with no new patient complaints, line flushed clear. Port flushed per protocol and de-accessed, sterile gauze to site. Patient scheduled for next appointment and released in no acute distress with her friend.

## 2018-08-01 NOTE — PROGRESS NOTES
"Pharmacy Chemotherapy calculation:    DX: metastatic Triple negative breast cancer    Cycle 2  Previous treatment: C1 =  7/25/18     Regimen: weekly taxol  Paclitaxel 80mg/m2 IV over 60 min on day 1  Weekly until DP/UT  NCCN Guidelines for Breast Cancer V.1.2017  Todd EA, et al - J Clin Oncol. 2001 Nov 15;19(22):4210-23.       /51   Pulse 91   Temp 36.5 °C (97.7 °F)   Resp 18   Ht 1.562 m (5' 1.5\")   Wt 57 kg (125 lb 10.6 oz)   SpO2 93%   BMI 23.36 kg/m²   Body surface area is 1.57 meters squared.     Labs 7/31/18:  ANC~ 4070 Plt = 319k   Hgb = 13.4     SCr = 0.62 mg/dL CrCl ~ 70 mL/min (min Scr 0.7)  AST/ALT/AP = WNL TBili = 0.3   K+ = 4.9      Paclitaxel 80mg/m2 x 1.57m2 = 125.6mg    <5% difference, ok to treat with final dose = 125.6 mg IV    xgeva 120mg-  q28 days per supportive care plan- given 7/25/18      Neisha Abernathy, PharmD, BCOP       "

## 2018-08-07 NOTE — PROGRESS NOTES
Pt arrives to medical oncology for port access with lab draw prior to treatment.  Patient with emla cream applied to right port.  Port site cleaned and accessed in sterile fashion with 20 gauge, 3/4 inch boswell needle.  Labs drawn.  Pt tolerated well.  Port flushed per policy and secured with tegaderm.  Pt released ambulatory to med onc waiting room.

## 2018-08-07 NOTE — PROGRESS NOTES
Consult Note: Oncology    Date of consultation: 8/7/2018     Referring provider: The patient is here by the kind referral of Brooke Brown M.D.    Reason for consultation:   CC: Triple negative breast cancer    Oncologic history to date:  -April 6, 2018. Patient was seen by primary care physician. Patient is complaining of hip pain and nipple lesion. Patient was documented to have a normal mammogram in June 2017. X-rays of the hip and mammogram were ordered. X-ray shows advanced degenerative disease of the hip  -May 23, 2018. MRI of the hip shows numerous lesions worrisome for metastatic disease. There is a 3.8 cm lesion along the right superior acetabulum with subtle break in the bony cortex worrisome for pathological fracture. Patient is at risk for developing full-thickness cortical fracture  -June 6, 2018. PET CT scan.Small spiculated mass in the outer left breast is highly suspicious for malignancy.FDG avid adenopathy in the left axilla is consistent with metastasis.Extensive FDG avid lytic and sclerotic lesions in the axial and appendicular skeleton, consistent with bony metastasis  -June 14, 2018. Left breast biopsy revealed ER/HI HER-2 negative invasive ductal carcinoma  -June 24,2018. A. Right iliac bone biopsy, 4 cores: Metastatic carcinoma in a background of fibrotic tissue demonstrating immunohistochemical profile most consistent with breast origin.  -July 9-24, 2018. Radiation therapy to hip  -July 25, 2018.  Started weekly paclitaxel    History of presenting illness:   First seen in on 6/19/2018  All relevant medical records available in [facility] were reviewed and are summarized above.    Zuly Christian  is a 67 y.o. female seen in clinic today for evaluation of metastatic triple negative breast cancer. The patient states she understands her diagnosis and would like to start treatment as soon as possible. She currently denies any acute complaints except for some right-sided hip pain      Interval  History: 6/28/2018  Zuly Christian is a 67 y.o. female seen in clinic today for follow up. She is accompanied by family and a friend who is voice recording the consultation today. She is here for follow up of MRI brain and biopsy of bone. She denies any new complaints.    Interval History: 7/24/2018  Zuly Christian is a 67 y.o. female seen in clinic today for follow up and pre chemotherapy appt. She is finishing her radiation therapy today which was extended     Interval History: 8/7/2018  Zuly Christian is a 67 y.o. female seen in clinic today, is accompanied by a friend.  Denies any acute complaints    Review of Systems:  Constitutional: No fever, chills, weight loss, malaise/fatigue.    All other review of systems are negative except what was mentioned above in the HPI.    Past Medical History:    Past Medical History:   Diagnosis Date   • Allergic rhinitis    • Cancer (HCC)     breast   • Cataract    • Congestive heart failure (HCC)    • COPD (chronic obstructive pulmonary disease) (HCC)    • Emphysema of lung (HCC)    • Heart burn    • Hyperlipidemia    • Hypertension    • Osteopenia    • Peripheral vascular disease (HCC)        Past surgical history:    Past Surgical History:   Procedure Laterality Date   • CAROTID ENDARTERECTOMY  7/20/2017    Procedure: CAROTID ENDARTERECTOMY FOR : LEFT SUBCLAVIAN ARTERY ANGIOPLASTY / STENT BRACHIAL APPROACH WITH ULTRASOUND;  Surgeon: Dinora Saldana M.D.;  Location: SURGERY [facility];  Service:    • TUBAL LIGATION  1977   • GYN SURGERY      d+c 1979, 1980   • OTHER ORTHOPEDIC SURGERY      left knee surgery   • TONSILLECTOMY         Allergies:  Sulfa drugs    Medications:    Current Outpatient Prescriptions   Medication Sig Dispense Refill   • tramadol (ULTRAM) 50 MG Tab Take 50 mg by mouth every 6 hours as needed for Mild Pain.     • FLUoxetine (PROZAC) 20 MG Cap Take 20 mg by mouth every day.     • ondansetron (ZOFRAN) 4 MG Tab tablet Take 1 Tab by mouth  every four hours as needed for Nausea/Vomiting (for nausea, vomiting). 30 Tab 6   • diphenoxylate-atropine (LOMOTIL) 2.5-0.025 MG Tab Take 1 Tab by mouth 4 times a day as needed for Diarrhea.     • prochlorperazine (COMPAZINE) 10 MG Tab Take 1 Tab by mouth every 6 hours as needed (for nausea, vomiting). 30 Tab 6   • lidocaine-prilocaine (EMLA) 2.5-2.5 % Cream Apply to port one hour prior to access and cover with plastic wrap. 1 Tube 3   • HYDROcodone-acetaminophen (NORCO) 5-325 MG Tab per tablet Take 1 Tab by mouth every 6 hours as needed for up to 30 days. 15 Tab 0   • prochlorperazine (COMPAZINE) 10 MG Tab Take 1 Tab by mouth every 6 hours as needed. 30 Tab 3   • glucosamine 500 MG Cap Take  by mouth every day.     • Calcium Polycarbophil (FIBER-CAPS PO) Take  by mouth.     • Oxymetazoline HCl (NASAL SPRAY NA) [place]  in nose.     • CHONDROITIN SULFATE PO Take  by mouth.     • NON SPECIFIED l-agrinine     • albuterol (PROAIR HFA) 108 (90 Base) MCG/ACT Aero Soln inhalation aerosol Inhale 2 Puffs by mouth every four hours as needed for Shortness of Breath (wheezing). 3 Inhaler 3   • Lifitegrast (XIIDRA OP) by Ophthalmic route.     • naproxen (NAPROSYN) 500 MG Tab Take 1 Tab by mouth 2 times a day, with meals. 60 Tab 1   • ANORO ELLIPTA 62.5-25 MCG/INH AEROSOL POWDER, BREATH ACTIVATED inhaler USE 1 INHALATION EVERY DAY 3 Inhaler 3   • ascorbic acid (ASCORBIC ACID) 500 MG Tab Take 500 mg by mouth every day.     • omega-3 acid ethyl esters (LOVAZA) 1 GM capsule Take  by mouth.     • Multiple Vitamin (MULTI VITAMIN DAILY PO) Take  by mouth.     • aspirin 81 MG tablet Take 81 mg by mouth every day. One tablet by mouth daily     • Coenzyme Q10 (COQ10) 100 MG Cap Take 100 mg by mouth.     • alprazolam (XANAX) 0.5 MG Tab Take 0.5 mg by mouth at bedtime as needed for Sleep.     • ezetimibe (ZETIA) 10 MG Tab Take 10 mg by mouth every day.     • rosuvastatin (CRESTOR) 40 MG tablet Take 40 mg by mouth every day.     • carvedilol  "(COREG) 25 MG Tab Take 25 mg by mouth 2 times a day, with meals.     • ramipril (ALTACE) 10 MG capsule Take 10 mg by mouth every day.       No current facility-administered medications for this visit.        Social History:     Social History     Social History   • Marital status:      Spouse name: N/A   • Number of children: N/A   • Years of education: N/A     Occupational History   • work in cardiology office Retired     Social History Main Topics   • Smoking status: Former Smoker     Packs/day: 1.00     Years: 50.00     Types: Cigarettes     Quit date: 5/1/2014   • Smokeless tobacco: Never Used   • Alcohol use Yes      Comment: 3 a month   • Drug use: No   • Sexual activity: No     Other Topics Concern   • Not on file     Social History Narrative   • No narrative on file       Family History:     Family History   Problem Relation Age of Onset   • Lung Cancer Father    • Lung Cancer Mother        Physical Exam:  Vitals:   /40   Pulse 94   Temp 36.4 °C (97.5 °F)   Resp 16   Ht 1.562 m (5' 1.5\")   Wt 57.3 kg (126 lb 6.9 oz)   SpO2 94%   BMI 23.50 kg/m²     General: Not in acute distress,   HEENT: No pallor,No icterus. Oropharynx clear.   Neck: Supple, no palpable masses.  Lymph nodes: No palpable cervical, supraclavicular, axillary or inguinal lymphadenopathy.    CVS: regular rate and rhythm, no rubs or gallops  Chest: Clear to auscultate bilaterally, no wheezing or crackles.   ABD: Soft, non tender, non distended, positive bowel sounds, no palpable organomegaly  EXT: No edema or cyanosis  CNS: Alert and oriented x3, No focal deficits.  Skin- No rash      Labs:   Hospital Outpatient Visit on 08/07/2018   Component Date Value Ref Range Status   • WBC 08/07/2018 10.6  4.8 - 10.8 K/uL Final   • RBC 08/07/2018 4.21  4.20 - 5.40 M/uL Final   • Hemoglobin 08/07/2018 11.9* 12.0 - 16.0 g/dL Final   • Hematocrit 08/07/2018 37.0  37.0 - 47.0 % Final   • MCV 08/07/2018 87.9  81.4 - 97.8 fL Final   • MCH " 08/07/2018 28.3  27.0 - 33.0 pg Final   • MCHC 08/07/2018 32.2* 33.6 - 35.0 g/dL Final   • RDW 08/07/2018 46.4  35.9 - 50.0 fL Final   • Platelet Count 08/07/2018 322  164 - 446 K/uL Final   • MPV 08/07/2018 9.5  9.0 - 12.9 fL Final   • Neutrophils-Polys 08/07/2018 85.80* 44.00 - 72.00 % Final   • Lymphocytes 08/07/2018 4.80* 22.00 - 41.00 % Final   • Monocytes 08/07/2018 5.80  0.00 - 13.40 % Final   • Eosinophils 08/07/2018 1.90  0.00 - 6.90 % Final   • Basophils 08/07/2018 0.50  0.00 - 1.80 % Final   • Immature Granulocytes 08/07/2018 1.20* 0.00 - 0.90 % Final   • Nucleated RBC 08/07/2018 0.00  /100 WBC Final   • Neutrophils (Absolute) 08/07/2018 9.13* 2.00 - 7.15 K/uL Final    Includes immature neutrophils, if present.   • Lymphs (Absolute) 08/07/2018 0.51* 1.00 - 4.80 K/uL Final   • Monos (Absolute) 08/07/2018 0.62  0.00 - 0.85 K/uL Final   • Eos (Absolute) 08/07/2018 0.20  0.00 - 0.51 K/uL Final   • Baso (Absolute) 08/07/2018 0.05  0.00 - 0.12 K/uL Final   • Immature Granulocytes (abs) 08/07/2018 0.13* 0.00 - 0.11 K/uL Final   • NRBC (Absolute) 08/07/2018 0.00  K/uL Final   • Sodium 08/07/2018 132* 135 - 145 mmol/L Final   • Potassium 08/07/2018 4.7  3.6 - 5.5 mmol/L Final   • Chloride 08/07/2018 104  96 - 112 mmol/L Final   • Co2 08/07/2018 21  20 - 33 mmol/L Final   • Anion Gap 08/07/2018 7.0  0.0 - 11.9 Final   • Glucose 08/07/2018 92  65 - 99 mg/dL Final   • Bun 08/07/2018 24* 8 - 22 mg/dL Final   • Creatinine 08/07/2018 0.48* 0.50 - 1.40 mg/dL Final   • Calcium 08/07/2018 8.4* 8.5 - 10.5 mg/dL Final   • AST(SGOT) 08/07/2018 14  12 - 45 U/L Final   • ALT(SGPT) 08/07/2018 17  2 - 50 U/L Final   • Alkaline Phosphatase 08/07/2018 93  30 - 99 U/L Final   • Total Bilirubin 08/07/2018 0.4  0.1 - 1.5 mg/dL Final   • Albumin 08/07/2018 3.8  3.2 - 4.9 g/dL Final   • Total Protein 08/07/2018 5.9* 6.0 - 8.2 g/dL Final   • Globulin 08/07/2018 2.1  1.9 - 3.5 g/dL Final   • A-G Ratio 08/07/2018 1.8  g/dL Final   • GFR If   08/07/2018 >60  >60 mL/min/1.73 m 2 Final   • GFR If Non  08/07/2018 >60  >60 mL/min/1.73 m 2 Final   Hospital Outpatient Visit on 07/31/2018   Component Date Value Ref Range Status   • WBC 07/31/2018 6.1  4.8 - 10.8 K/uL Final   • RBC 07/31/2018 4.79  4.20 - 5.40 M/uL Final   • Hemoglobin 07/31/2018 13.4  12.0 - 16.0 g/dL Final   • Hematocrit 07/31/2018 41.3  37.0 - 47.0 % Final   • MCV 07/31/2018 86.2  81.4 - 97.8 fL Final   • MCH 07/31/2018 28.0  27.0 - 33.0 pg Final   • MCHC 07/31/2018 32.4* 33.6 - 35.0 g/dL Final   • RDW 07/31/2018 43.5  35.9 - 50.0 fL Final   • Platelet Count 07/31/2018 319  164 - 446 K/uL Final   • MPV 07/31/2018 10.0  9.0 - 12.9 fL Final   • Neutrophils-Polys 07/31/2018 66.70  44.00 - 72.00 % Final   • Lymphocytes 07/31/2018 11.60* 22.00 - 41.00 % Final   • Monocytes 07/31/2018 11.10  0.00 - 13.40 % Final   • Eosinophils 07/31/2018 7.20* 0.00 - 6.90 % Final   • Basophils 07/31/2018 0.50  0.00 - 1.80 % Final   • Immature Granulocytes 07/31/2018 2.90* 0.00 - 0.90 % Final   • Nucleated RBC 07/31/2018 0.00  /100 WBC Final   • Neutrophils (Absolute) 07/31/2018 4.07  2.00 - 7.15 K/uL Final    Includes immature neutrophils, if present.   • Lymphs (Absolute) 07/31/2018 0.71* 1.00 - 4.80 K/uL Final   • Monos (Absolute) 07/31/2018 0.68  0.00 - 0.85 K/uL Final   • Eos (Absolute) 07/31/2018 0.44  0.00 - 0.51 K/uL Final   • Baso (Absolute) 07/31/2018 0.03  0.00 - 0.12 K/uL Final   • Immature Granulocytes (abs) 07/31/2018 0.18* 0.00 - 0.11 K/uL Final   • NRBC (Absolute) 07/31/2018 0.00  K/uL Final   • Sodium 07/31/2018 135  135 - 145 mmol/L Final   • Potassium 07/31/2018 4.9  3.6 - 5.5 mmol/L Final   • Chloride 07/31/2018 105  96 - 112 mmol/L Final   • Co2 07/31/2018 22  20 - 33 mmol/L Final   • Anion Gap 07/31/2018 8.0  0.0 - 11.9 Final   • Glucose 07/31/2018 103* 65 - 99 mg/dL Final   • Bun 07/31/2018 22  8 - 22 mg/dL Final   • Creatinine 07/31/2018 0.62  0.50 - 1.40  mg/dL Final   • Calcium 07/31/2018 9.9  8.5 - 10.5 mg/dL Final   • AST(SGOT) 07/31/2018 17  12 - 45 U/L Final   • ALT(SGPT) 07/31/2018 15  2 - 50 U/L Final   • Alkaline Phosphatase 07/31/2018 93  30 - 99 U/L Final   • Total Bilirubin 07/31/2018 0.3  0.1 - 1.5 mg/dL Final   • Albumin 07/31/2018 3.9  3.2 - 4.9 g/dL Final   • Total Protein 07/31/2018 6.5  6.0 - 8.2 g/dL Final   • Globulin 07/31/2018 2.6  1.9 - 3.5 g/dL Final   • A-G Ratio 07/31/2018 1.5  g/dL Final   • GFR If  07/31/2018 >60  >60 mL/min/1.73 m 2 Final   • GFR If Non  07/31/2018 >60  >60 mL/min/1.73 m 2 Final         Pathology:  -June 24,2018. A. Right iliac bone biopsy, 4 cores: Metastatic carcinoma in a background of fibrotic tissue demonstrating immunohistochemical profile most consistent with breast origin.              Imaging:  Ct-petct-whole Body    Result Date: 6/6/2018 6/6/2018 1:14 PM HISTORY/REASON FOR EXAM:  Pathological fracture of pelvis, unspecified pathological cause, initial encounter TECHNIQUE/EXAM DESCRIPTION AND NUMBER OF VIEWS: PET body imaging. Initially, 14.76 mCi F-18 FDG was administered intravenously under standardized conditions. Approximately 45 minutes after FDG administration, the patient was placed in the supine position on the PET CT table. Blood glucose level was 65 mg/dL. Low dose spiral CT imaging was performed from the skull base to the mid thighs. PET imaging was then performed from the skull base to the mid thighs. CT images, PET images, and PET/CT fused images were reviewed on a PACS 3D workstation. The limited non-contrast CT data are used primarily for attenuation correction and anatomic correlation.  Evaluation of solid organs and bowel are especially limited utilizing this technique. COMPARISON: None. FINDINGS: Head and neck: No abnormal focal FDG activity. Chest: A left breast mass measures 14 mm and is FDG avid with a maximum SUV of 8.2. A left axillary lymph node measures  approximately 8 mm short axis with a maximum SUV of 8.1. There is mild activity in several additional left axillary lymph nodes. An irregular left axillary lymph node on image 80 measures 9 mm short axis with a maximum  SUV of 7.3. Abdomen and pelvis: Patchy activity in the right colon is likely physiologic or inflammatory. Musculoskeletal: There are scattered lytic lesions throughout the appendicular and axial skeleton are FDG avid. A focal activity in the C2 vertebral body has a maximum SUV of 8.5. Focal activity in the distal left clavicle has a maximum SUV of 7.1. Focal  activity in the T5 vertebral body is present with a maximum SUV of 6.2. A lytic lesion in the right ilium is FDG avid with a maximum SUV of 10.7. A lytic and sclerotic lesion is noted in the right acetabulum with cortical disruption. The lesion is FDG avid with a maximum SUV of 8.8 Incidental findings on CT: Scattered arterial calcifications with ectasia of the abdominal aorta. Maximum diameter is approximately 3 cm. A 7 mm nodule in the left upper lobe is unchanged and does not demonstrate FDG activity. There are scattered diverticula in the colon.     1.  Small spiculated mass in the outer left breast is highly suspicious for malignancy. 2.  FDG avid adenopathy in the left axilla is consistent with metastasis. 3.  Extensive FDG avid lytic and sclerotic lesions in the axial and appendicular skeleton, consistent with bony metastasis. 4.  Atherosclerosis with ectasia of the abdominal aorta. 5.  Stable 7 mm left upper lobe nodule is not FDG avid. 6.  Diverticulosis.      Assessment and Plan:  -Breast cancer  -Triple negative  -Stage IV, metastatic to bone clinically  6/28/2018   -MRI of brain neg for CNS disease  -Bone biopsy to confirm metastatic disease  -We discussed the natural history of disease. All treatment options will be palliative for metastatic disease-Patient is not a candidate for hormonal manipulation or HER2 therapy because of  triple neg status  7/24/2018   -She is finishing radiation to the hips today and we discussed delaying the chemotherapy for a week as she has some fatigue, but she would like to start ASAP  8/7/2018  -Labs above reviewed  -Patient having some fatigue on the third and fourth day of chemotherapy  -Proceed with cycle 3 of paclitaxel    -Bone metastases  -July 9-24, 2018. Radiation therapy to hip  -Patient had recent evaluation from dentistry. Her teeth are in good health, no workup needed. Patient had x-ray of her teeth past month  7/24/2018    -Starting Xgeva q 4 weeks.  8/7/2018  -Continue with Xgeva every 4 weeks        She agreed and verbalized her agreement and understanding with the current plan.  I answered all questions and concerns she has at this time.    Dear  Brooke Brown M.D., Thank you very much for allowing me to see  Zuly Christian today .     Please note that this dictation was created using voice recognition software. I have made every reasonable attempt to correct obvious errors, but I expect that there are errors of grammar and possibly content that I did not discover before finalizing the note.      SIGNATURES:  Laurie Rouse    CC:  NIRMALA Chen Molly M, M.D.

## 2018-08-08 NOTE — PROGRESS NOTES
"Nutrition Services: Brief Update  Met w/ pt this date to follow-up on current intake and appetite. Pt reports that her intake remains good; however, her appetite is not the best. She feels as if she is forcing herself to eat; otherwise, no c/o GI distress, taste changes at this time. Pt presents w/ fluctuating wt status; however, appears stable @~128-129# since previous RD assessment, and pt reports that her clothes do not feel looser. Pt also reports she eats \"a lot of chicken, and consumes one muscle milk/day.\" RD to monitor ongoing PO adequacy and wt status to leave further recommendations accordingly.   "

## 2018-08-08 NOTE — PROGRESS NOTES
Pt presents for C3 weekly Taxol infusion.  Pt is hypotensive yet asymptomatic today.  Discussed with pt need to monitor BP at home and follow up with her PCP who prescribes her BP meds. Labs reviewed from yesterday and within parameters for treatment.  Pt arrived with port accessed from yesterday, flushes well with brisk blood return.  Premeds and chemotherapy given as ordered and tolerated well.  Port flushed and de-accessed per protocol.  No sign of adverse event.  Pt stable and ambulatory at discharge. Plan to return next week for cycle 4.

## 2018-08-08 NOTE — PROGRESS NOTES
Chemotherapy Verification - PRIMARY RN      Height = 156.2cm  Weight = 58kg  BSA = 1.59       Medication: Taxol  Dose: 80mg/m2  Calculated Dose: 127.2mg                            (In mg/m2, AUC, mg/kg)           I confirm this process was performed independently with the BSA and all final chemotherapy dosing calculations congruent.  Any discrepancies of 5% or greater have been addressed with the chemotherapy pharmacist. The resolution of the discrepancy has been documented in the EPIC progress notes.

## 2018-08-08 NOTE — PROGRESS NOTES
"Pharmacy Chemotherapy Calculation    Patient Name: Zuly Christian   Dx: breast cancer, metastatic         Protocol: weekly Taxol     *Dosing Reference*  Paclitaxel 80 mg/m² IV over 60 min on Day 1  Weekly cycle until disease progression or unacceptable toxicity  NCCN Guidelines for Breast Cancer V.1.2017  Todd EA, et al. J Clin Oncol. 2001;19(22):4216-23    Allergies:  Sulfa drugs     BP (!) 93/46   Pulse 96   Temp 36.7 °C (98 °F)   Resp 18   Ht 1.562 m (5' 1.5\")   Wt 58 kg (127 lb 13.9 oz)   SpO2 94%   BMI 23.77 kg/m²  Body surface area is 1.59 meters squared.    Labs 8/7/18:  ANC~ 9130   Plt = 322 k     Hgb = 11.9  SCr = 0.48 mg/dL  CrCl ~ 71 mL/min (min SCr 0.7 used)  AST/ALT/AP = 14/17/93 TBili = 0.4     Xgeva 120  Mg given 7/25/18 (q28d per supportive care plan)       Drug Order   (Drug name, dose, route, IV Fluid & volume, frequency, number of doses) Cycle 3     Previous treatment: C2 on 8/1/18   Medication = Paclitaxel (Taxol)  Base Dose = 80 mg/m²  Calc Dose: Base Dose x 1.59 m² = 127.2 mg  Final Dose = 127.2 mg  Route = IV  Fluid & Volume =  mL  Admin Duration = Over 60 min          <5% difference, okay to treat with final dose     By my signature below, I confirm this process was performed independently with the BSA and all final chemotherapy dosing calculations congruent. I have reviewed the above chemotherapy order and that my calculation of the final dose and BSA (when applicable) corroborate those calculations of the  pharmacist. Discrepancies of 5% or greater in the written dose have been addressed and documented within the [facility] Progress notes.      Radha Hall, PharmD  "

## 2018-08-08 NOTE — PROGRESS NOTES
Chemotherapy Verification - SECONDARY RN       Height = 156.2 cm  Weight = 58 kg  BSA = 1.59 m2       Medication: Taxol  Dose: 80 mg/m2  Calculated Dose: 127.2 mg                             (In mg/m2, AUC, mg/kg)       I confirm that this process was performed independently.

## 2018-08-08 NOTE — PROGRESS NOTES
"Pharmacy Chemotherapy calculation:    DX: metastatic Triple negative breast cancer    Cycle 3  Previous treatment: C2 =  7/25/18     Regimen: weekly taxol  Paclitaxel 80mg/m2 IV over 60 min on day 1  Weekly until DP/UT  NCCN Guidelines for Breast Cancer V.1.2017  Todd EA, et al - J Clin Oncol. 2001 Nov 15;19(22):4215-23.       BP (!) 93/46   Pulse 96   Temp 36.7 °C (98 °F)   Resp 18   Ht 1.562 m (5' 1.5\")   Wt 58 kg (127 lb 13.9 oz)   SpO2 94%   BMI 23.77 kg/m²   Body surface area is 1.59 meters squared.     LABS 8/7/2018:  ANC~ 9130 Plt = 322k   Hgb = 11.9     SCr = 0.48mg/dL CrCl ~ 70.5 mL/min (min Scr 0.7)  AST/ALT/AP = 14/17/93 TBili = 0.4   K+ = 4.7      Paclitaxel 80mg/m2 x Body surface area is 1.59 meters squared.m2 = 127.2mg    <5% difference, ok to treat with final dose = 127.2 mg IV    xgeva 120mg-  q28 days per supportive care plan- given 7/25/18      MICH Alejandre, PharmD         "

## 2018-08-10 NOTE — OP THERAPY EVALUATION
Outpatient Physical Therapy  LYMPHEDEMA THERAPY INITIAL EVALUATION    [facility] Physical Therapy [facility]  [address]  [address]  [place] [address]  Phone:  [phone]  Fax:  [phone]    Date of Evaluation: 08/10/2018    Patient: Zuly Christian  YOB: 1950  MRN: [id number]     Referring Provider: Kemi Eng M.D.  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    Visit Diagnoses     ICD-10-CM   1. Lymphedema of left upper extremity I89.0   2. Malignant neoplasm of left female breast, unspecified estrogen receptor status, unspecified site of breast (HCC) C50.912   3. Acute pain of left shoulder M25.512   4. 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 After the surgery she started to note swelling into the left arm. They determined that it wasn't a blood clot and they were hopeful that the swelling would just reduce on it's own. 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 She has undergone palliative radiation treatment to her hips bilaterally as well as to her clavicle, and feels that while it has been a bit painful it has also been helpful. Her swelling is static at this point in time, and she would like to know what she can do to address her swelling   Quality of life:  Fair  Pain:     Current pain  ratin    At best pain rating:  3    At worst pain ratin    Location:  Heaviness into the LUE    Quality:  Aching and discomfort (heaviness)    Pain timing:  Continuously    Relieving factors:  Elevation    Aggravating factors:  Activity, overhead activity, housework and dressing (unknown )    Progression:  Unchanged  Social Support:     Lives in:  One-story house    Lives with: lives with friend.  Hand dominance:  Right  Patient Goals:     Patient goals for therapy:  Decreased edema, decreased pain and increased motion      Past Medical History:   Diagnosis Date   • Allergic rhinitis    • Cancer (HCC)     breast   • Cataract    • Congestive heart failure (HCC)    • COPD (chronic obstructive pulmonary disease) (HCC)    • Emphysema of lung (HCC)    • Heart burn    • Hyperlipidemia    • Hypertension    • Osteopenia    • Peripheral vascular disease (HCC)      Past Surgical History:   Procedure Laterality Date   • CAROTID ENDARTERECTOMY  2017    Procedure: CAROTID ENDARTERECTOMY FOR : LEFT SUBCLAVIAN ARTERY ANGIOPLASTY / STENT BRACHIAL APPROACH WITH ULTRASOUND;  Surgeon: Dinora Saldana M.D.;  Location: SURGERY [facility];  Service:    • TUBAL LIGATION     • GYN SURGERY      d+c ,    • OTHER ORTHOPEDIC SURGERY      left knee surgery   • TONSILLECTOMY       Social History   Substance Use Topics   • Smoking status: Former Smoker     Packs/day: 1.00     Years: 50.00     Types: Cigarettes     Quit date: 2014   • Smokeless tobacco: Never Used   • Alcohol use Yes      Comment: 3 a month     Family and Occupational History     Social History   • Marital status:      Spouse name: N/A   • Number of children: N/A   • Years of education: N/A     Occupational History   • work in cardiology office Retired       Lymphedema Objective      Left Upper Extremity Circumferential Measurements  Axilla cm  Upper Proximal Humerus cm  Distal Humerus 25 cm  Elbow 23.5 cm  Mid-Forearm 23.6 cm  Wrist  17.5 cm  Distal Johnson Crease 17.3 cm  Total 106.9 cm    Right Upper Extremity Circumferential Measurements  Axilla cm  Upper Proximal Humerus cm  Distal Humerus 24.5 cm  Elbow 20.9 cm  Mid-Forearm 20.2 cm  Wrist 13.9 cm  Distal Johnson Crease 16.8 cm  Total cm 96.3      Other Notes  Right shoulder flexion 159 degrees, Right shoulder abduction 155 degrees  Left shoulder flexion 109 degrees, Left shoulder abduction 119 degrees        Therapeutic Treatments and Modalities:     1. Functional Training, Self Care (CPT 65617), Educated patient with regards to lymphedema plan of care including: anatomy of the lymphatic system, lymphatic pathophysiology, primary versus secondary lymphedema complete decongestive therapy, risk reduction strategies, and potential out of pocket expenses associated with treatment. Issued ordering instructions for CircAid UE reduction kit    Therapeutic Treatment and Modalities Summary:       Time-based treatments/modalities:  Functional training, self care minutes (CPT 06106): 23 minutes       Assessment and Plan:   Functional Impairments: abnormal/decreased or restricted ROM, lacks appropriate home exercise program, pain with function and swelling    Assessment details:  Patient is a 67 year old female who presents to therapy for evaluation and treatment of left upper extremity lymphedema. Clinical examination demonstrates the following impairments: positive dorsal hump into the hand, swelling that presents in distal to proximal manner, increased circumference of the LUE that is greater than 2cm, and swelling that does not reduce with elevation. These impairments limit patients ability to: perform upper body dressing, reach overhead, and manage the health of her integumentary and lymphatic system  and are consistent with diagnosis of Stage II Left Upper Extremity Lymphedema. Patient would benefit from complete decongestive therapy including: patient education, manual lymphatic drainage,  compression garments as needed, and therapeutic exercise to improve lymphatic circulation, musculoskeletal strength and mobility, and prevent further functional decline. If you have any questions regarding this plan of care please feel free to contact our clinic at [phone].    Barriers to therapy:  Out-of-pocket cost of treatment and comorbidities  Prognosis: good      Goals:   Short Term Goals:    1 - Patient will have no less than a 10 degree improvement into shoulder flexion and abduction   2 - Patient will be independent with donning/doffing of CircAid upper extremity reduction kit   Short term goal time span:  2-4 weeks    Long Term Goals:  1 - Patient will be independent with maintenance phase management of lymphedema including: compression garments, skin care education, risk reduction strategies, manual lymphatic drainage, home pneumatic compression pump, and therapeutic exercise  2 - Patient will be able to perform upper body dressing without increased pain into the left shoulder    Plan:  Therapy options:  Physical therapy treatment to continue  Planned therapy interventions:  Addressing equipment needs, ADL treatments (CPT 67888), compression glove, compression sleeve, decongestive exercises, home exercise program, intermittent compression, manual lymph drainage, Velcro wraps, strengthening exercises, myofascial release techniques, patient education, postural exercises, range of motion exercises, referral for compression garment & instructions for don/doffing, self-care/training (CPT 64742) and sequential compression pump  Planned education:  Functional anatomy and physiology of the lymphatic system, pathophysiology of lymphedema, lymphedema exercise, lymphedema precautions, proper skin care/nutrition, compression bandaging, self massage, breast cancer rehab, activity guidelines, home pump use, skin care guidelines and long term self-management of lymphedema  Frequency:  1x week  Duration in weeks:   6  Duration in visits:  6  Discussed with:  Patient      Functional Limitation G-Codes and Severity Modifiers  PT Functional Assessment Tool Used: LLIS  PT Functional Assessment Score: 25% CJ   Current: Self Care: Current (): CJ   Goal: Self Care: Goal (): CI     Referring provider co-signature:  I have reviewed this plan of care and my co-signature certifies the need for services.  Certification Dates:   From 8/10/18     To 9/21/18    Physician Signature: ________________________________ Date: ______________

## 2018-08-12 NOTE — PROGRESS NOTES
"Pharmacy Chemotherapy calculation:    DX: metastatic Triple negative breast cancer    Cycle 4  Previous treatment: C3 =  8/8/2018    Regimen: weekly taxol  Paclitaxel 80mg/m2 IV over 60 min on day 1  Weekly until DP/UT  NCCN Guidelines for Breast Cancer V.1.2017  Todd MATTHEW, et al - J Clin Oncol. 2001 Nov 15;19(22):4216-23.       BP (!) 92/40   Pulse 94   Temp 36.6 °C (97.8 °F)   Resp 18   Ht 1.562 m (5' 1.5\")   Wt 57 kg (125 lb 10.6 oz)   SpO2 93%   BMI 23.36 kg/m²   Body surface area is 1.57 meters squared.     LABS 8/14/2018:  ANC: 3020      WBC: 5.0     Plt: 393k   Hgb/Hct: 12.1/37.0     SCr: 0.51 mg/dL CrCl: 67.9 mL/min (min SCr 0.7)    K: 4.6  Na: 131          Glu: 85  LFT's: 14/15/95 TBili = 0.4       Paclitaxel 80mg/m2 x Body surface area is 1.57 meters squared.m2 = 125.6mg    <5% difference, ok to treat with final dose = 125.6 mg IV    xgeva 120mg-  q28 days per supportive care plan- given 7/25/18      MICH Alejandre, PharmD         " Hi Anabella-not unusual for vomiting-tho could just be related to your cold-things to try over the counter are Sudafed (not directly off the shelf-you have to ask pharmacist for), Mucinex, Robitussin if you are coughing. Using a cool mist humidifier in your bedroom and using a Normal Saline Nasal Spray is helpful also along with increased fluids and rest. If you have a fever or your symptoms don't improve you should see your primary care doctor of go to [facility] in clinic-hope you feel better soon, Juana DIAMOND Triage Nurse

## 2018-08-13 NOTE — OP THERAPY DAILY TREATMENT
Outpatient Physical Therapy  LYMPHEDEMA THERAPY DAILY TREATMENT     [facility] Physical Therapy [address]  [address]  [place] [address]  Phone:  [phone]  Fax:  [phone]    Date: 08/13/2018    Patient: Zuly Christian  YOB: 1950  MRN: [id number]     Time Calculation  Start time: 1117  Stop time: 1200 Time Calculation (min): 43 minutes     Chief Complaint: No chief complaint on file.    Visit #: 2    Subjective:   History of Present Illness:     Mechanism of injury:  Patient was feeling crummy over the weekend due to chemo, but is glad that she has her ball stretches to do at home as it gives her something to focus on       Lymphedema Objective      Other Notes  Left shoulder flexion 131          Therapeutic Treatments and Modalities:     1. Manual Therapy (CPT 01631), ASTYM shoulder sequence and flexor surface of the LUE, no treatment to clavicular attachments of pec due to stent placement, Manual lymphatic drainage of anterior and posterior chest wall with drainage to sternal and thoracic nodes respectively, drainage of LUE with focus on medial to lateral pathway , KT tape two fanstrips anchores posterior shoulder, tails to cubital fossa to encourage drainge to posterior subclavicular nodes    Time-based treatments/modalities:  Manual therapy minutes (CPT 79897): 43 minutes       Assessment and Plan:   Assessment details:  Improved ROM noted into shoulder flexion. There is extensive tissue adhesion into the posterior shoulder and axilla, but some of this is due to osteoarthritic changes in addition to surgical and lymphatic complications. Will initiate compression with reduction kit upon arrival

## 2018-08-14 NOTE — PROGRESS NOTES
Pt arrives to medical oncology for port access with lab draw prior to treatment.  Port site cleaned and accessed in sterile fashion with 20 gauge, 3/4 inch boswell needle.  Labs drawn.  Pt tolerated well.  Port flushed per policy and secured with tegaderm.  Pt to follow up with Dr. Rouse today and is scheduled for chemotherapy tomorrow.

## 2018-08-14 NOTE — PROGRESS NOTES
"Pharmacy Chemotherapy Calculation    Patient Name: Zuly Christian   Dx: breast cancer, metastatic         Protocol: weekly Taxol     *Dosing Reference*  Paclitaxel 80 mg/m² IV over 60 min on Day 1  Weekly cycle until disease progression or unacceptable toxicity  NCCN Guidelines for Breast Cancer V.1.2017  Todd EA, et al. J Clin Oncol. 2001;19(22):4216-23    Allergies:  Sulfa drugs     BP (!) 92/40   Pulse 94   Temp 36.6 °C (97.8 °F)   Resp 18   Ht 1.562 m (5' 1.5\")   Wt 57 kg (125 lb 10.6 oz)   SpO2 93%   BMI 23.36 kg/m²  Body surface area is 1.57 meters squared.    8/14/18  ANC~ 3020 Plt = 393k   Hgb = 12.1     SCr = 0.51mg/dL CrCl ~ 70mL/min (min Scr = 0.7)   LFT's = WNl TBili = 0.4       Drug Order   (Drug name, dose, route, IV Fluid & volume, frequency, number of doses) Cycle 4     Previous treatment: C3 8/8/18   Medication = Paclitaxel (Taxol)  Base Dose = 80 mg/m²  Calc Dose: Base Dose x 1.57m² = 125.6mg  Final Dose = 125.6 mg  Route = IV  Fluid & Volume =  mL  Admin Duration = Over 60 min          <5% difference, okay to treat with final dose     By my signature below, I confirm this process was performed independently with the BSA and all final chemotherapy dosing calculations congruent. I have reviewed the above chemotherapy order and that my calculation of the final dose and BSA (when applicable) corroborate those calculations of the  pharmacist. Discrepancies of 5% or greater in the written dose have been addressed and documented within the [facility] Progress notes.      Anthony Melchor, PharmD  "

## 2018-08-14 NOTE — PROGRESS NOTES
Consult Note: Oncology    Date of consultation: 8/14/2018     Referring provider: The patient is here by the kind referral of Brooke Brown M.D.    Reason for consultation:   CC: Triple negative breast cancer    Oncologic history to date:  -April 6, 2018. Patient was seen by primary care physician. Patient is complaining of hip pain and nipple lesion. Patient was documented to have a normal mammogram in June 2017. X-rays of the hip and mammogram were ordered. X-ray shows advanced degenerative disease of the hip  -May 23, 2018. MRI of the hip shows numerous lesions worrisome for metastatic disease. There is a 3.8 cm lesion along the right superior acetabulum with subtle break in the bony cortex worrisome for pathological fracture. Patient is at risk for developing full-thickness cortical fracture  -June 6, 2018. PET CT scan.Small spiculated mass in the outer left breast is highly suspicious for malignancy.FDG avid adenopathy in the left axilla is consistent with metastasis.Extensive FDG avid lytic and sclerotic lesions in the axial and appendicular skeleton, consistent with bony metastasis  -June 14, 2018. Left breast biopsy revealed ER/IN HER-2 negative invasive ductal carcinoma  -June 24,2018. A. Right iliac bone biopsy, 4 cores: Metastatic carcinoma in a background of fibrotic tissue demonstrating immunohistochemical profile most consistent with breast origin.  -July 9-24, 2018. Radiation therapy to hip  -July 25, 2018.  Started weekly paclitaxel    History of presenting illness:   First seen in on 6/19/2018  All relevant medical records available in [facility] were reviewed and are summarized above.    Zuly Christian  is a 67 y.o. female seen in clinic today for evaluation of metastatic triple negative breast cancer. The patient states she understands her diagnosis and would like to start treatment as soon as possible. She currently denies any acute complaints except for some right-sided hip pain      Interval  History: 6/28/2018  Zuly Christian is a 67 y.o. female seen in clinic today for follow up. She is accompanied by family and a friend who is voice recording the consultation today. She is here for follow up of MRI brain and biopsy of bone. She denies any new complaints.    Interval History: 8/14/2018  Zuly Christian is a 67 y.o. female seen in clinic today for follow up. She denies any new complaints. She has fatigue for a couple days which improves on its own. No neuropathy    Review of Systems:  Constitutional: No fever, chills, weight loss,  All other review of systems are negative except what was mentioned above in the HPI.    Past Medical History:    Past Medical History:   Diagnosis Date   • Allergic rhinitis    • Cancer (HCC)     breast   • Cataract    • Congestive heart failure (HCC)    • COPD (chronic obstructive pulmonary disease) (HCC)    • Emphysema of lung (HCC)    • Heart burn    • Hyperlipidemia    • Hypertension    • Osteopenia    • Peripheral vascular disease (HCC)        Past surgical history:    Past Surgical History:   Procedure Laterality Date   • CAROTID ENDARTERECTOMY  7/20/2017    Procedure: CAROTID ENDARTERECTOMY FOR : LEFT SUBCLAVIAN ARTERY ANGIOPLASTY / STENT BRACHIAL APPROACH WITH ULTRASOUND;  Surgeon: Dinora Saldana M.D.;  Location: SURGERY [facility];  Service:    • TUBAL LIGATION  1977   • GYN SURGERY      d+c 1979, 1980   • OTHER ORTHOPEDIC SURGERY      left knee surgery   • TONSILLECTOMY         Allergies:  Sulfa drugs    Medications:    Current Outpatient Prescriptions   Medication Sig Dispense Refill   • tramadol (ULTRAM) 50 MG Tab Take 50 mg by mouth every 6 hours as needed for Mild Pain.     • FLUoxetine (PROZAC) 20 MG Cap Take 20 mg by mouth every day.     • ondansetron (ZOFRAN) 4 MG Tab tablet Take 1 Tab by mouth every four hours as needed for Nausea/Vomiting (for nausea, vomiting). 30 Tab 6   • diphenoxylate-atropine (LOMOTIL) 2.5-0.025 MG Tab Take 1 Tab by mouth  4 times a day as needed for Diarrhea.     • prochlorperazine (COMPAZINE) 10 MG Tab Take 1 Tab by mouth every 6 hours as needed (for nausea, vomiting). 30 Tab 6   • lidocaine-prilocaine (EMLA) 2.5-2.5 % Cream Apply to port one hour prior to access and cover with plastic wrap. 1 Tube 3   • glucosamine 500 MG Cap Take  by mouth every day.     • Calcium Polycarbophil (FIBER-CAPS PO) Take  by mouth.     • Oxymetazoline HCl (NASAL SPRAY NA) [place]  in nose.     • CHONDROITIN SULFATE PO Take  by mouth.     • albuterol (PROAIR HFA) 108 (90 Base) MCG/ACT Aero Soln inhalation aerosol Inhale 2 Puffs by mouth every four hours as needed for Shortness of Breath (wheezing). 3 Inhaler 3   • Lifitegrast (XIIDRA OP) by Ophthalmic route.     • naproxen (NAPROSYN) 500 MG Tab Take 1 Tab by mouth 2 times a day, with meals. 60 Tab 1   • ANORO ELLIPTA 62.5-25 MCG/INH AEROSOL POWDER, BREATH ACTIVATED inhaler USE 1 INHALATION EVERY DAY 3 Inhaler 3   • ascorbic acid (ASCORBIC ACID) 500 MG Tab Take 500 mg by mouth every day.     • omega-3 acid ethyl esters (LOVAZA) 1 GM capsule Take  by mouth.     • Multiple Vitamin (MULTI VITAMIN DAILY PO) Take  by mouth.     • aspirin 81 MG tablet Take 81 mg by mouth every day. One tablet by mouth daily     • Coenzyme Q10 (COQ10) 100 MG Cap Take 100 mg by mouth.     • alprazolam (XANAX) 0.5 MG Tab Take 0.5 mg by mouth at bedtime as needed for Sleep.     • ezetimibe (ZETIA) 10 MG Tab Take 10 mg by mouth every day.     • rosuvastatin (CRESTOR) 40 MG tablet Take 40 mg by mouth every day.     • carvedilol (COREG) 25 MG Tab Take 25 mg by mouth 2 times a day, with meals.     • ramipril (ALTACE) 10 MG capsule Take 10 mg by mouth every day.       No current facility-administered medications for this visit.      Facility-Administered Medications Ordered in Other Visits   Medication Dose Route Frequency Provider Last Rate Last Dose   • heparin pf injection 500 Units  500 Units Intracatheter PRN Laurie Rouse M.D.  "  500 Units at 08/14/18 1024   • lidocaine (XYLOCAINE) 1 % injection 0.5 mL  0.5 mL Intradermal See Admin Instructions Laurie Rouse M.D.   0.5 mL at 08/14/18 1022       Social History:     Social History     Social History   • Marital status:      Spouse name: N/A   • Number of children: N/A   • Years of education: N/A     Occupational History   • work in cardiology office Retired     Social History Main Topics   • Smoking status: Former Smoker     Packs/day: 1.00     Years: 50.00     Types: Cigarettes     Quit date: 5/1/2014   • Smokeless tobacco: Never Used   • Alcohol use Yes      Comment: 3 a month   • Drug use: No   • Sexual activity: No     Other Topics Concern   • Not on file     Social History Narrative   • No narrative on file       Family History:     Family History   Problem Relation Age of Onset   • Lung Cancer Father    • Lung Cancer Mother        Physical Exam:  Vitals:   BP (!) 98/52   Pulse 97   Temp 36.4 °C (97.6 °F)   Resp 16   Ht 1.56 m (5' 1.42\")   Wt 56.4 kg (124 lb 5.4 oz)   SpO2 96%   BMI 23.17 kg/m²     General: Not in acute distress,   HEENT: No pallor,No icterus. Oropharynx clear.   Neck: Supple, no palpable masses.  Lymph nodes: No palpable cervical, supraclavicular, axillary or inguinal lymphadenopathy.    CVS: regular rate and rhythm, no rubs or gallops  Chest: Clear to auscultate bilaterally, no wheezing or crackles.   ABD: Soft, non tender, non distended, positive bowel sounds, no palpable organomegaly  EXT: No edema or cyanosis  CNS: Alert and oriented x3, No focal deficits.  Skin- No rash      Labs:   Hospital Outpatient Visit on 08/14/2018   Component Date Value Ref Range Status   • Sodium 08/14/2018 131* 135 - 145 mmol/L Final   • Potassium 08/14/2018 4.6  3.6 - 5.5 mmol/L Final   • Chloride 08/14/2018 102  96 - 112 mmol/L Final   • Co2 08/14/2018 22  20 - 33 mmol/L Final   • Anion Gap 08/14/2018 7.0  0.0 - 11.9 Final   • Glucose 08/14/2018 85  65 - 99 mg/dL " Final   • Bun 08/14/2018 26* 8 - 22 mg/dL Final   • Creatinine 08/14/2018 0.51  0.50 - 1.40 mg/dL Final   • Calcium 08/14/2018 8.9  8.5 - 10.5 mg/dL Final   • AST(SGOT) 08/14/2018 14  12 - 45 U/L Final   • ALT(SGPT) 08/14/2018 15  2 - 50 U/L Final   • Alkaline Phosphatase 08/14/2018 95  30 - 99 U/L Final   • Total Bilirubin 08/14/2018 0.4  0.1 - 1.5 mg/dL Final   • Albumin 08/14/2018 3.6  3.2 - 4.9 g/dL Final   • Total Protein 08/14/2018 6.2  6.0 - 8.2 g/dL Final   • Globulin 08/14/2018 2.6  1.9 - 3.5 g/dL Final   • A-G Ratio 08/14/2018 1.4  g/dL Final   • GFR If  08/14/2018 >60  >60 mL/min/1.73 m 2 Final   • GFR If Non  08/14/2018 >60  >60 mL/min/1.73 m 2 Final   Hospital Outpatient Visit on 08/14/2018   Component Date Value Ref Range Status   • WBC 08/14/2018 5.0  4.8 - 10.8 K/uL Final   • RBC 08/14/2018 4.25  4.20 - 5.40 M/uL Final   • Hemoglobin 08/14/2018 12.1  12.0 - 16.0 g/dL Final   • Hematocrit 08/14/2018 37.0  37.0 - 47.0 % Final   • MCV 08/14/2018 87.1  81.4 - 97.8 fL Final   • MCH 08/14/2018 28.5  27.0 - 33.0 pg Final   • MCHC 08/14/2018 32.7* 33.6 - 35.0 g/dL Final   • RDW 08/14/2018 47.3  35.9 - 50.0 fL Final   • Platelet Count 08/14/2018 393  164 - 446 K/uL Final   • MPV 08/14/2018 9.1  9.0 - 12.9 fL Final   • Nucleated RBC 08/14/2018 0.00  /100 WBC Final   • NRBC (Absolute) 08/14/2018 0.00  K/uL Final   • Neutrophils-Polys 08/14/2018 60.00  44.00 - 72.00 % Final   • Lymphocytes 08/14/2018 13.30* 22.00 - 41.00 % Final   • Monocytes 08/14/2018 16.70* 0.00 - 13.40 % Final   • Eosinophils 08/14/2018 3.20  0.00 - 6.90 % Final   • Basophils 08/14/2018 0.80  0.00 - 1.80 % Final   • Immature Granulocytes 08/14/2018 6.00* 0.00 - 0.90 % Final   • Lymphs (Absolute) 08/14/2018 0.67* 1.00 - 4.80 K/uL Final   • Monos (Absolute) 08/14/2018 0.84  0.00 - 0.85 K/uL Final   • Eos (Absolute) 08/14/2018 0.16  0.00 - 0.51 K/uL Final   • Baso (Absolute) 08/14/2018 0.04  0.00 - 0.12 K/uL  Final   • Immature Granulocytes (abs) 08/14/2018 0.30* 0.00 - 0.11 K/uL Final   • Neutrophils (Absolute) 08/14/2018 3.02  2.00 - 7.15 K/uL Final    Includes immature neutrophils, if present.   • Peripheral Smear Review 08/14/2018 see below   Final    Comment: Due to instrument suspect flags, further review of peripheral smear is  indicated on this patient sample. This review may or may not result in  abnormal findings.     • Comments-Diff 08/14/2018 see below   Final    Results have been verified by peripheral blood smear review.   Hospital Outpatient Visit on 08/07/2018   Component Date Value Ref Range Status   • WBC 08/07/2018 10.6  4.8 - 10.8 K/uL Final   • RBC 08/07/2018 4.21  4.20 - 5.40 M/uL Final   • Hemoglobin 08/07/2018 11.9* 12.0 - 16.0 g/dL Final   • Hematocrit 08/07/2018 37.0  37.0 - 47.0 % Final   • MCV 08/07/2018 87.9  81.4 - 97.8 fL Final   • MCH 08/07/2018 28.3  27.0 - 33.0 pg Final   • MCHC 08/07/2018 32.2* 33.6 - 35.0 g/dL Final   • RDW 08/07/2018 46.4  35.9 - 50.0 fL Final   • Platelet Count 08/07/2018 322  164 - 446 K/uL Final   • MPV 08/07/2018 9.5  9.0 - 12.9 fL Final   • Neutrophils-Polys 08/07/2018 85.80* 44.00 - 72.00 % Final   • Lymphocytes 08/07/2018 4.80* 22.00 - 41.00 % Final   • Monocytes 08/07/2018 5.80  0.00 - 13.40 % Final   • Eosinophils 08/07/2018 1.90  0.00 - 6.90 % Final   • Basophils 08/07/2018 0.50  0.00 - 1.80 % Final   • Immature Granulocytes 08/07/2018 1.20* 0.00 - 0.90 % Final   • Nucleated RBC 08/07/2018 0.00  /100 WBC Final   • Neutrophils (Absolute) 08/07/2018 9.13* 2.00 - 7.15 K/uL Final    Includes immature neutrophils, if present.   • Lymphs (Absolute) 08/07/2018 0.51* 1.00 - 4.80 K/uL Final   • Monos (Absolute) 08/07/2018 0.62  0.00 - 0.85 K/uL Final   • Eos (Absolute) 08/07/2018 0.20  0.00 - 0.51 K/uL Final   • Baso (Absolute) 08/07/2018 0.05  0.00 - 0.12 K/uL Final   • Immature Granulocytes (abs) 08/07/2018 0.13* 0.00 - 0.11 K/uL Final   • NRBC (Absolute)  08/07/2018 0.00  K/uL Final   • Sodium 08/07/2018 132* 135 - 145 mmol/L Final   • Potassium 08/07/2018 4.7  3.6 - 5.5 mmol/L Final   • Chloride 08/07/2018 104  96 - 112 mmol/L Final   • Co2 08/07/2018 21  20 - 33 mmol/L Final   • Anion Gap 08/07/2018 7.0  0.0 - 11.9 Final   • Glucose 08/07/2018 92  65 - 99 mg/dL Final   • Bun 08/07/2018 24* 8 - 22 mg/dL Final   • Creatinine 08/07/2018 0.48* 0.50 - 1.40 mg/dL Final   • Calcium 08/07/2018 8.4* 8.5 - 10.5 mg/dL Final   • AST(SGOT) 08/07/2018 14  12 - 45 U/L Final   • ALT(SGPT) 08/07/2018 17  2 - 50 U/L Final   • Alkaline Phosphatase 08/07/2018 93  30 - 99 U/L Final   • Total Bilirubin 08/07/2018 0.4  0.1 - 1.5 mg/dL Final   • Albumin 08/07/2018 3.8  3.2 - 4.9 g/dL Final   • Total Protein 08/07/2018 5.9* 6.0 - 8.2 g/dL Final   • Globulin 08/07/2018 2.1  1.9 - 3.5 g/dL Final   • A-G Ratio 08/07/2018 1.8  g/dL Final   • GFR If  08/07/2018 >60  >60 mL/min/1.73 m 2 Final   • GFR If Non  08/07/2018 >60  >60 mL/min/1.73 m 2 Final       Pathology:  -June 24,2018. A. Right iliac bone biopsy, 4 cores: Metastatic carcinoma in a background of fibrotic tissue demonstrating immunohistochemical profile most consistent with breast origin.              Imaging:  Ct-petct-whole Body    Result Date: 6/6/2018 6/6/2018 1:14 PM HISTORY/REASON FOR EXAM:  Pathological fracture of pelvis, unspecified pathological cause, initial encounter TECHNIQUE/EXAM DESCRIPTION AND NUMBER OF VIEWS: PET body imaging. Initially, 14.76 mCi F-18 FDG was administered intravenously under standardized conditions. Approximately 45 minutes after FDG administration, the patient was placed in the supine position on the PET CT table. Blood glucose level was 65 mg/dL. Low dose spiral CT imaging was performed from the skull base to the mid thighs. PET imaging was then performed from the skull base to the mid thighs. CT images, PET images, and PET/CT fused images were reviewed on a PACS 3D  workstation. The limited non-contrast CT data are used primarily for attenuation correction and anatomic correlation.  Evaluation of solid organs and bowel are especially limited utilizing this technique. COMPARISON: None. FINDINGS: Head and neck: No abnormal focal FDG activity. Chest: A left breast mass measures 14 mm and is FDG avid with a maximum SUV of 8.2. A left axillary lymph node measures approximately 8 mm short axis with a maximum SUV of 8.1. There is mild activity in several additional left axillary lymph nodes. An irregular left axillary lymph node on image 80 measures 9 mm short axis with a maximum  SUV of 7.3. Abdomen and pelvis: Patchy activity in the right colon is likely physiologic or inflammatory. Musculoskeletal: There are scattered lytic lesions throughout the appendicular and axial skeleton are FDG avid. A focal activity in the C2 vertebral body has a maximum SUV of 8.5. Focal activity in the distal left clavicle has a maximum SUV of 7.1. Focal  activity in the T5 vertebral body is present with a maximum SUV of 6.2. A lytic lesion in the right ilium is FDG avid with a maximum SUV of 10.7. A lytic and sclerotic lesion is noted in the right acetabulum with cortical disruption. The lesion is FDG avid with a maximum SUV of 8.8 Incidental findings on CT: Scattered arterial calcifications with ectasia of the abdominal aorta. Maximum diameter is approximately 3 cm. A 7 mm nodule in the left upper lobe is unchanged and does not demonstrate FDG activity. There are scattered diverticula in the colon.     1.  Small spiculated mass in the outer left breast is highly suspicious for malignancy. 2.  FDG avid adenopathy in the left axilla is consistent with metastasis. 3.  Extensive FDG avid lytic and sclerotic lesions in the axial and appendicular skeleton, consistent with bony metastasis. 4.  Atherosclerosis with ectasia of the abdominal aorta. 5.  Stable 7 mm left upper lobe nodule is not FDG avid. 6.   Diverticulosis.      Assessment and Plan:  -Breast cancer  -Triple negative  -Stage IV, metastatic to bone clinically  6/28/2018   -MRI of brain neg for CNS disease  -Bone biopsy to confirm metastatic disease  -We discussed the natural history of disease. All treatment options will be palliative for metastatic disease-Patient is not a candidate for hormonal manipulation or HER2 therapy because of triple neg status  8/14/2018   -labs reviewed  -no new complaints  -proceed with week 4 of taxol    -Bone metastases  -July 9-24, 2018. Radiation therapy to hip  -Patient had recent evaluation from dentistry. Her teeth are in good health, no workup needed. Patient had x-ray of her teeth past month  7/24/2018    -Starting Xgeva q 4 weeks.  8/14/2018   -Continue with Xgeva every 4 weeks        She agreed and verbalized her agreement and understanding with the current plan.  I answered all questions and concerns she has at this time.    Dear  Brooke Brown M.D., Thank you very much for allowing me to see  Zuly Christian today .     Please note that this dictation was created using voice recognition software. I have made every reasonable attempt to correct obvious errors, but I expect that there are errors of grammar and possibly content that I did not discover before finalizing the note.      SIGNATURES:  Laurie Rouse    CC:  NIRMALA Chen Molly M, M.D.

## 2018-08-15 NOTE — PROGRESS NOTES
Chemotherapy Verification - PRIMARY RN      Height = 156.2cm  Weight = 57kg  BSA = 1.57       Medication: Taxol  Dose: 80mg/m2  Calculated Dose: 125.6mg                            (In mg/m2, AUC, mg/kg)           I confirm this process was performed independently with the BSA and all final chemotherapy dosing calculations congruent.  Any discrepancies of 5% or greater have been addressed with the chemotherapy pharmacist. The resolution of the discrepancy has been documented in the EPIC progress notes.

## 2018-08-15 NOTE — PROGRESS NOTES
Chemotherapy Verification - SECONDARY RN       Height = 156.2 cm  Weight = 57 kg  BSA = 1.57 m2       Medication: Paclitaxel (Taxol)  Dose: 80 mg/m2  Calculated Dose: 125.6 mg                              I confirm that this process was performed independently.

## 2018-08-15 NOTE — PROGRESS NOTES
Pt presents for for C4 weekly Taxol infusion with no new complaints and port already accessed from lab draw yesterday.  Labs reviewed and within parameters for treatment.  Treatment given as ordered and tolerated well.  Port flushed and de-accessed per protocol. No sign of adverse event.  Pt stable and ambulatory at discharge. Plan to return next week for C5.

## 2018-08-17 NOTE — OP THERAPY DAILY TREATMENT
Outpatient Physical Therapy  LYMPHEDEMA THERAPY DAILY TREATMENT     [facility] Physical [address].  [address]  [place] [address]  Phone:  [phone]  Fax:  [phone]    Date: 08/17/2018    Patient: Zuly Christian  YOB: 1950  MRN: [id number]     Time Calculation  Start time: 1030  Stop time: 1115 Time Calculation (min): 45 minutes     Chief Complaint: No chief complaint on file.    Visit #: 3    Subjective:   History of Present Illness:     Mechanism of injury:  Patient feels that she is able to  her arm better and feels a bit intimidated with her reduction kit arrival      Lymphedema Objective      Left Upper Extremity Circumferential Measurements  Axilla cm  Upper Proximal Humerus cm  Distal Humerus 24.1 cm  Elbow 23.1 cm  Mid-Forearm 23 cm  Wrist 17.5 cm  Distal Johnson Crease 17.9 cm  Total 105.6 cm      Other Notes  Left shoulder abduction 133 degrees          Therapeutic Treatments and Modalities:     1. Functional Training, Self Care (CPT 56309), Training of correct donning and doffing technique using the CircAid LUE reduction kit. Patient is able to demonstrate correct technique with correct tension. Instructed to attempt to wear 23/24 hours -- kit should never be painful, should be firm but comfortable. Remove if painful. Correct application always includes: lotion, stockinette, UE piece, handpiece at firm but comfortable pressure    2. Therapeutic Activities (CPT 07582), Measuring and fitting of LUE CircAid reduction kit    Time-based treatments/modalities:  Therapeutic activity minutes (CPT 02407): 20 minutes  Functional training, self care minutes (CPT 77373): 25 minutes       Assessment and Plan:   Assessment details:  Initiated reduction kit and will resume MLD next tx session. Patients UE ROM has improved drastically and she is please to see this. When reduction is optimal will transition to compression sleeve

## 2018-08-20 NOTE — OP THERAPY DAILY TREATMENT
"  Outpatient Physical Therapy  LYMPHEDEMA THERAPY DAILY TREATMENT     [facility] Physical Therapy [address]  [address]  [place] [address]  Phone:  [phone]  Fax:  [phone]    Date: 08/20/2018    Patient: Zuly Christian  YOB: 1950  MRN: [id number]     Time Calculation  Start time: 1546  Stop time: 1626 Time Calculation (min): 40 minutes     Chief Complaint: No chief complaint on file.    Visit #: 4    Subjective:   History of Present Illness:     Mechanism of injury:  Patient has been able to get her reduction kit on, but has had some challenges with her hand piece      Lymphedema Objective      Left Upper Extremity Circumferential Measurements  Axilla cm  Upper Proximal Humerus cm  Distal Humerus 23.9 cm  Elbow 21.7 cm  Mid-Forearm 21 cm  Wrist 16.2 cm  Distal Johnson Crease 17.5 cm  Total 100.3 cm              Therapeutic Treatments and Modalities:     1. Manual Therapy (CPT 44364), Manual lymphatic drainage of anterior and posterior chest wall with drainage to sternal and thoracic nodes respectively, drainage of LUE with focus on medial to lateral pathway     2. Therapeutic Activities (CPT 26942), Measuring and re-fitting of reduction kit for LUE, marked top and bottom of handpiece to \"T\" and \"B\"    Time-based treatments/modalities:  Manual therapy minutes (CPT 28082): 25 minutes  Therapeutic activity minutes (CPT 93601): 15 minutes       Assessment and Plan:   Assessment details:  Patient has hand piece donned upside down, but still has an excellent reduction throughout her LUE. Will continue with active reduction and transition to compression sleeve when patient has no measurement differences greater than 2 cm         "

## 2018-08-21 NOTE — PROGRESS NOTES
Zuly Christian is a 67 y.o. female here for a non-provider visit for: Lab Draws  on 8/21/2018 at 9:49 AM    Procedure Performed: Venipuncture     Anatomical site: Right Antecubital Area (AC)    Equipment used: 21g Butterfly     Labs drawn: CBC w/diff and CMP    Ordering Provider: Dr. JESSY Parrish    Juan By: Jazlyn Tong, Med Ass't    Not successful (dr. parrish in clinic)

## 2018-08-21 NOTE — PROGRESS NOTES
Consult Note: Oncology    Date of consultation: 8/21/2018     Referring provider: The patient is here by the kind referral of Brooke Brown M.D.    Reason for consultation:   CC: Triple negative breast cancer    Oncologic history to date:  -April 6, 2018. Patient was seen by primary care physician. Patient is complaining of hip pain and nipple lesion. Patient was documented to have a normal mammogram in June 2017. X-rays of the hip and mammogram were ordered. X-ray shows advanced degenerative disease of the hip  -May 23, 2018. MRI of the hip shows numerous lesions worrisome for metastatic disease. There is a 3.8 cm lesion along the right superior acetabulum with subtle break in the bony cortex worrisome for pathological fracture. Patient is at risk for developing full-thickness cortical fracture  -June 6, 2018. PET CT scan.Small spiculated mass in the outer left breast is highly suspicious for malignancy.FDG avid adenopathy in the left axilla is consistent with metastasis.Extensive FDG avid lytic and sclerotic lesions in the axial and appendicular skeleton, consistent with bony metastasis  -June 14, 2018. Left breast biopsy revealed ER/MS HER-2 negative invasive ductal carcinoma  -June 24,2018. A. Right iliac bone biopsy, 4 cores: Metastatic carcinoma in a background of fibrotic tissue demonstrating immunohistochemical profile most consistent with breast origin.  -July 9-24, 2018. Radiation therapy to hip  -July 25, 2018.  Started weekly paclitaxel  -8/21/2018 . Weekly Paclitaxel changed to Q 3 weeks    History of presenting illness:   First seen in on 6/19/2018  All relevant medical records available in [facility] were reviewed and are summarized above.    Zuly Christian  is a 67 y.o. female seen in clinic today for evaluation of metastatic triple negative breast cancer. The patient states she understands her diagnosis and would like to start treatment as soon as possible. She currently denies any acute  complaints except for some right-sided hip pain      Interval History: 6/28/2018  Zuly Christian is a 67 y.o. female seen in clinic today for follow up. She is accompanied by family and a friend who is voice recording the consultation today. She is here for follow up of MRI brain and biopsy of bone. She denies any new complaints.    Interval History: 8/21/2018  Zuly Christian is a 67 y.o. female seen in clinic today for follow up.she is accompanied by her friend and grandson. She denies any acute complaints. She only  Has fatigue for a day or so during the week.     Review of Systems:  Constitutional: No fever, chills, weight loss,  All other review of systems are negative except what was mentioned above in the HPI.    Past Medical History:    Past Medical History:   Diagnosis Date   • Allergic rhinitis    • Cancer (HCC)     breast   • Cataract    • Congestive heart failure (HCC)    • COPD (chronic obstructive pulmonary disease) (HCC)    • Emphysema of lung (HCC)    • Heart burn    • Hyperlipidemia    • Hypertension    • Osteopenia    • Peripheral vascular disease (HCC)        Past surgical history:    Past Surgical History:   Procedure Laterality Date   • CAROTID ENDARTERECTOMY  7/20/2017    Procedure: CAROTID ENDARTERECTOMY FOR : LEFT SUBCLAVIAN ARTERY ANGIOPLASTY / STENT BRACHIAL APPROACH WITH ULTRASOUND;  Surgeon: Dinora Saldana M.D.;  Location: SURGERY [facility];  Service:    • TUBAL LIGATION  1977   • GYN SURGERY      d+c 1979, 1980   • OTHER ORTHOPEDIC SURGERY      left knee surgery   • TONSILLECTOMY         Allergies:  Sulfa drugs    Medications:    Current Outpatient Prescriptions   Medication Sig Dispense Refill   • tramadol (ULTRAM) 50 MG Tab Take 50 mg by mouth every 6 hours as needed for Mild Pain.     • FLUoxetine (PROZAC) 20 MG Cap Take 20 mg by mouth every day.     • ondansetron (ZOFRAN) 4 MG Tab tablet Take 1 Tab by mouth every four hours as needed for Nausea/Vomiting (for nausea,  vomiting). 30 Tab 6   • diphenoxylate-atropine (LOMOTIL) 2.5-0.025 MG Tab Take 1 Tab by mouth 4 times a day as needed for Diarrhea.     • prochlorperazine (COMPAZINE) 10 MG Tab Take 1 Tab by mouth every 6 hours as needed (for nausea, vomiting). 30 Tab 6   • lidocaine-prilocaine (EMLA) 2.5-2.5 % Cream Apply to port one hour prior to access and cover with plastic wrap. 1 Tube 3   • glucosamine 500 MG Cap Take  by mouth every day.     • Calcium Polycarbophil (FIBER-CAPS PO) Take  by mouth.     • Oxymetazoline HCl (NASAL SPRAY NA) [place]  in nose.     • CHONDROITIN SULFATE PO Take  by mouth.     • albuterol (PROAIR HFA) 108 (90 Base) MCG/ACT Aero Soln inhalation aerosol Inhale 2 Puffs by mouth every four hours as needed for Shortness of Breath (wheezing). 3 Inhaler 3   • Lifitegrast (XIIDRA OP) by Ophthalmic route.     • naproxen (NAPROSYN) 500 MG Tab Take 1 Tab by mouth 2 times a day, with meals. 60 Tab 1   • ANORO ELLIPTA 62.5-25 MCG/INH AEROSOL POWDER, BREATH ACTIVATED inhaler USE 1 INHALATION EVERY DAY 3 Inhaler 3   • ascorbic acid (ASCORBIC ACID) 500 MG Tab Take 500 mg by mouth every day.     • omega-3 acid ethyl esters (LOVAZA) 1 GM capsule Take  by mouth.     • Multiple Vitamin (MULTI VITAMIN DAILY PO) Take  by mouth.     • aspirin 81 MG tablet Take 81 mg by mouth every day. One tablet by mouth daily     • Coenzyme Q10 (COQ10) 100 MG Cap Take 100 mg by mouth.     • alprazolam (XANAX) 0.5 MG Tab Take 0.5 mg by mouth at bedtime as needed for Sleep.     • ezetimibe (ZETIA) 10 MG Tab Take 10 mg by mouth every day.     • rosuvastatin (CRESTOR) 40 MG tablet Take 40 mg by mouth every day.     • carvedilol (COREG) 25 MG Tab Take 25 mg by mouth 2 times a day, with meals.     • ramipril (ALTACE) 10 MG capsule Take 10 mg by mouth every day.       No current facility-administered medications for this visit.        Social History:     Social History     Social History   • Marital status:      Spouse name: N/A   • Number  "of children: N/A   • Years of education: N/A     Occupational History   • work in cardiology office Retired     Social History Main Topics   • Smoking status: Former Smoker     Packs/day: 1.00     Years: 50.00     Types: Cigarettes     Quit date: 5/1/2014   • Smokeless tobacco: Never Used   • Alcohol use Yes      Comment: 3 a month   • Drug use: No   • Sexual activity: No     Other Topics Concern   • Not on file     Social History Narrative   • No narrative on file       Family History:     Family History   Problem Relation Age of Onset   • Lung Cancer Father    • Lung Cancer Mother        Physical Exam:  Vitals:   /70   Pulse (!) 119   Temp 36.6 °C (97.9 °F)   Resp 18   Ht 1.6 m (5' 3\")   Wt 56.4 kg (124 lb 5.4 oz)   SpO2 95%   Breastfeeding? No   BMI 22.03 kg/m²     General: Not in acute distress,   HEENT: No pallor,No icterus. Oropharynx clear.   Neck: Supple, no palpable masses.  Lymph nodes: No palpable cervical, supraclavicular, axillary or inguinal lymphadenopathy.    CVS: regular rate and rhythm, no rubs or gallops  Chest: Clear to auscultate bilaterally, no wheezing or crackles.   ABD: Soft, non tender, non distended, positive bowel sounds, no palpable organomegaly  EXT: No edema or cyanosis  CNS: Alert and oriented x3, No focal deficits.  Skin- No rash      Labs:   Hospital Outpatient Visit on 08/21/2018   Component Date Value Ref Range Status   • WBC 08/21/2018 7.2  4.8 - 10.8 K/uL Final   • RBC 08/21/2018 4.49  4.20 - 5.40 M/uL Final   • Hemoglobin 08/21/2018 12.5  12.0 - 16.0 g/dL Final   • Hematocrit 08/21/2018 39.2  37.0 - 47.0 % Final   • MCV 08/21/2018 87.3  81.4 - 97.8 fL Final   • MCH 08/21/2018 27.8  27.0 - 33.0 pg Final   • MCHC 08/21/2018 31.9* 33.6 - 35.0 g/dL Final   • RDW 08/21/2018 47.9  35.9 - 50.0 fL Final   • Platelet Count 08/21/2018 434  164 - 446 K/uL Final   • MPV 08/21/2018 9.3  9.0 - 12.9 fL Final   • Nucleated RBC 08/21/2018 0.00  /100 WBC Final   • NRBC (Absolute) " 08/21/2018 0.00  K/uL Final   • Neutrophils-Polys 08/21/2018 68.80  44.00 - 72.00 % Final   • Lymphocytes 08/21/2018 12.00* 22.00 - 41.00 % Final   • Monocytes 08/21/2018 10.10  0.00 - 13.40 % Final   • Eosinophils 08/21/2018 1.70  0.00 - 6.90 % Final   • Basophils 08/21/2018 1.00  0.00 - 1.80 % Final   • Immature Granulocytes 08/21/2018 6.40* 0.00 - 0.90 % Final   • Lymphs (Absolute) 08/21/2018 0.86* 1.00 - 4.80 K/uL Final   • Monos (Absolute) 08/21/2018 0.72  0.00 - 0.85 K/uL Final   • Eos (Absolute) 08/21/2018 0.12  0.00 - 0.51 K/uL Final   • Baso (Absolute) 08/21/2018 0.07  0.00 - 0.12 K/uL Final   • Immature Granulocytes (abs) 08/21/2018 0.46* 0.00 - 0.11 K/uL Final   • Neutrophils (Absolute) 08/21/2018 4.92  2.00 - 7.15 K/uL Final    Includes immature neutrophils, if present.   • Sodium 08/21/2018 132* 135 - 145 mmol/L Final   • Potassium 08/21/2018 4.5  3.6 - 5.5 mmol/L Final   • Chloride 08/21/2018 102  96 - 112 mmol/L Final   • Co2 08/21/2018 22  20 - 33 mmol/L Final   • Anion Gap 08/21/2018 8.0  0.0 - 11.9 Final   • Glucose 08/21/2018 93  65 - 99 mg/dL Final   • Bun 08/21/2018 19  8 - 22 mg/dL Final   • Creatinine 08/21/2018 0.57  0.50 - 1.40 mg/dL Final   • Calcium 08/21/2018 9.5  8.5 - 10.5 mg/dL Final   • AST(SGOT) 08/21/2018 14  12 - 45 U/L Final   • ALT(SGPT) 08/21/2018 13  2 - 50 U/L Final   • Alkaline Phosphatase 08/21/2018 104* 30 - 99 U/L Final   • Total Bilirubin 08/21/2018 0.3  0.1 - 1.5 mg/dL Final   • Albumin 08/21/2018 3.8  3.2 - 4.9 g/dL Final   • Total Protein 08/21/2018 6.7  6.0 - 8.2 g/dL Final   • Globulin 08/21/2018 2.9  1.9 - 3.5 g/dL Final   • A-G Ratio 08/21/2018 1.3  g/dL Final   • GFR If  08/21/2018 >60  >60 mL/min/1.73 m 2 Final   • GFR If Non  08/21/2018 >60  >60 mL/min/1.73 m 2 Final   • Peripheral Smear Review 08/21/2018 see below   Final    Comment: Due to instrument suspect flags, further review of peripheral smear is  indicated on this patient  sample. This review may or may not result in  abnormal findings.     • Comments-Diff 08/21/2018 see below   Final    Results have been verified by peripheral blood smear review.   Hospital Outpatient Visit on 08/14/2018   Component Date Value Ref Range Status   • Sodium 08/14/2018 131* 135 - 145 mmol/L Final   • Potassium 08/14/2018 4.6  3.6 - 5.5 mmol/L Final   • Chloride 08/14/2018 102  96 - 112 mmol/L Final   • Co2 08/14/2018 22  20 - 33 mmol/L Final   • Anion Gap 08/14/2018 7.0  0.0 - 11.9 Final   • Glucose 08/14/2018 85  65 - 99 mg/dL Final   • Bun 08/14/2018 26* 8 - 22 mg/dL Final   • Creatinine 08/14/2018 0.51  0.50 - 1.40 mg/dL Final   • Calcium 08/14/2018 8.9  8.5 - 10.5 mg/dL Final   • AST(SGOT) 08/14/2018 14  12 - 45 U/L Final   • ALT(SGPT) 08/14/2018 15  2 - 50 U/L Final   • Alkaline Phosphatase 08/14/2018 95  30 - 99 U/L Final   • Total Bilirubin 08/14/2018 0.4  0.1 - 1.5 mg/dL Final   • Albumin 08/14/2018 3.6  3.2 - 4.9 g/dL Final   • Total Protein 08/14/2018 6.2  6.0 - 8.2 g/dL Final   • Globulin 08/14/2018 2.6  1.9 - 3.5 g/dL Final   • A-G Ratio 08/14/2018 1.4  g/dL Final   • GFR If  08/14/2018 >60  >60 mL/min/1.73 m 2 Final   • GFR If Non  08/14/2018 >60  >60 mL/min/1.73 m 2 Final   Hospital Outpatient Visit on 08/14/2018   Component Date Value Ref Range Status   • WBC 08/14/2018 5.0  4.8 - 10.8 K/uL Final   • RBC 08/14/2018 4.25  4.20 - 5.40 M/uL Final   • Hemoglobin 08/14/2018 12.1  12.0 - 16.0 g/dL Final   • Hematocrit 08/14/2018 37.0  37.0 - 47.0 % Final   • MCV 08/14/2018 87.1  81.4 - 97.8 fL Final   • MCH 08/14/2018 28.5  27.0 - 33.0 pg Final   • MCHC 08/14/2018 32.7* 33.6 - 35.0 g/dL Final   • RDW 08/14/2018 47.3  35.9 - 50.0 fL Final   • Platelet Count 08/14/2018 393  164 - 446 K/uL Final   • MPV 08/14/2018 9.1  9.0 - 12.9 fL Final   • Nucleated RBC 08/14/2018 0.00  /100 WBC Final   • NRBC (Absolute) 08/14/2018 0.00  K/uL Final   • Neutrophils-Polys 08/14/2018  60.00  44.00 - 72.00 % Final   • Lymphocytes 08/14/2018 13.30* 22.00 - 41.00 % Final   • Monocytes 08/14/2018 16.70* 0.00 - 13.40 % Final   • Eosinophils 08/14/2018 3.20  0.00 - 6.90 % Final   • Basophils 08/14/2018 0.80  0.00 - 1.80 % Final   • Immature Granulocytes 08/14/2018 6.00* 0.00 - 0.90 % Final   • Lymphs (Absolute) 08/14/2018 0.67* 1.00 - 4.80 K/uL Final   • Monos (Absolute) 08/14/2018 0.84  0.00 - 0.85 K/uL Final   • Eos (Absolute) 08/14/2018 0.16  0.00 - 0.51 K/uL Final   • Baso (Absolute) 08/14/2018 0.04  0.00 - 0.12 K/uL Final   • Immature Granulocytes (abs) 08/14/2018 0.30* 0.00 - 0.11 K/uL Final   • Neutrophils (Absolute) 08/14/2018 3.02  2.00 - 7.15 K/uL Final    Includes immature neutrophils, if present.   • Peripheral Smear Review 08/14/2018 see below   Final    Comment: Due to instrument suspect flags, further review of peripheral smear is  indicated on this patient sample. This review may or may not result in  abnormal findings.     • Comments-Diff 08/14/2018 see below   Final    Results have been verified by peripheral blood smear review.       Pathology:  -June 24,2018. A. Right iliac bone biopsy, 4 cores: Metastatic carcinoma in a background of fibrotic tissue demonstrating immunohistochemical profile most consistent with breast origin.              Imaging:  Ct-petct-whole Body    Result Date: 6/6/2018 6/6/2018 1:14 PM HISTORY/REASON FOR EXAM:  Pathological fracture of pelvis, unspecified pathological cause, initial encounter TECHNIQUE/EXAM DESCRIPTION AND NUMBER OF VIEWS: PET body imaging. Initially, 14.76 mCi F-18 FDG was administered intravenously under standardized conditions. Approximately 45 minutes after FDG administration, the patient was placed in the supine position on the PET CT table. Blood glucose level was 65 mg/dL. Low dose spiral CT imaging was performed from the skull base to the mid thighs. PET imaging was then performed from the skull base to the mid thighs. CT images,  PET images, and PET/CT fused images were reviewed on a PACS 3D workstation. The limited non-contrast CT data are used primarily for attenuation correction and anatomic correlation.  Evaluation of solid organs and bowel are especially limited utilizing this technique. COMPARISON: None. FINDINGS: Head and neck: No abnormal focal FDG activity. Chest: A left breast mass measures 14 mm and is FDG avid with a maximum SUV of 8.2. A left axillary lymph node measures approximately 8 mm short axis with a maximum SUV of 8.1. There is mild activity in several additional left axillary lymph nodes. An irregular left axillary lymph node on image 80 measures 9 mm short axis with a maximum  SUV of 7.3. Abdomen and pelvis: Patchy activity in the right colon is likely physiologic or inflammatory. Musculoskeletal: There are scattered lytic lesions throughout the appendicular and axial skeleton are FDG avid. A focal activity in the C2 vertebral body has a maximum SUV of 8.5. Focal activity in the distal left clavicle has a maximum SUV of 7.1. Focal  activity in the T5 vertebral body is present with a maximum SUV of 6.2. A lytic lesion in the right ilium is FDG avid with a maximum SUV of 10.7. A lytic and sclerotic lesion is noted in the right acetabulum with cortical disruption. The lesion is FDG avid with a maximum SUV of 8.8 Incidental findings on CT: Scattered arterial calcifications with ectasia of the abdominal aorta. Maximum diameter is approximately 3 cm. A 7 mm nodule in the left upper lobe is unchanged and does not demonstrate FDG activity. There are scattered diverticula in the colon.     1.  Small spiculated mass in the outer left breast is highly suspicious for malignancy. 2.  FDG avid adenopathy in the left axilla is consistent with metastasis. 3.  Extensive FDG avid lytic and sclerotic lesions in the axial and appendicular skeleton, consistent with bony metastasis. 4.  Atherosclerosis with ectasia of the abdominal aorta.  5.  Stable 7 mm left upper lobe nodule is not FDG avid. 6.  Diverticulosis.      Assessment and Plan:  -Breast cancer  -Triple negative  -Stage IV, metastatic to bone clinically  6/28/2018   -MRI of brain neg for CNS disease  -Bone biopsy to confirm metastatic disease  -We discussed the natural history of disease. All treatment options will be palliative for metastatic disease-Patient is not a candidate for hormonal manipulation or HER2 therapy because of triple neg status  8/14/2018   -labs reviewed  -no new complaints  -proceed with week 4 of taxol  8/21/2018   -labs reviewed  -no acute complaints  -changed paclitaxel to 175mg.m2 ever 3 weeks after lond discussion with patient. She understand the side effects will be more acute however she would have more time to spend with family    -Bone metastases  -July 9-24, 2018. Radiation therapy to hip  -Patient had recent evaluation from dentistry. Her teeth are in good health, no workup needed. Patient had x-ray of her teeth past month  7/24/2018    -Starting Xgeva q 4 weeks.  8/14/2018   -Continue with Xgeva every 4 weeks        She agreed and verbalized her agreement and understanding with the current plan.  I answered all questions and concerns she has at this time.    Dear  Brooke Brown M.D., Thank you very much for allowing me to see  Zuly Christian today .     Please note that this dictation was created using voice recognition software. I have made every reasonable attempt to correct obvious errors, but I expect that there are errors of grammar and possibly content that I did not discover before finalizing the note.      SIGNATURES:  Laurie Rouse    CC:  NIRMALA Chen Molly M, M.D.

## 2018-08-22 NOTE — PROGRESS NOTES
"Pharmacy Chemotherapy calculation:    Dx: metastatic Triple negative breast cancer    Cycle 1 , Day 1  Previous treatment = weekly Taxol x 4 weeks    Regimen: Taxol  *Dosing Reference*  Paclitaxel 175 mg/m² IV over 3 hrs on Day 1  21-day cycle until disease progression or unacceptable toxicity  NCCN Guidelines for Breast Cancer V.1.2018  Leyda DUNN, et al. J Clin Oncol. 1005;13(10):2574-81    Allergies:  Sulfa drugs    BP (!) 99/42   Pulse 99   Temp 36.6 °C (97.8 °F)   Resp 18   Ht 1.562 m (5' 1.5\")   Wt 55.9 kg (123 lb 3.8 oz)   SpO2 92%   BMI 22.91 kg/m²  Body surface area is 1.56 meters squared.    Labs 8/21/18:  ANC ~ 4920       Plt = 434 k   Hgb = 12.5       SCr =  0.57    CrCl ~69 mL/min (min SCr 0.7 used)  AST/ALT/AP =  14/13/104 Tbili = 0.3   Serum Ca = 9.5      OK for denosumab (Xgeva) 120 mg SQ today - Q 28 Days per supportive care plan  Last tx = 7/25/18      Paclitaxel (Taxol) 175 mg/m² x 1.56 m² = 273 mg   <5% difference, okay to treat with final dose = 273 mg IV      Radha Hall, PharmD  "

## 2018-08-22 NOTE — PROGRESS NOTES
Chemotherapy Verification - SECONDARY RN       Height = 1.562 m  Weight = 55.9 kg  BSA = 1.56 m2       Medication: paclitaxel  Dose: 175 mg/m2  Calculated Dose: 273 mg                             (In mg/m2, AUC, mg/kg)       I confirm that this process was performed independently.

## 2018-08-22 NOTE — PROGRESS NOTES
"Pharmacy Chemotherapy Calculation    Patient Name: Zuly Christian   Dx: breast cancer, metastatic         Protocol: Taxol     Paclitaxel 175 mg/m² IV over 3 hrs on Day 1  21-day cycle until disease progression or unacceptable toxicity  NCCN Guidelines for Breast Cancer V.1.2018  Leyda DUNN, et al. JCO. 1995;13(10):7535-81.    Allergies:  Sulfa drugs     BP (!) 99/42   Pulse 99   Temp 36.6 °C (97.8 °F)   Resp 18   Ht 1.562 m (5' 1.5\")   Wt 55.9 kg (123 lb 3.8 oz)   SpO2 92%   BMI 22.91 kg/m²  Body surface area is 1.56 meters squared.    Labs from 8/21/18:  ANC~ 4920 Plt = 434k   Hgb = 12.5   SCr = 0.57mg/dL CrCl ~ 69mL/min   LFT's = 14/13/104 TBili = 0.3      Drug Order   (Drug name, dose, route, IV Fluid & volume, frequency, number of doses) Cycle 1 of q3w Taxol (pt preference)    Previous treatment: s/p 4 cycles weekly Taxol, last 8/15/18   Medication = Paclitaxel (Taxol)  Base Dose = 175 mg/m²  Calc Dose: Base Dose x 1.56 m² = 273 mg  Final Dose = 273 mg  Route = IV  Fluid & Volume =  mL  Admin Duration = Over 3 hrs          <5% difference, okay to treat with final dose     By my signature below, I confirm this process was performed independently with the BSA and all final chemotherapy dosing calculations congruent. I have reviewed the above chemotherapy order and that my calculation of the final dose and BSA (when applicable) corroborate those calculations of the  pharmacist. Discrepancies of 5% or greater in the written dose have been addressed and documented within the [facility] Progress notes.      Greer Manzo, PharmD, BCOP  "

## 2018-08-22 NOTE — PROGRESS NOTES
Chemotherapy Verification - PRIMARY RN      Height = 156.2 cm  Weight = 55.9 kg  BSA = 1.56 m2       Medication: Taxol  Dose: 175 mg/m2  Calculated Dose: 273 mg                             (In mg/m2, AUC, mg/kg)     I confirm this process was performed independently with the BSA and all final chemotherapy dosing calculations congruent.  Any discrepancies of 5% or greater have been addressed with the chemotherapy pharmacist. The resolution of the discrepancy has been documented in the EPIC progress notes.

## 2018-08-23 NOTE — TELEPHONE ENCOUNTER
Followed up with Zuly regarding her unilateral eye redness. Pt states she does have discharge, it is not painful and that she is following up with her optometrist tomorrow. Unlikely to be Taxol related. TIMBO Santos updated and agrees. Pt denies any questions or concerns.

## 2018-08-23 NOTE — PROGRESS NOTES
Nutrition Services: Update  Met with pt at chairside to follow-up on appetite, intake, and nutrition status. Pt reports she feels overall well. Weight: 123#, stable, note pt's weight fluctuates between 123-126#. Pt reports occasional constipation 2' pain medications but she reports she now has an ounce of milk of magnesia with her meds and no longer experiences constipation. Discussed increasing fiber containing foods and water intake, pt reports understanding. Pt denied any further c/o GI distress nor taste changes.     RD following

## 2018-08-23 NOTE — PROGRESS NOTES
Pt arrived ambulatory to [facility] for q 3 week Taxol.  POC reviewed, pt to begin q 3 week Taxol vs previous weekly Taxol.  Labs previously drawn in MD, results reviewed, pt meets parameters for chemotherapy today.  R chest port accessed in sterile field, brisk blood return confirmed.  CA 27.29 drawn per treatment plan orders and sent to labs for processing.  Premedications administered as ordered, tolerated well.  Taxol infused over three hours with in-line filter, no adverse reaction noted.  Xgeva given to back of R arm, pt denies recent/upcoming invasive dental procedures and states her dentist is aware of her getting Xgeva.  Port flushed per policy, de-accessed, and site covered with sterile gauze and tape.  Next appointment confirmed.  Pt discharged from IS in NAD under care of friend.

## 2018-08-23 NOTE — TELEPHONE ENCOUNTER
"Patient called stating her dose of Taxol was increased and she woke up this morning with a red eye (right eye). Patient denies pain, itching and fever. There may be some \"goop\" in her eye but she is not sure. I informed her I will relay the information to our RN and have her return her call when she can. Patient agreed. If there is some sort of urgency prior to the return call I asked the patient to call back; patient agreed.   "

## 2018-08-24 NOTE — OP THERAPY DAILY TREATMENT
Outpatient Physical Therapy  LYMPHEDEMA THERAPY DAILY TREATMENT     [facility] [address].  [address]  [place] [address]  Phone:  [phone]  Fax:  [phone]    Date: 08/24/2018    Patient: Zuly Christian  YOB: 1950  MRN: [id number]     Time Calculation  Start time: 1416  Stop time: 1454 Time Calculation (min): 38 minutes     Chief Complaint: No chief complaint on file.    Visit #: 5    Subjective:   History of Present Illness:     Mechanism of injury:  Patient is feeling tired from chemo, but is still doing her best to get her reduction kit on an in place      Lymphedema Objective      Left Upper Extremity Circumferential Measurements  Axilla cm  Upper Proximal Humerus cm  Distal Humerus 22.6 cm  Elbow 21.4 cm  Mid-Forearm 20.2 cm  Wrist 16.7 cm  Distal Johnson Crease 18.1 cm  Total 99 cm              Therapeutic Treatments and Modalities:     1. Manual Therapy (CPT 19833), Short neck sequence, establishment of axillary to axillary anastamoses, decongestion of the left upper extremity from proximal to distal with an emphasis on medial to lateral drainage    Time-based treatments/modalities:  Manual therapy minutes (CPT 57370): 38 minutes       Assessment and Plan:   Assessment details:  Patient is having difficulty with her hand wrap, but all other measurement points show reduction that is sufficient to transition into compression sleeve and gauntlet. Issued referral for Ability to be measured and will continue with CDT next week as I do feel that she would benefit from compression pump, and will need to complete 30 days of conservative management before request can be made

## 2018-08-30 NOTE — OP THERAPY DAILY TREATMENT
Outpatient Physical Therapy  LYMPHEDEMA THERAPY DAILY TREATMENT     [facility] [address].  [address]  [place] [address]  Phone:  [phone]  Fax:  [phone]    Date: 08/30/2018    Patient: Zuly Christian  YOB: 1950  MRN: [id number]     Time Calculation  Start time: 1118  Stop time: 1156 Time Calculation (min): 38 minutes     Chief Complaint: No chief complaint on file.    Visit #: 6    Subjective:   History of Present Illness:     Mechanism of injury:  Patient has purchased her compression sleeve and gauntlet and has been wearing during the day since Friday. She continues to wear her reduction kit in the evening. Follow up with Rdx is scheduled for 9/10      Lymphedema Objective      Left Upper Extremity Circumferential Measurements  Axilla cm  Upper Proximal Humerus cm  Distal Humerus 24 cm  Elbow 22.6 cm  Mid-Forearm 21.5 cm  Wrist 14.9 cm  Distal Johnson Crease 16.5 cm  Total 99.5 cm              Therapeutic Treatments and Modalities:     1. Manual Therapy (CPT 88050), Short neck sequence, establishment of axillary to axillary anastamoses, decongestion of the left upper extremity from proximal to distal with an emphasis on medial to lateral drainage    2. Functional Training, Self Care (CPT 56204), Garment fit check, donning/doffing practice with donning gloves and inside/out technique    Time-based treatments/modalities:  Manual therapy minutes (CPT 23095): 30 minutes  Functional training, self care minutes (CPT 92413): 8 minutes       Assessment and Plan:   Assessment details:  Patient compression sleeve is providing excellent containment and she does well with donning gloves to bring sleeve to the correct height. Will review self MLD next tx session prior to therapist going on maternity leave

## 2018-09-10 NOTE — PROGRESS NOTES
RADIATION ONCOLOGY FOLLOW-UP    DATE OF SERVICE: 9/10/2018    IDENTIFICATION:    A 67 y.o. female with metastatic triple negative cancer status post radiation therapy to bilateral hips and left clavicle complete 7/24/2018    HISTORY OF PRESENT ILLNESS:   N/seen patient has been continuing on Taxol she is actually feeling good she still has some pain in the right hip about a 4 out of 10 which is better than it was previously. The pain in the left clavicle has resolved. She also did develop a little bit of a skin reaction in the left buttock that is also slowly getting better.    CURRENT MEDICATIONS:  Current Outpatient Prescriptions   Medication Sig Dispense Refill   • tramadol (ULTRAM) 50 MG Tab Take 50 mg by mouth every 6 hours as needed for Mild Pain.     • FLUoxetine (PROZAC) 20 MG Cap Take 20 mg by mouth every day.     • glucosamine 500 MG Cap Take  by mouth every day.     • CHONDROITIN SULFATE PO Take  by mouth.     • Lifitegrast (XIIDRA OP) by Ophthalmic route.     • ANORO ELLIPTA 62.5-25 MCG/INH AEROSOL POWDER, BREATH ACTIVATED inhaler USE 1 INHALATION EVERY DAY 3 Inhaler 3   • omega-3 acid ethyl esters (LOVAZA) 1 GM capsule Take  by mouth.     • Multiple Vitamin (MULTI VITAMIN DAILY PO) Take  by mouth.     • aspirin 81 MG tablet Take 81 mg by mouth every day. One tablet by mouth daily     • Coenzyme Q10 (COQ10) 100 MG Cap Take 100 mg by mouth.     • ezetimibe (ZETIA) 10 MG Tab Take 10 mg by mouth every day.     • rosuvastatin (CRESTOR) 40 MG tablet Take 40 mg by mouth every day.     • carvedilol (COREG) 25 MG Tab Take 25 mg by mouth 2 times a day, with meals.     • ramipril (ALTACE) 10 MG capsule Take 10 mg by mouth every day.     • ondansetron (ZOFRAN) 4 MG Tab tablet Take 1 Tab by mouth every four hours as needed for Nausea/Vomiting (for nausea, vomiting). (Patient not taking: Reported on 9/10/2018) 30 Tab 6   • diphenoxylate-atropine (LOMOTIL) 2.5-0.025 MG Tab Take 1 Tab by mouth 4 times a day as needed  for Diarrhea.     • prochlorperazine (COMPAZINE) 10 MG Tab Take 1 Tab by mouth every 6 hours as needed (for nausea, vomiting). (Patient not taking: Reported on 9/10/2018) 30 Tab 6   • lidocaine-prilocaine (EMLA) 2.5-2.5 % Cream Apply to port one hour prior to access and cover with plastic wrap. (Patient not taking: Reported on 9/10/2018) 1 Tube 3   • Calcium Polycarbophil (FIBER-CAPS PO) Take  by mouth.     • Oxymetazoline HCl (NASAL SPRAY NA) Spray  in nose.     • albuterol (PROAIR HFA) 108 (90 Base) MCG/ACT Aero Soln inhalation aerosol Inhale 2 Puffs by mouth every four hours as needed for Shortness of Breath (wheezing). (Patient not taking: Reported on 9/10/2018) 3 Inhaler 3   • naproxen (NAPROSYN) 500 MG Tab Take 1 Tab by mouth 2 times a day, with meals. 60 Tab 1   • ascorbic acid (ASCORBIC ACID) 500 MG Tab Take 500 mg by mouth every day.     • alprazolam (XANAX) 0.5 MG Tab Take 0.5 mg by mouth at bedtime as needed for Sleep.       No current facility-administered medications for this encounter.        ALLERGIES:  Sulfa drugs    FAMILY HISTORY:    Family History   Problem Relation Age of Onset   • Lung Cancer Father    • Lung Cancer Mother    [unfilled]        SOCIAL HISTORY:     reports that she quit smoking about 4 years ago. Her smoking use included Cigarettes. She has a 50.00 pack-year smoking history. She has never used smokeless tobacco. She reports that she drinks alcohol. She reports that she does not use drugs.    REVIEW OF SYSTEMS: Is significant for that mentioned in the HPI  The rest of the review of systems has been reviewed by me and is documented in the nursing note in [facility] dated 6/29/2018    PHYSICAL EXAM:    Vitals:    09/10/18 1008   BP: 126/62   Pulse: 84   Temp: 36.7 °C (98 °F)   SpO2: 96%   Weight: 55.2 kg (121 lb 12.8 oz)   Pain Score: 4=Slight-Moderate Pain  Location: Hip        ECOG PERFORMANCE STATUS:  0= Fully active, able to carry on all pre-disease performance without  restriction.  GENERAL: Well-appearing alert and oriented ×3 in no apparent distress  Left clavicle has no pain and there is no radiation change  Buttocks: The left buttock has some dry desquamation from the prior radiation this is healing.  HEENT:  Pupils are equal, round, and reactive to light.  Extraocular muscles   are intact. Sclerae nonicteric.  Conjunctivae pink.  Oral cavity, tongue   protrudes midline.   NECK:  Supple without evidence of thyromegaly.  NODES:  No peripheral adenopathy of the neck, supraclavicular fossa or axillae   bilaterally.  LUNGS:  Clear to ascultation   HEART:  Regular rate and rhythm.  No murmur appreciated  ABDOMEN:  Soft. No evidence of hepatosplenomegaly.  Positive bowel sounds.  EXTREMITIES:  Without Edema.  NEUROLOGIC:  Cranial nerves II through XII were intact. Normal stance and gait motor and sensory grossly within normal limits        IMPRESSION:    A 67 y.o. female with  metastatic triple negative cancer status post radiation therapy to bilateral hips and left clavicle complete 7/24/2018    RECOMMENDATIONS:   Patient is following closely with her medical oncologist. Because of this we can see her on an as-needed basis. She notes she has any problems I'm happy to see her in follow-up.          Thank you for the opportunity to participate in her care.  If any questions or comments, please do not hesitate in calling.      Please note that this dictation was created using voice recognition software. I have made every reasonable attempt to correct obvious errors, but I expect that there are errors of grammar and possibly content that I did not discover before finalizing the note.

## 2018-09-10 NOTE — NON-PROVIDER
Patient was seen today in clinic with Dr. Eng for Follow up.  Vitals signs and weight were obtained and pain assessment was completed.  Allergies and medications were reviewed with the patient.  Toxicities of treatment assessed.     Vitals/Pain:  Vitals:    09/10/18 1008   BP: 126/62   Pulse: 84   Temp: 36.7 °C (98 °F)   SpO2: 96%   Weight: 55.2 kg (121 lb 12.8 oz)   Pain Score: 4=Slight-Moderate Pain  Location: Hip        Allergies:   Sulfa drugs    Current Medications:  Current Outpatient Prescriptions   Medication Sig Dispense Refill   • tramadol (ULTRAM) 50 MG Tab Take 50 mg by mouth every 6 hours as needed for Mild Pain.     • FLUoxetine (PROZAC) 20 MG Cap Take 20 mg by mouth every day.     • glucosamine 500 MG Cap Take  by mouth every day.     • CHONDROITIN SULFATE PO Take  by mouth.     • Lifitegrast (XIIDRA OP) by Ophthalmic route.     • ANORO ELLIPTA 62.5-25 MCG/INH AEROSOL POWDER, BREATH ACTIVATED inhaler USE 1 INHALATION EVERY DAY 3 Inhaler 3   • omega-3 acid ethyl esters (LOVAZA) 1 GM capsule Take  by mouth.     • Multiple Vitamin (MULTI VITAMIN DAILY PO) Take  by mouth.     • aspirin 81 MG tablet Take 81 mg by mouth every day. One tablet by mouth daily     • Coenzyme Q10 (COQ10) 100 MG Cap Take 100 mg by mouth.     • ezetimibe (ZETIA) 10 MG Tab Take 10 mg by mouth every day.     • rosuvastatin (CRESTOR) 40 MG tablet Take 40 mg by mouth every day.     • carvedilol (COREG) 25 MG Tab Take 25 mg by mouth 2 times a day, with meals.     • ramipril (ALTACE) 10 MG capsule Take 10 mg by mouth every day.     • ondansetron (ZOFRAN) 4 MG Tab tablet Take 1 Tab by mouth every four hours as needed for Nausea/Vomiting (for nausea, vomiting). (Patient not taking: Reported on 9/10/2018) 30 Tab 6   • diphenoxylate-atropine (LOMOTIL) 2.5-0.025 MG Tab Take 1 Tab by mouth 4 times a day as needed for Diarrhea.     • prochlorperazine (COMPAZINE) 10 MG Tab Take 1 Tab by mouth every 6 hours as needed (for nausea, vomiting).  (Patient not taking: Reported on 9/10/2018) 30 Tab 6   • lidocaine-prilocaine (EMLA) 2.5-2.5 % Cream Apply to port one hour prior to access and cover with plastic wrap. (Patient not taking: Reported on 9/10/2018) 1 Tube 3   • Calcium Polycarbophil (FIBER-CAPS PO) Take  by mouth.     • Oxymetazoline HCl (NASAL SPRAY NA) Spray  in nose.     • albuterol (PROAIR HFA) 108 (90 Base) MCG/ACT Aero Soln inhalation aerosol Inhale 2 Puffs by mouth every four hours as needed for Shortness of Breath (wheezing). (Patient not taking: Reported on 9/10/2018) 3 Inhaler 3   • naproxen (NAPROSYN) 500 MG Tab Take 1 Tab by mouth 2 times a day, with meals. 60 Tab 1   • ascorbic acid (ASCORBIC ACID) 500 MG Tab Take 500 mg by mouth every day.     • alprazolam (XANAX) 0.5 MG Tab Take 0.5 mg by mouth at bedtime as needed for Sleep.       No current facility-administered medications for this encounter.          PCP:  Stephanie Grove, Med Ass't

## 2018-09-11 NOTE — PROGRESS NOTES
"Pharmacy Chemotherapy Calculation    Patient Name: Zuly Christian   Dx: breast cancer, metastatic         Protocol: Taxol     Paclitaxel 175 mg/m² IV over 3 hrs on Day 1  21-day cycle until disease progression or unacceptable toxicity  NCCN Guidelines for Breast Cancer V.1.2018  Leyda DUNN, et al. JCO. 1995;13(10):1413-65.    Allergies:  Sulfa drugs     /52   Pulse 99   Temp 36.4 °C (97.6 °F)   Resp 18   Ht 1.562 m (5' 1.5\")   Wt 56.4 kg (124 lb 5.4 oz)   SpO2 92%   BMI 23.11 kg/m²  Body surface area is 1.56 meters squared.    LABS 9/11/18:  ANC: 7150      WBC: 9.8     Plt: 406k   Hgb/Hct: 12.1/37.9     SCr: 0.55 mg/dL CrCl: 68.5 mL/min (SCr 0.7)    K: 4.2  Na: 138          Glu: 87  LFT's: 15/12/109 TBili = 0.3       Drug Order   (Drug name, dose, route, IV Fluid & volume, frequency, number of doses) Cycle 2 of q3w Taxol (pt preference)    Previous treatment: 8/22/2018 (s/p 4 cycles weekly Taxol, last 8/15/18)   Medication = Paclitaxel (Taxol)  Base Dose = 175 mg/m²  Calc Dose: Base Dose x Body surface area is 1.56 meters squared. m² = 237 mg  Final Dose = 237 mg  Route = IV  Fluid & Volume =  mL  Admin Duration = Over 3 hrs          <5% difference, okay to treat with final dose     By my signature below, I confirm this process was performed independently with the BSA and all final chemotherapy dosing calculations congruent. I have reviewed the above chemotherapy order and that my calculation of the final dose and BSA (when applicable) corroborate those calculations of the  pharmacist. Discrepancies of 5% or greater in the written dose have been addressed and documented within the [facility] Progress notes.      MICH Alejandre, PharmD  "

## 2018-09-11 NOTE — PROGRESS NOTES
Date of visit: 9/11/2018  3:35 PM        CC: Triple negative breast cancer     HPI-   -April 6, 2018. Patient was seen by primary care physician. Patient is complaining of hip pain and nipple lesion. Patient was documented to have a normal mammogram in June 2017. X-rays of the hip and mammogram were ordered. X-ray shows advanced degenerative disease of the hip  -May 23, 2018. MRI of the hip shows numerous lesions worrisome for metastatic disease. There is a 3.8 cm lesion along the right superior acetabulum with subtle break in the bony cortex worrisome for pathological fracture. Patient is at risk for developing full-thickness cortical fracture  -June 6, 2018. PET CT scan.Small spiculated mass in the outer left breast is highly suspicious for malignancy.FDG avid adenopathy in the left axilla is consistent with metastasis.Extensive FDG avid lytic and sclerotic lesions in the axial and appendicular skeleton, consistent with bony metastasis  -June 14, 2018. Left breast biopsy revealed ER/OK HER-2 negative invasive ductal carcinoma  -June 24,2018. A. Right iliac bone biopsy, 4 cores: Metastatic carcinoma in a background of fibrotic tissue demonstrating immunohistochemical profile most consistent with breast origin.  -July 9-24, 2018. Radiation therapy to hip  -July 25, 2018.  Started weekly paclitaxel  -8/21/2018 . Weekly Paclitaxel changed to Q 3 weeks for convenience  -Here to establish care with me. She tolerated the every 3 week does better than expected. No significant neuropathy or myalgias.      Past Medical History:      Past Medical History:   Diagnosis Date   • Allergic rhinitis    • Cancer (HCC)     breast   • Cataract    • Congestive heart failure (HCC)    • COPD (chronic obstructive pulmonary disease) (HCC)    • Emphysema of lung (HCC)    • Heart burn    • Hyperlipidemia    • Hypertension    • Osteopenia    • Peripheral vascular disease (HCC)        Past surgical history:       Past Surgical History:    Procedure Laterality Date   • CAROTID ENDARTERECTOMY  7/20/2017    Procedure: CAROTID ENDARTERECTOMY FOR : LEFT SUBCLAVIAN ARTERY ANGIOPLASTY / STENT BRACHIAL APPROACH WITH ULTRASOUND;  Surgeon: Dinora Saldana M.D.;  [facility] SURG[facility];  Service:    • TUBAL LIGATION  1977   • GYN SURGERY      d+c 1979, 1980   • OTHER ORTHOPEDIC SURGERY      left knee surgery   • TONSILLECTOMY         Allergies:       Sulfa drugs    Medications:         Current Outpatient Prescriptions   Medication Sig Dispense Refill   • tramadol (ULTRAM) 50 MG Tab Take 50 mg by mouth every 6 hours as needed for Mild Pain.     • FLUoxetine (PROZAC) 20 MG Cap Take 20 mg by mouth every day.     • ondansetron (ZOFRAN) 4 MG Tab tablet Take 1 Tab by mouth every four hours as needed for Nausea/Vomiting (for nausea, vomiting). (Patient not taking: Reported on 9/10/2018) 30 Tab 6   • diphenoxylate-atropine (LOMOTIL) 2.5-0.025 MG Tab Take 1 Tab by mouth 4 times a day as needed for Diarrhea.     • prochlorperazine (COMPAZINE) 10 MG Tab Take 1 Tab by mouth every 6 hours as needed (for nausea, vomiting). (Patient not taking: Reported on 9/10/2018) 30 Tab 6   • lidocaine-prilocaine (EMLA) 2.5-2.5 % Cream Apply to port one hour prior to access and cover with plastic wrap. (Patient not taking: Reported on 9/10/2018) 1 Tube 3   • glucosamine 500 MG Cap Take  by mouth every day.     • Calcium Polycarbophil (FIBER-CAPS PO) Take  by mouth.     • Oxymetazoline HCl (NASAL SPRAY NA) [place]  in nose.     • CHONDROITIN SULFATE PO Take  by mouth.     • albuterol (PROAIR HFA) 108 (90 Base) MCG/ACT Aero Soln inhalation aerosol Inhale 2 Puffs by mouth every four hours as needed for Shortness of Breath (wheezing). (Patient not taking: Reported on 9/10/2018) 3 Inhaler 3   • Lifitegrast (XIIDRA OP) by Ophthalmic route.     • naproxen (NAPROSYN) 500 MG Tab Take 1 Tab by mouth 2 times a day, with meals. 60 Tab 1   • ANORO ELLIPTA 62.5-25 MCG/INH AEROSOL POWDER,  BREATH ACTIVATED inhaler USE 1 INHALATION EVERY DAY 3 Inhaler 3   • ascorbic acid (ASCORBIC ACID) 500 MG Tab Take 500 mg by mouth every day.     • omega-3 acid ethyl esters (LOVAZA) 1 GM capsule Take  by mouth.     • Multiple Vitamin (MULTI VITAMIN DAILY PO) Take  by mouth.     • aspirin 81 MG tablet Take 81 mg by mouth every day. One tablet by mouth daily     • Coenzyme Q10 (COQ10) 100 MG Cap Take 100 mg by mouth.     • alprazolam (XANAX) 0.5 MG Tab Take 0.5 mg by mouth at bedtime as needed for Sleep.     • ezetimibe (ZETIA) 10 MG Tab Take 10 mg by mouth every day.     • rosuvastatin (CRESTOR) 40 MG tablet Take 40 mg by mouth every day.     • carvedilol (COREG) 25 MG Tab Take 25 mg by mouth 2 times a day, with meals.     • ramipril (ALTACE) 10 MG capsule Take 10 mg by mouth every day.       No current facility-administered medications for this visit.          Social History:     Social History     Social History   • Marital status:      Spouse name: N/A   • Number of children: N/A   • Years of education: N/A     Occupational History   • work in cardiology office Retired     Social History Main Topics   • Smoking status: Former Smoker     Packs/day: 1.00     Years: 50.00     Types: Cigarettes     Quit date: 5/1/2014   • Smokeless tobacco: Never Used   • Alcohol use Yes      Comment: 3 a month   • Drug use: No   • Sexual activity: No     Other Topics Concern   • Not on file     Social History Narrative   • No narrative on file       Family History:      Family History   Problem Relation Age of Onset   • Lung Cancer Father    • Lung Cancer Mother        Review of Systems:  All other review of systems are negative except what was mentioned above in the HPI.    Constitutional: Negative for fever, chills, weight loss and malaise/fatigue.    HEENT: No new auditory or visual complaints. No sore throat and neck pain.     Respiratory: Negative for cough, sputum production, shortness of breath and wheezing.     Cardiovascular: Negative for chest pain, palpitations, orthopnea and leg swelling.    Gastrointestinal: Negative for heartburn, nausea, vomiting and abdominal pain.    Genitourinary: Negative for dysuria, hematuria    Musculoskeletal: No new arthralgias or myalgias   Skin: Negative for rash and itching.    Neurological: Negative for focal weakness and headaches.    Endo/Heme/Allergies: No abnormal bleed/bruise.    Psychiatric/Behavioral: No new depression/anxiety.    Physical Exam:  Vitals: There were no vitals taken for this visit.    General: Not in acute distress, alert and oriented x 3  HEENT: No pallor, icterus. Oropharynx clear.   Neck: Supple, no palpable masses.  Lymph nodes: No palpable cervical, supraclavicular, axillary or inguinal lymphadenopathy.    CVS: regular rate and rhythm, no rubs or gallops  RESP: Clear to auscultate bilaterally, no wheezing or crackles.   ABD: Soft, non tender, non distended, positive bowel sounds, no palpable organomegaly  EXT: No edema or cyanosis  CNS: Alert and oriented x3, No focal deficits.  Skin- No rash      Labs:   No visits with results within 1 Week(s) from this visit.   Latest known visit with results is:   [facility] on 2018   Component Date Value Ref Range Status   • Ca 27.29 2018 62.2* 0.0 - 40.0 U/mL Final    Comment: INTERPRETIVE INFORMATION: Cancer Antigen 27.29  The CA 27.29 assay is intended for use in monitorin) disease  progression and/or response to therapy in patients with metastatic  disease, and 2) disease recurrence in patients treated previously  for stages II or III breast carcinoma who are clinically free of  the disease. Serial testing in patients who are clinically free of  disease should be used in conjunction with other clinical methods  for early detection of cancer recurrence.  Limitations: Patients with confirmed breast carcinoma frequently  have CA 27.29 assay values in the same range as  healthy  individuals.  Elevations may also be observed in patients with non  malignant disease. Results of this test must always be interpreted  in the context of morphologic and other relevant data, and should  not be used alone for a diagnosis of malignancy.  Methodology: Siemens Advia Centaur CA 27.29 chemiluminescent  immunoassay was used. Results obtained with different assay  methods                            or kits cannot be used interchangeably.  Performed by [facility],  [address] [place] [address] [phone]  [email or web address], Demetrius Hewitt MD - Lab. Director         Assessment and Plan:    Assessment and Plan:  Metastatic triple negative breast carcinoma to the bones-diagnosed-6/28/2018   -MRI of brain neg for CNS disease  -Started on weekly Taxol with good tolerance  -changed paclitaxel to 175mg.m2 ever 3 weeks after lond discussion with patient, for convenience  . She tolerated the first dose surprisingly well.  -  -Labs are adequate to proceed . Reiterated that if she develops any intolerance. We will back off on the chemotherapy regiment. Discussed the goals of care, which will be longer-term disease control with the least effective dose of chemotherapy possible to minimize the adverse effects.  -We will obtain germline testing given the triple negative phenotype. I have also asked the pathology to check for androgen receptor status , given slightly less aggressive clinical course with the triple negative breast carcinoma.  -Bone metastases  -July 9-24, 2018. Radiation therapy to hip  --Continue with Xgeva every 6 weeks    Complex patient requiring complex decision making. I have extensively reviewed her prior medical records as part of establishing care with me and formulated the plan.  She agreed and verbalized  agreement and understanding with the current plan.  I answered all questions and concerns at this time         Please note that this dictation was created using voice  recognition software. I have made every reasonable attempt to correct obvious errors, but I expect that there are errors of grammar and possibly content that I did not discover before finalizing the note.      SIGNATURES:  Abhinav Arellano    CC:  Brooke Brown M.D.  No ref. provider found

## 2018-09-12 PROBLEM — I71.40 ABDOMINAL AORTIC ANEURYSM (AAA) (HCC): Status: ACTIVE | Noted: 2018-01-01

## 2018-09-12 PROBLEM — G45.8 SUBCLAVIAN STEAL SYNDROME: Status: ACTIVE | Noted: 2018-01-01

## 2018-09-12 PROBLEM — F41.8 MIXED ANXIETY DEPRESSIVE DISORDER: Status: ACTIVE | Noted: 2018-01-01

## 2018-09-12 PROBLEM — Z14.8 ALPHA 1-ANTITRYPSIN PIMS PHENOTYPE: Status: ACTIVE | Noted: 2018-01-01

## 2018-09-12 PROBLEM — I65.29 STENOSIS OF CAROTID ARTERY: Status: ACTIVE | Noted: 2018-01-01

## 2018-09-12 PROBLEM — J43.2 CENTRILOBULAR EMPHYSEMA (HCC): Status: ACTIVE | Noted: 2018-01-01

## 2018-09-12 PROBLEM — K21.9 GASTROESOPHAGEAL REFLUX DISEASE: Status: ACTIVE | Noted: 2018-01-01

## 2018-09-12 PROBLEM — M79.89 ARM SWELLING: Status: ACTIVE | Noted: 2018-01-01

## 2018-09-12 NOTE — PATIENT INSTRUCTIONS
We will try to get records from Dr. Saldana and radiation oncologist Dr. Eng   Tramadol, zofran (ondansetron) and fluoxetine have interactions. But you are on a low dose of each of these medications so it is okay to continue them.   Do not drive or operative machinery on tramadol since it can make you sleepy.   It is ok to try tylenol as long as you keep the dose <3 g in 24 hours.   Get flu vaccine at the pharmacy in 2 weeks.

## 2018-09-12 NOTE — TELEPHONE ENCOUNTER
----- Message from Abhinav Arellano M.D. sent at 9/11/2018  4:15 PM PDT -----  pls ask path to run Androgen receptor from the prior bx specimen

## 2018-09-12 NOTE — PROGRESS NOTES
Zuly Christian is a 68 y.o. female here for a non-provider visit for: Lab Draws  on 9/12/2018 at 1:39 PM    Procedure Performed: Venipuncture     Anatomical site: Right Antecubital Area (AC)    Equipment used: 23g Butterfly    Labs drawn: Ambry Kit    Ordering Provider: Dr. Abhinav Arellano    Juan By: Roxana Joyner, Med Ass't  No complications and  was present in the office the entire time I was doing the patients blood draw.

## 2018-09-12 NOTE — PROGRESS NOTES
Pt scheduled for Taxol D1 C2 & Xgeva SQ; arrived ambulatory, independent; denied pain/discomfort or other health changes. R-Chest port accessed using sterile technique; easily flushed, brisk blood return noted; pt tolerated well. Labs from 9/11/18 reviewed, results w/i treatment parameters. Premeds given per orders; Taxol titrated for pt comfort & safety. Pt tolerated infusion w/o adverse events. Report given to REINIER Deluca, for completion of care.

## 2018-09-12 NOTE — PROGRESS NOTES
Chemotherapy Verification - SECONDARY RN       Height = 156.2 cm  Weight = 56.4 kg  BSA = 1.56 m2       Medication: Paclitaxel (Taxol)  Dose: 175 mg/m2  Calculated Dose: 273 mg                               I confirm that this process was performed independently.

## 2018-09-12 NOTE — PROGRESS NOTES
Zuly Christian is a 68 y.o. female here for a non-provider visit for: Lab Draws  on 9/12/2018 at 8:30 AM    Procedure Performed: Venipuncture     Anatomical site: Right Antecubital Area (AC)    Equipment used: 23g Butterfly    Labs drawn: CBC w/diff, CMP and CA 27.29    Ordering Provider: Dr. Abhinav Arellano    Juan By: Roxana Joyner, Med Ass't  No complications and  was present in the office the entire time I was doing the patients blood draw.

## 2018-09-12 NOTE — PROGRESS NOTES
Chemotherapy Verification - PRIMARY RN      Height = 156.2 cm  Weight = 56.4 kg  BSA = 1.56 m^2       Medication: paclitaxel  Dose: 175 mg/m^2  Calculated Dose: 273 mg                             (In mg/m2, AUC, mg/kg)       I confirm this process was performed independently with the BSA and all final chemotherapy dosing calculations congruent.  Any discrepancies of 5% or greater have been addressed with the chemotherapy pharmacist. The resolution of the discrepancy has been documented in the EPIC progress notes.

## 2018-09-12 NOTE — PROGRESS NOTES
Pharmacy Chemotherapy calculation:    Dx: metastatic Triple negative breast cancer    Cycle 2  Previous treatment = weekly Taxol x 4 weeks    Regimen: Taxol  *Dosing Reference*  Paclitaxel 175 mg/m² IV over 3 hrs on Day 1  21-day cycle until disease progression or unacceptable toxicity  NCCN Guidelines for Breast Cancer V.1.2018  Leyda DUNN, et al. J Clin Oncol. 1005;13(10):8938-81    Allergies:  Sulfa drugs    There were no vitals taken for this visit. There is no height or weight on file to calculate BSA.    9/11/18  ANC~ 7150 Plt = 406k   Hgb = 12.1     SCr = 0.55mg/dL CrCl ~ 68mL/min (min Scr = 0.7)   Calcium = 8.9  LFT's = WNL, except AP = 109 TBili = 0.3         OK for denosumab (Xgeva) 120 mg SQ given 9/12/18 - Q 28 Days per supportive care plan      Paclitaxel (Taxol) 175 mg/m² x 1.56m² = 273mg   <5% difference, okay to treat with final dose = 273mg IV      Anthony Melchor, PharmD

## 2018-09-12 NOTE — LETTER
[employer] [facility]  Kaylee Mandujano M.D.  [address]  [place] [address]  Fax: [phone]   Authorization for Release/Disclosure of   Protected Health Information   Name: ZULY RODRÍGUEZ : 1950 SSN: [id number]   Address: [address] [place] [address] Phone:    [phone] (home)    I authorize the entity listed below to release/disclose the PHI below to:   [facility]/Kaylee Mandujano M.D. and Kaylee Mandujano M.D.   Provider or Entity Name:     Address   City, State, Zip   Phone:      Fax:     Reason for request: continuity of care   Information to be released:    [  ] LAST COLONOSCOPY,  including any PATH REPORT and follow-up  [  ] LAST FIT/COLOGUARD RESULT [  ] LAST DEXA  [  ] LAST MAMMOGRAM  [  ] LAST PAP  [  ] LAST LABS [  ] RETINA EXAM REPORT  [  ] IMMUNIZATION RECORDS  [  ] Release all info      [  ] Check here and initial the line next to each item to release ALL health information INCLUDING  _____ Care and treatment for drug and / or alcohol abuse  _____ HIV testing, infection status, or AIDS  _____ Genetic Testing    DATES OF SERVICE OR TIME PERIOD TO BE DISCLOSED: _____________  I understand and acknowledge that:  * This Authorization may be revoked at any time by you in writing, except if your health information has already been used or disclosed.  * Your health information that will be used or disclosed as a result of you signing this authorization could be re-disclosed by the recipient. If this occurs, your re-disclosed health information may no longer be protected by State or Federal laws.  * You may refuse to sign this Authorization. Your refusal will not affect your ability to obtain treatment.  * This Authorization becomes effective upon signing and will  on (date) __________.      If no date is indicated, this Authorization will  one (1) year from the signature date.    Name: Zuly Rodríguez    Signature:   Date:     2018       PLEASE FAX REQUESTED RECORDS BACK  TO: [phone]

## 2018-09-12 NOTE — LETTER
[employer] [facility]  Kaylee Mandujano M.D.  [address]  [place] [address]  Fax: [phone]   Authorization for Release/Disclosure of   Protected Health Information   Name: ZULY RODRÍGUEZ : 1950 SSN: [id number]   Address: [address] [place] [address] Phone:    [phone] (home)    I authorize the entity listed below to release/disclose the PHI below to:   [facility]/Kaylee Mandujano M.D. and Kaylee Mandujano M.D.   Provider or Entity Name:     Address   City, State, Zip   Phone:      Fax:     Reason for request: continuity of care   Information to be released:    [  ] LAST COLONOSCOPY,  including any PATH REPORT and follow-up  [  ] LAST FIT/COLOGUARD RESULT [  ] LAST DEXA  [  ] LAST MAMMOGRAM  [  ] LAST PAP  [  ] LAST LABS [  ] RETINA EXAM REPORT  [  ] IMMUNIZATION RECORDS  [  ] Release all info      [  ] Check here and initial the line next to each item to release ALL health information INCLUDING  _____ Care and treatment for drug and / or alcohol abuse  _____ HIV testing, infection status, or AIDS  _____ Genetic Testing    DATES OF SERVICE OR TIME PERIOD TO BE DISCLOSED: _____________  I understand and acknowledge that:  * This Authorization may be revoked at any time by you in writing, except if your health information has already been used or disclosed.  * Your health information that will be used or disclosed as a result of you signing this authorization could be re-disclosed by the recipient. If this occurs, your re-disclosed health information may no longer be protected by State or Federal laws.  * You may refuse to sign this Authorization. Your refusal will not affect your ability to obtain treatment.  * This Authorization becomes effective upon signing and will  on (date) __________.      If no date is indicated, this Authorization will  one (1) year from the signature date.    Name: Zuly Rodríguez    Signature:   Date:     2018       PLEASE FAX REQUESTED RECORDS BACK  TO: [phone]

## 2018-09-12 NOTE — PROGRESS NOTES
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    Metastatic breast cancer   Stage IV invasive ductal carcinoma    She had been treated with weekly taxane therapy for metastatic breast cancer, now recently changed to paclitaxel once every 3 weeks. Her last chemotherapy was today. She was also seen by radiation oncology for metastatic bone lesions to the axial & appendicular skeleton along with a pathological fracture in the right acetabulum. She was treated with radiation treatments and with denosumab.     Filled out advanced directives with her oncologist previously. Scanned in to EPIC.       Bone metastases   Pain due to bone metastases   She is here to establish care w/ a [facility] provider since her oncologist is at [facility]. She uses tramadol 50 mg every night which improves the pain associated with bone metastases. She has metastatic cancer to both the right and left hip, with symptomatic disease due to a pathological fracture in the right hip. Underwent a short palliative course of radiation tx.   She also uses naproxen 500 mg twice daily which improves her pain more than tramadol. She was on this routine as discussed with her previous PCP and oncologist. She declines to be started on another narcotic pain medication.       Depression with anxiety   Started on fluoxetine by her oncologist due to concern for anxiety and mood changes after the diagnosis of metastatic cancer.       Gyn Hx: no abnormal pap smears. . Did not feel a breast mass prior to onset of right hip pain and pathological fracture. Workup revealed that this was possibly a metastatic cancer. Denies family hx of ovarian or breast cancer, but a family hx of lung cancer in mother, father and aunt who were smokers. Getting  BRCA testing today with oncology.     Social Hx: smoked 1.5 to 2 ppd for 46 years, quit 4 years ago. Occasional social alcohol use without binge drinking.     Patient Active Problem List    Diagnosis Date Noted   • Abdominal aortic aneurysm (AAA) (Former[facility]) 09/12/2018   • Alpha 1-antitrypsin PiMS phenotype 09/12/2018   • Stenosis of carotid artery 09/12/2018   • Centrilobular emphysema (Former[facility]) 09/12/2018   • Gastroesophageal reflux disease 09/12/2018   • Subclavian steal syndrome 09/12/2018   • Mixed anxiety depressive disorder 09/12/2018   • Metastatic breast cancer (Former[facility]) 06/19/2018   • Arm swelling 06/07/2018   • Occlusion and stenosis of unspecified carotid artery 07/20/2017   • Abnormal CT scan, chest 08/22/2016   • COPD (chronic obstructive pulmonary disease) (Former[facility]) 08/22/2016   • Essential hypertension 08/22/2016   • PVD (peripheral vascular disease) ([facility]) 08/22/2016   • Dyslipidemia 08/22/2016   • Allergic rhinitis 08/22/2016       Past Medical History:   Diagnosis Date   • Allergic rhinitis    • Cancer (HCC)     breast   • Cataract    • COPD (chronic obstructive pulmonary disease) ([facility])    • Emphysema of lung ([facility])    • Heart burn    • Hyperlipidemia    • Hypertension    • Osteopenia    • Peripheral vascular disease ([facility])        Current Outpatient Prescriptions   Medication Sig Dispense Refill   • sennosides-docusate sodium (SENOKOT-S) 8.6-50 MG tablet Take 1 Tab by mouth 1 time daily as needed (for constipation. Do not take if LOOSE STOOLS). 30 Tab 3   • polyethylene glycol/lytes (MIRALAX) Pack Take 1 Packet by mouth every day. 30 Each 0   • tramadol (ULTRAM) 50 MG Tab Take 1 Tab by mouth every 12 hours as needed for Moderate Pain or Severe Pain for up to 30 days. 60 Tab 0   • omega-3 acid ethyl esters (LOVAZA) 1 GM capsule Take 1 Cap by mouth 2 Times a Day. 180 Cap 3   • FLUoxetine (PROZAC) 20 MG Cap Take 1 Cap by mouth every day. 90 Cap 0   • amitriptyline (ELAVIL) 75 MG Tab      • ondansetron (ZOFRAN) 4 MG Tab  tablet Take 1 Tab by mouth every four hours as needed for Nausea/Vomiting (for nausea, vomiting). (Patient not taking: Reported on 9/10/2018) 30 Tab 6   • diphenoxylate-atropine (LOMOTIL) 2.5-0.025 MG Tab Take 1 Tab by mouth 4 times a day as needed for Diarrhea.     • prochlorperazine (COMPAZINE) 10 MG Tab Take 1 Tab by mouth every 6 hours as needed (for nausea, vomiting). (Patient not taking: Reported on 9/10/2018) 30 Tab 6   • lidocaine-prilocaine (EMLA) 2.5-2.5 % Cream Apply to port one hour prior to access and cover with plastic wrap. 1 Tube 3   • glucosamine 500 MG Cap Take  by mouth every day.     • Calcium Polycarbophil (FIBER-CAPS PO) Take  by mouth.     • Oxymetazoline HCl (NASAL SPRAY NA) Spencer  in nose.     • CHONDROITIN SULFATE PO Take  by mouth.     • albuterol (PROAIR HFA) 108 (90 Base) MCG/ACT Aero Soln inhalation aerosol Inhale 2 Puffs by mouth every four hours as needed for Shortness of Breath (wheezing). (Patient not taking: Reported on 9/10/2018) 3 Inhaler 3   • Lifitegrast (XIIDRA OP) by Ophthalmic route.     • naproxen (NAPROSYN) 500 MG Tab Take 1 Tab by mouth 2 times a day, with meals. 60 Tab 1   • ANORO ELLIPTA 62.5-25 MCG/INH AEROSOL POWDER, BREATH ACTIVATED inhaler USE 1 INHALATION EVERY DAY 3 Inhaler 3   • ascorbic acid (ASCORBIC ACID) 500 MG Tab Take 500 mg by mouth every day.     • Multiple Vitamin (MULTI VITAMIN DAILY PO) Take  by mouth.     • aspirin 81 MG tablet Take 81 mg by mouth every day. One tablet by mouth daily     • Coenzyme Q10 (COQ10) 100 MG Cap Take 100 mg by mouth.     • alprazolam (XANAX) 0.5 MG Tab Take 0.5 mg by mouth at bedtime as needed for Sleep.     • ezetimibe (ZETIA) 10 MG Tab Take 10 mg by mouth every day.     • rosuvastatin (CRESTOR) 40 MG tablet Take 40 mg by mouth every day.     • carvedilol (COREG) 25 MG Tab Take 25 mg by mouth 2 times a day, with meals.     • ramipril (ALTACE) 10 MG capsule Take 10 mg by mouth every day.       No current facility-administered  "medications for this visit.        Allergies as of 09/12/2018 - Reviewed 09/12/2018   Allergen Reaction Noted   • Pollen extract     • Sulfa drugs Hives 07/19/2016       Social History     Social History   • Marital status:      Spouse name: N/A   • Number of children: N/A   • Years of education: N/A     Occupational History   • work in cardiology office Retired     Social History Main Topics   • Smoking status: Former Smoker     Packs/day: 1.00     Years: 50.00     Types: Cigarettes     Quit date: 5/1/2014   • Smokeless tobacco: Never Used      Comment: started at age 18, quit at 64, 1.5-2 ppd for 46 years   • Alcohol use Yes      Comment: 3 a month   • Drug use: No   • Sexual activity: No     Other Topics Concern   • Not on file     Social History Narrative   • No narrative on file       Family History   Problem Relation Age of Onset   • Lung Cancer Father    • Cancer Father 71        lung   • Lung Cancer Mother    • Cancer Mother 91        lung   • Cancer Maternal Aunt 80        lung cancer, smoking       Past Surgical History:   Procedure Laterality Date   • CAROTID ENDARTERECTOMY  7/20/2017    Procedure: CAROTID ENDARTERECTOMY FOR : LEFT SUBCLAVIAN ARTERY ANGIOPLASTY / STENT BRACHIAL APPROACH WITH ULTRASOUND;  Surgeon: Dinora Saldana M.D.;  Location: SURGERY [facility];  Service:    • TUBAL LIGATION  1977   • GYN SURGERY      d+c 1979, 1980   • OTHER ORTHOPEDIC SURGERY      left knee surgery   • TONSILLECTOMY         ROS: A 14 point ROS has been completed and is negative except as stated above in HPI.       /73   Pulse 87   Temp 36.5 °C (97.7 °F)   Ht 1.549 m (5' 1\")   Wt 56 kg (123 lb 6.4 oz)   SpO2 94%   Breastfeeding? No   BMI 23.32 kg/m²     Physical Exam  General:  Alert and oriented, No apparent distress. Pleasant and conversant.     Eyes: Pupils equal and reactive. No scleral icterus.    Throat: Clear no erythema or exudates noted.    Neck: Supple. No lymphadenopathy noted. " Thyroid not enlarged.    Lungs: Clear to auscultation bilaterally.    Cardiovascular: Regular rate and rhythm. No murmurs, rubs or gallops.    Abdomen:  Benign. No rebound or guarding noted.    Extremities: No clubbing, cyanosis, edema. Right hip with limited range of motion to internal and external rotation when flexed.     Skin: Clear. No rash or suspicious skin lesions noted.     Neuro: A&O x 4. Cn II-XII grossly intact.     Note: I have reviewed all pertinent labs and diagnostic tests associated with this visit with specific comments listed under the assessment and plan below    Assessment and Plan       1. Invasive ductal carcinoma of breast, stage 4, left (HCC)  Stage IV Breast cancer metastatic to bone that is triple negative, invasive ductal carcinoma   - under the care of Dr. Abhinav naranjo/onc getting paclitaxel q 3 weeks  [facility]; Future  - tramadol (ULTRAM) 50 MG Tab; Take 1 Tab by mouth every 12 hours as needed for Moderate Pain or Severe Pain for up to 30 days.  Dispense: 60 Tab; Refill: 0  - Consent for Opiate Prescription       2. Bone metastases (HCC)  3. Pain from bone metastases (HCC)  4. Chronic use of opiates for therapeutic purpose   5. Pathological fracture   - metastatic lesions to hip with pathological fracture   - treated per j[facility] a onc Dr. Arellano with denosumab sub Q every 4 week injections     [facility]; Future  - tramadol (ULTRAM) 50 MG Tab; Take 1 Tab by mouth every 12 hours as needed for Moderate Pain or Severe Pain for up to 30 days.  Dispense: 60 Tab; Refill: 0  - Consent for Opiate Prescription    - both prn and scheduled fiber supplementation along with laxatives/stool softeners to prevent opiate induced constipation   - sennosides-docusate sodium (SENOKOT-S) 8.6-50 MG tablet; Take 1 Tab by mouth 1 time daily as needed (for constipation. Do not take if LOOSE STOOLS).  Dispense: 30 Tab; Refill: 3  - polyethylene glycol/lytes  (MIRALAX) Pack; Take 1 Packet by mouth every day.  Dispense: 30 Each; Refill: 0   - urine drug screen collected today    The patient is using chronic opiods for control of pain under my supervision. I have reviewed the most recent prescription monitoring profile on this patient and he/she  is in compliance with our clinic policy. The patients' activity and  functionality is good on opiate therapy and he/she is not manifesting any adverse effects. Analgesia is adeqaute, no aberrant behaviours are noted and affect is normal. Risks and benefits of being on an opiate have been discussed with the patient whom also has a current pain contract on file. I have reviewed below the patient's risk for substance abuse and obtained a score of 1  indicating low risk level.         Interpretation of Opioid Risk Score   Score 0-3 = Low risk of abuse. Do UDS at least once per year.  Score 4-7 = Moderate risk of abuse. Do UDS 1-4 times per year.  Score 8+ = High risk of abuse. Refer to specialist.        6. Hyperlipidemia, unspecified hyperlipidemia type  - omega-3 acid ethyl esters (LOVAZA) 1 GM capsule; Take 1 Cap by mouth 2 Times a Day.  Dispense: 180 Cap; Refill: 3       7. Mixed anxiety depressive disorder  - continue fluoxetine   - discussed rare side effect of serotonin syndrome if she is taking tramadol, fluoxetine and (occasional) ondansetron. However, pt is taking very low doses of all these medications, and takes ondansetron rarely    - if she needs increase in tramadol or fluoxetine, will consider switching tramadol to oxycodone to minimize risk of serotonin syndrome   - FLUoxetine (PROZAC) 20 MG Cap; Take 1 Cap by mouth every day.  Dispense: 90 Cap; Refill: 0      8. COPD   - stable, not on home oxygen   - continue Anoro Ellipta and prn albuterol     Preventive care  Flu - received chemotherapy today, declined   TD- due   TDaP - due  Pneumococcal / Prevnar - not available at clinic today, will discuss at next visit    Colonoscopy - utd   Shinges -- due     Women only  Pap - not indicated   Mammogram - undergoing current tx for breast cancer   Dexa - due     Followup: Return in about 5 weeks (around 10/17/2018) for Long.  Discuss preventative care at next visit     Risk Assessment (discuss potential complications a function of chronic problems): high risk due to  stage IV metastatic cancer of the breast with high mortality in the next 5 years, pathological fractures due to bone metastases     Complexity (discuss number of co-morbidities): highly complex     Signed by: Kaylee Mandujano M.D.

## 2018-09-12 NOTE — LETTER
[facility]  Kaylee Mandujano M.D.  [address]  [place] [address]  Fax: [phone]   Authorization for Release/Disclosure of   Protected Health Information   Name: ZULY RODRÍGUEZ : 1950 SSN: [id number]   Address: [address] [place] [address] Phone:    [phone] (home)    I authorize the entity listed below to release/disclose the PHI below to:   [facility]/Kaylee Mandujano M.D. and Kaylee Mandujano M.D.   Provider or Entity Name:     Address   City, State, Zip   Phone:      Fax:     Reason for request: continuity of care   Information to be released:    [  ] LAST COLONOSCOPY,  including any PATH REPORT and follow-up  [  ] LAST FIT/COLOGUARD RESULT [  ] LAST DEXA  [  ] LAST MAMMOGRAM  [  ] LAST PAP  [  ] LAST LABS [  ] RETINA EXAM REPORT  [  ] IMMUNIZATION RECORDS  [  ] Release all info      [  ] Check here and initial the line next to each item to release ALL health information INCLUDING  _____ Care and treatment for drug and / or alcohol abuse  _____ HIV testing, infection status, or AIDS  _____ Genetic Testing    DATES OF SERVICE OR TIME PERIOD TO BE DISCLOSED: _____________  I understand and acknowledge that:  * This Authorization may be revoked at any time by you in writing, except if your health information has already been used or disclosed.  * Your health information that will be used or disclosed as a result of you signing this authorization could be re-disclosed by the recipient. If this occurs, your re-disclosed health information may no longer be protected by State or Federal laws.  * You may refuse to sign this Authorization. Your refusal will not affect your ability to obtain treatment.  * This Authorization becomes effective upon signing and will  on (date) __________.      If no date is indicated, this Authorization will  one (1) year from the signature date.    Name: Zuly Rodríguez    Signature:   Date:     2018       PLEASE FAX REQUESTED RECORDS BACK  TO: [phone]

## 2018-09-12 NOTE — PROGRESS NOTES
Zuly Christian is a 68 y.o. female here for a non-provider visit for: Lab Draws  on 9/12/2018 at 1:38 PM    Procedure Performed: Venipuncture     Anatomical site: Right Antecubital Area (AC) and R Forearm    Equipment used: 23g Butterfly    Labs drawn: DELMI    Ordering Provider: Dr. Abhinav Martinez By: Niall Sullivan Ass't    First two tries unsuccessful, Roxana Joyner MA came to assist and was able to get return without incident.

## 2018-09-13 NOTE — PROGRESS NOTES
Pt tolerated remaining infusion well and without incident. Port had brisk blood return after, flushed per [facility] policy, de-accessed, needle intact, insertion site covered with sterile gauze and paper tape. Pt left department with friend appearing in good spirits and NAD.

## 2018-09-13 NOTE — PROGRESS NOTES
Report received from Jazlyn HILLS while Taxol was running at rate. Pt resting comfortably in chair, call bell within reach, friend at chairside. Pt appeared to be tolerating infusion well.

## 2018-09-14 NOTE — OP THERAPY DAILY TREATMENT
Outpatient Physical Therapy  LYMPHEDEMA THERAPY DAILY TREATMENT     [facility] [address].  [address]  [place] [address]  Phone:  [phone]  Fax:  [phone]    Date: 09/14/2018    Patient: Zuly Christian  YOB: 1950  MRN: [id number]     Time Calculation             Chief Complaint: No chief complaint on file.    Visit #: 7    Subjective:   History of Present Illness:     Mechanism of injury:  She has been using the compression sleeve but unable to use gauntlet due to web injury caused by irritation from wrap garment. So at this time, she is not wrapping the hand but keeps the arm sleeve on day and night.  Controlling the edema. (She states that without compression at night, the arm swells.)  Would like to know more about the arm pump.      Lymphedema Objective      Skin Appearance      Small.5 cm area of eschar in L web space with mild swelling in L hand and fingers (due to no compression garment on hand)    Left Upper Extremity Circumferential Measurements  Axilla cm  Upper Proximal Humerus 26 cm  Distal Humerus 23 cm  Elbow 22.4 cm  Mid-Forearm 22 cm  Wrist 15.5 cm  Distal Johnson Crease cm  Total 108.9 cm              Therapeutic Treatments and Modalities:     1. Manual Therapy (CPT 04438), Short neck sequence, establishment of axillary to axillary anastamoses, decongestion of the left upper extremity from proximal to distal with an emphasis on medial to lateral drainage, Pt educated on self MLD during this process to allow her to begin ASAP at home    Time-based treatments/modalities:          Assessment and Plan:   Assessment details:  Pt attentive to instruction and provided with written guidelines to continue at home on a daily basis.     Plan:  Therapy options:  Physical therapy treatment to continue  Planned therapy interventions:  Self-care/training (CPT 51856)  Planned education:  Long term self-management of lymphedema  Other planned therapy  interventions:  Will explore options for home arm pump  Frequency:  1x week

## 2018-09-24 NOTE — OP THERAPY DAILY TREATMENT
Outpatient Physical Therapy  LYMPHEDEMA THERAPY DAILY TREATMENT     [facility] [address].  [address]  [place] [address]  Phone:  [phone]  Fax:  [phone]    Date: 09/24/2018    Patient: Zuly Christian  YOB: 1950  MRN: [id number]     Time Calculation  Start time: 0930  Stop time: 1015 Time Calculation (min): 45 minutes     Chief Complaint: Arm Swelling    Visit #: 8    Subjective:   History of Present Illness:     Mechanism of injury:  Able to wear glove now as small wound in thumb web space is nearly healed. Wore sleeve and glove last night and woke up with swollen hand.  Not sure if it had to do with salty dinner or sleeping psn, etc.  She has a few more chemo treatments left then starts again in early October.          Lymphedema Objective      Skin Appearance      Closed wound in L thumb web space with moderate swelling in L hand and fingers    Left Upper Extremity Circumferential Measurements  Axilla cm  Upper Proximal Humerus 26 cm  Distal Humerus 23 cm  Elbow 22.4 cm  Mid-Forearm 22 cm  Wrist 15.5 cm  Distal Johnson Crease cm  Total 108.9 cm              Therapeutic Exercises (CPT 81386):     1. Shld flexion and abd to 90 degrees with 1#, 10 x ea, handout provided    2. Elbow ext with 1# in supine, 10 x, handout provided    Therapeutic Treatments and Modalities:     1. Manual Therapy (CPT 22365), Short neck sequence, establishment of axillary to axillary anastamoses, decongestion of the left upper extremity from proximal to distal with an emphasis on medial to lateral drainage    Time-based treatments/modalities:  Manual therapy minutes (CPT 06975): 35 minutes  Therapeutic exercise minutes (CPT 15553): 10 minutes       Assessment and Plan:   Assessment details:  Pt demonstrates compliance with HEP and self care, however she continues to have fluctuations in distal edema, which we are trouble-shooting.  Other measurements (ROM and circum) are  maintained.  Phone call made for consultation for Flexitouch for long term management of swelling

## 2018-09-25 NOTE — TELEPHONE ENCOUNTER
----- Message from Taisha Keane sent at 9/25/2018  1:23 PM PDT -----  Regarding: FW: Results  Roxana,    Please ask Dr. Louis if she is comfortable calling the patient and giving results. If not, please advise patient will be discussed at next appointment.    ----- Message -----  From: Luis Carrillo R.N.  Sent: 9/24/2018  12:04 PM  To: Dee Green R.N., Saumya Louis M.D.  Subject: Results                                          Patient phoned Nurse Navigator and lvm requesting test results.  Is this something that you can discuss with the patient over the phone, or do you prefer to review them at her next appointment?    Can someone reach out to her to let her know?    Thank you,  Luis

## 2018-09-26 NOTE — TELEPHONE ENCOUNTER
Patient returned my phone call and stated that she did received her results and that she has her results on her mychart. No further questions or concerns at this time.

## 2018-10-01 NOTE — PROGRESS NOTES
Zuly Christian is a 68 y.o. female here for a non-provider visit for: Lab Draws  on 10/1/2018 at 11:26 AM    Procedure Performed: Venipuncture     Anatomical site: Right Hand     Equipment used: 23g Butterfly    Labs drawn: CBC w/diff and CMP    Ordering Provider: Dr. JESSY Rouse    Juan By: Roxana Joyner, Niall Ass't  No complications and Dr. Rouse was present in the office the entire time I was doing the patients blood draw.

## 2018-10-02 PROBLEM — R06.02 SOB (SHORTNESS OF BREATH): Status: ACTIVE | Noted: 2018-01-01

## 2018-10-02 NOTE — PROGRESS NOTES
Date of visit: 10/2/2018  12:14 PM      Chief Complaint   Patient presents with   • Follow-Up     Prechemo     History of present illness:  68-year-old woman diagnosed with triple negative left breast cancer initially diagnosed with metastatic disease from the start on June 14, 2018.  Patient presented with severe right hip pain and radiologic evaluation on April 6, 2018 disclosed severe degenerative disease and an MRI of the hip follows on May 23 which disclosed numerous lesions worrisome for metastatic disease, 3.8 cm lesion along the right superior acetabulum with supple break in the bony cortex worrisome for pathological fracture.  On June 8, 2018 PET/CT disclosed a small spiculated mass in the outer left breast suspicious for malignancy.  Extensive FDG avid lytic and sclerotic lesions in the axial or appendicular skeleton consistent with bony metastasis.  Adenopathy in the left axilla noted as well.  June 14, 2018, a left breast biopsy was completed disclosing invasive ductal carcinoma ER DC HER-2/shanell negative, triple negative.  June 24, 2018 right iliac bone biopsy disclosed metastatic carcinoma consistent with breast origin.  She completed radiation to the right hip with palliation of pain from July 9 to the 24th of 2018.  On July 25, 2018 weekly Taxol was initiated.  On August 21, 2018 weekly Taxol was changed to every 3-week Taxol for convenience of treatment.  She is tolerating Taxol single agent treatment very well.    For about 1 week she noticed exacerbation shortness of breath with mild activity as walking.  Further evaluation will be undertaken.      Past Medical History:      Past Medical History:   Diagnosis Date   • Allergic rhinitis    • Cancer (HCC)     breast   • Cataract    • COPD (chronic obstructive pulmonary disease) (HCC)    • Emphysema of lung (HCC)    • Heart burn    • Hyperlipidemia    • Hypertension    • Osteopenia    • Peripheral vascular disease (HCC)        Past surgical history:        Past Surgical History:   Procedure Laterality Date   • CAROTID ENDARTERECTOMY  7/20/2017    Procedure: CAROTID ENDARTERECTOMY FOR : LEFT SUBCLAVIAN ARTERY ANGIOPLASTY / STENT BRACHIAL APPROACH WITH ULTRASOUND;  Surgeon: Dinora Saldana M.D.;  Location: SURGERY [facility];  Service:    • TUBAL LIGATION  1977   • GYN SURGERY      d+c 1979, 1980   • OTHER ORTHOPEDIC SURGERY      left knee surgery   • TONSILLECTOMY         Allergies:         Allergies   Allergen Reactions   • Pollen Extract    • Sulfa Drugs Hives       Medications:         Current Outpatient Prescriptions   Medication Sig Dispense Refill   • polyethylene glycol/lytes (MIRALAX) Pack Take 1 Packet by mouth every day. 30 Each 0   • amitriptyline (ELAVIL) 75 MG Tab      • tramadol (ULTRAM) 50 MG Tab Take 1 Tab by mouth every 12 hours as needed for Moderate Pain or Severe Pain for up to 30 days. 60 Tab 0   • omega-3 acid ethyl esters (LOVAZA) 1 GM capsule Take 1 Cap by mouth 2 Times a Day. 180 Cap 3   • FLUoxetine (PROZAC) 20 MG Cap Take 1 Cap by mouth every day. 90 Cap 0   • lidocaine-prilocaine (EMLA) 2.5-2.5 % Cream Apply to port one hour prior to access and cover with plastic wrap. 1 Tube 3   • glucosamine 500 MG Cap Take  by mouth every day.     • Calcium Polycarbophil (FIBER-CAPS PO) Take  by mouth.     • CHONDROITIN SULFATE PO Take  by mouth.     • Lifitegrast (XIIDRA OP) by Ophthalmic route.     • naproxen (NAPROSYN) 500 MG Tab Take 1 Tab by mouth 2 times a day, with meals. 60 Tab 1   • ANORO ELLIPTA 62.5-25 MCG/INH AEROSOL POWDER, BREATH ACTIVATED inhaler USE 1 INHALATION EVERY DAY 3 Inhaler 3   • Multiple Vitamin (MULTI VITAMIN DAILY PO) Take  by mouth.     • aspirin 81 MG tablet Take 81 mg by mouth every day. One tablet by mouth daily     • Coenzyme Q10 (COQ10) 100 MG Cap Take 100 mg by mouth.     • ezetimibe (ZETIA) 10 MG Tab Take 10 mg by mouth every day.     • rosuvastatin (CRESTOR) 40 MG tablet Take 40 mg by mouth every day.     •  carvedilol (COREG) 25 MG Tab Take 25 mg by mouth 2 times a day, with meals.     • ramipril (ALTACE) 10 MG capsule Take 10 mg by mouth every day.     • sennosides-docusate sodium (SENOKOT-S) 8.6-50 MG tablet Take 1 Tab by mouth 1 time daily as needed (for constipation. Do not take if LOOSE STOOLS). 30 Tab 3   • ondansetron (ZOFRAN) 4 MG Tab tablet Take 1 Tab by mouth every four hours as needed for Nausea/Vomiting (for nausea, vomiting). (Patient not taking: Reported on 9/10/2018) 30 Tab 6   • diphenoxylate-atropine (LOMOTIL) 2.5-0.025 MG Tab Take 1 Tab by mouth 4 times a day as needed for Diarrhea.     • prochlorperazine (COMPAZINE) 10 MG Tab Take 1 Tab by mouth every 6 hours as needed (for nausea, vomiting). (Patient not taking: Reported on 9/10/2018) 30 Tab 6   • Oxymetazoline HCl (NASAL SPRAY NA) Spray  in nose.     • albuterol (PROAIR HFA) 108 (90 Base) MCG/ACT Aero Soln inhalation aerosol Inhale 2 Puffs by mouth every four hours as needed for Shortness of Breath (wheezing). 3 Inhaler 3   • ascorbic acid (ASCORBIC ACID) 500 MG Tab Take 500 mg by mouth every day.     • alprazolam (XANAX) 0.5 MG Tab Take 0.5 mg by mouth at bedtime as needed for Sleep.       No current facility-administered medications for this visit.        Social History:     Social History     Social History   • Marital status:      Spouse name: N/A   • Number of children: N/A   • Years of education: N/A     Occupational History   • work in cardiology office Retired     Social History Main Topics   • Smoking status: Former Smoker     Packs/day: 1.00     Years: 50.00     Types: Cigarettes     Quit date: 5/1/2014   • Smokeless tobacco: Never Used      Comment: started at age 18, quit at 64, 1.5-2 ppd for 46 years   • Alcohol use Yes      Comment: 3 a month   • Drug use: No   • Sexual activity: No     Other Topics Concern   • Not on file     Social History Narrative   • No narrative on file       Family History:      Family History   Problem  Relation Age of Onset   • Lung Cancer Father    • Cancer Father 71        lung   • Lung Cancer Mother    • Cancer Mother 91        lung   • Cancer Maternal Aunt 80        lung cancer, smoking       Review of Systems   Constitutional: Positive for malaise/fatigue. Negative for chills and fever.   HENT: Positive for congestion. Negative for sore throat.    Eyes: Negative for blurred vision and double vision.   Respiratory: Positive for shortness of breath (worse lately x 1 week ). Negative for cough.    Cardiovascular: Negative for chest pain, palpitations and leg swelling.   Gastrointestinal: Negative for abdominal pain, blood in stool, constipation, diarrhea, heartburn, nausea and vomiting.   Genitourinary: Negative for frequency, hematuria and urgency.   Musculoskeletal: Positive for joint pain (RT hip , Lt shoulder) and neck pain. Negative for myalgias.   Skin: Negative for rash.   Neurological: Positive for weakness. Negative for dizziness and headaches.   Psychiatric/Behavioral: The patient is nervous/anxious and has insomnia.        Physical Exam   Constitutional: She is oriented to person, place, and time and well-developed, well-nourished, and in no distress.   HENT:   Head: Normocephalic and atraumatic.   Mouth/Throat: No oropharyngeal exudate.   Eyes: Pupils are equal, round, and reactive to light. No scleral icterus.   Neck: Normal range of motion. No JVD present. No thyromegaly present.   Cardiovascular: Normal rate and regular rhythm.    No murmur heard.  Pulmonary/Chest: Effort normal. She has no wheezes. She has no rales.   Abdominal: Soft. She exhibits no distension. There is no tenderness. There is no rebound and no guarding.   Musculoskeletal: She exhibits no edema.   Lymphadenopathy:     She has no cervical adenopathy.   Neurological: She is alert and oriented to person, place, and time.   Skin: Skin is warm and dry. No rash noted.   Psychiatric: Mood and affect normal.       Vitals: /64 (BP  "Location: Right arm, Patient Position: Sitting, BP Cuff Size: Small infant)   Pulse 94   Temp 36.5 °C (97.7 °F) (Temporal)   Resp 16   Ht 1.562 m (5' 1.5\")   Wt 55.4 kg (122 lb 3.9 oz)   SpO2 92%   BMI 22.72 kg/m²     Labs:     Hospital Outpatient Visit on 10/02/2018   Component Date Value Ref Range Status   • WBC 10/02/2018 12.8* 4.8 - 10.8 K/uL Final   • RBC 10/02/2018 4.01* 4.20 - 5.40 M/uL Final   • Hemoglobin 10/02/2018 11.2* 12.0 - 16.0 g/dL Final   • Hematocrit 10/02/2018 35.1* 37.0 - 47.0 % Final   • MCV 10/02/2018 87.5  81.4 - 97.8 fL Final   • MCH 10/02/2018 27.9  27.0 - 33.0 pg Final   • MCHC 10/02/2018 31.9* 33.6 - 35.0 g/dL Final   • RDW 10/02/2018 56.4* 35.9 - 50.0 fL Final   • Platelet Count 10/02/2018 408  164 - 446 K/uL Final   • MPV 10/02/2018 9.4  9.0 - 12.9 fL Final   • Nucleated RBC 10/02/2018 0.40  /100 WBC Final   • NRBC (Absolute) 10/02/2018 0.05  K/uL Final   • Neutrophils-Polys 10/02/2018 78.10* 44.00 - 72.00 % Final   • Lymphocytes 10/02/2018 11.40* 22.00 - 41.00 % Final   • Monocytes 10/02/2018 8.80  0.00 - 13.40 % Final   • Eosinophils 10/02/2018 1.70  0.00 - 6.90 % Final   • Basophils 10/02/2018 0.00  0.00 - 1.80 % Final   • Neutrophils (Absolute) 10/02/2018 10.00* 2.00 - 7.15 K/uL Final    Includes immature neutrophils, if present.   • Lymphs (Absolute) 10/02/2018 1.46  1.00 - 4.80 K/uL Final   • Monos (Absolute) 10/02/2018 1.13* 0.00 - 0.85 K/uL Final   • Eos (Absolute) 10/02/2018 0.22  0.00 - 0.51 K/uL Final   • Baso (Absolute) 10/02/2018 0.00  0.00 - 0.12 K/uL Final   • Anisocytosis 10/02/2018 1+   Final   • Microcytosis 10/02/2018 1+   Final   • Sodium 10/02/2018 136  135 - 145 mmol/L Final   • Potassium 10/02/2018 4.3  3.6 - 5.5 mmol/L Final   • Chloride 10/02/2018 107  96 - 112 mmol/L Final   • Co2 10/02/2018 20  20 - 33 mmol/L Final   • Anion Gap 10/02/2018 9.0  0.0 - 11.9 Final   • Glucose 10/02/2018 89  65 - 99 mg/dL Final   • Bun 10/02/2018 25* 8 - 22 mg/dL Final   • " Creatinine 10/02/2018 0.53  0.50 - 1.40 mg/dL Final   • Calcium 10/02/2018 9.2  8.5 - 10.5 mg/dL Final   • AST(SGOT) 10/02/2018 17  12 - 45 U/L Final   • ALT(SGPT) 10/02/2018 12  2 - 50 U/L Final   • Alkaline Phosphatase 10/02/2018 107* 30 - 99 U/L Final   • Total Bilirubin 10/02/2018 0.3  0.1 - 1.5 mg/dL Final   • Albumin 10/02/2018 3.4  3.2 - 4.9 g/dL Final   • Total Protein 10/02/2018 6.7  6.0 - 8.2 g/dL Final   • Globulin 10/02/2018 3.3  1.9 - 3.5 g/dL Final   • A-G Ratio 10/02/2018 1.0  g/dL Final   • GFR If  10/02/2018 >60  >60 mL/min/1.73 m 2 Final   • GFR If Non  10/02/2018 >60  >60 mL/min/1.73 m 2 Final   • Manual Diff Status 10/02/2018 PERFORMED   Final   • Peripheral Smear Review 10/02/2018 see below   Final    Comment: Due to instrument suspect flags, further review of peripheral smear is  indicated on this patient sample. This review may or may not result in  abnormal findings.     • Plt Estimation 10/02/2018 Normal   Final   • RBC Morphology 10/02/2018 Present   Final   • Polychromia 10/02/2018 1+   Final   • Poikilocytosis 10/02/2018 1+   Final   • Ovalocytes 10/02/2018 1+   Final         Assessment and Plan:    1.  Metastatic triple negative breast cancer with diffuse bony involvement diagnosed June 20, 2018.  MRI of the brain is negative for metastatic disease as noted that time.  Started on weekly Taxol on July 25, 2018.  At that time radiation to the hip was completed (July 9 2/24 2018).  Radiation was given for pain control.   On August 21, 2018 weekly Taxol was changed to every 2 weeks for traveling convenience.  She started being followed by Dr. Arellano at that time.  She is tolerating Taxol quite well In this fashion.  Mild neuropathy and myalgias.  Appetite is maintained.  Cycle 3--21-day paclitaxel is scheduled for October 3, 2018.  I would like to see the ejection fraction before chemotherapy tomorrow.    2. Xgeva is due October 24, 2018  3.  In the setting of  worsening shortness of breath for the past week I am going to proceed with an echocardiogram today as soon as possible.  Evaluation of the right heart strain and ejection fraction is the purpose.  4. Follow-up with Dr. Arellano in 3 weeks with chemotherapy.    She agreed and verbalized  agreement and understanding with the current plan.  I answered all questions and concerns at this time        All labs mentioned in the note above were reviewed with and explained to the patient as a pertain to medical decision making.    Patient was seen for 50 minutes face to face of which > 70 % of appointment time was spent on counseling and coordination of care discussing supportive care measures, discussing etiology of shortness of breath first we need to rule out any evidence of ejection fraction decline, the first impression toward pulmonary embolus possibility will be done by evaluating the right heart pressure.  Discuss supportive care measures and follow-up.      At the end of the day around 4:13 PM echocardiogram results were noted with the ejection fraction of 60% but with increased pulmonary artery pressure at 58 mmHg.I am going to hold chemo tomorrow and do a stat CT angio to r/o PE . I informed Dr Arellano .        SIGNATURES:  Saumya Louis    CC:  Kaylee Mandujano M.D.  No ref. provider found

## 2018-10-02 NOTE — NON-PROVIDER
Zuly Christian is a 68 y.o. female here for a non-provider visit for: Lab Draws  on 10/2/2018 at 11:30 AM    Procedure Performed: Venipuncture     Anatomical site: right forearm    Equipment used: 25 Butterfly    Labs drawn: , CBC w/diff and CMP    Ordering Provider: Dr. Saumya Louis    Juan By: Roxana Joyner, Niall Ass't  No complications and  was present in the office the entire time I was doing the patients blood draw.

## 2018-10-02 NOTE — PATIENT INSTRUCTIONS
1. Chemo  Cycle 3 Taxol ( every 3 weeks ) --10/3/18   2. Blood work today -- OK   3. Will do a ECHO -- today   4. Xgeva -- 10 /24/18   5. See Dr Arellano in 3 weeks with chemo

## 2018-10-03 NOTE — NON-PROVIDER
Zuly Christian is a 68 y.o. female here for a non-provider visit for: Lab Draws  on 10/3/2018 at 8:49 AM    Procedure Performed: Venipuncture     Anatomical site: Right Antecubital Area (AC)    Equipment used: 23g Butterfly    Labs drawn: , CBC w/diff and CMP    Ordering Provider: DR Saumya Martinez By: Anaid Ryan, Med Ass't     Miss the AC on Right ARM (1) ask Roxana FERNANDEZ for help she completed the draw.

## 2018-10-03 NOTE — TELEPHONE ENCOUNTER
Called patient with the CT chest angiogram from yesterday October 2, 2018 results.  No evidence of pulmonary embolus small opacity in the dependent right lower lobe could be atelectasis or consolidation.  Sclerotic and lytic bone metastasis noted.  Ejection fraction is within normal limits 60%.  We will consult pulmonology today for evaluation.  We will hold chemotherapy until next cycle in 3 weeks.  Patient is content with the plan.

## 2018-10-10 NOTE — PROGRESS NOTES
Chief Complaint   Patient presents with   • COPD     CT chest       HPI:  Zuly Christian is a 68 y.o. year old female here today for follow-up on her chronic obstructive pulmonary disease and abnormal CT scan of the chest.     CT scan of the chest was repeated on 8/16/2016 and indicates stable appearance of a 6 mm left upper lobe nodule dating back to August 2014. There is stable diffuse centrilobular emphysema and no pulmonary consolidation or adenopathy. She has over a 40 PYH of tobacco abuse, quitting in 2014. She was recommend annual CT imaging. She also has a family history of lung cancer. CT chest was updated 11/2/2017 and indicates;   Despite suspicious features, lingular 7 mm noncalcified nodule is stable  New left subclavian artery origin stent  Small pericardial effusion, borderline slight enlargement  Advanced emphysema     CTA was completed 10/2/2018 for complaints of increased shortness of breath indicates;  1. No evidence of pulmonary embolus.  2. Small opacity in the dependent right lower lobe, favor atelectasis or consolidation.  3. Numerous sclerotic and lytic osseous metastases.  4. Age-indeterminate T5 compression fracture, likely pathologic.  It may be acute. Correlate for point tenderness.    Since her last office visit she was diagnosed with metastatic breast cancer with bone involvement. She is undergoing chemotherapy. She has had tolerance issues with chemo and has been more short of breath. She was ordered an Echocardiogram as well as the CTA. Echo 10/2/2018 indicates;  CONCLUSIONS  Mild concentric left ventricular hypertrophy.  Normal left ventricular systolic function.  Left ventricular ejection fraction is visually estimated to be 60%.  Indeterminate diastolic function.  Normal inferior vena cava size and inspiratory collapse.  Estimated right ventricular systolic pressure  is 58 mmHg.    Multi oximetry today in the office indicates a low 02 of 86% with exertion. She did well on 02 2  LPM.      Alpha 1 testing indicates an MF phenotype. Level was ordered through the lab and is normal 154 mg/dl. PFT's 8/22/2016 indicated an FEV1 of 1.59 L, 67% predicted with an FEV1/FVC ratio of 59 and a DLCO of 67% predicted. PFT's were updated 8/16/2017 and indicates an FEV1 of 1.57 L, 67% predicted with an FEV1/FVC ratio of 51 with a DLCO of 82% predicted. She was switched from Spiriva to Anoro.     She feels her dyspnea has been slowly worsening with each chemo treatment. She denies current cough or mucous production. She denies any wheezing. She denies any fevers or chills.   She notes occasional runny nose which she feels is allergy triggered. She does use an OTC antihistamine as needed. She notes rare snoring usually when allergies are bad. She is on Elavil at bedtime for sleep. She tends to wake refreshed. She denies any morning headaches.       Past Medical History:   Diagnosis Date   • Allergic rhinitis    • Cancer (HCC)     breast   • Cataract    • COPD (chronic obstructive pulmonary disease) (HCC)    • Emphysema of lung (HCC)    • Heart burn    • Hyperlipidemia    • Hypertension    • Osteopenia    • Peripheral vascular disease (HCC)        Past Surgical History:   Procedure Laterality Date   • CAROTID ENDARTERECTOMY  7/20/2017    Procedure: CAROTID ENDARTERECTOMY FOR : LEFT SUBCLAVIAN ARTERY ANGIOPLASTY / STENT BRACHIAL APPROACH WITH ULTRASOUND;  Surgeon: Dinora Saldana M.D.;  Location: SURGERY [facility];  Service:    • TUBAL LIGATION  1977   • GYN SURGERY      d+c 1979, 1980   • OTHER ORTHOPEDIC SURGERY      left knee surgery   • TONSILLECTOMY         Family History   Problem Relation Age of Onset   • Lung Cancer Father    • Cancer Father 71        lung   • Lung Cancer Mother    • Cancer Mother 91        lung   • Cancer Maternal Aunt 80        lung cancer, smoking       Social History     Social History   • Marital status:      Spouse name: N/A   • Number of children: N/A   • Years of  education: N/A     Occupational History   • work in cardiology office Retired     Social History Main Topics   • Smoking status: Former Smoker     Packs/day: 1.00     Years: 50.00     Types: Cigarettes     Quit date: 5/1/2014   • Smokeless tobacco: Never Used      Comment: started at age 18, quit at 64, 1.5-2 ppd for 46 years   • Alcohol use Yes      Comment: 3 a month   • Drug use: No   • Sexual activity: No     Other Topics Concern   • Not on file     Social History Narrative   • No narrative on file         ROS:  Constitutional: Denies fevers, chills, sweats, weight loss  Eyes: Denies vision loss, pain, drainage, double vision. Wears glasses   Ears/Nose/Mouth/Throat: Denies ear ache, difficulty hearing, sore throat, persistent hoarseness, decayed teeth/toothache  Cardiovascular: Denies chest pain, tightness, palpitations, swelling in feet/legs, fainting, difficulty breathing when laying down  Respiratory: See HPI   GI: Denies heartburn, difficulty swallowing, nausea, vomiting, abdominal pain, diarrhea, constipation  : Denies frequent urination, painful urination  Integumentary: Denies rashes, lumps or color changes  MSK: Denies painful joints, sore muscles, and back pain.   Neurological: Denies frequent headaches, dizziness, weakness  Sleep: See HPI       Current Outpatient Prescriptions   Medication Sig Dispense Refill   • enoxaparin (LOVENOX) 60 MG/0.6ML Solution inj Inject 60 mg as instructed every 12 hours. 20 Syringe 2   • sennosides-docusate sodium (SENOKOT-S) 8.6-50 MG tablet Take 1 Tab by mouth 1 time daily as needed (for constipation. Do not take if LOOSE STOOLS). 30 Tab 3   • polyethylene glycol/lytes (MIRALAX) Pack Take 1 Packet by mouth every day. 30 Each 0   • amitriptyline (ELAVIL) 75 MG Tab      • tramadol (ULTRAM) 50 MG Tab Take 1 Tab by mouth every 12 hours as needed for Moderate Pain or Severe Pain for up to 30 days. 60 Tab 0   • omega-3 acid ethyl esters (LOVAZA) 1 GM capsule Take 1 Cap by  mouth 2 Times a Day. 180 Cap 3   • FLUoxetine (PROZAC) 20 MG Cap Take 1 Cap by mouth every day. 90 Cap 0   • ondansetron (ZOFRAN) 4 MG Tab tablet Take 1 Tab by mouth every four hours as needed for Nausea/Vomiting (for nausea, vomiting). (Patient not taking: Reported on 9/10/2018) 30 Tab 6   • diphenoxylate-atropine (LOMOTIL) 2.5-0.025 MG Tab Take 1 Tab by mouth 4 times a day as needed for Diarrhea.     • prochlorperazine (COMPAZINE) 10 MG Tab Take 1 Tab by mouth every 6 hours as needed (for nausea, vomiting). (Patient not taking: Reported on 9/10/2018) 30 Tab 6   • lidocaine-prilocaine (EMLA) 2.5-2.5 % Cream Apply to port one hour prior to access and cover with plastic wrap. 1 Tube 3   • glucosamine 500 MG Cap Take  by mouth every day.     • Calcium Polycarbophil (FIBER-CAPS PO) Take  by mouth.     • Oxymetazoline HCl (NASAL SPRAY NA) [place]  in nose.     • CHONDROITIN SULFATE PO Take  by mouth.     • albuterol (PROAIR HFA) 108 (90 Base) MCG/ACT Aero Soln inhalation aerosol Inhale 2 Puffs by mouth every four hours as needed for Shortness of Breath (wheezing). 3 Inhaler 3   • Lifitegrast (XIIDRA OP) by Ophthalmic route.     • naproxen (NAPROSYN) 500 MG Tab Take 1 Tab by mouth 2 times a day, with meals. 60 Tab 1   • ANORO ELLIPTA 62.5-25 MCG/INH AEROSOL POWDER, BREATH ACTIVATED inhaler USE 1 INHALATION EVERY DAY 3 Inhaler 3   • ascorbic acid (ASCORBIC ACID) 500 MG Tab Take 500 mg by mouth every day.     • Multiple Vitamin (MULTI VITAMIN DAILY PO) Take  by mouth.     • aspirin 81 MG tablet Take 81 mg by mouth every day. One tablet by mouth daily     • Coenzyme Q10 (COQ10) 100 MG Cap Take 100 mg by mouth.     • alprazolam (XANAX) 0.5 MG Tab Take 0.5 mg by mouth at bedtime as needed for Sleep.     • ezetimibe (ZETIA) 10 MG Tab Take 10 mg by mouth every day.     • rosuvastatin (CRESTOR) 40 MG tablet Take 40 mg by mouth every day.     • carvedilol (COREG) 25 MG Tab Take 25 mg by mouth 2 times a day, with meals.     •  "ramipril (ALTACE) 10 MG capsule Take 10 mg by mouth every day.       No current facility-administered medications for this visit.        Allergies   Allergen Reactions   • Pollen Extract    • Sulfa Drugs Hives       Blood pressure 120/80, pulse (!) 114, temperature 36.6 °C (97.9 °F), temperature source Temporal, resp. rate 16, height 1.549 m (5' 1\"), weight 54.9 kg (121 lb), SpO2 94 %, not currently breastfeeding.    PE:   Appearance: Well developed, well nourished, no acute distress  Eyes: PERRL, EOM intact, sclera white, conjunctiva moist  Ears: no lesions or deformities  Hearing: grossly intact  Nose: no lesions or deformities  Oropharynx: tongue normal, posterior pharynx without erythema or exudate  Neck: supple, trachea midline, no masses   Respiratory effort: no intercostal retractions or use of accessory muscles  Lung auscultation: no rales, rhonchi or wheezes  Heart auscultation: no murmur rub or gallop  Extremities: no cyanosis or edema  Abdomen: soft ,non tender, no masses  Gait and Station: normal  Digits and nails: no clubbing, cyanosis, petechiae or nodes.  Cranial nerves: grossly intact  Skin: no rashes, lesions or ulcers noted  Orientation: Oriented to time, person and place  Mood and affect: mood and affect appropriate, normal interaction with examiner  Judgement: Intact          Assessment:    1. Chronic obstructive pulmonary disease, unspecified COPD type (HCC)  PULMONARY FUNCTION TESTS -Test requested: Complete Pulmonary Function Test    DME O2 New Set Up   2. Hypoxemia  Multiple Oximetry    Overnight Home Sleep Study    PULMONARY FUNCTION TESTS -Test requested: Complete Pulmonary Function Test    REFERRAL TO CARDIOLOGY    DME O2 New Set Up   3. Allergic rhinitis, unspecified seasonality, unspecified trigger     4. Abnormal CT scan, chest     5. Alpha-1-antitrypsin deficiency carrier (HCC)     6. History of tobacco abuse     7. Metastatic breast cancer (HCC)  REFERRAL TO CARDIOLOGY   8. Pulmonary " hypertension (HCC)  Overnight Home Sleep Study    REFERRAL TO CARDIOLOGY    DME O2 New Set Up         Plan:    1) She has evidence of worsening dyspnea over the past few months. CTA negative for PE or significant interstitial changes. Echo indicates severe Pulmonary Hypertension. Priro Echo in June they were unable to measure RVSP. Referral to Cardiology, Pulm HTN Specialist. HST now to rule out sleep apnea. PFT's now to rule out restrictive changes or reduced DLCO. If PFT's indicates restrictive changes then consider HRCT. Multi oximetry indicates a low 02 of 86%. Add 02 2 LPM nocturnally and prn exertion.  2) Continue Anoro and Albuterol inhalers.  3) Patient is up to date on her Prevnar 13, Pneumovax 23 and Influenza vaccinations.  4) Continue to follow with Oncology.   5) Follow up after HST with updated PFT's, sooner OV if needed. Pending Cardiology consult as well.

## 2018-10-11 NOTE — PATIENT INSTRUCTIONS
Plan:    1) She has evidence of worsening dyspnea over the past few months. CTA negative for PE or significant interstitial changes. Echo indicates severe Pulmonary Hypertension. Priro Echo in June they were unable to measure RVSP. Referral to Cardiology, Pulm HTN Specialist. HST now to rule out sleep apnea. PFT's now to rule out restrictive changes or reduced DLCO. If PFT's indicates restrictive changes then consider HRCT. Multi oximetry indicates a low 02 of 86%. Add 02 2 LPM nocturnally and prn exertion.  2) Continue Anoro and Albuterol inhalers.  3) Patient is up to date on her Prevnar 13, Pneumovax 23 and Influenza vaccinations.  4) Continue to follow with Oncology.   5) Follow up after HST with updated PFT's, sooner OV if needed. Pending Cardiology consult as well.

## 2018-10-11 NOTE — PROCEDURES
Multi-Ox Readings  Multi Ox #1 Room air   O2 sat % at rest 94   O2 sat % on exertion 86   O2 sat average on exertion     Multi Ox #2 2 LPM   O2 sat % at rest 94   O2 sat % on exertion 89   O2 sat average on exertion       Oxygen Use     Oxygen Frequency     Duration of need     Is the patient mobile within the home?     CPAP Use?     BIPAP Use?     Servo Titration

## 2018-10-16 NOTE — PATIENT COMMUNICATION
Sandra, Can we try to get her scheduled urgently with PFT's and HST with follow up? Please let her know that she should keep that appointment with Cardiology. We can try to arrange a sooner Cardiology consult if her Pulmonary work up is negative. However we need to get testing completed first.

## 2018-10-16 NOTE — TELEPHONE ENCOUNTER
----- Message from Zuly Christian sent at 10/16/2018  9:31 AM PDT -----  Regarding: Non-Urgent Medical Question  Contact: [phone]  I called Cardiology and asked for an appt. with their Pulmonary Hypertensive specialist.   The earliest available is for 1/7/2019 with Dr. London Hebert @ 2:15 pm.   Is this soon enough?

## 2018-10-17 PROBLEM — R91.1 SOLITARY PULMONARY NODULE: Status: ACTIVE | Noted: 2018-01-01

## 2018-10-17 PROBLEM — C79.51 MALIGNANT NEOPLASM METASTATIC TO BONE (HCC): Status: ACTIVE | Noted: 2018-01-01

## 2018-10-17 PROBLEM — M84.40XA PATHOLOGICAL FRACTURE: Status: ACTIVE | Noted: 2018-01-01

## 2018-10-17 NOTE — PROGRESS NOTES
Established Patient    Zuly presents today with the following:    CC: med refill     HPI:   69 yo woman with recent diagnosis of stage IV triple negative invasive ductal cancer of the left breast metastatic to bone, COPD (on home nocturnal oxygen), alpha-1 antitrypsin (carrier) and solitary pulmonary nodule who presents to clinic for follow up.      Stage IV invasive ductal carcinoma metastatic to bone   Chronic pain due to malignancy   Chronic use of opiates for therapeutic purposes   On Taxol every 3 weeks per her oncologist. Also seen by radiation oncology previously for metastatic bone lesions to axial & appendicular skeleton, pathological fracture in right acetabulum. Pain significantly improved now.     Still taking Naproxen twice daily. She was prescribed tramadol at her last visit, but has used it very sparingly.  reviewed today. Patient declines any refills for tramadol today.     COPD   Solitary pulmonary nodule   Chronic hypoxic respiratory failure on nocturnal oxygen therapy   She is followed by pulmonology. She wears oxygen at night and with severe exertion, otherwise does not wear it with light activity. Has pulse ox at home and agreeable to checking it.   Pneumovax 23 and Prevnar 13 vaccinations previously given by pulmonary -- up to date.   She was previously following with low dose CT scan for lung cancer screening which found a solitary pulmonary nodule, no evidence this is metastatic on PET scan.   Stable, no recent COPD exacerbations. On Anoro and Proair.   Carrier for Alpha 1 antitrypsin deficiency. No infusion treatments given by pulonary.   Denies orthopnea, dyspnea, exertional dyspnea, cough, chest pain or pleurisy.     Preventive care  Flu - received 9/26/18   TD-   TDaP - recived 7/6/18  Pneumococcal - 11/2015   Prevnar - received 11/2015  Colonoscopy - utd, 2016   Zostavax-obtain at outside Pharmacy     Women only  Pap - not indicated   Mammogram - undergoing current tx for  breast cancer   Dexa - due, undergoing current tx with Denosumab with oncology      Patient Active Problem List    Diagnosis Date Noted   • Malignant neoplasm metastatic to bone (HCC) 10/17/2018   • Pathological fracture 10/17/2018   • Solitary pulmonary nodule 10/17/2018   • SOB (shortness of breath) 10/02/2018   • Abdominal aortic aneurysm (AAA) ([facility]) 09/12/2018   • Alpha 1-antitrypsin PiMS phenotype 09/12/2018   • Stenosis of carotid artery 09/12/2018   • Centrilobular emphysema ([facility]) 09/12/2018   • Gastroesophageal reflux disease 09/12/2018   • Subclavian steal syndrome 09/12/2018   • Mixed anxiety depressive disorder 09/12/2018   • Metastatic breast cancer ([facility]) 06/19/2018   • Arm swelling 06/07/2018   • Occlusion and stenosis of unspecified carotid artery 07/20/2017   • Abnormal CT scan, chest 08/22/2016   • COPD (chronic obstructive pulmonary disease) ([facility]) 08/22/2016   • Essential hypertension 08/22/2016   • Peripheral vascular disease ([facility]) 08/22/2016   • Dyslipidemia 08/22/2016   • Allergic rhinitis 08/22/2016       Current Outpatient Prescriptions   Medication Sig Dispense Refill   • amitriptyline (ELAVIL) 100 MG Tab Take 1 Tab by mouth every bedtime. 90 Tab 1   • raNITidine (ZANTAC) 150 MG Tab Take 1 Tab by mouth 2 times a day. 60 Tab 3   • FLUoxetine (PROZAC) 20 MG Cap Take 1 Cap by mouth every day. 90 Cap 0   • lidocaine-prilocaine (EMLA) 2.5-2.5 % Cream Apply to port one hour prior to access and cover with plastic wrap. 1 Tube 3   • glucosamine 500 MG Cap Take  by mouth every day.     • CHONDROITIN SULFATE PO Take  by mouth.     • Lifitegrast (XIIDRA OP) by Ophthalmic route.     • naproxen (NAPROSYN) 500 MG Tab Take 1 Tab by mouth 2 times a day, with meals. 60 Tab 1   • ANORO ELLIPTA 62.5-25 MCG/INH AEROSOL POWDER, BREATH ACTIVATED inhaler USE 1 INHALATION EVERY DAY 3 Inhaler 3   • Multiple Vitamin (MULTI VITAMIN DAILY PO) Take  by mouth.     • aspirin 81 MG tablet Take 81 mg by mouth every day. One  "tablet by mouth daily     • Coenzyme Q10 (COQ10) 100 MG Cap Take 100 mg by mouth.     • ezetimibe (ZETIA) 10 MG Tab Take 10 mg by mouth every day.     • carvedilol (COREG) 25 MG Tab Take 12.5 mg by mouth 2 times a day, with meals.     • enoxaparin (LOVENOX) 60 MG/0.6ML Solution inj Inject 60 mg as instructed every 12 hours. (Patient not taking: Reported on 10/17/2018) 20 Syringe 2   • sennosides-docusate sodium (SENOKOT-S) 8.6-50 MG tablet Take 1 Tab by mouth 1 time daily as needed (for constipation. Do not take if LOOSE STOOLS). 30 Tab 3   • polyethylene glycol/lytes (MIRALAX) Pack Take 1 Packet by mouth every day. 30 Each 0   • omega-3 acid ethyl esters (LOVAZA) 1 GM capsule Take 1 Cap by mouth 2 Times a Day. 180 Cap 3   • Calcium Polycarbophil (FIBER-CAPS PO) Take  by mouth.     • Oxymetazoline HCl (NASAL SPRAY NA) Spray  in nose.     • albuterol (PROAIR HFA) 108 (90 Base) MCG/ACT Aero Soln inhalation aerosol Inhale 2 Puffs by mouth every four hours as needed for Shortness of Breath (wheezing). 3 Inhaler 3   • ascorbic acid (ASCORBIC ACID) 500 MG Tab Take 500 mg by mouth every day.     • rosuvastatin (CRESTOR) 40 MG tablet Take 40 mg by mouth every day.     • ramipril (ALTACE) 10 MG capsule Take 10 mg by mouth every day.       No current facility-administered medications for this visit.        ROS: A 10 point ROS was completed and is negative except as stated above in HPI.   /66 (BP Location: Right arm, Patient Position: Sitting, BP Cuff Size: Adult)   Pulse 93   Temp 36.4 °C (97.6 °F) (Temporal)   Resp 19   Ht 1.575 m (5' 2\")   Wt 55.2 kg (121 lb 12.8 oz)   SpO2 91%   Breastfeeding? No   BMI 22.28 kg/m²     Physical Exam   Constitutional:  oriented to person, place, and time. No distress.   Eyes: Extraocular muscles intact. No scleral icterus.  Neck: Neck supple. No thyromegaly present.   Cardiovascular: Normal rate, regular rhythm and normal heart sounds.  Exam reveals no gallop and no friction " rub.  No murmur heard.  Pulmonary/Chest: Breath sounds normal. Musculoskeletal:   no edema.   Lymphadenopathy: no cervical adenopathy  Neurological: alert and oriented to person, place, and time. Moves all extremities. No resting or intention tremor. Normal coordination and gait.   Psych:euthymic, cooperative   Skin: No cyanosis. Nails show no clubbing.      Note: I have reviewed all pertinent labs and diagnostic tests associated with this visit with specific comments listed under the assessment and plan below    Assessment and Plan      1. Essential hypertension  - continue ramipril   - discuss follow up of AAA at next visit     2. Malignant neoplasm metastatic to bone (HCC)  - treatment per her oncologist     3. Pain due to malignant neoplasm metastatic to bone (HCC)  - refilled amitriptyline today   - counseled to cut down Naproxen to once daily (in morning) and take tramadol at night. Start Ranitidine 150 mg bid when still taking Naproxen. Discussed risks of high dose and long term NSAIDs, including renal impairment and gastric ulcers including life threatening bleeding   - amitriptyline (ELAVIL) 100 MG Tab; Take 1 Tab by mouth every bedtime.  Dispense: 90 Tab; Refill: 1      Followup: Return in about 3 months (around 1/17/2019).  AAA follow up, refill tramadol and need for urine drug screen     Signed by: Kaylee Mandujano M.D.

## 2018-10-17 NOTE — PATIENT INSTRUCTIONS
Take Ranitidine every morning and evening to protect your stomach when taking Naproxen.   Try taking tramadol at night instead of naproxen. The point is to minimize damage to stomach and kidneys that Naproxen can cause in the long run.   Tramadol and fluoxetine work similarly and can have side effects. But you are on low doses for both these medications.   Amitriptyline has been refilled for 90 days with one additional refill (total 180 days supply).

## 2018-10-18 PROBLEM — Z99.81 DEPENDENCE ON NOCTURNAL OXYGEN THERAPY: Status: ACTIVE | Noted: 2018-01-01

## 2018-10-20 NOTE — PROGRESS NOTES
"Pharmacy Chemotherapy calculation:    Dx: metastatic Triple negative breast cancer    Cycle 3  Previous treatment =  Cycle 2 on 9/12/18 s/p weekly Taxol x 4 weeks    Regimen: Taxol  *Dosing Reference*  Paclitaxel 175 mg/m² IV over 3 hrs on Day 1  21-day cycle until disease progression or unacceptable toxicity  NCCN Guidelines for Breast Cancer V.1.2018  Leyda DUNN, et al. J Clin Oncol. 1005;13(10):6531-81    Allergies:  Sulfa drugs    /50   Pulse 85   Temp 36.4 °C (97.6 °F)   Resp 18   Ht 1.562 m (5' 1.5\")   Wt 55.8 kg (123 lb 0.3 oz)   SpO2 92%   BMI 22.87 kg/m²  Body surface area is 1.56 meters squared.    10/24/18  ANC~ 7370 Plt = 385k   Hgb = 12.1      SCr = 0.47mg/dL CrCl ~ 68mL/min (min Scr = 0.7)   Calcium = 9.5  LFT's = WNL TBili = 0.3         OK for denosumab (Xgeva) 120 mg SQ given 10/25/18 - Q 28 Days per supportive care plan      Paclitaxel (Taxol) 175 mg/m² x 1.56 m² = 273 mg   <5% difference, okay to treat with final dose = 273 mg IV      Dinora Boyd, PharmD  "

## 2018-10-24 NOTE — PROGRESS NOTES
Subjective:      Zuly Christian is a 68 y.o. female who presents for Follow-Up (Pre Chemo) evaluation prior to cycle 3 Taxol for breast cancer      HPI   Ms. Christian presents for evaluation prior to cycle 3 Taxol for treatment of stage IV high-grade, triple negative, invasive ductal cell carcinoma of the left breast.  She is accompanied by her friend for today's visit.    Treatment had been delayed due to shortness of breath and further associated workup. She was evaluated for pulmonary hypertension is established with Dr. Hebert. Her overall fatigue and malaise are improved with the additional recovery time. She is now using O2 at night. Hip and clavicular pain remain stable since completion of radiation therapy. She is otherwise feeling better overall and ready to resume treatment.      Allergies   Allergen Reactions   • Pollen Extract    • Sulfa Drugs Hives       Current Outpatient Prescriptions on File Prior to Visit   Medication Sig Dispense Refill   • amitriptyline (ELAVIL) 100 MG Tab Take 1 Tab by mouth every bedtime. 90 Tab 1   • raNITidine (ZANTAC) 150 MG Tab Take 1 Tab by mouth 2 times a day. 60 Tab 3   • FLUoxetine (PROZAC) 20 MG Cap Take 1 Cap by mouth every day. 90 Cap 0   • glucosamine 500 MG Cap Take  by mouth every day.     • CHONDROITIN SULFATE PO Take  by mouth.     • Lifitegrast (XIIDRA OP) by Ophthalmic route.     • naproxen (NAPROSYN) 500 MG Tab Take 1 Tab by mouth 2 times a day, with meals. 60 Tab 1   • ANORO ELLIPTA 62.5-25 MCG/INH AEROSOL POWDER, BREATH ACTIVATED inhaler USE 1 INHALATION EVERY DAY 3 Inhaler 3   • Multiple Vitamin (MULTI VITAMIN DAILY PO) Take  by mouth.     • aspirin 81 MG tablet Take 81 mg by mouth every day. One tablet by mouth daily     • Coenzyme Q10 (COQ10) 100 MG Cap Take 100 mg by mouth.     • ezetimibe (ZETIA) 10 MG Tab Take 10 mg by mouth every day.     • carvedilol (COREG) 25 MG Tab Take 12.5 mg by mouth 2 times a day, with meals.     • sennosides-docusate sodium  (SENOKOT-S) 8.6-50 MG tablet Take 1 Tab by mouth 1 time daily as needed (for constipation. Do not take if LOOSE STOOLS). 30 Tab 3   • polyethylene glycol/lytes (MIRALAX) Pack Take 1 Packet by mouth every day. 30 Each 0   • omega-3 acid ethyl esters (LOVAZA) 1 GM capsule Take 1 Cap by mouth 2 Times a Day. 180 Cap 3   • lidocaine-prilocaine (EMLA) 2.5-2.5 % Cream Apply to port one hour prior to access and cover with plastic wrap. 1 Tube 3   • Calcium Polycarbophil (FIBER-CAPS PO) Take  by mouth.     • Oxymetazoline HCl (NASAL SPRAY NA) Spray  in nose.     • albuterol (PROAIR HFA) 108 (90 Base) MCG/ACT Aero Soln inhalation aerosol Inhale 2 Puffs by mouth every four hours as needed for Shortness of Breath (wheezing). 3 Inhaler 3   • ascorbic acid (ASCORBIC ACID) 500 MG Tab Take 500 mg by mouth every day.     • rosuvastatin (CRESTOR) 40 MG tablet Take 40 mg by mouth every day.     • ramipril (ALTACE) 10 MG capsule Take 10 mg by mouth every day.       No current facility-administered medications on file prior to visit.          Review of Systems   Constitutional: Positive for malaise/fatigue (a bit better the break). Negative for chills, fever and weight loss (stable).   Respiratory: Positive for shortness of breath (with exertion). Negative for cough and wheezing.         New O2 at night and with exercise - ordered per Pulm   Cardiovascular: Negative for chest pain, palpitations and leg swelling.        LUE lymphedema uses home SCD/pneumatic device - daily   Gastrointestinal: Positive for constipation (baseline - resolves with own regimen; Last BM last night). Negative for diarrhea, nausea and vomiting.   Genitourinary: Negative for dysuria.   Musculoskeletal: Positive for joint pain (intermittent at right hip (more with walking) and left calvicle - s/p XRT). Negative for myalgias.   Neurological: Positive for tingling (left thumb). Negative for dizziness and headaches.          Objective:     There were no vitals taken  for this visit.     Physical Exam   Constitutional: She is oriented to person, place, and time. She appears well-developed and well-nourished. No distress.   fatigued   HENT:   Head: Normocephalic and atraumatic.   Mouth/Throat: Oropharynx is clear and moist. No oropharyngeal exudate.   Eyes: Pupils are equal, round, and reactive to light. Conjunctivae and EOM are normal. Right eye exhibits no discharge. Left eye exhibits no discharge. No scleral icterus.   Neck: Normal range of motion. Neck supple.   Cardiovascular: Normal rate, regular rhythm and normal heart sounds.  Exam reveals no gallop and no friction rub.    No murmur heard.  Pulmonary/Chest: Effort normal and breath sounds normal. No respiratory distress. She has no wheezes.   Abdominal: Soft. Bowel sounds are normal. She exhibits no distension. There is no tenderness.   Musculoskeletal: Normal range of motion. She exhibits no edema.   Neurological: She is alert and oriented to person, place, and time.   Skin: Skin is warm and dry. No rash noted. She is not diaphoretic. No erythema. No pallor.   Psychiatric: She has a normal mood and affect. Her behavior is normal.       Hospital Outpatient Visit on 10/24/2018   Component Date Value Ref Range Status   • WBC 10/24/2018 10.9* 4.8 - 10.8 K/uL Final   • RBC 10/24/2018 4.47  4.20 - 5.40 M/uL Final   • Hemoglobin 10/24/2018 12.1  12.0 - 16.0 g/dL Final   • Hematocrit 10/24/2018 38.7  37.0 - 47.0 % Final   • MCV 10/24/2018 86.6  81.4 - 97.8 fL Final   • MCH 10/24/2018 27.1  27.0 - 33.0 pg Final   • MCHC 10/24/2018 31.3* 33.6 - 35.0 g/dL Final   • RDW 10/24/2018 54.1* 35.9 - 50.0 fL Final   • Platelet Count 10/24/2018 385  164 - 446 K/uL Final   • MPV 10/24/2018 9.5  9.0 - 12.9 fL Final   • Neutrophils-Polys 10/24/2018 67.80  44.00 - 72.00 % Final   • Lymphocytes 10/24/2018 9.40* 22.00 - 41.00 % Final   • Monocytes 10/24/2018 9.90  0.00 - 13.40 % Final   • Eosinophils 10/24/2018 11.60* 0.00 - 6.90 % Final   •  Basophils 10/24/2018 0.30  0.00 - 1.80 % Final   • Immature Granulocytes 10/24/2018 1.00* 0.00 - 0.90 % Final   • Nucleated RBC 10/24/2018 0.00  /100 WBC Final   • Neutrophils (Absolute) 10/24/2018 7.37* 2.00 - 7.15 K/uL Final    Includes immature neutrophils, if present.   • Lymphs (Absolute) 10/24/2018 1.02  1.00 - 4.80 K/uL Final   • Monos (Absolute) 10/24/2018 1.07* 0.00 - 0.85 K/uL Final   • Eos (Absolute) 10/24/2018 1.26* 0.00 - 0.51 K/uL Final   • Baso (Absolute) 10/24/2018 0.03  0.00 - 0.12 K/uL Final   • Immature Granulocytes (abs) 10/24/2018 0.11  0.00 - 0.11 K/uL Final   • NRBC (Absolute) 10/24/2018 0.00  K/uL Final   • Sodium 10/24/2018 137  135 - 145 mmol/L Final   • Potassium 10/24/2018 4.5  3.6 - 5.5 mmol/L Final   • Chloride 10/24/2018 106  96 - 112 mmol/L Final   • Co2 10/24/2018 23  20 - 33 mmol/L Final   • Anion Gap 10/24/2018 8.0  0.0 - 11.9 Final   • Glucose 10/24/2018 68  65 - 99 mg/dL Final   • Bun 10/24/2018 20  8 - 22 mg/dL Final   • Creatinine 10/24/2018 0.47* 0.50 - 1.40 mg/dL Final   • Calcium 10/24/2018 9.5  8.5 - 10.5 mg/dL Final   • AST(SGOT) 10/24/2018 20  12 - 45 U/L Final   • ALT(SGPT) 10/24/2018 15  2 - 50 U/L Final   • Alkaline Phosphatase 10/24/2018 94  30 - 99 U/L Final   • Total Bilirubin 10/24/2018 0.3  0.1 - 1.5 mg/dL Final   • Albumin 10/24/2018 3.9  3.2 - 4.9 g/dL Final   • Total Protein 10/24/2018 6.5  6.0 - 8.2 g/dL Final   • Globulin 10/24/2018 2.6  1.9 - 3.5 g/dL Final   • A-G Ratio 10/24/2018 1.5  g/dL Final   • Ca 27.29 10/24/2018 100.9* 0.0 - 40.0 U/mL Final    Comment: INTERPRETIVE INFORMATION: Cancer Antigen 27.29  The CA 27.29 assay is intended for use in monitorin) disease  progression and/or response to therapy in patients with metastatic  disease, and 2) disease recurrence in patients treated previously  for stages II or III breast carcinoma who are clinically free of  the disease. Serial testing in patients who are clinically free of  disease should be  used in conjunction with other clinical methods  for early detection of cancer recurrence.  Limitations: Patients with confirmed breast carcinoma frequently  have CA 27.29 assay values in the same range as healthy  individuals.  Elevations may also be observed in patients with non  malignant disease. Results of this test must always be interpreted  in the context of morphologic and other relevant data, and should  not be used alone for a diagnosis of malignancy.  Methodology: Siemens Advia Centaur CA 27.29 chemiluminescent  immunoassay was used. Results obtained with different assay  methods                            or kits cannot be used interchangeably.  Performed by [facility],  [address] [place] [id number] [phone]  [email or web address] Demetrius Hewitt MD - Lab. Director     • GFR If  10/24/2018 >60  >60 mL/min/1.73 m 2 Final   • GFR If Non  10/24/2018 >60  >60 mL/min/1.73 m 2 Final         Ct-cta Chest Pulmonary Artery W/ Recons    Result Date: 10/2/2018  10/2/2018 6:06 PM HISTORY/REASON FOR EXAM:  Shortness of breath TECHNIQUE/EXAM DESCRIPTION: CT angiogram scan for pulmonary embolism with contrast, with reconstructions. 1.25 mm helical sections were obtained from the lung apices through the lung bases following the rapid bolus administration of 50 mL of Omnipaque 350 nonionic contrast. Thin-section overlapping reconstruction interval was utilized. Coronal reconstructions were generated from the axial data. MIP post processing was performed and utilized for the interpretation. Low dose optimization technique was utilized for this CT exam including automated exposure control and adjustment of the mA and/or kV according to patient size. COMPARISON: 11/2/2017 FINDINGS: Pulmonary Embolism: None. Lungs: Emphysema. Interstitial thickening in the left apex, likely chronic. No small opacity in the dependent right lower lobe, favor atelectasis over consolidation. Stable 7 mm  nodule in the left upper lobe. No new suspicious nodules or masses. No pleural effusions. Mediastinum: Unremarkable thyroid. No mediastinal mass. Nodes: No enlarged lymph nodes. Heart: Not enlarged. Trace pericardial effusion. Coronary calcifications. Aorta and great vessels: No aneurysm. Atherosclerosis. Patent left subclavian stent. Chest port in the right chest wall with tip in the SVC. Musculoskeletal structures: Numerous osseous metastases, some of them sclerotic and some lytic, involving the visualized axial and appendicular skeleton. Age-indeterminate compression deformity of T5 vertebral body, with approximately 50% vertebral body height loss. Upper abdomen: Stable left adrenal adenoma.     1. No evidence of pulmonary embolus. 2. Small opacity in the dependent right lower lobe, favor atelectasis or consolidation. 3. Numerous sclerotic and lytic osseous metastases. 4. Age-indeterminate T5 compression fracture, likely pathologic.  It may be acute. Correlate for point tenderness.      Ec-echocardiogram Complete W/o Cont    Result Date: 10/2/2018  Transthoracic Echo Report Echocardiography Laboratory CONCLUSIONS Mild concentric left ventricular hypertrophy. Normal left ventricular systolic function. Left ventricular ejection fraction is visually estimated to be 60%. Indeterminate diastolic function. Normal inferior vena cava size and inspiratory collapse. Estimated right ventricular systolic pressure  is 58 mmHg. DILEEP RODRÍGUEZ Exam Date:         10/02/2018                    13:06 Exam Location:     Out Patient Priority:          Routine Ordering Physician:        SANJEEV DEUTSCH M Referring Physician:       [id number] NENA Sonographer:               Fauzia Thornton RDCS Age:    68     Gender:    F MRN:    [id number] :    1950 BSA:    1.53   Ht (in):    61     Wt (lb):    122 Exam Type:     Complete Indications:     Shortness of breath ICD Codes:        CPT Codes:       02357 BP:   109    /   64     HR:    105 Technical Quality:       Fair MEASUREMENTS  (Male / Female) Normal Values 2D ECHO LV Diastolic Diameter PLAX        3.7 cm                4.2 - 5.9 / 3.9 - 5.3 cm LV Systolic Diameter PLAX         2.5 cm                2.1 - 4.0 cm IVS Diastolic Thickness           1.1 cm                LVPW Diastolic Thickness          1.2 cm                LVOT Diameter                     1.5 cm                Estimated LV Ejection Fraction    60 %                  LV Ejection Fraction MOD BP       55.8 %                >= 55  % LV Ejection Fraction MOD 4C       54 %                  LV Ejection Fraction MOD 2C       59.6 %                IVC Diameter                      0.76 cm               M-MODE Aortic Root Diameter MM           3.2 cm                DOPPLER AV Peak Velocity                  1 m/s                 AV Peak Gradient                  4.2 mmHg              AV Mean Gradient                  2 mmHg                LVOT Peak Velocity                0.74 m/s              AV Area Cont Eq vti               1.5 cm²               MV Velocity Time Integral         15.2 cm               Mitral E Point Velocity           0.65 m/s              Mitral E to A Ratio               0.68                  Mitral A Duration                 71.5 ms               MV Pressure Half Time             33.4 ms               MV Area PHT                       6.6 cm²               MV Deceleration Time              113 ms                TR Peak Velocity                  369 cm/s              * Indicates values subject to auto-interpretation LV EF:  60    % FINDINGS Left Ventricle Normal left ventricular chamber size. Mild concentric left ventricular hypertrophy. Normal left ventricular systolic function. Left ventricular ejection fraction is visually estimated to be 60%. Normal regional wall motion. Indeterminate diastolic function. Right Ventricle The right ventricle was normal in size and function. Right Atrium The right atrium is normal  in size. Normal inferior vena cava size and inspiratory collapse. Left Atrium The left atrium is normal in size. Left atrial volume index is 13 mL/sq m. Mitral Valve Structurally normal mitral valve without significant stenosis or regurgitation. Aortic Valve Structurally normal aortic valve without significant stenosis or regurgitation. Tricuspid Valve Structurally normal tricuspid valve. Mild to moderate tricuspid regurgitation. Estimated right ventricular systolic pressure  is 58 mmHg. Right atrial pressure is estimated to be 3 mmHg. Pulmonic Valve The pulmonic valve is not well visualized. No pulmonic insufficiency. Pericardium Normal pericardium without effusion. Aorta The aortic root is normal. Ascending aorta diameter is 3.0 cm. Laura Vazquez MD (Electronically Signed) Final Date:     02 October 2018                 15:37       Assessment/Plan:     1. Metastatic breast cancer (HCC)  REFERRAL TO CARDIOLOGY   2. Encounter for antineoplastic chemotherapy     3. Abdominal aortic aneurysm (AAA) without rupture (HCC)  REFERRAL TO CARDIOLOGY   4. Stenosis of carotid artery, unspecified laterality  REFERRAL TO CARDIOLOGY   5. Dependence on nocturnal oxygen therapy     6. Osteopenia of multiple sites           1.  Osteopenia/fracture: History of pathological fracture, stable on monthly Xgeva, next due 11/21/18.    2.  Nocturnal hypoxia: Patient is now followed by Pulmonology and using O2 at night.     3.  Cardiac history: Patient referred to cardio-oncology program.    4.  Breast cancer: Cycle 3 was delayed due to fatigue and SOB. She is now established with pulmonology and referred to cardiology for further workup. She is feeling better overall and prepared to resume treatment. CBC and CMP have been evaluated and found to be within acceptable limits. Ca 27-29 is noted to be increasing. She will proceed with cycle 3 and return in 3 weeks for evaluation prior to cycle 4, sooner as needed.    Patient expressing  preparedness for treatment plan change after completing current regimen. She has researched Tencentriq, talazoparib and will discuss further with Dr. Arellano at their next visit.          The patient verbalized agreement and understanding of current plan. All questions and concerns were addressed at time of visit.    Please note that this dictation was created using voice recognition software. I have made every reasonable attempt to correct obvious errors, but I expect that there are errors of grammar and possibly content that I did not discover before finalizing the note.

## 2018-10-25 NOTE — NON-PROVIDER
Zuly Christian is a 68 y.o. female here for a non-provider visit for: Lab Draws  on 10/25/2018 at 8:02 AM    Procedure Performed: Venipuncture     Anatomical site: right forearm     Equipment used: 25 Butterfly    Labs drawn: CBC w/diff, CMP and CA 27.29    Ordering Provider: TIMBO Santos By: Roxana Joyner, Med Ass't  1 re-stick on right AC was unsuccessful. 2nd stick was successful.   was present in the office the entire time I was doing the patients blood draw.

## 2018-10-25 NOTE — PROGRESS NOTES
Chemotherapy Verification - SECONDARY RN       Height = 156.2 cm  Weight = 55.8 kg  BSA = 1.56 m2       Medication: Paclitaxel (Taxol)  Dose: 175 mg/m2  Calculated Dose: 273 mg                              I confirm that this process was performed independently.

## 2018-10-25 NOTE — PROGRESS NOTES
Chemotherapy Verification - PRIMARY RN      Height = 156.2 cm  Weight = 55.8 kg  BSA = 1.56 m^2       Medication: Taxol  Dose: 175 mg/m^2  Calculated Dose: 273 mg                             (In mg/m2, AUC, mg/kg)     I confirm this process was performed independently with the BSA and all final chemotherapy dosing calculations congruent.  Any discrepancies of 5% or greater have been addressed with the chemotherapy pharmacist. The resolution of the discrepancy has been documented in the EPIC progress notes.

## 2018-10-25 NOTE — PROGRESS NOTES
"Pharmacy Chemotherapy Calculation    Patient Name: Zuly Christian   Dx: breast cancer, metastatic         Protocol: Taxol     Paclitaxel 175 mg/m² IV over 3 hrs on Day 1  21-day cycle until disease progression or unacceptable toxicity  NCCN Guidelines for Breast Cancer V.1.2018  Leyda DUNN, et al. JCO. 1995;13(10):4107-05.    Allergies:  Sulfa drugs     /50   Pulse 85   Temp 36.4 °C (97.6 °F)   Resp 18   Ht 1.562 m (5' 1.5\")   Wt 55.8 kg (123 lb 0.3 oz)   SpO2 92%   BMI 22.87 kg/m²  Body surface area is 1.56 meters squared.    LABS 10/24/2018:  ANC: 7370      WBC: 10.9     Plt: 385k   Hgb/Hct: 12.1/38.7     SCr: 0.47 mg/dL CrCl: 68 mL/min (SCr 0.7)    K: 4.5  Na: 137          Glu: 68  LFT's: WNL TBili = 0.3       Drug Order   (Drug name, dose, route, IV Fluid & volume, frequency, number of doses) Cycle 3 of q3w Taxol (pt preference)    Previous treatment: C2 (9/12/2018)  (s/p 4 cycles weekly Taxol, last 8/15/18)   Medication = Paclitaxel (Taxol)  Base Dose = 175 mg/m²  Calc Dose: Base Dose x Body surface area is 1.56 meters squared. m² = 273 mg  Final Dose = 273 mg  Route = IV  Fluid & Volume =  mL  Admin Duration = Over 3 hrs          <5% difference, okay to treat with final dose     By my signature below, I confirm this process was performed independently with the BSA and all final chemotherapy dosing calculations congruent. I have reviewed the above chemotherapy order and that my calculation of the final dose and BSA (when applicable) corroborate those calculations of the  pharmacist. Discrepancies of 5% or greater in the written dose have been addressed and documented within the [facility] Progress notes.      MICH Alejandre, PharmD  "

## 2018-10-26 NOTE — PROGRESS NOTES
Patient here for Day 1, Cycle 3 Taxol. Lidocaine used prior to accessing port. Port accessed using sterile technique; brisk blood return noted. Pre-medications given per MAR. Taxol given per MAR. Heparin instilled prior to de-accessing port. Port de-accessed; gauze/coban applied over site. Next appointment scheduled. Discharged to self care; no apparent distress noted.

## 2018-11-01 NOTE — TELEPHONE ENCOUNTER
From: Zuly Christian  To: ADRIA Garcia  Sent: 11/1/2018 9:14 AM PDT  Subject: Non-Urgent Medical Question    I have not received a call to schedule an cancer cardiologist appt.

## 2018-11-01 NOTE — TELEPHONE ENCOUNTER
Patient requested to schedule an appointment with the Cardiology office where Zulema IZQUIERDO referred her to. I contacted that office then transferred the patient.

## 2018-11-02 NOTE — ED TRIAGE NOTES
Chief Complaint   Patient presents with   • Fall   • Head Injury     hematoma left side   • Shoulder Injury     left abrasion     Pt bib ems, pt had witnessed glf , tripped on curve and fell on her left side hitting head and shoulder. When ems arrived, pt was confuse . Hematoma left side of head and bruising left shoulder.   Per pt no loc but she cannot remember what happened.

## 2018-11-02 NOTE — ED NOTES
Pt clear for d/c. Arm sling applied.  Educated on d/c instructions, verbalized understanding. No questions or concerned at time of d/c.

## 2018-11-02 NOTE — ED PROVIDER NOTES
ED Provider Note    CHIEF COMPLAINT  Chief Complaint   Patient presents with   • Fall   • Head Injury     hematoma left side   • Shoulder Injury     left abrasion       HPI  Zuly Christian is a 68 y.o. female who presents for evaluation of trauma.  The patient was brought in by 2 of her friends.  She was walking over to get into a car trip on a curb hit the left aspect of her forehead.  Possible brief loss because of consciousness but no witnessed seizures she has poor memory of the event.  She has an extensive medical history but is not on any explicit anticoagulants but does take baby aspirin and fish oils.  She also landed on her left shoulder and sustained an abrasion.  She specifically denies any numbness weakness or tingling.  No report of pain or injury to the neck or back or upper or lower extremities.  No chest or abdominal pain.  No preceding palpitations shortness of breath night sweats weight loss or any other systemic symptoms.  Injury occurred 30 minutes prior to arrival    REVIEW OF SYSTEMS  See HPI for further details.  Positive for headache confusion left shoulder pain all other systems are negative.     PAST MEDICAL HISTORY  Past Medical History:   Diagnosis Date   • Allergic rhinitis    • Cancer (HCC)     breast   • Cataract    • COPD (chronic obstructive pulmonary disease) (HCC)    • Emphysema of lung (HCC)    • Heart burn    • Hyperlipidemia    • Hypertension    • Osteopenia    • Peripheral vascular disease (HCC)        FAMILY HISTORY  Noncontributory    SOCIAL HISTORY  Social History     Social History   • Marital status:      Spouse name: N/A   • Number of children: N/A   • Years of education: N/A     Occupational History   • work in cardiology office Retired     Social History Main Topics   • Smoking status: Former Smoker     Packs/day: 1.00     Years: 50.00     Types: Cigarettes     Quit date: 5/1/2014   • Smokeless tobacco: Never Used      Comment: started at age 18, quit at 64,  1.5-2 ppd for 46 years   • Alcohol use Yes      Comment: 3 a month   • Drug use: No   • Sexual activity: No     Other Topics Concern   • Not on file     Social History Narrative   • No narrative on file     Ormer smoker  SURGICAL HISTORY  Past Surgical History:   Procedure Laterality Date   • CAROTID ENDARTERECTOMY  7/20/2017    Procedure: CAROTID ENDARTERECTOMY FOR : LEFT SUBCLAVIAN ARTERY ANGIOPLASTY / STENT BRACHIAL APPROACH WITH ULTRASOUND;  Surgeon: Dinora Saldana M.D.;  Location: SURGERY [facility];  Service:    • TUBAL LIGATION  1977   • GYN SURGERY      d+c 1979, 1980   • OTHER ORTHOPEDIC SURGERY      left knee surgery   • TONSILLECTOMY         CURRENT MEDICATIONS  Home Medications     Reviewed by Tania Byrd R.N. (Registered Nurse) on 11/02/18 at 1130  Med List Status: Partial   Medication Last Dose Status   albuterol (PROAIR HFA) 108 (90 Base) MCG/ACT Aero Soln inhalation aerosol PRN Active   amitriptyline (ELAVIL) 100 MG Tab Taking Active   ANORO ELLIPTA 62.5-25 MCG/INH AEROSOL POWDER, BREATH ACTIVATED inhaler Taking Active   ascorbic acid (ASCORBIC ACID) 500 MG Tab Not Taking Active   aspirin 81 MG tablet Taking Active   Calcium Polycarbophil (FIBER-CAPS PO) Taking Active   carvedilol (COREG) 25 MG Tab Taking Active   CHONDROITIN SULFATE PO Taking Active   Coenzyme Q10 (COQ10) 100 MG Cap Taking Active   ezetimibe (ZETIA) 10 MG Tab Taking Active   FLUoxetine (PROZAC) 20 MG Cap Taking Active   glucosamine 500 MG Cap Taking Active   lidocaine-prilocaine (EMLA) 2.5-2.5 % Cream PRN Active   Lifitegrast (XIIDRA OP) Taking Active   Multiple Vitamin (MULTI VITAMIN DAILY PO) Taking Active   naproxen (NAPROSYN) 500 MG Tab Taking Active   omega-3 acid ethyl esters (LOVAZA) 1 GM capsule Taking Active   Oxymetazoline HCl (NASAL SPRAY NA) PRN Active   polyethylene glycol/lytes (MIRALAX) Pack PRN Active   ramipril (ALTACE) 10 MG capsule Taking Active   raNITidine (ZANTAC) 150 MG Tab Taking Active  "  rosuvastatin (CRESTOR) 40 MG tablet Taking Active   sennosides-docusate sodium (SENOKOT-S) 8.6-50 MG tablet PRN Active                ALLERGIES  Allergies   Allergen Reactions   • Pollen Extract    • Sulfa Drugs Hives       PHYSICAL EXAM  VITAL SIGNS: /59   Pulse (!) 18   Resp 12   Ht 1.575 m (5' 2\")   Wt 54.4 kg (120 lb)   SpO2 91%   BMI 21.95 kg/m²       Constitutional: Well developed, Well nourished, No acute distress, Non-toxic appearance.   HENT: Hematoma noted to the left aspect of the forehead nonsuturable contusion no hemotympanum negative matt sign, Bilateral external ears normal, Oropharynx moist, No oral exudates, Nose normal.   Eyes: PERRLA, EOMI, Conjunctiva normal, No discharge.   Neck: Normal range of motion, No midline bony tenderness, Supple, No stridor.   Cardiovascular: Normal heart rate, Normal rhythm, No murmurs, No rubs, No gallops.   Thorax & Lungs: Normal breath sounds, No respiratory distress, No wheezing, No chest tenderness.   Abdomen: Bowel sounds normal, Soft, No tenderness, No masses, No pulsatile masses.   Skin: Warm, Dry, No erythema, No rash.   Back: No midline bone tenderness, No CVA tenderness.   Extremities: Intact distal pulses, tenderness noted over the distal third of the left clavicle  Neurologic: Alert & oriented x 3, Normal motor function, Normal sensory function, No focal deficits noted.   Psychiatric: Anxious        RADIOLOGY/PROCEDURES  DX-CLAVICLE LEFT   Final Result      Pathological fracture of the left clavicle.   Metastatic lesions involving the left clavicle.      DX-SHOULDER 2+ LEFT   Final Result      Mildly displaced mid left clavicular fracture.      CT-HEAD W/O   Final Result      No acute intracranial findings.           Results for orders placed or performed during the hospital encounter of 11/02/18   CBC WITH DIFFERENTIAL   Result Value Ref Range    WBC 7.8 4.8 - 10.8 K/uL    RBC 4.06 (L) 4.20 - 5.40 M/uL    Hemoglobin 11.1 (L) 12.0 - 16.0 " g/dL    Hematocrit 34.9 (L) 37.0 - 47.0 %    MCV 86.0 81.4 - 97.8 fL    MCH 27.3 27.0 - 33.0 pg    MCHC 31.8 (L) 33.6 - 35.0 g/dL    RDW 54.0 (H) 35.9 - 50.0 fL    Platelet Count 375 164 - 446 K/uL    MPV 10.1 9.0 - 12.9 fL    Neutrophils-Polys 64.90 44.00 - 72.00 %    Lymphocytes 12.50 (L) 22.00 - 41.00 %    Monocytes 13.10 0.00 - 13.40 %    Eosinophils 7.90 (H) 0.00 - 6.90 %    Basophils 0.30 0.00 - 1.80 %    Immature Granulocytes 1.30 (H) 0.00 - 0.90 %    Nucleated RBC 0.00 /100 WBC    Neutrophils (Absolute) 5.07 2.00 - 7.15 K/uL    Lymphs (Absolute) 0.98 (L) 1.00 - 4.80 K/uL    Monos (Absolute) 1.02 (H) 0.00 - 0.85 K/uL    Eos (Absolute) 0.62 (H) 0.00 - 0.51 K/uL    Baso (Absolute) 0.02 0.00 - 0.12 K/uL    Immature Granulocytes (abs) 0.10 0.00 - 0.11 K/uL    NRBC (Absolute) 0.00 K/uL   COMP METABOLIC PANEL   Result Value Ref Range    Sodium 135 135 - 145 mmol/L    Potassium 4.8 3.6 - 5.5 mmol/L    Chloride 105 96 - 112 mmol/L    Co2 23 20 - 33 mmol/L    Anion Gap 7.0 0.0 - 11.9    Glucose 79 65 - 99 mg/dL    Bun 20 8 - 22 mg/dL    Creatinine 0.48 (L) 0.50 - 1.40 mg/dL    Calcium 9.0 8.5 - 10.5 mg/dL    AST(SGOT) 24 12 - 45 U/L    ALT(SGPT) 14 2 - 50 U/L    Alkaline Phosphatase 83 30 - 99 U/L    Total Bilirubin 0.3 0.1 - 1.5 mg/dL    Albumin 3.5 3.2 - 4.9 g/dL    Total Protein 6.2 6.0 - 8.2 g/dL    Globulin 2.7 1.9 - 3.5 g/dL    A-G Ratio 1.3 g/dL   ESTIMATED GFR   Result Value Ref Range    GFR If African American >60 >60 mL/min/1.73 m 2    GFR If Non African American >60 >60 mL/min/1.73 m 2        COURSE & MEDICAL DECISION MAKING  Pertinent Labs & Imaging studies reviewed. (See chart for details)  Emergent CT scan of the head was performed.  There does not appear to be any evidence of metastatic disease skull fracture intracranial hemorrhage etc.  She does have what appears to be a pathological fracture of the left clavicle.  She has known metastatic disease to her axial skeleton therefore this is not  surprising.  She is neurovascularly intact.  She Rai has pain medicine at home and will be placed in a brace.  This will likely be nonoperative.  Follow-up with PCP    FINAL IMPRESSION  1.   1. Closed head injury, initial encounter    2. Closed displaced fracture of shaft of right clavicle, initial encounter               Electronically signed by: John Kuhn, 11/2/2018 12:08 PM

## 2018-11-05 PROBLEM — R40.20 LOSS OF CONSCIOUSNESS (HCC): Status: ACTIVE | Noted: 2018-01-01
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
## 2018-11-05 PROBLEM — S42.032A DISPLACED FRACTURE OF LATERAL END OF LEFT CLAVICLE, INITIAL ENCOUNTER FOR CLOSED FRACTURE: Status: ACTIVE | Noted: 2018-01-01

## 2018-11-06 NOTE — PROGRESS NOTES
Established Patient    Zuly presents today with the following:    CC: follow up from ED for recent fall    HPI:  Ms. Christian is a 69 yo F with pmhx of breast cancer with bone metastasis is here for  follow up from ED for recent fall. Pt had a fall on ground on 11/2/18 that was resulted in closed displaced fracture of left clavicle as well as skin abrasion/ecchymosis of left side of head, hematoma of left aspect of forehead and around left eye. Pt has LOC briefly and regained consciousness with some confusion later that was resolved slowly over time. Pt had work up in ED with impression of closed head injury ( CT head w/o showed no acute intracranial findings) and mildly displaced mid left clavicle fracture ( associated with cancer myentasis fracture). Pt denies pre-syncopal symptoms, chest pain, SOB, palpitation, dizziness, vago-vagal episode, FND, or vertigo at that time and since then. Denies previous falls. Her friends reported that occasionally her gait might be unstable. Pt is using arm-sling for now. Otherwise no concern or symptoms. Has follow up with pulmonary/cardiology/oncology this month .       Patient Active Problem List    Diagnosis Date Noted   • Displaced fracture of lateral end of left clavicle, initial encounter for closed fracture 11/05/2018   • Fall 11/05/2018   • Loss of consciousness (HCC) 11/05/2018   • Dependence on nocturnal oxygen therapy 10/18/2018   • Malignant neoplasm metastatic to bone (HCC) 10/17/2018   • Pathological fracture 10/17/2018   • Solitary pulmonary nodule 10/17/2018   • SOB (shortness of breath) 10/02/2018   • Abdominal aortic aneurysm (AAA) (HCC) 09/12/2018   • Alpha 1-antitrypsin PiMS phenotype 09/12/2018   • Stenosis of carotid artery 09/12/2018   • Centrilobular emphysema (HCC) 09/12/2018   • Gastroesophageal reflux disease 09/12/2018   • Subclavian steal syndrome 09/12/2018   • Mixed anxiety depressive disorder 09/12/2018   • Metastatic breast cancer (HCC) 06/19/2018    • Arm swelling 06/07/2018   • Occlusion and stenosis of unspecified carotid artery 07/20/2017   • Abnormal CT scan, chest 08/22/2016   • COPD (chronic obstructive pulmonary disease) ([facility]) 08/22/2016   • Essential hypertension 08/22/2016   • Peripheral vascular disease ([facility]) 08/22/2016   • Dyslipidemia 08/22/2016   • Allergic rhinitis 08/22/2016       Current Outpatient Prescriptions   Medication Sig Dispense Refill   • amitriptyline (ELAVIL) 100 MG Tab Take 1 Tab by mouth every bedtime. 90 Tab 1   • raNITidine (ZANTAC) 150 MG Tab Take 1 Tab by mouth 2 times a day. 60 Tab 3   • sennosides-docusate sodium (SENOKOT-S) 8.6-50 MG tablet Take 1 Tab by mouth 1 time daily as needed (for constipation. Do not take if LOOSE STOOLS). 30 Tab 3   • polyethylene glycol/lytes (MIRALAX) Pack Take 1 Packet by mouth every day. 30 Each 0   • omega-3 acid ethyl esters (LOVAZA) 1 GM capsule Take 1 Cap by mouth 2 Times a Day. 180 Cap 3   • FLUoxetine (PROZAC) 20 MG Cap Take 1 Cap by mouth every day. 90 Cap 0   • lidocaine-prilocaine (EMLA) 2.5-2.5 % Cream Apply to port one hour prior to access and cover with plastic wrap. 1 Tube 3   • glucosamine 500 MG Cap Take  by mouth every day.     • Calcium Polycarbophil (FIBER-CAPS PO) Take  by mouth.     • Oxymetazoline HCl (NASAL SPRAY NA) [place]  in nose.     • CHONDROITIN SULFATE PO Take  by mouth.     • albuterol (PROAIR HFA) 108 (90 Base) MCG/ACT Aero Soln inhalation aerosol Inhale 2 Puffs by mouth every four hours as needed for Shortness of Breath (wheezing). 3 Inhaler 3   • Lifitegrast (XIIDRA OP) by Ophthalmic route.     • naproxen (NAPROSYN) 500 MG Tab Take 1 Tab by mouth 2 times a day, with meals. 60 Tab 1   • ANORO ELLIPTA 62.5-25 MCG/INH AEROSOL POWDER, BREATH ACTIVATED inhaler USE 1 INHALATION EVERY DAY 3 Inhaler 3   • ascorbic acid (ASCORBIC ACID) 500 MG Tab Take 500 mg by mouth every day.     • Multiple Vitamin (MULTI VITAMIN DAILY PO) Take  by mouth.     • aspirin 81 MG tablet  Take 81 mg by mouth every day. One tablet by mouth daily     • Coenzyme Q10 (COQ10) 100 MG Cap Take 100 mg by mouth.     • ezetimibe (ZETIA) 10 MG Tab Take 10 mg by mouth every day.     • rosuvastatin (CRESTOR) 40 MG tablet Take 40 mg by mouth every day.     • carvedilol (COREG) 25 MG Tab Take 12.5 mg by mouth 2 times a day, with meals.     • ramipril (ALTACE) 10 MG capsule Take 10 mg by mouth every day.       No current facility-administered medications for this visit.      Past Medical History:   Diagnosis Date   • Allergic rhinitis    • Cancer (HCC)     breast   • Cataract    • COPD (chronic obstructive pulmonary disease) (HCC)    • Emphysema of lung (HCC)    • Heart burn    • Hyperlipidemia    • Hypertension    • Osteopenia    • Peripheral vascular disease (HCC)      Past Surgical History:   Procedure Laterality Date   • CAROTID ENDARTERECTOMY  7/20/2017    Procedure: CAROTID ENDARTERECTOMY FOR : LEFT SUBCLAVIAN ARTERY ANGIOPLASTY / STENT BRACHIAL APPROACH WITH ULTRASOUND;  Surgeon: Dinora Saldana M.D.;  Location: SURGERY [facility];  Service:    • TUBAL LIGATION  1977   • GYN SURGERY      d+c 1979, 1980   • OTHER ORTHOPEDIC SURGERY      left knee surgery   • TONSILLECTOMY       Social History     Social History   • Marital status:      Spouse name: N/A   • Number of children: N/A   • Years of education: N/A     Occupational History   • work in cardiology office Retired     Social History Main Topics   • Smoking status: Former Smoker     Packs/day: 1.00     Years: 50.00     Types: Cigarettes     Quit date: 5/1/2014   • Smokeless tobacco: Never Used      Comment: started at age 18, quit at 64, 1.5-2 ppd for 46 years   • Alcohol use Yes      Comment: 3 a month   • Drug use: No   • Sexual activity: No     Other Topics Concern   • Not on file     Social History Narrative   • No narrative on file     Family History   Problem Relation Age of Onset   • Lung Cancer Father    • Cancer Father 71        lung  "  • Lung Cancer Mother    • Cancer Mother 91        lung   • Cancer Maternal Aunt 80        lung cancer, smoking       ROS: As per HPI. Otherwise unremarkable     /63 (BP Location: Right arm, Patient Position: Sitting, BP Cuff Size: Large adult)   Pulse 91   Temp 36.3 °C (97.4 °F) (Temporal)   Resp 18   Ht 1.575 m (5' 2\")   Wt 55.3 kg (122 lb)   SpO2 92%   Breastfeeding? No   BMI 22.31 kg/m²     Physical Exam   Constitutional:  oriented to person, place, and time. No distress.   Eyes: Pupils are equal, round, and reactive to light. No scleral icterus. Old ecchymosis around left eye.   Neck: Neck supple. No thyromegaly present.   Cardiovascular: Normal rate, regular rhythm and normal heart sounds.  Exam reveals no gallop and no friction rub.  No murmur heard.  Pulmonary/Chest: Breath sounds normal. Chest wall is not dull to percussion.   Abdomen: Soft, NT/ND + BS, no masses, no suprapubic tenderness, no hepatomegaly.  Musculoskeletal: ROM of left shoulder is mildly limited 2/2 fracture, no tenderness over the left clavicle, mild swelling of left wrist .  Lymphadenopathy: no cervical adenopathy  Neurological: alert and oriented to person, place, and time. Gait stable, no ataxia.   Skin: No cyanosis. Nails show no clubbing.        Assessment and Plan    Metastatic breast cancer (HCC)  Malignant neoplasm metastatic to bone (HCC)  Pathological fracture of the left clavicle   Displaced fracture of lateral end of left clavicle  Fall, subsequent encounter  Loss of consciousness (HCC)  -unknown etiology so far, denies pre-syncopal symptoms, chest pain, SOB, palpitation, dizziness, vago-vagal episode, FND, or vertigo at that time and since then. Denies previous falls  -Friends reported that occasionally her gait might be unstable  -reported pain is well controlled with naproxen and tramadol  -advised to continue using arm-sling for now  -cont denosumab sub Q every 4 week injections with oncology service  -cont " vit D and Calcium  -advised to use walker when ambulating to prevent further fall, st alternating with cane ( pt has a dog and long O2 tube at home, these are risky for mechanical falls). Pt educated about falls and safety measures. Pt reported of having both FWW and cane at home  -advise avoid dehydration  -might need physical therapy after clavicle fracute healing for further stability   -pt verbalize understand and agreed to the plan           Signed by: Juan Martinez M.D.

## 2018-11-08 NOTE — PROCEDURES
Technician: NIKKI Shine    Technician Comment:  Good patient effort & cooperation.  The results of this test meet the ATS/ERS standards for acceptability & reproducibility.  Test was performed on the [employer] Body Plethysmograph-Elite DX system.  Predicted values were [facility]-3 for spirometry, [facility] for DLCO, ITS for Lung Volumes.  The DLCO was uncorrected for Hgb.  A bronchodilator of Ventolin HFA -2puffs via spacer administered.  DLCO performed during dilation period.    Interpretation:    Lung function testing was completed on November 7, 2018.  Moderate reduction of mid flows, 0.68 L/s with reduced FEV1 at 1.48 L, 65% predicted.  Borderline bronchodilator response.  No significant restriction or hyperinflation.  Reduced oxygen transfer at 49% suggests alveolar capillary block syndrome.  Flow volume loop confirms a significant obstructive pattern with good effort noted

## 2018-11-08 NOTE — PROGRESS NOTES
CC    HPI:  Zuly Christian is a 68 y.o. year old female accompanied by a friend here today for follow-up on overnight oximetry and PFT results.  Patient has a history of breast cancer with bone metastases and pathological fracture currently on chemotherapy.  Does have a past history of smoking quit in 2014 with approximately  pack years.  Reported amount does vary. Patient also suffered a recent ground-level fall for which she was seen in ER on 11/2/2018.  Patient had loss of consciousness, possible CHI, also noted closed displaced fracture of the left clavicle associated with cancer myenstasis fracture, skin abrasions and hematoma left forehead and around left eye.      She continues to experience mild left-sided discomfort but doubts that affected her performance on PFT today.  Reviewed her PFT results as compared to previous PFTs completed on 8/16/2017.  FVC today was 2.47 L or 82% predicted as compared to 2.89 L or 94% predicted, FEV1 was 1.48 L or 65% predicted as compared to 1.64 L and 70% predicted previously, FEV1/ FVC ratio was 79% today as compared to 57% previously, her residual volume was 96% today and TLC was 93%.  Her DLCO was 49% as compared to 82% previously.    Overnight oximetry report was also reviewed with patient showing evidence of mild obstructive sleep apnea with estimated ANKUSH of 9.7/hr increasing to 24/hr in supine sleep position, O2 sat rangel was 80% with a baseline O2 sat at 96%, and total time O2 sat was below 89% was for 200 minutes of the flow evaluation time. This study was completed on room air and patient has been wearing 2 L per nasal cannula.  Although she had a decrease in exertional oxygen saturations at last office visit reports she wears O2 only at night.  Will schedule a CPAP titration study and patient would like to know if wearing oxygen at 2 L would be sufficient.  Patient is amenable to getting overnight study done but reports she may be unable to sleep as she has  trouble initiating sleep at home. Will likely need a mild sedative. Reviewed some sleep hygiene with patient as she reports some trouble initiating sleep.  Denies morning headaches, denies daytime sleepiness, denies daytime napping.     Patient did have an echo on 10-2-2018 which per report showed left ventricular EF of 60%, mild to moderate tricuspid regurg and estimated RVSP of 58 mmHg.  Also reviewed vitals today including blood pressure of 144/56.  Patient reports she has had some blood pressure instability since beginning chemo.  Her BMI today was 22.31.  Patient home respiratory routine: Daily Anoro, as needed albuterol which she has not required in some time and oxymetazoline nasal spray.    ROS:     Constitutional: Reports increased fatigue since being on chemo, denies fever, chills, night sweats, unintentional weight loss  Skin: Reports bruising and mild abrasions from her recent fall, denies rashes, hair or nail changes, lumps, sores  Eyes: Wears glasses, denies sudden onset blurring or other vision changes  ENT: History of allergies, wears hearing aids but no dentures, hoarseness she attributes to chemotherapy, denies earaches, nasal congestion, runny nose  GI: Denies heartburn, reflux, trouble swallowing  Respiratory: Occasional cough without production, no wheezing, denies shortness of breath at rest but does report shortness of breath with activity  CV: Denies murmurs, chest pain or pressure, tightness, irregular heartbeat, edema, orthopnea    Past Medical History:   Diagnosis Date   • Allergic rhinitis    • Cancer (HCC)     breast   • Cataract    • COPD (chronic obstructive pulmonary disease) (HCC)    • Emphysema of lung (HCC)    • Heart burn    • Hyperlipidemia    • Hypertension    • Osteopenia    • Peripheral vascular disease (HCC)        Past Surgical History:   Procedure Laterality Date   • CAROTID ENDARTERECTOMY  7/20/2017    Procedure: CAROTID ENDARTERECTOMY FOR : LEFT SUBCLAVIAN ARTERY  "ANGIOPLASTY / STENT BRACHIAL APPROACH WITH ULTRASOUND;  Surgeon: Dinora Saldana M.D.;  Location: SURGERY [facility];  Service:    • TUBAL LIGATION  1977   • GYN SURGERY      d+c 1979, 1980   • OTHER ORTHOPEDIC SURGERY      left knee surgery   • TONSILLECTOMY         Family History   Problem Relation Age of Onset   • Lung Cancer Father    • Cancer Father 71        lung   • Lung Cancer Mother    • Cancer Mother 91        lung   • Cancer Maternal Aunt 80        lung cancer, smoking       Social History     Social History   • Marital status:      Spouse name: N/A   • Number of children: N/A   • Years of education: N/A     Occupational History   • work in cardiology office Retired     Social History Main Topics   • Smoking status: Former Smoker     Packs/day: 1.00     Years: 50.00     Types: Cigarettes     Quit date: 5/1/2014   • Smokeless tobacco: Never Used      Comment: started at age 18, quit at 64, 1.5-2 ppd for 46 years, continued abstinance   • Alcohol use Yes      Comment: 3 a month   • Drug use: No   • Sexual activity: No     Other Topics Concern   • Not on file     Social History Narrative   • No narrative on file       Allergies as of 11/07/2018 - Reviewed 11/07/2018   Allergen Reaction Noted   • Pollen extract     • Sulfa drugs Hives 07/19/2016        @Vital signs for this encounter:  Vitals:    11/07/18 1614 11/07/18 1618   Height: 1.575 m (5' 2\")    Weight: 55.3 kg (122 lb)    Weight % change since last entry.: 0 %    BP: 144/56    Pulse: (!) 107    BMI (Calculated): 22.31    Resp: 16    Temp: 36.7 °C (98.1 °F)    TempSrc: Temporal    O2 sat % room air:  96 %       Current medications as of today   Current Outpatient Prescriptions   Medication Sig Dispense Refill   • umeclidinium-vilanterol (ANORO ELLIPTA) 62.5-25 MCG/INH AEROSOL POWDER, BREATH ACTIVATED inhaler USE 1 INHALATION EVERY DAY 3 Inhaler 3   • amitriptyline (ELAVIL) 100 MG Tab Take 1 Tab by mouth every bedtime. 90 Tab 1   • " raNITidine (ZANTAC) 150 MG Tab Take 1 Tab by mouth 2 times a day. 60 Tab 3   • sennosides-docusate sodium (SENOKOT-S) 8.6-50 MG tablet Take 1 Tab by mouth 1 time daily as needed (for constipation. Do not take if LOOSE STOOLS). 30 Tab 3   • polyethylene glycol/lytes (MIRALAX) Pack Take 1 Packet by mouth every day. 30 Each 0   • omega-3 acid ethyl esters (LOVAZA) 1 GM capsule Take 1 Cap by mouth 2 Times a Day. 180 Cap 3   • FLUoxetine (PROZAC) 20 MG Cap Take 1 Cap by mouth every day. 90 Cap 0   • lidocaine-prilocaine (EMLA) 2.5-2.5 % Cream Apply to port one hour prior to access and cover with plastic wrap. 1 Tube 3   • glucosamine 500 MG Cap Take  by mouth every day.     • Calcium Polycarbophil (FIBER-CAPS PO) Take  by mouth.     • Oxymetazoline HCl (NASAL SPRAY NA) [place]  in nose.     • CHONDROITIN SULFATE PO Take  by mouth.     • albuterol (PROAIR HFA) 108 (90 Base) MCG/ACT Aero Soln inhalation aerosol Inhale 2 Puffs by mouth every four hours as needed for Shortness of Breath (wheezing). 3 Inhaler 3   • Lifitegrast (XIIDRA OP) by Ophthalmic route.     • naproxen (NAPROSYN) 500 MG Tab Take 1 Tab by mouth 2 times a day, with meals. 60 Tab 1   • ascorbic acid (ASCORBIC ACID) 500 MG Tab Take 500 mg by mouth every day.     • Multiple Vitamin (MULTI VITAMIN DAILY PO) Take  by mouth.     • aspirin 81 MG tablet Take 81 mg by mouth every day. One tablet by mouth daily     • Coenzyme Q10 (COQ10) 100 MG Cap Take 100 mg by mouth.     • ezetimibe (ZETIA) 10 MG Tab Take 10 mg by mouth every day.     • rosuvastatin (CRESTOR) 40 MG tablet Take 40 mg by mouth every day.     • carvedilol (COREG) 25 MG Tab Take 12.5 mg by mouth 2 times a day, with meals.     • ramipril (ALTACE) 10 MG capsule Take 10 mg by mouth every day.       No current facility-administered medications for this visit.          Physical Exam:   Gen:           Alert and oriented, No apparent distress. Mood and affect appropriate  Eyes:          sclere white,  conjunctive moist.  Hearing:     Grossly intact with hearing aids in use  Dentition:    Good dentition.  Oropharynx:   Tongue normal, posterior pharynx without erythema or exudate.  Mallampati Classification: II-III  Neck:        Supple, trachea midline, no masses.  Respiratory Effort: No intercostal retractions or use of accessory muscles.   Lung Auscultation:      Clear to auscultation bilaterally; no rales, rhonchi or wheezing.  CV:            Regular rate and rhythm. No murmurs, rubs or gallops.  Digits, Nails, Ext: No clubbing, cyanosis, petechiae, or nodes.   Skin:        No rashes, lesions or ulcers noted.                     Assessment:  1. Chronic obstructive pulmonary disease, unspecified COPD type (HCC)  umeclidinium-vilanterol (ANORO ELLIPTA) 62.5-25 MCG/INH AEROSOL POWDER, BREATH ACTIVATED inhaler    DME Other   2. Nocturnal hypoxia  Polysomnography Titration   3. Centrilobular emphysema (HCC)     4. Malignant neoplasm metastatic to bone (HCC)     5. Solitary pulmonary nodule     6. Pathological fracture of other site due to neoplastic disease with delayed healing, subsequent encounter     7. Dependence on nocturnal oxygen therapy     8. Fall, subsequent encounter     9. Abnormal CT scan, chest     10. SOB (shortness of breath)         Immunizations:    Flu: 9/26/2018  Pneumovax 23: 12/1/2005  Prevnar 13: 11/1/2015    Plan:  1-continue same home regimen of daily Anoro, nasal spray and as needed albuterol, 2 L O2 at night  2-reviewed PFT results which did show some decreased volumes since last test  3-reviewed overnight oximetry and some sleep hygiene information as patient has trouble initiating sleep, referral for polysomnogram titration study  4-follow up appointment with Arlen in 3 months sooner if able to complete sleep study sooner or if new respiratory issues  5-request to DME for alternative connective tubing as patient is somewhat of a fall risk    This dictation was created using voice  recognition software. The accuracy of the dictation is limited to the abilities of the software. I expect there may be some errors of grammar and possibly content.

## 2018-11-08 NOTE — PATIENT INSTRUCTIONS
1-continue same home regimen  2-reviewed PFT results which did show some decreased volumes  3-reviewed some sleep hygiene information as patient has trouble initiating sleep  4-follow up appointment with Arlen  5-request to DME for alternative connective tubing

## 2018-11-10 NOTE — TELEPHONE ENCOUNTER
----- Message from Zuly Christian sent at 11/8/2018  4:24 PM PST -----  Regarding: Prescription Question  Contact: [phone]  I saw Cecily Gómez yesterday.   She sent  refills of mhAnoro Ellipta 30 doses 62.5/25 to [facility].  I need this Rx  sent to  [facility].  Any long term Rx needs to  be sent to them for a 3 month aupply.  Do you know when Arlen Carreon returns?

## 2018-11-10 NOTE — TELEPHONE ENCOUNTER
Have we ever prescribed this med? Yes.  If yes, what date? 11/7/18    Last OV: 11/7/18    Next OV: 1/2/19    DX: Chronic obstructive pulmonary disease, unspecified COPD type (HCC) (J44.9)    Medications: umeclidinium-vilanterol (ANORO ELLIPTA) 62.5-25 MCG/INH AEROSOL POWDER, BREATH ACTIVATED inhaler      Pt needs prescription to go to Mail order pharmacy

## 2018-11-12 NOTE — PROGRESS NOTES
Subjective:   Zuly Christian is a 68 y.o. female referred to cardiology clinic for concern for pulmonary hypertension.    Patient was diagnosed with stage IV breast cancer with metastases to her bone in June 2018.  She initially received left chest radiation and since then has been on chemotherapy (Taxol) disease.  She also received radiation to her right hip for palliation.  She had been doing well until about a month ago when she started having shortness of breath with exertion and started noticing fatigue especially in the mornings.  She underwent an echocardiogram which showed concern for elevated RVSP and patient was subsequently referred to our clinic for further management.    She continues to have dyspnea on exertion, none at rest.  She does have a over 60-pack-year history of smoking and carries a diagnosis of COPD.  She has never tried using her rescue inhalers when she gets short of breath.    She has already been referred to sleep clinic for evaluation of sleep apnea.    About 10 days ago, patient had a syncopal episode while walking with her friends.  She denies any prodromal symptoms, no palpitations or dizziness.  She just remembers that she was being moved into the ambulance.  Per bystanders, she did not regain consciousness for almost 20 minutes.  When she was seen at the ER, she underwent brain imaging which was unremarkable and she was subsequently discharged.  She has never had a syncopal episode previously or since.  Denies any dizziness however.    Referring physician: TIMBO [place]    Past Medical History:   Diagnosis Date   • Allergic rhinitis    • Cancer (HCC)     breast   • Cataract    • COPD (chronic obstructive pulmonary disease) (HCC)    • Emphysema of lung (HCC)    • Heart burn    • Hyperlipidemia    • Hypertension    • Osteopenia    • Peripheral vascular disease (HCC)      Past Surgical History:   Procedure Laterality Date   • CAROTID ENDARTERECTOMY  7/20/2017    Procedure:  CAROTID ENDARTERECTOMY FOR : LEFT SUBCLAVIAN ARTERY ANGIOPLASTY / STENT BRACHIAL APPROACH WITH ULTRASOUND;  Surgeon: Dinora Saldana M.D.;  Location: SURGERY [facility];  Service:    • TUBAL LIGATION  1977   • GYN SURGERY      d+c 1979, 1980   • OTHER ORTHOPEDIC SURGERY      left knee surgery   • TONSILLECTOMY       Family History   Problem Relation Age of Onset   • Lung Cancer Father    • Cancer Father 71        lung   • Lung Cancer Mother    • Cancer Mother 91        lung   • Cancer Maternal Aunt 80        lung cancer, smoking     Social History     Social History   • Marital status:      Spouse name: N/A   • Number of children: N/A   • Years of education: N/A     Occupational History   • work in cardiology office Retired     Social History Main Topics   • Smoking status: Former Smoker     Packs/day: 1.00     Years: 50.00     Types: Cigarettes     Quit date: 5/1/2014   • Smokeless tobacco: Never Used      Comment: started at age 18, quit at 64, 1.5-2 ppd for 46 years, continued abstinance   • Alcohol use Yes      Comment: 3 a month   • Drug use: No   • Sexual activity: No     Other Topics Concern   • Not on file     Social History Narrative   • No narrative on file     Allergies   Allergen Reactions   • Pollen Extract    • Sulfa Drugs Hives     Outpatient Encounter Prescriptions as of 11/12/2018   Medication Sig Dispense Refill   • umeclidinium-vilanterol (ANORO ELLIPTA) 62.5-25 MCG/INH AEROSOL POWDER, BREATH ACTIVATED inhaler USE 1 INHALATION EVERY DAY 3 Inhaler 3   • amitriptyline (ELAVIL) 100 MG Tab Take 1 Tab by mouth every bedtime. 90 Tab 1   • raNITidine (ZANTAC) 150 MG Tab Take 1 Tab by mouth 2 times a day. 60 Tab 3   • sennosides-docusate sodium (SENOKOT-S) 8.6-50 MG tablet Take 1 Tab by mouth 1 time daily as needed (for constipation. Do not take if LOOSE STOOLS). 30 Tab 3   • omega-3 acid ethyl esters (LOVAZA) 1 GM capsule Take 1 Cap by mouth 2 Times a Day. 180 Cap 3   • FLUoxetine (PROZAC)  20 MG Cap Take 1 Cap by mouth every day. 90 Cap 0   • Calcium Polycarbophil (FIBER-CAPS PO) Take  by mouth.     • Oxymetazoline HCl (NASAL SPRAY NA) [place]  in nose.     • CHONDROITIN SULFATE PO Take  by mouth.     • Lifitegrast (XIIDRA OP) by Ophthalmic route.     • naproxen (NAPROSYN) 500 MG Tab Take 1 Tab by mouth 2 times a day, with meals. 60 Tab 1   • Multiple Vitamin (MULTI VITAMIN DAILY PO) Take  by mouth.     • aspirin 81 MG tablet Take 81 mg by mouth every day. One tablet by mouth daily     • Coenzyme Q10 (COQ10) 100 MG Cap Take 100 mg by mouth.     • ezetimibe (ZETIA) 10 MG Tab Take 10 mg by mouth every day.     • rosuvastatin (CRESTOR) 40 MG tablet Take 40 mg by mouth every day.     • carvedilol (COREG) 25 MG Tab Take 12.5 mg by mouth 2 times a day, with meals.     • ramipril (ALTACE) 10 MG capsule Take 10 mg by mouth every day.     • polyethylene glycol/lytes (MIRALAX) Pack Take 1 Packet by mouth every day. 30 Each 0   • lidocaine-prilocaine (EMLA) 2.5-2.5 % Cream Apply to port one hour prior to access and cover with plastic wrap. 1 Tube 3   • [DISCONTINUED] glucosamine 500 MG Cap Take  by mouth every day.     • albuterol (PROAIR HFA) 108 (90 Base) MCG/ACT Aero Soln inhalation aerosol Inhale 2 Puffs by mouth every four hours as needed for Shortness of Breath (wheezing). 3 Inhaler 3   • [DISCONTINUED] ascorbic acid (ASCORBIC ACID) 500 MG Tab Take 500 mg by mouth every day.       No facility-administered encounter medications on file as of 11/12/2018.      Review of Systems   Constitutional: Positive for malaise/fatigue.   Respiratory: Positive for shortness of breath.    Cardiovascular: Negative for chest pain, palpitations, orthopnea, leg swelling and PND.   Gastrointestinal: Negative for abdominal pain.   Musculoskeletal: Negative for falls.   Neurological: Negative for dizziness and loss of consciousness.   Psychiatric/Behavioral: Negative for depression.   All other systems reviewed and are  "negative.       Objective:   /70 (BP Location: Left arm, Patient Position: Sitting, BP Cuff Size: Adult)   Pulse (!) 110   Ht 1.575 m (5' 2\")   Wt 55.3 kg (122 lb)   BMI 22.31 kg/m²     Physical Exam   Constitutional: She is oriented to person, place, and time. She appears well-developed and well-nourished. No distress.   HENT:   Head: Normocephalic and atraumatic.   Eyes: Conjunctivae are normal.   Neck: Normal range of motion. Neck supple.   Cardiovascular: Normal rate, regular rhythm and normal heart sounds.  Exam reveals no gallop and no friction rub.    No murmur heard.  Pulmonary/Chest: Effort normal and breath sounds normal. No respiratory distress. She has no wheezes. She has no rales.   Abdominal: Soft. She exhibits no distension. There is no tenderness.   Musculoskeletal: She exhibits no edema.   Neurological: She is alert and oriented to person, place, and time.   Skin: Skin is warm and dry. She is not diaphoretic.   Psychiatric: She has a normal mood and affect. Her behavior is normal.   Nursing note and vitals reviewed.    Labs performed in November 2018 were reviewed and showed hemoglobin 11.1.  Creatinine 0.48.    Echocardiogram performed October 2018 was personally reviewed and per my interpretation shows normal LV systolic function.  RVSP 45 mmHg.  No major valvular pathology noted.    EKG performed today was personally reviewed and per my interpretation shows sinus rhythm at 96 bpm.  Nonspecific T wave changes.    Assessment:     1. Syncope, unspecified syncope type  EKG    Holter Monitor / Event Recorder   2. Dyspnea on exertion     3. Fatigue, unspecified type         Medical Decision Making:  Today's Assessment / Status / Plan:     Syncope:  Dyspnea on exertion:  Fatigue:  Elevated right-sided pressures:  Patient echocardiogram does show a mild elevation in right ventricular systolic pressures.  She does not have any symptoms to suggest right heart failure other than her shortness of " breath which could be secondary to her underlying COPD.  I have therefore advised her to try her rescue inhalers when she does get symptoms.  Of course angina is a concern as well however in this patient with metastatic breast cancer, I would not recommend ischemic evaluation unless absolutely indicated/necessary.  I discussed this with the patient and she is agreeable.  She is currently on aspirin.  In terms of her right heart pressures, this could be due to her underlying COPD versus possibly sleep apnea.  Agree with referral to sleep clinic.  Her syncope is of concern.  I will refer her for a Ziopatch monitor for further evaluation of arrhythmias.  This will also help evaluate her shortness of breath.  Her echocardiogram was unremarkable.  Her LVOT gradient was normal.  In the meantime, patient has been advised to follow lifestyle changes like standing up slowly, staying hydrated and sitting down whenever she has symptoms.      Return to clinic in 2 months or earlier if needed.    Thank you for allowing me to participate in the care of this patient. Please do not hesitate to contact me with any questions.    Laura Vazquez MD  Cardiologist  [facility]      PLEASE NOTE: This dictation was created using voice recognition software.

## 2018-11-12 NOTE — LETTER
[facility]   [address]   [address]   [place] [address]  Phone: [phone]  Fax: [phone]              Zuly Christian  1950    Encounter Date: 11/12/2018    Laura Vazquez M.D.          PROGRESS NOTE:    Subjective:   Zuly Christian is a 68 y.o. female referred to cardiology clinic for concern for pulmonary hypertension.    Patient was diagnosed with stage IV breast cancer with metastases to her bone in June 2018.  She initially received left chest radiation and since then has been on chemotherapy (Taxol) disease.  She also received radiation to her right hip for palliation.  She had been doing well until about a month ago when she started having shortness of breath with exertion and started noticing fatigue especially in the mornings.  She underwent an echocardiogram which showed concern for elevated RVSP and patient was subsequently referred to our clinic for further management.    She continues to have dyspnea on exertion, none at rest.  She does have a over 60-pack-year history of smoking and carries a diagnosis of COPD.  She has never tried using her rescue inhalers when she gets short of breath.    She has already been referred to sleep clinic for evaluation of sleep apnea.    About 10 days ago, patient had a syncopal episode while walking with her friends.  She denies any prodromal symptoms, no palpitations or dizziness.  She just remembers that she was being moved into the ambulance.  Per bystanders, she did not regain consciousness for almost 20 minutes.  When she was seen at the ER, she underwent brain imaging which was unremarkable and she was subsequently discharged.  She has never had a syncopal episode previously or since.  Denies any dizziness however.    Referring physician: TIMBO Spain    Past Medical History:   Diagnosis Date   • Allergic rhinitis    • Cancer (HCC)     breast   • Cataract    • COPD  (chronic obstructive pulmonary disease) (HCC)    • Emphysema of lung (HCC)    • Heart burn    • Hyperlipidemia    • Hypertension    • Osteopenia    • Peripheral vascular disease (HCC)      Past Surgical History:   Procedure Laterality Date   • CAROTID ENDARTERECTOMY  7/20/2017    Procedure: CAROTID ENDARTERECTOMY FOR : LEFT SUBCLAVIAN ARTERY ANGIOPLASTY / STENT BRACHIAL APPROACH WITH ULTRASOUND;  Surgeon: Dinora Saldana M.D.;  Location: SURGERY [facility];  Service:    • TUBAL LIGATION  1977   • GYN SURGERY      d+c 1979, 1980   • OTHER ORTHOPEDIC SURGERY      left knee surgery   • TONSILLECTOMY       Family History   Problem Relation Age of Onset   • Lung Cancer Father    • Cancer Father 71        lung   • Lung Cancer Mother    • Cancer Mother 91        lung   • Cancer Maternal Aunt 80        lung cancer, smoking     Social History     Social History   • Marital status:      Spouse name: N/A   • Number of children: N/A   • Years of education: N/A     Occupational History   • work in cardiology office Retired     Social History Main Topics   • Smoking status: Former Smoker     Packs/day: 1.00     Years: 50.00     Types: Cigarettes     Quit date: 5/1/2014   • Smokeless tobacco: Never Used      Comment: started at age 18, quit at 64, 1.5-2 ppd for 46 years, continued abstinance   • Alcohol use Yes      Comment: 3 a month   • Drug use: No   • Sexual activity: No     Other Topics Concern   • Not on file     Social History Narrative   • No narrative on file     Allergies   Allergen Reactions   • Pollen Extract    • Sulfa Drugs Hives     Outpatient Encounter Prescriptions as of 11/12/2018   Medication Sig Dispense Refill   • umeclidinium-vilanterol (ANORO ELLIPTA) 62.5-25 MCG/INH AEROSOL POWDER, BREATH ACTIVATED inhaler USE 1 INHALATION EVERY DAY 3 Inhaler 3   • amitriptyline (ELAVIL) 100 MG Tab Take 1 Tab by mouth every bedtime. 90 Tab 1   • raNITidine (ZANTAC) 150 MG Tab Take 1 Tab by mouth 2 times a day.  60 Tab 3   • sennosides-docusate sodium (SENOKOT-S) 8.6-50 MG tablet Take 1 Tab by mouth 1 time daily as needed (for constipation. Do not take if LOOSE STOOLS). 30 Tab 3   • omega-3 acid ethyl esters (LOVAZA) 1 GM capsule Take 1 Cap by mouth 2 Times a Day. 180 Cap 3   • FLUoxetine (PROZAC) 20 MG Cap Take 1 Cap by mouth every day. 90 Cap 0   • Calcium Polycarbophil (FIBER-CAPS PO) Take  by mouth.     • Oxymetazoline HCl (NASAL SPRAY NA) [place]  in nose.     • CHONDROITIN SULFATE PO Take  by mouth.     • Lifitegrast (XIIDRA OP) by Ophthalmic route.     • naproxen (NAPROSYN) 500 MG Tab Take 1 Tab by mouth 2 times a day, with meals. 60 Tab 1   • Multiple Vitamin (MULTI VITAMIN DAILY PO) Take  by mouth.     • aspirin 81 MG tablet Take 81 mg by mouth every day. One tablet by mouth daily     • Coenzyme Q10 (COQ10) 100 MG Cap Take 100 mg by mouth.     • ezetimibe (ZETIA) 10 MG Tab Take 10 mg by mouth every day.     • rosuvastatin (CRESTOR) 40 MG tablet Take 40 mg by mouth every day.     • carvedilol (COREG) 25 MG Tab Take 12.5 mg by mouth 2 times a day, with meals.     • ramipril (ALTACE) 10 MG capsule Take 10 mg by mouth every day.     • polyethylene glycol/lytes (MIRALAX) Pack Take 1 Packet by mouth every day. 30 Each 0   • lidocaine-prilocaine (EMLA) 2.5-2.5 % Cream Apply to port one hour prior to access and cover with plastic wrap. 1 Tube 3   • [DISCONTINUED] glucosamine 500 MG Cap Take  by mouth every day.     • albuterol (PROAIR HFA) 108 (90 Base) MCG/ACT Aero Soln inhalation aerosol Inhale 2 Puffs by mouth every four hours as needed for Shortness of Breath (wheezing). 3 Inhaler 3   • [DISCONTINUED] ascorbic acid (ASCORBIC ACID) 500 MG Tab Take 500 mg by mouth every day.       No facility-administered encounter medications on file as of 11/12/2018.      Review of Systems   Constitutional: Positive for malaise/fatigue.   Respiratory: Positive for shortness of breath.    Cardiovascular: Negative for chest pain,  "palpitations, orthopnea, leg swelling and PND.   Gastrointestinal: Negative for abdominal pain.   Musculoskeletal: Negative for falls.   Neurological: Negative for dizziness and loss of consciousness.   Psychiatric/Behavioral: Negative for depression.   All other systems reviewed and are negative.       Objective:   /70 (BP Location: Left arm, Patient Position: Sitting, BP Cuff Size: Adult)   Pulse (!) 110   Ht 1.575 m (5' 2\")   Wt 55.3 kg (122 lb)   BMI 22.31 kg/m²      Physical Exam   Constitutional: She is oriented to person, place, and time. She appears well-developed and well-nourished. No distress.   HENT:   Head: Normocephalic and atraumatic.   Eyes: Conjunctivae are normal.   Neck: Normal range of motion. Neck supple.   Cardiovascular: Normal rate, regular rhythm and normal heart sounds.  Exam reveals no gallop and no friction rub.    No murmur heard.  Pulmonary/Chest: Effort normal and breath sounds normal. No respiratory distress. She has no wheezes. She has no rales.   Abdominal: Soft. She exhibits no distension. There is no tenderness.   Musculoskeletal: She exhibits no edema.   Neurological: She is alert and oriented to person, place, and time.   Skin: Skin is warm and dry. She is not diaphoretic.   Psychiatric: She has a normal mood and affect. Her behavior is normal.   Nursing note and vitals reviewed.    Labs performed in November 2018 were reviewed and showed hemoglobin 11.1.  Creatinine 0.48.    Echocardiogram performed October 2018 was personally reviewed and per my interpretation shows normal LV systolic function.  RVSP 45 mmHg.  No major valvular pathology noted.    EKG performed today was personally reviewed and per my interpretation shows sinus rhythm at 96 bpm.  Nonspecific T wave changes.    Assessment:     1. Syncope, unspecified syncope type  EKG    Holter Monitor / Event Recorder   2. Dyspnea on exertion     3. Fatigue, unspecified type         Medical Decision Making:  Today's " Assessment / Status / Plan:     Syncope:  Dyspnea on exertion:  Fatigue:  Elevated right-sided pressures:  Patient echocardiogram does show a mild elevation in right ventricular systolic pressures.  She does not have any symptoms to suggest right heart failure other than her shortness of breath which could be secondary to her underlying COPD.  I have therefore advised her to try her rescue inhalers when she does get symptoms.  Of course angina is a concern as well however in this patient with metastatic breast cancer, I would not recommend ischemic evaluation unless absolutely indicated/necessary.  I discussed this with the patient and she is agreeable.  She is currently on aspirin.  In terms of her right heart pressures, this could be due to her underlying COPD versus possibly sleep apnea.  Agree with referral to sleep clinic.  Her syncope is of concern.  I will refer her for a Ziopatch monitor for further evaluation of arrhythmias.  This will also help evaluate her shortness of breath.  Her echocardiogram was unremarkable.  Her LVOT gradient was normal.  In the meantime, patient has been advised to follow lifestyle changes like standing up slowly, staying hydrated and sitting down whenever she has symptoms.      Return to clinic in 2 months or earlier if needed.    Thank you for allowing me to participate in the care of this patient. Please do not hesitate to contact me with any questions.    Laura Vazquez MD  Cardiologist  [facility]      PLEASE NOTE: This dictation was created using voice recognition software.         Kaylee Mandujano M.D.  [address]  [address]  [place] [address]  VIA In Basket     ADRIA Garcia  [address]  [address]  [place] [address]  VIA In Basket

## 2018-11-14 NOTE — PROGRESS NOTES
Date of visit: 11/14/2018  12:50 PM      Chief Complaint- metastatic triple negative breast cancer      Identification/Prior relevant history:   April 6, 2018. Patient was seen by primary care physician. Patient is complaining of hip pain and nipple lesion. Patient was documented to have a normal mammogram in June 2017. X-rays of the hip and mammogram were ordered. X-ray shows advanced degenerative disease of the hip  -May 23, 2018. MRI of the hip shows numerous lesions worrisome for metastatic disease. There is a 3.8 cm lesion along the right superior acetabulum with subtle break in the bony cortex worrisome for pathological fracture. Patient is at risk for developing full-thickness cortical fracture  -June 6, 2018. PET CT scan.Small spiculated mass in the outer left breast is highly suspicious for malignancy.FDG avid adenopathy in the left axilla is consistent with metastasis.Extensive FDG avid lytic and sclerotic lesions in the axial and appendicular skeleton, consistent with bony metastasis  -June 14, 2018. Left breast biopsy revealed ER/AK HER-2 negative invasive ductal carcinoma  -June 24,2018. A. Right iliac bone biopsy, 4 cores: Metastatic carcinoma in a background of fibrotic tissue demonstrating immunohistochemical profile most consistent with breast origin.  -July 9-24, 2018. Radiation therapy to hip  -July 25, 2018.  Started weekly paclitaxel  -8/21/2018 . Weekly Paclitaxel changed to Q 3 weeks for convenience/patient wish  Interim history  -Has noticed more right hip pain and she is limping more  -She had a fall and developed a clavicular fracture in the interim.  Apparently she has tripped over.  She denies any significant peripheral neuropathy to explain her fall.  CT chest-10/2/18  1. No evidence of pulmonary embolus.  2. Small opacity in the dependent right lower lobe, favor atelectasis or consolidation.  3. Numerous sclerotic and lytic osseous metastases.  4. Age-indeterminate T5 compression  fracture, likely pathologic.  It may be acute. Correlate for point tenderness  Past Medical History:      Past Medical History:   Diagnosis Date   • Allergic rhinitis    • Cancer (HCC)     breast   • Cataract    • COPD (chronic obstructive pulmonary disease) (HCC)    • Emphysema of lung (HCC)    • Heart burn    • Hyperlipidemia    • Hypertension    • Osteopenia    • Peripheral vascular disease (HCC)        Past surgical history:       Past Surgical History:   Procedure Laterality Date   • CAROTID ENDARTERECTOMY  7/20/2017    Procedure: CAROTID ENDARTERECTOMY FOR : LEFT SUBCLAVIAN ARTERY ANGIOPLASTY / STENT BRACHIAL APPROACH WITH ULTRASOUND;  Surgeon: Dinora Saldana M.D.;  Location: SURGERY [facility];  Service:    • TUBAL LIGATION  1977   • GYN SURGERY      d+c 1979, 1980   • OTHER ORTHOPEDIC SURGERY      left knee surgery   • TONSILLECTOMY         Allergies:       Pollen extract and Sulfa drugs    Medications:         Current Outpatient Prescriptions   Medication Sig Dispense Refill   • umeclidinium-vilanterol (ANORO ELLIPTA) 62.5-25 MCG/INH AEROSOL POWDER, BREATH ACTIVATED inhaler USE 1 INHALATION EVERY DAY 3 Inhaler 3   • amitriptyline (ELAVIL) 100 MG Tab Take 1 Tab by mouth every bedtime. 90 Tab 1   • raNITidine (ZANTAC) 150 MG Tab Take 1 Tab by mouth 2 times a day. 60 Tab 3   • sennosides-docusate sodium (SENOKOT-S) 8.6-50 MG tablet Take 1 Tab by mouth 1 time daily as needed (for constipation. Do not take if LOOSE STOOLS). 30 Tab 3   • polyethylene glycol/lytes (MIRALAX) Pack Take 1 Packet by mouth every day. 30 Each 0   • omega-3 acid ethyl esters (LOVAZA) 1 GM capsule Take 1 Cap by mouth 2 Times a Day. 180 Cap 3   • FLUoxetine (PROZAC) 20 MG Cap Take 1 Cap by mouth every day. 90 Cap 0   • lidocaine-prilocaine (EMLA) 2.5-2.5 % Cream Apply to port one hour prior to access and cover with plastic wrap. 1 Tube 3   • Calcium Polycarbophil (FIBER-CAPS PO) Take  by mouth.     • Oxymetazoline HCl (NASAL SPRAY  NA) Spray  in nose.     • CHONDROITIN SULFATE PO Take  by mouth.     • albuterol (PROAIR HFA) 108 (90 Base) MCG/ACT Aero Soln inhalation aerosol Inhale 2 Puffs by mouth every four hours as needed for Shortness of Breath (wheezing). 3 Inhaler 3   • Lifitegrast (XIIDRA OP) by Ophthalmic route.     • naproxen (NAPROSYN) 500 MG Tab Take 1 Tab by mouth 2 times a day, with meals. 60 Tab 1   • Multiple Vitamin (MULTI VITAMIN DAILY PO) Take  by mouth.     • aspirin 81 MG tablet Take 81 mg by mouth every day. One tablet by mouth daily     • Coenzyme Q10 (COQ10) 100 MG Cap Take 100 mg by mouth.     • ezetimibe (ZETIA) 10 MG Tab Take 10 mg by mouth every day.     • rosuvastatin (CRESTOR) 40 MG tablet Take 40 mg by mouth every day.     • carvedilol (COREG) 25 MG Tab Take 12.5 mg by mouth 2 times a day, with meals.     • ramipril (ALTACE) 10 MG capsule Take 10 mg by mouth every day.       No current facility-administered medications for this visit.          Social History:     Social History     Social History   • Marital status:      Spouse name: N/A   • Number of children: N/A   • Years of education: N/A     Occupational History   • work in cardiology office Retired     Social History Main Topics   • Smoking status: Former Smoker     Packs/day: 1.00     Years: 50.00     Types: Cigarettes     Quit date: 5/1/2014   • Smokeless tobacco: Never Used      Comment: started at age 18, quit at 64, 1.5-2 ppd for 46 years, continued abstinance   • Alcohol use Yes      Comment: 3 a month   • Drug use: No   • Sexual activity: No     Other Topics Concern   • Not on file     Social History Narrative   • No narrative on file       Family History:      Family History   Problem Relation Age of Onset   • Lung Cancer Father    • Cancer Father 71        lung   • Lung Cancer Mother    • Cancer Mother 91        lung   • Cancer Maternal Aunt 80        lung cancer, smoking       Review of Systems:  All other review of systems are negative  "except what was mentioned above in the HPI.    Constitutional: Negative for fever, chills, weight loss and stable malaise/fatigue.    HEENT: No new auditory or visual complaints. No sore throat and neck pain.     Shoulder pain improved  Respiratory: Negative for cough, sputum production, shortness of breath and wheezing.    Cardiovascular: Negative for chest pain, palpitations, orthopnea and leg swelling.    Gastrointestinal: Negative for heartburn, nausea, vomiting and abdominal pain.    Genitourinary: Negative for dysuria, hematuria    Musculoskeletal: Worsening right hip pain  Skin: Negative for rash and itching.    Neurological: Negative for focal weakness and headaches.    Endo/Heme/Allergies: No abnormal bleed/bruise.    Psychiatric/Behavioral: No new depression/anxiety.    Physical Exam:  Vitals: BP (!) 94/62   Pulse 92   Temp 36.5 °C (97.7 °F) (Temporal)   Resp 16   Ht 1.575 m (5' 2\")   Wt 55.6 kg (122 lb 7.5 oz)   SpO2 97%   BMI 22.40 kg/m²     General: Not in acute distress, alert and oriented x 3  HEENT: No pallor, icterus. Oropharynx clear.   Neck: Supple, no palpable masses.  Lymph nodes: No palpable cervical, supraclavicular, axillary or inguinal lymphadenopathy.    CVS: regular rate and rhythm, no rubs or gallops  RESP: Clear to auscultate bilaterally, no wheezing or crackles.   ABD: Soft, non tender, non distended, positive bowel sounds, no palpable organomegaly  EXT: No edema or cyanosis  CNS: Alert and oriented x3, No focal deficits.  Skin- No rash      Labs:   Hospital Outpatient Visit on 11/14/2018   Component Date Value Ref Range Status   • WBC 11/14/2018 9.1  4.8 - 10.8 K/uL Final   • RBC 11/14/2018 4.29  4.20 - 5.40 M/uL Final   • Hemoglobin 11/14/2018 11.1* 12.0 - 16.0 g/dL Final   • Hematocrit 11/14/2018 37.0  37.0 - 47.0 % Final   • MCV 11/14/2018 86.2  81.4 - 97.8 fL Final   • MCH 11/14/2018 25.9* 27.0 - 33.0 pg Final   • MCHC 11/14/2018 30.0* 33.6 - 35.0 g/dL Final   • RDW " 11/14/2018 54.2* 35.9 - 50.0 fL Final   • Platelet Count 11/14/2018 458* 164 - 446 K/uL Final   • MPV 11/14/2018 9.6  9.0 - 12.9 fL Final   • Neutrophils-Polys 11/14/2018 61.80  44.00 - 72.00 % Final   • Lymphocytes 11/14/2018 10.50* 22.00 - 41.00 % Final   • Monocytes 11/14/2018 19.10* 0.00 - 13.40 % Final   • Eosinophils 11/14/2018 4.80  0.00 - 6.90 % Final   • Basophils 11/14/2018 0.50  0.00 - 1.80 % Final   • Immature Granulocytes 11/14/2018 3.30* 0.00 - 0.90 % Final   • Nucleated RBC 11/14/2018 0.00  /100 WBC Final   • Neutrophils (Absolute) 11/14/2018 5.64  2.00 - 7.15 K/uL Final    Includes immature neutrophils, if present.   • Lymphs (Absolute) 11/14/2018 0.96* 1.00 - 4.80 K/uL Final   • Monos (Absolute) 11/14/2018 1.75* 0.00 - 0.85 K/uL Final   • Eos (Absolute) 11/14/2018 0.44  0.00 - 0.51 K/uL Final   • Baso (Absolute) 11/14/2018 0.05  0.00 - 0.12 K/uL Final   • Immature Granulocytes (abs) 11/14/2018 0.30* 0.00 - 0.11 K/uL Final   • NRBC (Absolute) 11/14/2018 0.00  K/uL Final   • Sodium 11/14/2018 133* 135 - 145 mmol/L Final   • Potassium 11/14/2018 4.2  3.6 - 5.5 mmol/L Final   • Chloride 11/14/2018 103  96 - 112 mmol/L Final   • Co2 11/14/2018 21  20 - 33 mmol/L Final   • Anion Gap 11/14/2018 9.0  0.0 - 11.9 Final   • Glucose 11/14/2018 73  65 - 99 mg/dL Final   • Bun 11/14/2018 20  8 - 22 mg/dL Final   • Creatinine 11/14/2018 0.61  0.50 - 1.40 mg/dL Final   • Calcium 11/14/2018 9.7  8.5 - 10.5 mg/dL Final   • AST(SGOT) 11/14/2018 20  12 - 45 U/L Final   • ALT(SGPT) 11/14/2018 15  2 - 50 U/L Final   • Alkaline Phosphatase 11/14/2018 98  30 - 99 U/L Final   • Total Bilirubin 11/14/2018 0.3  0.1 - 1.5 mg/dL Final   • Albumin 11/14/2018 3.8  3.2 - 4.9 g/dL Final   • Total Protein 11/14/2018 6.3  6.0 - 8.2 g/dL Final   • Globulin 11/14/2018 2.5  1.9 - 3.5 g/dL Final   • A-G Ratio 11/14/2018 1.5  g/dL Final   • GFR If  11/14/2018 >60  >60 mL/min/1.73 m 2 Final   • GFR If Non   2018 >60  >60 mL/min/1.73 m 2 Final   Office Visit on 2018   Component Date Value Ref Range Status   • Report 2018    Final                    Value:[facility]    Test Date:  2018  Pt Name:    DILEEP RODRÍGUEZ                Department: [facility]  MRN:        [id number]                      Room:  Gender:     Female                       Technician: CE  :        1950                   Requested By:GERARDO BRITO  Order #:    [id number]                    Reading MD: Gerardo Brito MD    Measurements  Intervals                                Axis  Rate:       96                           P:          79  MA:         140                          QRS:        69  QRSD:       86                           T:          87  QT:         364  QTc:        460    Interpretive Statements  SINUS RHYTHM  NONSPECIFIC T ABNORMALITIES, ANT-LAT LEADS  Compared to ECG 2017 08:48:46  No significant changes    Electronically Signed On 2018 17:15:15 PST by Gerardo Brito MD               Assessment and Plan:     Assessment and Plan:  Metastatic triple negative breast carcinoma to the bones-diagnosed-2018   -MRI of brain neg for CNS disease  -Started on weekly Taxol with good tolerance  -2018 changed paclitaxel to 175mg.m2 ever 3 weeks after lond discussion with patient, for convenience  -18-established care with me.  Discussed the option of de-escalating her to weekly Taxol for tolerance however she feels like she is tolerating the every 3 weeks okay.  -She does report worsening right hip pain.  She recently had a fall and developed is being managed conservatively.  I am concerned about her developing bone progression.  A CT scan of the chest done during the recent ER visit did not reveal any visceral progression.  Given the bony disease, I have requested for androgen receptor expression, however pathology has done the mutation analysis.  Will await IHC results  -  -Labs are  adequate to proceed .  Discussed the goals of care, which will be longer-term disease control with the least effective dose of chemotherapy possible to minimize the adverse effects.  - ambry rersults pending   -Bone metastases -she is having worsening hip pain.  We will start with bone scan and if there is any progression consider local imaging for more radiation or orthopedic intervention.  -July 9-24, 2018. Radiation therapy to hip  --Continue with Xgeva every 6 weeks    She agreed and verbalized  agreement and understanding with the current plan.  I answered all questions and concerns at this time         Please note that this dictation was created using voice recognition software. I have made every reasonable attempt to correct obvious errors, but I expect that there are errors of grammar and possibly content that I did not discover before finalizing the note.      SIGNATURES:  Abhinav Arellano    CC:  Kaylee Mandujano M.D.  No ref. provider found

## 2018-11-14 NOTE — PROGRESS NOTES
Zuly Christian is a 68 y.o. female here for a non-provider visit for: Lab Draws  on 11/14/2018 at 8:50 AM    Procedure Performed: Venipuncture     Anatomical site: Right Antecubital Area (AC)    Equipment used: 21g Butterfly     Labs drawn: CBC w/diff and CMP, CA27.29    Ordering Provider: Dr. Abhinav Martinez By: Arlen Rangel, Med Ass't  No complications

## 2018-11-15 NOTE — TELEPHONE ENCOUNTER
Called patient to let her know that her NM-BONE SCAN WHOLE BODY   Is scheduled for Monday 11/19/18 0845 check in for the injection and 1145 for the scan.   Patient verbalized understanding.

## 2018-11-15 NOTE — PROGRESS NOTES
"Nutrition Services: Update  Met w/ pt this date to follow-up on current intake and appetite. Pt reports that her intake remains good w/ no current c/o GI distress, and reports that her constipation improves w/ the use of stool softeners. Pt reports that she does have to \"force herself\" to eat now and again. However, she is trying and is successfully maintaining her body weight. Wt stable @~122-124# since 8/23. RD to continue to monitor ongoing PO adequacy and wt status to leave further recommendations if warranted.   "

## 2018-11-15 NOTE — PROGRESS NOTES
"Pharmacy Chemotherapy Calculation    Patient Name: Zuly Christian   Dx: breast cancer, metastatic         Protocol: Taxol     Paclitaxel 175 mg/m² IV over 3 hrs on Day 1  21-day cycle until disease progression or unacceptable toxicity  NCCN Guidelines for Breast Cancer V.1.2018  Leyda DUNN, et al. JCO. 1995;13(10):4603-81.    Allergies:  Sulfa drugs     /40   Pulse (!) 101   Temp 36.4 °C (97.6 °F) (Temporal)   Resp 18   Ht 1.562 m (5' 1.5\")   Wt 55.5 kg (122 lb 5.7 oz)   SpO2 93%   BMI 22.75 kg/m²  Body surface area is 1.55 meters squared.    LABS 11/14/2018:  ANC: 5640      WBC: 9.1     Plt: 158k   Hgb/Hct: 11.1/37.0     SCr: 0.61 mg/dL CrCl: 67.2   mL/min (SCr 0.7)    K: 4.2  Na: 133         Glu: 73  LFT's: WNL TBili = 0.3       Drug Order   (Drug name, dose, route, IV Fluid & volume, frequency, number of doses) Cycle 4 of q3w Taxol (pt preference)    Previous treatment: C3 (10/25/2018)  (s/p 4 cycles weekly Taxol, last 8/15/18)   Medication = Paclitaxel (Taxol)  Base Dose = 175 mg/m²  Calc Dose: Base Dose x Body surface area is 1.55 meters squared. m² = 271.25 mg  Final Dose = 271.3 mg  Route = IV  Fluid & Volume =  mL  Admin Duration = Over 3 hrs          <5% difference, okay to treat with final dose     By my signature below, I confirm this process was performed independently with the BSA and all final chemotherapy dosing calculations congruent. I have reviewed the above chemotherapy order and that my calculation of the final dose and BSA (when applicable) corroborate those calculations of the  pharmacist. Discrepancies of 5% or greater in the written dose have been addressed and documented within the [facility] Progress notes.      MICH Alejandre, PharmD  "

## 2018-11-15 NOTE — PROGRESS NOTES
Chemotherapy Verification - PRIMARY RN      Height = 156.2  cm  Weight = 55.5 kg  BSA = 1.55 m2       Medication: Taxol  Dose: 175 mg/m2  Calculated Dose: 271.25 mg                             (In mg/m2, AUC, mg/kg)           I confirm this process was performed independently with the BSA and all final chemotherapy dosing calculations congruent.  Any discrepancies of 5% or greater have been addressed with the chemotherapy pharmacist. The resolution of the discrepancy has been documented in the EPIC progress notes.

## 2018-11-15 NOTE — PROGRESS NOTES
"Pharmacy Chemotherapy calculation:    Dx: metastatic Triple negative breast cancer    Cycle 4  Previous treatment = C3 on 10/25/18    Regimen: Taxol  *Dosing Reference*  Paclitaxel 175 mg/m² IV over 3 hrs on Day 1  21-day cycle until disease progression or unacceptable toxicity  NCCN Guidelines for Breast Cancer V.1.2018  Leyda DUNN, et al. J Clin Oncol. 1005;13(10):2575-81    Allergies:  Sulfa drugs    /40   Pulse (!) 101   Temp 36.4 °C (97.6 °F) (Temporal)   Resp 18   Ht 1.562 m (5' 1.5\")   Wt 55.5 kg (122 lb 5.7 oz)   SpO2 93%   BMI 22.75 kg/m²  Body surface area is 1.55 meters squared.    Labs 11/14/18:  ANC~ 5640   Plt = 458 k     Hgb = 11.1  SCr = 0.61 mg/dL  CrCl ~ 67 mL/min (min SCr 0.7 used)  AST/ALT/AP = 20/15/98 TBili = 0.3        denosumab (Xgeva) 120 mg SQ given 10/25/18 - Q 28 Days per supportive care plan      Paclitaxel (Taxol) 175 mg/m² x 1.55 m² = 271.3 mg   <5% difference, okay to treat with final dose = 271.3 mg IV      Radha Hall, PharmD  "

## 2018-11-15 NOTE — PROGRESS NOTES
Chemotherapy Verification - SECONDARY RN       Height = 156.2cm    Weight = 55.5kg    BSA = 1.55m^2    Medication: Taxol    Dose: 175mg/m^2    Calculated Dose: 271.6mg                               I confirm that this process was performed independently.

## 2018-11-16 NOTE — PROGRESS NOTES
Patient presents for day one cycle four Taxol. Reviewed plan of care patient verbalizes understanding. Port flushes well with brisk blood return. Medication infusing, patient in no acute distress. Report to Arminda HILLS

## 2018-11-16 NOTE — PROGRESS NOTES
Taxol finished without incident. Port flushed, brisk blood return observed, heparinized and de-accessed with needle intact, gauze drsg placed. Has next appt, left on foot in good spirits with friend.

## 2018-11-22 NOTE — TELEPHONE ENCOUNTER
Spoke with patient to discuss bone scan has been unchanged from previous exam. Patient. stated she was able to view the results via [employer] and was happy nurse called her.

## 2018-11-22 NOTE — PROGRESS NOTES
"Subjective:   Zuly Christian is a 68 y.o. female who presents for URI        URI    This is a new problem. The current episode started in the past 7 days. Associated symptoms include congestion, coughing and a sore throat. Pertinent negatives include no abdominal pain, diarrhea, ear pain, nausea, neck pain, rash, vomiting or wheezing.     Notes last 5-6d of cough, now w/ chest congestion, prod cough, denies fever/chills, PMH of copd, currently on chemo, last was two weeks ago today, c/o ST, denies earpain, did get  Flu shot, denies nausea/voimtming/abdpain/diarrhea/rash, PMH of copd - has been using MDI at home, denies PMH of pnuemonia, PMH of bronchitis, mild seasonal allerg. Tried using mucinex, sudafed OTC.       Review of Systems   Constitutional: Negative for chills, fever and malaise/fatigue.   HENT: Positive for congestion and sore throat. Negative for ear pain.    Respiratory: Positive for cough and sputum production. Negative for shortness of breath and wheezing.    Gastrointestinal: Negative for abdominal pain, diarrhea, nausea and vomiting.   Musculoskeletal: Negative for back pain, myalgias and neck pain.   Skin: Negative for rash.     Allergies   Allergen Reactions   • Pollen Extract    • Sulfa Drugs Hives   I have worn a mask for the entire encounter with this patient.    Objective:   /62   Pulse (!) 102   Temp 36.5 °C (97.7 °F)   Resp 15   Ht 1.575 m (5' 2\")   Wt 54.4 kg (120 lb)   SpO2 95%   BMI 21.95 kg/m²   Physical Exam   Constitutional: She is oriented to person, place, and time. She appears well-developed and well-nourished. No distress.   HENT:   Head: Normocephalic and atraumatic.   Right Ear: Tympanic membrane, external ear and ear canal normal.   Left Ear: Tympanic membrane, external ear and ear canal normal.   Nose: Nose normal.   Mouth/Throat: Uvula is midline and mucous membranes are normal. Posterior oropharyngeal erythema ( mild PND) present. No oropharyngeal exudate, " posterior oropharyngeal edema or tonsillar abscesses.   Eyes: Conjunctivae and lids are normal. Right eye exhibits no discharge. Left eye exhibits no discharge. No scleral icterus.   Neck: Neck supple.   Pulmonary/Chest: Effort normal. No respiratory distress. She has no decreased breath sounds. She has no wheezes. She has no rhonchi ( slightly coarse). She has no rales.   Musculoskeletal: Normal range of motion.   Lymphadenopathy:     She has cervical adenopathy ( mild bilat).   Neurological: She is alert and oriented to person, place, and time. She is not disoriented.   Skin: Skin is warm and dry. She is not diaphoretic. No erythema. No pallor.   Psychiatric: Her speech is normal and behavior is normal.   Nursing note and vitals reviewed.        Assessment/Plan:   1. LRTI (lower respiratory tract infection)  - azithromycin (ZITHROMAX) 250 MG Tab; Take as directed on package. Dispense one package.  Dispense: 1 Quantity Sufficient; Refill: 0    2. Cough  - benzonatate (TESSALON) 100 MG Cap; Take 1 Cap by mouth 3 times a day as needed for Cough.  Dispense: 60 Cap; Refill: 0    3. COPD exacerbation (HCC)    4. S/P chemotherapy, time since less than 4 weeks    Other orders  - guaiFENesin LA (MUCINEX) 600 MG TABLET SR 12 HR; Take 600 mg by mouth every 12 hours.  Supportive care is reviewed with patient/caregiver - recommend to push PO fluids and electrolytes, tylenol, netti pot/saline irrig, humidifier in home, flonase,  take full course of Rx, take with probiotics, observe for resolution  Return to clinic with lack of resolution or progression of symptoms.    Differential diagnosis, natural history, supportive care, and indications for immediate follow-up discussed.

## 2018-11-27 NOTE — TELEPHONE ENCOUNTER
From: Zuly Christian  To: ADRIA Garcia  Sent: 11/27/2018 11:14 AM PST  Subject: Procedure Question    I have an appt. tomorrow for a Sleep Study. I was told to call and request a prescription a sleeping aid. Please send the order to [facility] on [address]. Please let me know after placing the order.

## 2018-11-28 NOTE — PATIENT COMMUNICATION
CALLED PT TO INFORM HER THAT HER RX WAS FAXED TO     [id number] - [place], [place] - [address] [address]  [address] [address]  Phone: [phone] Fax: [phone]

## 2018-11-29 NOTE — PROCEDURES
The patient underwent an overnight CPAP titration and polysomnogram using the standard montage for measurement of parameters of sleep, respiratory events, movement abnormalities, and heart rate and rhythm.    A microphone was used to monitor snoring.  Interpretation:  Study start time was 08:21:48 PM.  Diagnostic recording time was 7h 7.0m with a total sleep time of 3h 20.5m resulting in a sleep efficiency of 46.96%%.    Sleep latency from the start of the study was 49 minutes and the latency from sleep to REM was 00 minutes.  In total,82  arousals were scored for an arousal index of 24.5.  Respiratory:  There were a total of 2 apneas consisting of 2 obstructive apneas, 0 mixed apneas, and 0 central apneas.  A total of 6 hypopneas were scored.  The apnea index was 0.60 per hour and the hypopnea index was 1.80 per hour resulting in an overall AHI of 2.39.  AHI during rem was 0.0 and AHI while supine was 10.00.  Oximetry:  There was a mean oxygen saturation of 92.0% with a minimum oxygen saturation of 83.0%.  Time spent with oxygen saturations below 89% was 5.2 minutes.  Cardiac:  The highest heart rate seen while awake was 131 BPM while the highest heart rate during sleep was 117 BPM with an average sleeping heart rate of 94 BPM.  Limb Movements:  There were a total of 88 PLMs during sleep, of which 27 were PLMS arousals.  This resulted in a PLMS index of 26.3 and a PLMS arousal index of 8.1.     CPAP was tried from 5cm H2O to 6cm H2O.    RECORDING TECHNIQUE:       After the scalp was prepared, gold plated electrodes were applied to the scalp according to the International 10-20 System. EEG (electroencephalogram) was continuously monitored from the O1-M2, O2-M1, C3-M2, C4-M1, F3-M2, and F4-M1.   EOGs (electrooculograms) were monitored by electrodes placed at the left and right outer canthi.  Chin EMG (electromyogram) was monitored by electrodes placed on the mentalis and sub-mentalis muscles.  Nasal and oral airflow  were monitored using a triple port thermocouple as well as oronasal pressure transducer.  Respiratory effort was measured by inductive plethysmography technology employing abdominal and thoracic belts.  Blood oxygen saturation and pulse were monitored by pulse oximetry.  Heart rhythm was monitored by surface electrocardiogram.  Leg EMG was studied using surface electrodes placed on left and right anterior tibialis.  A microphone was used to monitor tracheal sounds and snoring.  Body position was monitored and documented by technician observation      SUMMARY:    This was an overnight positive airway pressure titration polysomnogram.  The patient chose to use a small Dreamwear full face mask and heated humidification.     The total recording time was 427 minutes, the sleep period time was 352 minutes, and the total sleep time was 200 minutes.  The patient's sleep efficiency was 46.96 % which is reduced.  The patient experienced 0 REM periods.    The sleep stage durations revealed 151.5 minutes of wake after sleep onset (WASO), 40 minutes of N1 sleep, 156.5 minutes of N2 sleep, 4 minutes of N3 sleep, and 0 minutes of REM.    The latency to sleep was 49 minutes which is prolonged.  The latency to REM was not applicable as the patient did not achieve REM.  Moderate sleep fragmentation was identified.  The arousal index was 24.5  The Total Limb Movement (isolated) Index was 16.2, the Limb Movement with Arousal Index was 15.6, and the PLM Series Index was 25.7.    The patient experienced 2 apneas and 6 hypopneas and 0 RERAS.  The apnea hypopnea index was 2.4, the RDI was 2.4, the mean event duration was 30.3 seconds, and the longest event lasted 68.5 seconds.  The REM index was 0 and the supine index was 2.1.  The apnea hypopnea index represents no significant sleep apnea syndrome during the Pap titration.    The rangel saturation during sleep was 87 % and the patient spent 1.2 % of the recording with saturations less than  or equal to 90%.    During sleep, the minimum heart rate was 85 bpm, the mean heart rate was 94 bpm, and the maximum heart rate was 117 bpm.    The technician initiated treatment with CPAP set at 5 cmH2O and increased the pressure to a maximum of 6 cmH2O.    The patient did best on CPAP 6 cm water with a resultant AHI of 2.3, a minimum saturation of 84 %, and a mean saturation of 93 %.  The patient was able to achieve supine but not REM sleep on CPAP at 6 cm water    ASSESSMENT:    Satisfactory though not optimal CPAP titration to a final pressure of 6 cm H2O with a resultant AHI of 2.3, a minimum saturation of 84%, a mean saturation of 93%, and the achievement of supine but not REM sleep.  Periodic limb movement disorder with a limb movement with arousal index of 15.6        RECOMMENDATION:    Recommend CPAP 6 cmH2O using a mask of choice and heated humidification followed by data card and clinical review in 6-8 weeks.  The patient successfully used a small Dreamwear full face mask with heated humidification.

## 2018-12-05 NOTE — PROGRESS NOTES
"Pharmacy Chemotherapy calculation:    Dx: metastatic Triple negative breast cancer    Cycle 5  Previous treatment = C4 on 11/15/18    Regimen: Taxol  *Dosing Reference*  Paclitaxel 175 mg/m² IV over 3 hrs on Day 1  21-day cycle until disease progression or unacceptable toxicity  NCCN Guidelines for Breast Cancer V.1.2018  Leyda DUNN, et al. J Clin Oncol. 1005;13(10):2576-81    Allergies:  Sulfa drugs    /47   Pulse 61   Temp 36.3 °C (97.3 °F) (Temporal)   Resp 18   Ht 1.562 m (5' 1.5\")   Wt 54.3 kg (119 lb 11.4 oz)   SpO2 93%   BMI 22.26 kg/m²  Body surface area is 1.53 meters squared.    Labs 12/5/18  ANC~ 8120 Plt = 488k   Hgb = 10.1     SCr = 0.58 mg/dL CrCl ~ 65.7 mL/min (minimum SCr of 0.7)   LFT's = WNLs  TBili = 0.3  Ca = 9    denosumab (Xgeva) 120 mg SQ given 12/6/18 - Q 28 Days per supportive care plan    Paclitaxel (Taxol) 175 mg/m² x 1.53 m² = 267.75 mg   <5% difference, okay to treat with final dose = 267.8 mg IV    Tommy Vale, PharmD  "

## 2018-12-05 NOTE — PROGRESS NOTES
Subjective:      Zuly Christian is a 68 y.o. female who presents for Follow-Up (PreChemo) evaluation prior to cycle 5 taxol for breast cancer       HPI   Ms. Christian presents for evaluation prior to cycle 5 Taxol for treatment of stage IV high-grade, triple negative, invasive ductal cell carcinoma of the left breast.  She is accompanied by her friend for today's visit.    Patient continues to improve from SOB and pulmonary hypertension experienced in October. She continues with persistent fatigue and is not engaging in her usual activity due to poor tolerance. Halter monitor in place for cardiac evaluation. She visited  on 11/22/18 for worsening cough that resolved with completion of z-pack. She continues on O2 at night with sleep study pending. Last week she experienced several days of spontaneous emesis, which has since resolved. She notes worsening right hip pain since she has decreased her activity level. In regards to treatment she is doing very well and is overall asymptomatic.      Allergies   Allergen Reactions   • Pollen Extract    • Sulfa Drugs Hives       Current Outpatient Prescriptions on File Prior to Visit   Medication Sig Dispense Refill   • guaiFENesin LA (MUCINEX) 600 MG TABLET SR 12 HR Take 600 mg by mouth every 12 hours.     • benzonatate (TESSALON) 100 MG Cap Take 1 Cap by mouth 3 times a day as needed for Cough. 60 Cap 0   • umeclidinium-vilanterol (ANORO ELLIPTA) 62.5-25 MCG/INH AEROSOL POWDER, BREATH ACTIVATED inhaler USE 1 INHALATION EVERY DAY 3 Inhaler 3   • amitriptyline (ELAVIL) 100 MG Tab Take 1 Tab by mouth every bedtime. 90 Tab 1   • raNITidine (ZANTAC) 150 MG Tab Take 1 Tab by mouth 2 times a day. 60 Tab 3   • sennosides-docusate sodium (SENOKOT-S) 8.6-50 MG tablet Take 1 Tab by mouth 1 time daily as needed (for constipation. Do not take if LOOSE STOOLS). 30 Tab 3   • polyethylene glycol/lytes (MIRALAX) Pack Take 1 Packet by mouth every day. 30 Each 0   • omega-3 acid ethyl  esters (LOVAZA) 1 GM capsule Take 1 Cap by mouth 2 Times a Day. 180 Cap 3   • FLUoxetine (PROZAC) 20 MG Cap Take 1 Cap by mouth every day. 90 Cap 0   • lidocaine-prilocaine (EMLA) 2.5-2.5 % Cream Apply to port one hour prior to access and cover with plastic wrap. 1 Tube 3   • Calcium Polycarbophil (FIBER-CAPS PO) Take  by mouth.     • Oxymetazoline HCl (NASAL SPRAY NA) [place]  in nose.     • CHONDROITIN SULFATE PO Take  by mouth.     • albuterol (PROAIR HFA) 108 (90 Base) MCG/ACT Aero Soln inhalation aerosol Inhale 2 Puffs by mouth every four hours as needed for Shortness of Breath (wheezing). 3 Inhaler 3   • Lifitegrast (XIIDRA OP) by Ophthalmic route.     • naproxen (NAPROSYN) 500 MG Tab Take 1 Tab by mouth 2 times a day, with meals. 60 Tab 1   • Multiple Vitamin (MULTI VITAMIN DAILY PO) Take  by mouth.     • aspirin 81 MG tablet Take 81 mg by mouth every day. One tablet by mouth daily     • Coenzyme Q10 (COQ10) 100 MG Cap Take 100 mg by mouth.     • ezetimibe (ZETIA) 10 MG Tab Take 10 mg by mouth every day.     • rosuvastatin (CRESTOR) 40 MG tablet Take 40 mg by mouth every day.     • carvedilol (COREG) 25 MG Tab Take 12.5 mg by mouth 2 times a day, with meals.     • ramipril (ALTACE) 10 MG capsule Take 10 mg by mouth every day.       No current facility-administered medications on file prior to visit.          Review of Systems   Constitutional: Positive for malaise/fatigue (feels more age related, friend says more fatigued; friend states less activity due to increased tiredness; no treadmill due to discomfort). Negative for chills, fever and weight loss (stable).   Respiratory: Positive for cough (11/22  visit for cough - s/p z pack and tessalon & mucinex). Negative for shortness of breath and wheezing.         O2 at night x 3+ weeks, sleep study pending   Cardiovascular: Positive for leg swelling (LUE lymphedema). Negative for chest pain and palpitations.        Halter monitor in place for 2 weeks - started  "last Friday   Gastrointestinal: Positive for vomiting (sudden onset in night last week (7-10 days) self limiting). Negative for constipation (baslein - ok right now), diarrhea and nausea.   Genitourinary: Negative for dysuria.   Musculoskeletal: Positive for joint pain (R hip pain worsening - does get a bit better with movement, hx of radiation at both hips pre Dr. Eng). Negative for myalgias.        Left clavicle fx - takes 1500 ca daily   Skin:        Hair is growing back   Neurological: Positive for dizziness (if up too fast or after bending) and tingling (left thumb but improved). Negative for headaches.   Psychiatric/Behavioral: The patient does not have insomnia.           Objective:     BP (!) 98/53   Pulse 86   Temp 36.2 °C (97.2 °F) (Temporal)   Resp 16   Ht 1.575 m (5' 2\")   Wt 54.9 kg (121 lb)   SpO2 90%   BMI 22.13 kg/m²      Physical Exam   Constitutional: She is oriented to person, place, and time. She appears well-developed and well-nourished. No distress.   HENT:   Head: Normocephalic and atraumatic.   Mouth/Throat: Oropharynx is clear and moist. No oropharyngeal exudate.   Eyes: Pupils are equal, round, and reactive to light. Conjunctivae and EOM are normal. Right eye exhibits no discharge. Left eye exhibits no discharge. No scleral icterus.   Neck: Normal range of motion. Neck supple.   Cardiovascular: Normal rate, regular rhythm and normal heart sounds.  Exam reveals no gallop and no friction rub.    No murmur heard.  Pulmonary/Chest: Effort normal. No respiratory distress. She has no wheezes.   Diminished t/o   Abdominal: Soft. Bowel sounds are normal. She exhibits no distension. There is no tenderness.   Musculoskeletal: Normal range of motion. She exhibits no edema.   Neurological: She is alert and oriented to person, place, and time.   Skin: Skin is warm and dry. No rash noted. She is not diaphoretic. No erythema. No pallor.   Psychiatric: She has a normal mood and affect. Her " behavior is normal.   Vitals reviewed.      Hospital Outpatient Visit on 12/05/2018   Component Date Value Ref Range Status   • WBC 12/05/2018 12.2* 4.8 - 10.8 K/uL Final   • RBC 12/05/2018 4.00* 4.20 - 5.40 M/uL Final   • Hemoglobin 12/05/2018 10.1* 12.0 - 16.0 g/dL Final   • Hematocrit 12/05/2018 34.0* 37.0 - 47.0 % Final   • MCV 12/05/2018 85.0  81.4 - 97.8 fL Final   • MCH 12/05/2018 25.3* 27.0 - 33.0 pg Final   • MCHC 12/05/2018 29.7* 33.6 - 35.0 g/dL Final   • RDW 12/05/2018 53.1* 35.9 - 50.0 fL Final   • Platelet Count 12/05/2018 488* 164 - 446 K/uL Final   • MPV 12/05/2018 9.5  9.0 - 12.9 fL Final   • Nucleated RBC 12/05/2018 0.00  /100 WBC Final   • NRBC (Absolute) 12/05/2018 0.00  K/uL Final   • Neutrophils-Polys 12/05/2018 66.80  44.00 - 72.00 % Final   • Lymphocytes 12/05/2018 10.80* 22.00 - 41.00 % Final   • Monocytes 12/05/2018 13.80* 0.00 - 13.40 % Final   • Eosinophils 12/05/2018 4.30  0.00 - 6.90 % Final   • Basophils 12/05/2018 0.40  0.00 - 1.80 % Final   • Immature Granulocytes 12/05/2018 3.90* 0.00 - 0.90 % Final   • Lymphs (Absolute) 12/05/2018 1.31  1.00 - 4.80 K/uL Final   • Monos (Absolute) 12/05/2018 1.68* 0.00 - 0.85 K/uL Final   • Eos (Absolute) 12/05/2018 0.52* 0.00 - 0.51 K/uL Final   • Baso (Absolute) 12/05/2018 0.05  0.00 - 0.12 K/uL Final   • Immature Granulocytes (abs) 12/05/2018 0.47* 0.00 - 0.11 K/uL Final   • Neutrophils (Absolute) 12/05/2018 8.12* 2.00 - 7.15 K/uL Final    Includes immature neutrophils, if present.   • Anisocytosis 12/05/2018 1+   Final   • Microcytosis 12/05/2018 1+   Final   • Sodium 12/05/2018 137  135 - 145 mmol/L Final   • Potassium 12/05/2018 4.2  3.6 - 5.5 mmol/L Final   • Chloride 12/05/2018 107  96 - 112 mmol/L Final   • Co2 12/05/2018 20  20 - 33 mmol/L Final   • Anion Gap 12/05/2018 10.0  0.0 - 11.9 Final   • Glucose 12/05/2018 73  65 - 99 mg/dL Final   • Bun 12/05/2018 15  8 - 22 mg/dL Final   • Creatinine 12/05/2018 0.58  0.50 - 1.40 mg/dL Final   •  Calcium 12/05/2018 9.0  8.5 - 10.5 mg/dL Final   • AST(SGOT) 12/05/2018 18  12 - 45 U/L Final   • ALT(SGPT) 12/05/2018 10  2 - 50 U/L Final   • Alkaline Phosphatase 12/05/2018 95  30 - 99 U/L Final   • Total Bilirubin 12/05/2018 0.3  0.1 - 1.5 mg/dL Final   • Albumin 12/05/2018 3.5  3.2 - 4.9 g/dL Final   • Total Protein 12/05/2018 6.3  6.0 - 8.2 g/dL Final   • Globulin 12/05/2018 2.8  1.9 - 3.5 g/dL Final   • A-G Ratio 12/05/2018 1.3  g/dL Final   • GFR If  12/05/2018 >60  >60 mL/min/1.73 m 2 Final   • GFR If Non  12/05/2018 >60  >60 mL/min/1.73 m 2 Final   • Peripheral Smear Review 12/05/2018 see below   Final    Comment: Due to instrument suspect flags, further review of peripheral smear is  indicated on this patient sample. This review may or may not result in  abnormal findings.     • Plt Estimation 12/05/2018 Increased   Final    Comment: PLT: Platelet clumping confirmed on smear review.  If a more accurate  platelet count is required, please request recollection.     • RBC Morphology 12/05/2018 Present   Final   • Poikilocytosis 12/05/2018 1+   Final   • Ovalocytes 12/05/2018 1+   Final   • Schistocytes 12/05/2018 1+   Final   • Echinocytes 12/05/2018 1+   Final   • Comments-Diff 12/05/2018 see below   Final    Results have been verified by peripheral blood smear review.       Nm-bone Scan Whole Body    Result Date: 11/19/2018 11/19/2018 9:40 AM HISTORY/REASON FOR EXAM:  Metastatic breast cancer. TECHNIQUE/EXAM DESCRIPTION AND NUMBER OF VIEWS: Radionuclide whole body bone scan. COMPARISON: 9/18/2018 PROCEDURE:  26.1 mCi of Tc 99m-MDP was administered intravenously followed by delayed whole body planar imaging. FINDINGS: Residual activity in the bladder limits evaluation of the pelvis. Multiple foci of increased activity present in the ribs, sternum, proximal humeri, cervical, thoracic and lumbar spine as seen previously. Increased activity in present in the frontal skull.  Multifocal increased activity in the pelvis.     Multiple foci of abnormal uptake in the axial and proximal appendicular skeleton consistent with bony metastatic disease, unchanged from prior exam.         Assessment/Plan:     1. Metastatic breast cancer (HCC)  REFERRAL TO RADIATION ONCOLOGY   2. Malignant neoplasm metastatic to bone (HCC)  REFERRAL TO RADIATION ONCOLOGY   3. Encounter for antineoplastic chemotherapy     4. Poor tolerance for activity  REFERRAL TO PHYSICAL THERAPY Reason for Therapy: Eval/Treat/Report   5. Right hip pain  REFERRAL TO PHYSICAL THERAPY Reason for Therapy: Eval/Treat/Report    REFERRAL TO RADIATION ONCOLOGY       1.  Hip pain/bone mets/clavicle fx: Patient with worsening hip pain that correlates with decrease in activity. She does have stable bone mets per bone scan. She is referred to PT for strengthening and re-referred to radiation oncology for further evaluation of any palliative XRT options.    Should rad onc determine issue is more arthritic, will consider referral to Dr. Waggoner for non-pharm pain intervention.    Pt. will consider CBD options for non-opioid relief.    2.  Breast cancer: Patient is tolerating treatment fairly well. CBC and CMP have been evaluated and found to be within acceptable limits. She will proceed with cycle 5 of treatment and return in 3 weeks for evaluation prior to cycle 6, sooner as needed.            The patient verbalized agreement and understanding of current plan. All questions and concerns were addressed at time of visit.    Please note that this dictation was created using voice recognition software. I have made every reasonable attempt to correct obvious errors, but I expect that there are errors of grammar and possibly content that I did not discover before finalizing the note.

## 2018-12-06 NOTE — PROGRESS NOTES
"Pharmacy Chemotherapy Calculation    Patient Name: Zuly Christian   Dx: breast cancer, metastatic         Protocol: Taxol     Paclitaxel 175 mg/m² IV over 3 hrs on Day 1  21-day cycle until disease progression or unacceptable toxicity  NCCN Guidelines for Breast Cancer V.1.2018  Leyda DUNN, et al. JCO. 1995;13(10):0827-89.    Allergies:  Sulfa drugs     /47   Pulse 61   Temp 36.3 °C (97.3 °F) (Temporal)   Resp 18   Ht 1.562 m (5' 1.5\")   Wt 54.3 kg (119 lb 11.4 oz)   SpO2 93%   BMI 22.26 kg/m²  Body surface area is 1.53 meters squared.    LABS 12/5/2018:  ANC: 8120      WBC: 12.2     Plt: 488k   Hgb/Hct: 10.1/34.0     SCr: 0.58 mg/dL CrCl: 65 mL/min (SCr 0.7)    K: 4.2  Na: 137         Glu: 73  LFT's: WNL TBili = 0.3       Drug Order   (Drug name, dose, route, IV Fluid & volume, frequency, number of doses) Cycle 5 of q3w Taxol (pt preference)    Previous treatment: C4 (11/15/2018)  (s/p 4 cycles weekly Taxol, last 8/15/18)   Medication = Paclitaxel (Taxol)  Base Dose = 175 mg/m²  Calc Dose: Base Dose x Body surface area is 1.53 meters squared. m² = 267.75 mg  Final Dose = 267.8 mg  Route = IV  Fluid & Volume =  mL  Admin Duration = Over 3 hrs          <5% difference, okay to treat with final dose     By my signature below, I confirm this process was performed independently with the BSA and all final chemotherapy dosing calculations congruent. I have reviewed the above chemotherapy order and that my calculation of the final dose and BSA (when applicable) corroborate those calculations of the  pharmacist. Discrepancies of 5% or greater in the written dose have been addressed and documented within the [facility] Progress notes.      MICH Alejandre, PharmD  "

## 2018-12-06 NOTE — PROGRESS NOTES
Zuly Christian is a 68 y.o. female here for a non-provider visit for: Lab Draws  on 12/6/2018 at 9:25 AM    Procedure Performed: Venipuncture     Anatomical site: Right Antecubital Area (AC)    Equipment used: 21g Butterfly     Labs drawn: CBC w/diff and CMP    Ordering Provider: TIMBO Santos By: Niall Rose Ass't    No complications.

## 2018-12-06 NOTE — PROGRESS NOTES
Pt presents today for C5D1 Taxol and Xgeva.  Pt offers no new complaints, Port accessed per protocol, positive blood return noted. Pre medications administered and tolerated well. Taxol infused over 3 hours. Xgeva administered SQ in right upper arm, tolerated well.   Port flushed per protocol and positive blood return noted, d/c'd. Pt discharged home stable ambulatory with friend. Will return to clinic in 3 weeks.

## 2018-12-06 NOTE — PROGRESS NOTES
Chemotherapy Verification - PRIMARY RN      Height = 156.2 cm  Weight = 54.3 kg  BSA = 1.53 m2       Medication: Paclitaxel  Dose: 175 mg/m2  Calculated Dose: 267.75 mg2                             (In mg/m2, AUC, mg/kg)         I confirm this process was performed independently with the BSA and all final chemotherapy dosing calculations congruent.  Any discrepancies of 5% or greater have been addressed with the chemotherapy pharmacist. The resolution of the discrepancy has been documented in the EPIC progress notes.

## 2018-12-06 NOTE — PROGRESS NOTES
Chemotherapy Verification - SECONDARY RN       Height = 156.2cm  Weight = 54.3kg  BSA = 1.53       Medication: Taxol  Dose: 175mg/m2  Calculated Dose: 267.75mg                             (In mg/m2, AUC, mg/kg)       I confirm that this process was performed independently.

## 2018-12-24 NOTE — OP THERAPY EVALUATION
Outpatient Physical Therapy  INITIAL EVALUATION    [facility] [place]  [address], [address]  [place] [address]  Phone:  [phone]  Fax:  [phone]    Date of Evaluation: 12/24/2018    Patient: Zuly Christian  YOB: 1950  MRN: [id number]     Referring Provider: AIDA Lee [address] [id number]   Referring Diagnosis Poor tolerance for activity [Z78.9];Right hip pain [M25.551]     Time Calculation  Start time: 1115  Stop time: 1215 Time Calculation (min): 60 minutes     Physical Therapy Occurrence Codes    Date physical therapy care plan established or reviewed:  12/24/18   Date physical therapy treatment started:  12/24/18          Chief Complaint:  Balance and hip pain    Visit Diagnoses     ICD-10-CM   1. Poor tolerance for activity Z78.9   2. Right hip pain M25.551         Subjective:   History of Present Illness:     Mechanism of injury:  Zuly Christian is a 68 y.o. female that presents to therapy with R hip pain. She is undergoing treatment for stage 4 breast cancer. She is currently getting chemo. Standing up she gets dizzy. First steps are the worst when walking in terms of pain. She has had previous radiation on her hip.  Her pain is located along the front of her hip that occurs when she stands and relieves after a few steps of walking. She notes balance deficits with a previous fall in November. She notes that her endurance is poor as well. Her next chemotherapy treatment is later this week. (every 6 weeks) Patient denies fevers/chills, numbness of lower extremities, bowel/bladder incontinence, saddle anesthesia.       Aggravating factors: walking, stairs  Releiving factors: rest    ADL limitations: limited walking/standing, poor balance, previous falls.         Past Medical History:   Diagnosis Date   • Allergic rhinitis    • Cancer (HCC)     breast   • Cataract    • COPD (chronic obstructive pulmonary disease)  (HCC)    • Emphysema of lung (HCC)    • Heart burn    • Hyperlipidemia    • Hypertension    • Osteopenia    • Peripheral vascular disease (HCC)      Past Surgical History:   Procedure Laterality Date   • CAROTID ENDARTERECTOMY  7/20/2017    Procedure: CAROTID ENDARTERECTOMY FOR : LEFT SUBCLAVIAN ARTERY ANGIOPLASTY / STENT BRACHIAL APPROACH WITH ULTRASOUND;  Surgeon: Dinora Saldana M.D.;  Location: SURGERY [facility];  Service:    • TUBAL LIGATION  1977   • GYN SURGERY      d+c 1979, 1980   • OTHER ORTHOPEDIC SURGERY      left knee surgery   • TONSILLECTOMY       Social History   Substance Use Topics   • Smoking status: Former Smoker     Packs/day: 1.00     Years: 50.00     Types: Cigarettes     Quit date: 5/1/2014   • Smokeless tobacco: Never Used      Comment: started at age 18, quit at 64, 1.5-2 ppd for 46 years, continued abstinance   • Alcohol use Yes      Comment: 3 a month     Family and Occupational History     Social History   • Marital status:      Spouse name: N/A   • Number of children: N/A   • Years of education: N/A     Occupational History   • work in cardiology office Retired       Objective     Lumbar Screen  Lumbar range of motion within normal limits.    Neurological Testing     Sensation     Hip   Left Hip   Intact: light touch    Right Hip   Intact: light touch    Reflexes   Left   Babinski sign: negative  Clonus sign: negative    Right   Babinski sign: negative  Clonus sign: negative    Active Range of Motion   Left Hip   Flexion: 115 degrees   Extension: 10 degrees   Abduction: WFL  External rotation (90/90): WFL  Internal rotation (90/90): WFL    Right Hip   Flexion: 110 degrees with pain  Extension: 10 degrees   Abduction: WFL  External rotation (90/90): 25 degrees with pain  Internal rotation (90/90): 15 degrees with pain    Passive Range of Motion     Additional Passive Range of Motion Details  PROM =AROM    Strength:      Left Hip   Planes of Motion   Flexion: 4  Extension:  4  Abduction: 4  Adduction: 5  External rotation: 4  Internal rotation: 4    Right Hip   Planes of Motion   Flexion: 2-  Extension: 4  Abduction: 4  Adduction: 5  External rotation: 2-  Internal rotation: 2-    Tests     Right Hip   Positive IGNACIA and FADIR.   Negative scour.     General Comments     Hip Comments   Needs hands to sit/stand to control motion. Narrow based balance limited. Single leg balance limited. R LE weightbearing less stable/painful.         Therapeutic Exercises (CPT 43195):       Therapeutic Exercise Summary: HEP instruction/performance and development. Handout provided and exercises located below:  Access Code: [email or web address]   URL: [email or web address]   Date: 12/24/2018   Prepared by: Morteza Mcknight      Exercises  · Standing Hip Extension with Unilateral Counter Support - 5 reps - 2 sets - 1x daily - 7x weekly  · Standing Hip Abduction with Counter Support - 5 reps - 2 sets - 1x daily - 7x weekly  · Mini Squat with Counter Support - 5 reps - 2 sets - 1x daily - 7x weekly  · Standing Tandem Balance with Counter Support - 20 reps - 1x daily - 7x weekly      Time-based treatments/modalities:  Therapeutic exercise minutes (CPT 13249): 10 minutes       Assessment, Response and Plan:   Assessment details:  Zuly Christian is a 68 y.o. female with signs and symptoms consistent with R hip impingement along with generalized LE Weakness secondary to cancer related treatment. She requires skilled physical therapy intervention to decrease pain, increase functional mobility, improve ADL completion and establish a home exercise program. Pt is a fall risk at this time but is not using an assistive device. Future visits will facilitate safe use of least restricting Assistive device. As strength improves AD may not be necessary.   Goals:   Short Term Goals:   1. Patient will be Independent with prescribed Home Exercise Program (HEP) and will be able to demonstrate exercises without cues for improved  overall symptoms/activity tolerance.   2. Pt will be able to ambulate with least restrictive AD for reduced risk of falls and overall improved activity tolerance.   Short term goal time span:  1-2 weeks      Long Term Goals:    3. Pt to improve jacques score to greater than 45/56 for reduced fall risk.   4. Pt to ambulate first 20 feet with pain less than 3/10.   5. Pt to improved hip extension strength to 4+/5 bilaterally for improved ability to stand up from a chair.   Long term goal time span:  4-6 weeks    Plan:   Planned therapy interventions:  Therapeutic Exercise (CPT 72232), Therapeutic Activities (CPT 26124), Manual Therapy (CPT 98790), Neuromuscular Re-education (CPT 67234), E Stim Unattended (CPT 23743) and Gait Training (CPT 08888)  Frequency: 1-2x/week.  Duration in weeks:  6  Discussed with:  Patient    Functional Limitation G-Codes and Severity Modifiers  PT Functional Assessment Tool Used: JACQUES, LEFS  PT Functional Assessment Score: JACQUES 43/56, LEFS 28/80   Current: Mobility: Current (): CL   Goal: Mobility: Goal (): CK     Referring provider co-signature:  I have reviewed this plan of care and my co-signature certifies the need for services.  Certification Dates:   From 12/24/18      To 2/4/18    Physician Signature: ________________________________ Date: ______________

## 2018-12-26 NOTE — OP THERAPY DAILY TREATMENT
Outpatient Physical Therapy  DAILY TREATMENT     [facility] [place]  [address], [address]  [place] [address]  Phone:  [phone]  Fax:  [phone]    Date: 12/26/2018    Patient: Zuly Christian  YOB: 1950  MRN: [id number]     Time Calculation  Start time: 0145  Stop time: 0215 Time Calculation (min): 30 minutes     Chief Complaint: R hip pain, overall endurance    Visit #: 2    SUBJECTIVE:  hasn't tried implementing HEP yet with the holiday. R hip pain with ambulation.     OBJECTIVE:  Current objective measures: ambulation with Shortend step. Limited weightweaing on R LE.            Therapeutic Exercises (CPT 80394):     1. Nu step , 6min, lvl 3    2. Shuttle , 3c x 10, 2c x20, 2c x 2min     3. Calf rocking, 2min    4. HEP reproduction Standing hip anduction and extension, mini squat and tandem balance all with support for safety.        Time-based treatments/modalities:  Therapeutic exercise minutes (CPT 54210): 30 minutes       Pain rating before treatment: 3  Pain rating after treatment: 3    ASSESSMENT:   Response to treatment: Pt to have chemo round starting tomorrow. Will f/u next week. initiate HEP as able without increasing pain. Again encourage SPC use for safety with ambulation.       PLAN/RECOMMENDATIONS:   Plan for treatment: therapy treatment to continue next visit.  Planned interventions for next visit: continue with current treatment.

## 2018-12-27 NOTE — PROGRESS NOTES
Chemotherapy Verification - SECONDARY RN       Height = 156.2 cm  Weight = 53.6 kg  BSA = 1.53 m2       Medication: Taxol  Dose: 175 mg/m2  Calculated Dose: 267.75 mg                             (In mg/m2, AUC, mg/kg)         I confirm that this process was performed independently.

## 2018-12-27 NOTE — PROGRESS NOTES
"Pharmacy Chemotherapy calculation:    Dx: metastatic Triple negative breast cancer    Cycle 6  Previous treatment = C4 on 11/15/18    Regimen: Taxol  Paclitaxel 175 mg/m² IV over 3 hrs on Day 1  21-day cycle until disease progression or unacceptable toxicity  NCCN Guidelines for Breast Cancer V.1.2018  Leyda AD, et al. J Clin Oncol. 1005;13(10):2575-81    Allergies:  Sulfa drugs    /48   Pulse (!) 107   Temp 36.6 °C (97.9 °F) (Temporal)   Resp 18   Ht 1.562 m (5' 1.5\")   Wt 53.6 kg (118 lb 2.7 oz)   SpO2 94%   BMI 21.97 kg/m²  Body surface area is 1.53 meters squared.    ANC~ 9260 Plt = 471k   Hgb = 10.3     SCr = 0.6mg/dL CrCl ~ 65mL/min   LFT's = 18/9/103 TBili = 0.3     denosumab (Xgeva) 120 mg SQ given 12/6/18 - Q 28 Days per supportive care plan    Paclitaxel (Taxol) 175 mg/m² x 1.53 m² = 267.75 mg   <5% difference, okay to treat with final dose = 267.8 mg IV    Greer Manzo, PharmD, BCOP  "

## 2018-12-27 NOTE — NON-PROVIDER
"Patient was seen today in clinic with Dr. Eng for Consult.  Vitals signs and weight were obtained and pain assessment was completed.  Allergies and medications were reviewed with the patient.  Review of systems completed.     Vitals/Pain:  Vitals:    12/27/18 1249   BP: 120/46   BP Location: Right arm   Patient Position: Sitting   BP Cuff Size: Adult   Pulse: (!) 104   Temp: 36.9 °C (98.4 °F)   TempSrc: Temporal   SpO2: 97%   Weight: 53.4 kg (117 lb 11.2 oz)   Height: 1.575 m (5' 2\")   Pain Score: 7=Moderate-Severe Pain (Right side hip)        Allergies:   Pollen extract and Sulfa drugs    Current Medications:  Current Outpatient Prescriptions   Medication Sig Dispense Refill   • FLUoxetine (PROZAC) 20 MG Cap Take 1 Cap by mouth every day. 90 Cap 0   • umeclidinium-vilanterol (ANORO ELLIPTA) 62.5-25 MCG/INH AEROSOL POWDER, BREATH ACTIVATED inhaler USE 1 INHALATION EVERY DAY 3 Inhaler 3   • amitriptyline (ELAVIL) 100 MG Tab Take 1 Tab by mouth every bedtime. 90 Tab 1   • raNITidine (ZANTAC) 150 MG Tab Take 1 Tab by mouth 2 times a day. 60 Tab 3   • polyethylene glycol/lytes (MIRALAX) Pack Take 1 Packet by mouth every day. 30 Each 0   • omega-3 acid ethyl esters (LOVAZA) 1 GM capsule Take 1 Cap by mouth 2 Times a Day. 180 Cap 3   • lidocaine-prilocaine (EMLA) 2.5-2.5 % Cream Apply to port one hour prior to access and cover with plastic wrap. 1 Tube 3   • Calcium Polycarbophil (FIBER-CAPS PO) Take  by mouth.     • CHONDROITIN SULFATE PO Take  by mouth.     • Lifitegrast (XIIDRA OP) by Ophthalmic route.     • naproxen (NAPROSYN) 500 MG Tab Take 1 Tab by mouth 2 times a day, with meals. 60 Tab 1   • Multiple Vitamin (MULTI VITAMIN DAILY PO) Take  by mouth.     • aspirin 81 MG tablet Take 81 mg by mouth every day. One tablet by mouth daily     • Coenzyme Q10 (COQ10) 100 MG Cap Take 100 mg by mouth.     • ezetimibe (ZETIA) 10 MG Tab Take 10 mg by mouth every day.     • rosuvastatin (CRESTOR) 40 MG tablet Take 40 mg by " mouth every day.     • carvedilol (COREG) 25 MG Tab Take 12.5 mg by mouth 2 times a day, with meals.     • ramipril (ALTACE) 10 MG capsule Take 10 mg by mouth every day.     • guaiFENesin LA (MUCINEX) 600 MG TABLET SR 12 HR Take 600 mg by mouth every 12 hours.     • benzonatate (TESSALON) 100 MG Cap Take 1 Cap by mouth 3 times a day as needed for Cough. 60 Cap 0   • sennosides-docusate sodium (SENOKOT-S) 8.6-50 MG tablet Take 1 Tab by mouth 1 time daily as needed (for constipation. Do not take if LOOSE STOOLS). 30 Tab 3   • Oxymetazoline HCl (NASAL SPRAY NA) Spray  in nose.     • albuterol (PROAIR HFA) 108 (90 Base) MCG/ACT Aero Soln inhalation aerosol Inhale 2 Puffs by mouth every four hours as needed for Shortness of Breath (wheezing). 3 Inhaler 3     No current facility-administered medications for this encounter.          PCP:  Nazia Grove, Med Ass't

## 2018-12-27 NOTE — PROGRESS NOTES
Zuly Christian is a 68 y.o. female here for a non-provider visit for: Lab Draws  on 12/27/2018 at 9:25 AM    Procedure Performed: Venipuncture     Anatomical site: Right Antecubital Area (AC)    Equipment used: 21g Butterfly     Labs drawn: CBC w/diff, CEA and CMP, CA27.29    Ordering Provider: TIMBO Santos    Juan By: Arlen Rangel Medical Assistant  Successful blood draw.

## 2018-12-27 NOTE — PROGRESS NOTES
RADIATION ONCOLOGY FOLLOW-UP    DATE OF SERVICE: 12/27/2018    IDENTIFICATION:   A 68 y.o. female with metastatic triple negative cancer status post radiation therapy to bilateral hips and left clavicle complete 7/24/2018 currently on Taxol with persistent and worsening pain in the right hip/right pelvic region seen in consultation today about whether further radiation therapy can be delivered    HISTORY OF PRESENT ILLNESS:   Patient has been doing fairly well continuing on Taxol however over the last month she has had decreased mobility because of increased pain in the right hip this is more medially in the groin area and hurts most when she is getting from a sitting to a standing position.  Most recent bone scan 11/19/2018 does not show any major changes from the scan prior to the prior radiation.    CURRENT MEDICATIONS:  Current Outpatient Prescriptions   Medication Sig Dispense Refill   • FLUoxetine (PROZAC) 20 MG Cap Take 1 Cap by mouth every day. 90 Cap 0   • umeclidinium-vilanterol (ANORO ELLIPTA) 62.5-25 MCG/INH AEROSOL POWDER, BREATH ACTIVATED inhaler USE 1 INHALATION EVERY DAY 3 Inhaler 3   • amitriptyline (ELAVIL) 100 MG Tab Take 1 Tab by mouth every bedtime. 90 Tab 1   • raNITidine (ZANTAC) 150 MG Tab Take 1 Tab by mouth 2 times a day. 60 Tab 3   • polyethylene glycol/lytes (MIRALAX) Pack Take 1 Packet by mouth every day. (Patient not taking: Reported on 12/27/2018) 30 Each 0   • omega-3 acid ethyl esters (LOVAZA) 1 GM capsule Take 1 Cap by mouth 2 Times a Day. 180 Cap 3   • lidocaine-prilocaine (EMLA) 2.5-2.5 % Cream Apply to port one hour prior to access and cover with plastic wrap. 1 Tube 3   • Calcium Polycarbophil (FIBER-CAPS PO) Take  by mouth.     • CHONDROITIN SULFATE PO Take  by mouth.     • Lifitegrast (XIIDRA OP) by Ophthalmic route.     • naproxen (NAPROSYN) 500 MG Tab Take 1 Tab by mouth 2 times a day, with meals. 60 Tab 1   • Multiple Vitamin (MULTI VITAMIN DAILY PO) Take  by mouth.     •  aspirin 81 MG tablet Take 81 mg by mouth every day. One tablet by mouth daily     • Coenzyme Q10 (COQ10) 100 MG Cap Take 100 mg by mouth.     • ezetimibe (ZETIA) 10 MG Tab Take 10 mg by mouth every day.     • rosuvastatin (CRESTOR) 40 MG tablet Take 40 mg by mouth every day.     • carvedilol (COREG) 25 MG Tab Take 12.5 mg by mouth 2 times a day, with meals.     • ramipril (ALTACE) 10 MG capsule Take 10 mg by mouth every day.     • tramadol (ULTRAM) 50 MG Tab      • guaiFENesin LA (MUCINEX) 600 MG TABLET SR 12 HR Take 600 mg by mouth every 12 hours.     • benzonatate (TESSALON) 100 MG Cap Take 1 Cap by mouth 3 times a day as needed for Cough. (Patient not taking: Reported on 12/27/2018) 60 Cap 0   • sennosides-docusate sodium (SENOKOT-S) 8.6-50 MG tablet Take 1 Tab by mouth 1 time daily as needed (for constipation. Do not take if LOOSE STOOLS). 30 Tab 3   • Oxymetazoline HCl (NASAL SPRAY NA) Spray  in nose.     • albuterol (PROAIR HFA) 108 (90 Base) MCG/ACT Aero Soln inhalation aerosol Inhale 2 Puffs by mouth every four hours as needed for Shortness of Breath (wheezing). 3 Inhaler 3     No current facility-administered medications for this encounter.      Facility-Administered Medications Ordered in Other Encounters   Medication Dose Route Frequency Provider Last Rate Last Dose   • NS infusion   Intravenous Continuous Zulema Hyatt, A.P.R.N. 30 mL/hr at 12/27/18 1402     • heparin pf injection 500 Units  500 Units Intracatheter PRN Zulema Hyatt, A.P.R.N.       • PACLitaxel (TAXOL) 267.8 mg in  mL Chemo Infusion  175 mg/m2 Intravenous Once Zulema Hyatt, A.P.R.N.           ALLERGIES:  Pollen extract and Sulfa drugs    FAMILY HISTORY:    Family History   Problem Relation Age of Onset   • Lung Cancer Father    • Cancer Father 71        lung   • Lung Cancer Mother    • Cancer Mother 91        lung   • Cancer Maternal Aunt 80        lung cancer, smoking   [unfilled]        SOCIAL HISTORY:     reports that  "she quit smoking about 4 years ago. Her smoking use included Cigarettes. She has a 50.00 pack-year smoking history. She has quit using smokeless tobacco. She reports that she drinks alcohol. She reports that she does not use drugs.    PAIN: 7 out of 10  What makes the pain better: Immobility  What makes the pain worse: Getting from a sitting to a standing position  Pain controlled with current regimen: yes  Pain related to condition being seen here for: yes    REVIEW OF SYSTEMS: Is significant for decrease in appetite, fatigue decrease in taste dry mouth occasional hoarseness.  She has hearing loss and some vertigo.  She has constipation joint pain neck pain neck stiffness.  She has an unsteady gait and a history of stroke long time ago she has shortness of breath and uses oxygen at night.  The rest of the review of systems has been reviewed by me and is documented in the nursing note in [facility] dated 12/27/2018    PHYSICAL EXAM:     ECOG PERFORMANCE STATUS:  1= Restricted in physically strenuous activity, but ambulatory and able to carry out work of a light sedentary nature, e.g., light housework, office work.       Vitals:    12/27/18 1249   BP: 120/46   BP Location: Right arm   Patient Position: Sitting   BP Cuff Size: Adult   Pulse: (!) 104   Temp: 36.9 °C (98.4 °F)   TempSrc: Temporal   SpO2: 97%   Weight: 53.4 kg (117 lb 11.2 oz)   Height: 1.575 m (5' 2\")   Pain Score: 7=Moderate-Severe Pain (Right side hip)        GENERAL: Well-appearing alert and oriented x3 in no major distress somewhat fatigued  Musculoskeletal: She does have tenderness in the medial aspect of the hip on the right as well as in the pubic symphysis region.  No pain can be elicited in the superior aspect of the right pelvis and the iliac crest where she does have known disease.  HEENT:  Pupils are equal, round, and reactive to light.  Extraocular muscles   are intact. Sclerae nonicteric.  Conjunctivae pink.  Oral cavity, tongue   protrudes " midline.   NECK:  Supple without evidence of thyromegaly.  NODES:  No peripheral adenopathy of the neck, supraclavicular fossa or axillae   bilaterally.  LUNGS:  Clear to ascultation   HEART:  Regular rate and rhythm.  No murmur appreciated  ABDOMEN:  Soft. No evidence of hepatosplenomegaly.  Positive bowel sounds.  EXTREMITIES:  Without Edema.  NEUROLOGIC:  Cranial nerves II through XII were intact. Normal stance and gait motor and sensory grossly within normal limits          IMPRESSION:    A 68 y.o. female with metastatic triple negative cancer now with increasing right hip pain despite radiation therapy to the right hip and Taxol therapy.    RECOMMENDATIONS:   After carefully reviewing some of the scans I think there is a possibility that she has disease medially that was not incorporated in the radiation field.  I would like to order an MRI scan of the pelvis to confirm this.  I think we can reasonably give further radiation therapy to this area.  I have her tentatively scheduled for simulation in a couple weeks to get started soon thereafter.    We discussed the side effects of the radiation therapy including generalized fatigue and damage to the tissues within the treatment field.      Thank you for the opportunity to participate in her care.  If any questions or comments, please do not hesitate in calling.      Please note that this dictation was created using voice recognition software. I have made every reasonable attempt to correct obvious errors, but I expect that there are errors of grammar and possibly content that I did not discover before finalizing the note.

## 2018-12-27 NOTE — PROGRESS NOTES
Subjective:      Zuly Christian is a 68 y.o. female who presents for Follow-Up (PreChemo) evaluation prior to cycle 6 Taxol for breast cancer      HPI   Ms. Christian presents for evaluation prior to cycle 6 Taxol for treatment of stage IV high-grade, triple negative, invasive ductal cell carcinoma of the left breast.  She is accompanied by her friend for today's visit.    Patient continues with persistent fatigue.  She is participating in less activities per her friend.  She continues with right hip pain and has been referred to PT.  She has been evaluated and provided exercises for balance and strengthening.  She follows up with Dr. Eng today for evaluation for radiation therapy.  She continues with O2 at 2 L/min nightly and has experienced mild increase in shortness of breath she notes mild external burning with urination. Last visit she was wearing a Holter monitor, results are still pending.  She is noticing more dry mouth.   She reports dizziness when she gets up and when she turns her head to the left.  She needs a new batteries and hearing aids and feels that it is related to that.  In regards to treatment she continues tolerating it very well and is otherwise asymptomatic.        Allergies   Allergen Reactions   • Pollen Extract    • Sulfa Drugs Hives           Current Outpatient Prescriptions on File Prior to Visit   Medication Sig Dispense Refill   • FLUoxetine (PROZAC) 20 MG Cap Take 1 Cap by mouth every day. 90 Cap 0   • guaiFENesin LA (MUCINEX) 600 MG TABLET SR 12 HR Take 600 mg by mouth every 12 hours.     • benzonatate (TESSALON) 100 MG Cap Take 1 Cap by mouth 3 times a day as needed for Cough. 60 Cap 0   • umeclidinium-vilanterol (ANORO ELLIPTA) 62.5-25 MCG/INH AEROSOL POWDER, BREATH ACTIVATED inhaler USE 1 INHALATION EVERY DAY 3 Inhaler 3   • amitriptyline (ELAVIL) 100 MG Tab Take 1 Tab by mouth every bedtime. 90 Tab 1   • raNITidine (ZANTAC) 150 MG Tab Take 1 Tab by mouth 2 times a day. 60 Tab  3   • sennosides-docusate sodium (SENOKOT-S) 8.6-50 MG tablet Take 1 Tab by mouth 1 time daily as needed (for constipation. Do not take if LOOSE STOOLS). 30 Tab 3   • polyethylene glycol/lytes (MIRALAX) Pack Take 1 Packet by mouth every day. 30 Each 0   • omega-3 acid ethyl esters (LOVAZA) 1 GM capsule Take 1 Cap by mouth 2 Times a Day. 180 Cap 3   • lidocaine-prilocaine (EMLA) 2.5-2.5 % Cream Apply to port one hour prior to access and cover with plastic wrap. 1 Tube 3   • Calcium Polycarbophil (FIBER-CAPS PO) Take  by mouth.     • Oxymetazoline HCl (NASAL SPRAY NA) [place]  in nose.     • CHONDROITIN SULFATE PO Take  by mouth.     • albuterol (PROAIR HFA) 108 (90 Base) MCG/ACT Aero Soln inhalation aerosol Inhale 2 Puffs by mouth every four hours as needed for Shortness of Breath (wheezing). 3 Inhaler 3   • Lifitegrast (XIIDRA OP) by Ophthalmic route.     • naproxen (NAPROSYN) 500 MG Tab Take 1 Tab by mouth 2 times a day, with meals. 60 Tab 1   • Multiple Vitamin (MULTI VITAMIN DAILY PO) Take  by mouth.     • aspirin 81 MG tablet Take 81 mg by mouth every day. One tablet by mouth daily     • Coenzyme Q10 (COQ10) 100 MG Cap Take 100 mg by mouth.     • ezetimibe (ZETIA) 10 MG Tab Take 10 mg by mouth every day.     • rosuvastatin (CRESTOR) 40 MG tablet Take 40 mg by mouth every day.     • carvedilol (COREG) 25 MG Tab Take 12.5 mg by mouth 2 times a day, with meals.     • ramipril (ALTACE) 10 MG capsule Take 10 mg by mouth every day.     • tramadol (ULTRAM) 50 MG Tab        No current facility-administered medications on file prior to visit.           Review of Systems   Constitutional: Positive for malaise/fatigue (more tired, less activities). Negative for chills, diaphoresis, fever and weight loss (stable).   HENT: Positive for hearing loss (bilateral hearing aids - needing adjustment and cleaning).         Dry mouth causes speech changes - improved with coughdrop   Respiratory: Positive for shortness of breath (a  "little, has been refered to PT - working them). Negative for cough and wheezing.         O2 @ 2 at night   Cardiovascular: Negative for chest pain, palpitations and leg swelling.        Halter monitor results pending   Gastrointestinal: Positive for constipation (relieved with sennekot s). Negative for diarrhea, melena, nausea and vomiting.   Genitourinary: Positive for dysuria.        Burning \"outside\" sometimes   Musculoskeletal: Positive for joint pain (R hip pain - follows with Dr. Eng today for XRT eval; started with PT for balance and strengthening). Negative for myalgias.   Neurological: Positive for dizziness (notices when turning head to left - fairly new - bilateral hearing aids). Negative for tingling and headaches.   Psychiatric/Behavioral: Memory loss: Friend feels patient is getting for forgetful.          Objective:     BP (!) 98/50   Pulse (!) 108   Temp 37.1 °C (98.8 °F) (Temporal)   Resp 16   Ht 1.562 m (5' 1.5\")   Wt 54 kg (119 lb)   SpO2 93%   BMI 22.12 kg/m²      Physical Exam   Constitutional: She is oriented to person, place, and time. She appears well-developed and well-nourished. No distress.   HENT:   Head: Normocephalic and atraumatic.   Mouth/Throat: Oropharynx is clear and moist. No oropharyngeal exudate.   Bilateral hearing aides   Eyes: Pupils are equal, round, and reactive to light. Conjunctivae and EOM are normal. Right eye exhibits no discharge. Left eye exhibits no discharge. No scleral icterus.   Neck: Normal range of motion. Neck supple.   Cardiovascular: Regular rhythm and normal heart sounds.  Exam reveals no gallop and no friction rub.    No murmur heard.  Tachy with activity   Pulmonary/Chest: Effort normal and breath sounds normal. No respiratory distress. She has no wheezes.   O2 at night   Abdominal: Soft. Bowel sounds are normal. She exhibits no distension. There is no tenderness.   Musculoskeletal: Normal range of motion. She exhibits tenderness (R hip tender, " using cane, limiting activity). She exhibits no edema.   Neurological: She is alert and oriented to person, place, and time.   Skin: Skin is warm and dry. No rash noted. She is not diaphoretic. No erythema. No pallor.   Psychiatric: She has a normal mood and affect. Her behavior is normal.   Vitals reviewed.      Hospital Outpatient Visit on 12/27/2018   Component Date Value Ref Range Status   • Color 12/27/2018 Yellow   Final   • Character 12/27/2018 Cloudy*  Final   • Specific Gravity 12/27/2018 1.012  <1.035 Final   • Ph 12/27/2018 6.5  5.0 - 8.0 Final   • Glucose 12/27/2018 Negative  Negative mg/dL Final   • Ketones 12/27/2018 Negative  Negative mg/dL Final   • Protein 12/27/2018 Negative  Negative mg/dL Final   • Bilirubin 12/27/2018 Negative  Negative Final   • Urobilinogen, Urine 12/27/2018 0.2  Negative Final   • Nitrite 12/27/2018 Negative  Negative Final   • Leukocyte Esterase 12/27/2018 Negative  Negative Final   • Occult Blood 12/27/2018 Negative  Negative Final   • Micro Urine Req 12/27/2018 Microscopic   Final   • WBC 12/27/2018 2-5  /hpf Final    Comment: Female  <12 Yr 0-2  >12 Yr 0-5  Male   None     • RBC 12/27/2018 0-2  /hpf Final    Comment: Female  >12 Yr 0-2  Male   None     • Bacteria 12/27/2018 Negative  None /hpf Final   • Epithelial Cells 12/27/2018 Negative  /hpf Final   • Hyaline Cast 12/27/2018 0-2  /lpf Final   Hospital Outpatient Visit on 12/27/2018   Component Date Value Ref Range Status   • Sodium 12/27/2018 132* 135 - 145 mmol/L Final   • Potassium 12/27/2018 4.8  3.6 - 5.5 mmol/L Final   • Chloride 12/27/2018 105  96 - 112 mmol/L Final   • Co2 12/27/2018 18* 20 - 33 mmol/L Final   • Anion Gap 12/27/2018 9.0  0.0 - 11.9 Final   • Glucose 12/27/2018 79  65 - 99 mg/dL Final   • Bun 12/27/2018 18  8 - 22 mg/dL Final   • Creatinine 12/27/2018 0.60  0.50 - 1.40 mg/dL Final   • Calcium 12/27/2018 9.4  8.5 - 10.5 mg/dL Final   • AST(SGOT) 12/27/2018 18  12 - 45 U/L Final   • ALT(SGPT)  12/27/2018 9  2 - 50 U/L Final   • Alkaline Phosphatase 12/27/2018 103* 30 - 99 U/L Final   • Total Bilirubin 12/27/2018 0.3  0.1 - 1.5 mg/dL Final   • Albumin 12/27/2018 3.7  3.2 - 4.9 g/dL Final   • Total Protein 12/27/2018 6.6  6.0 - 8.2 g/dL Final   • Globulin 12/27/2018 2.9  1.9 - 3.5 g/dL Final   • A-G Ratio 12/27/2018 1.3  g/dL Final   • WBC 12/27/2018 13.3* 4.8 - 10.8 K/uL Final   • RBC 12/27/2018 4.10* 4.20 - 5.40 M/uL Final   • Hemoglobin 12/27/2018 10.3* 12.0 - 16.0 g/dL Final   • Hematocrit 12/27/2018 33.9* 37.0 - 47.0 % Final   • MCV 12/27/2018 82.7  81.4 - 97.8 fL Final   • MCH 12/27/2018 25.1* 27.0 - 33.0 pg Final   • MCHC 12/27/2018 30.4* 33.6 - 35.0 g/dL Final   • RDW 12/27/2018 53.7* 35.9 - 50.0 fL Final   • Platelet Count 12/27/2018 471* 164 - 446 K/uL Final   • MPV 12/27/2018 9.4  9.0 - 12.9 fL Final   • Nucleated RBC 12/27/2018 0.20  /100 WBC Final   • NRBC (Absolute) 12/27/2018 0.02  K/uL Final   • Neutrophils-Polys 12/27/2018 46.10  44.00 - 72.00 % Final   • Lymphocytes 12/27/2018 10.40* 22.00 - 41.00 % Final   • Monocytes 12/27/2018 8.70  0.00 - 13.40 % Final   • Eosinophils 12/27/2018 6.10  0.00 - 6.90 % Final   • Basophils 12/27/2018 0.00  0.00 - 1.80 % Final   • Neutrophils (Absolute) 12/27/2018 9.26* 2.00 - 7.15 K/uL Final    Includes immature neutrophils, if present.   • Lymphs (Absolute) 12/27/2018 1.38  1.00 - 4.80 K/uL Final   • Monos (Absolute) 12/27/2018 1.16* 0.00 - 0.85 K/uL Final   • Eos (Absolute) 12/27/2018 0.81* 0.00 - 0.51 K/uL Final   • Baso (Absolute) 12/27/2018 0.00  0.00 - 0.12 K/uL Final   • Anisocytosis 12/27/2018 1+   Final   • Microcytosis 12/27/2018 1+   Final   • Carcinoembryonic Antigen 12/27/2018 2.3  0.0 - 3.0 ng/mL Final    Comment: Effective September 17, 2013 the quantitative determination  of Carcinoembryonic Antigen (CEA) will now be performed at  [facility].  The Access CEA paramagnetic  particle chemiluminescent immunoassay is used.   Results  obtained with different test methods or kits cannot be used interchangeably.  Measurement of CEA has been shown to be  clinically relevant in the management of patients with  colorectal, breast, lung, prostatic, pancreatic, and ovarian  carcinomas.  Smokers may have slightly elevated levels of CEA.     • Ca 27.29 2018 144.9* 0.0 - 40.0 U/mL Final    Comment: INTERPRETIVE INFORMATION: Cancer Antigen 27.29  The CA 27.29 assay is intended for use in monitorin) disease  progression and/or response to therapy in patients with metastatic  disease, and 2) disease recurrence in patients treated previously  for stages II or III breast carcinoma who are clinically free of  the disease. Serial testing in patients who are clinically free of  disease should be used in conjunction with other clinical methods  for early detection of cancer recurrence.  Limitations: Patients with confirmed breast carcinoma frequently  have CA 27.29 assay values in the same range as healthy  individuals.  Elevations may also be observed in patients with non  malignant disease. Results of this test must always be interpreted  in the context of morphologic and other relevant data, and should  not be used alone for a diagnosis of malignancy.  Methodology: [employer]ens [employer] CA 27.29 chemiluminescent  immunoassay was used. Results obtained with different assay  methods                            or kits cannot be used interchangeably.  Performed by [facility],  [address] [place] [id number] [phone]  [email or web address], Demetrius Hewitt MD - Lab. Director     • GFR If  2018 >60  >60 mL/min/1.73 m 2 Final   • GFR If Non  2018 >60  >60 mL/min/1.73 m 2 Final   • Bands-Stabs 2018 23.50* 0.00 - 10.00 % Final   • Metamyelocytes 2018 1.70  % Final   • Myelocytes 2018 3.50  % Final   • Manual Diff Status 2018 PERFORMED   Final   • Peripheral Smear Review 2018  see below   Final    Comment: Due to instrument suspect flags, further review of peripheral smear is  indicated on this patient sample. This review may or may not result in  abnormal findings.     • Plt Estimation 12/27/2018 Increased   Final   • RBC Morphology 12/27/2018 Present   Final   • Polychromia 12/27/2018 1+   Final   • Poikilocytosis 12/27/2018 2+   Final   • Ovalocytes 12/27/2018 1+   Final   • Echinocytes 12/27/2018 2+   Final         Dx-chest-2 Views    Result Date: 12/27/2018 12/27/2018 11:18 AM HISTORY/REASON FOR EXAM:  Shortness of Breath Breast cancer, emphysema. TECHNIQUE/EXAM DESCRIPTION AND NUMBER OF VIEWS: Two views of the chest. COMPARISON:  Chest x-ray 12/5/2013, CT chest 10/2/2018 FINDINGS: Cardiac mediastinal contour is unchanged. Atherosclerotic calcification of thoracic aorta. LEFT superior mediastinal vascular stent noted. RIGHT chest infusion port in place. No focal pulmonary consolidation. Linear opacities at the LEFT lung apex. Faint nodular density at the LEFT mid chest peripherally, corresponding to chest CT findings from 10/2/2018. No pleural fluid collection or pneumothorax. Degenerative change of thoracic spine. LEFT mid clavicle fracture with apparent callus formation.     1.  LEFT apical scar. 2.  LEFT midlung nodule corresponding to prior CT findings. 3.  No lobar pneumonia or pneumothorax. 4.  Subacute mildly displaced LEFT mid clavicle fracture. 5.  Supportive tubing as described above.          Assessment/Plan:     1. Metastatic breast cancer (HCC)  CT-CHEST,ABDOMEN,PELVIS WITH   2. Encounter for antineoplastic chemotherapy     3. Malignant neoplasm metastatic to bone (HCC)  CT-CHEST,ABDOMEN,PELVIS WITH   4. Right hip pain     5. SOB (shortness of breath)  DX-CHEST-2 VIEWS    CT-CHEST,ABDOMEN,PELVIS WITH   6. Dysuria  URINALYSIS,CULTURE IF INDICATED    CT-CHEST,ABDOMEN,PELVIS WITH         1.  Dysuria: Patient reports burning externally with urination, UA with C&S  ordered.    2.  SOB: Patient has not been as active as she usually is is noticing increasing shortness of breath we will obtain chest x-ray for further evaluation.  Suspect she is becoming deconditioned.    3.  Bone mets/Hip Pain: Patient is following up with physical therapy for balance and strengthening exercises.  She is also following up with Dr. Eng today for further evaluation of any radiation therapy options.    4.  Breast cancer: Patient is tolerating treatment fairly well.  CBC and CMP have been evaluated and found to be within acceptable limits.  CA 27.29 is noted to be increasing.  She will proceed with cycle 6 Taxol and return in 3 weeks for posttreatment evaluation, sooner as needed. Will obtain CT prior to next visit.              The patient verbalized agreement and understanding of current plan. All questions and concerns were addressed at time of visit.    Please note that this dictation was created using voice recognition software. I have made every reasonable attempt to correct obvious errors, but I expect that there are errors of grammar and possibly content that I did not discover before finalizing the note.

## 2018-12-27 NOTE — PROGRESS NOTES
"Pharmacy Chemotherapy Calculation    Patient Name: Zuly Christian   Dx: breast cancer, metastatic         Protocol: Taxol     Paclitaxel 175 mg/m² IV over 3 hrs on Day 1  21-day cycle until disease progression or unacceptable toxicity  NCCN Guidelines for Breast Cancer V.1.2018  Leyda DUNN, et al. JCO. 1995;13(10):2610-81.    Allergies:  Sulfa drugs     /48   Pulse (!) 107   Temp 36.6 °C (97.9 °F) (Temporal)   Resp 18   Ht 1.562 m (5' 1.5\")   Wt 53.6 kg (118 lb 2.7 oz)   SpO2 94%   BMI 21.97 kg/m²  Body surface area is 1.53 meters squared.    LABS 12/27/2018:  ANC: 9260      WBC: 13.3     Plt: 471k   Hgb/Hct: 10.3/33.9     SCr: 0.60 mg/dL CrCl: 65 mL/min (SCr 0.7)    K: 4.8  Na: 132         Glu: 79  LFT's: 18/19/103 TBili = 0.3       Drug Order   (Drug name, dose, route, IV Fluid & volume, frequency, number of doses) Cycle 6 of q3w Taxol (pt preference)    Previous treatment: C5 (12/6/2018)  (s/p 4 cycles weekly Taxol, last 8/15/18)   Medication = Paclitaxel (Taxol)  Base Dose = 175 mg/m²  Calc Dose: Base Dose x Body surface area is 1.53 meters squared. m² = 267.75 mg  Final Dose = 267.8 mg  Route = IV  Fluid & Volume =  mL  Admin Duration = Over 3 hrs          <5% difference, okay to treat with final dose     By my signature below, I confirm this process was performed independently with the BSA and all final chemotherapy dosing calculations congruent. I have reviewed the above chemotherapy order and that my calculation of the final dose and BSA (when applicable) corroborate those calculations of the  pharmacist. Discrepancies of 5% or greater in the written dose have been addressed and documented within the [facility] Progress notes.      MICH Alejandre, PharmD  "

## 2018-12-27 NOTE — PROGRESS NOTES
Chemotherapy Verification - PRIMARY RN    D1C6    Height = 156.2 cm  Weight = 53.6 kg  BSA = 1.53 m2       Medication: Paclitaxel (Taxol)  Dose: 175 mg/m2  Calculated Dose: 267.75 mg                              I confirm this process was performed independently with the BSA and all final chemotherapy dosing calculations congruent.  Any discrepancies of 5% or greater have been addressed with the chemotherapy pharmacist. The resolution of the discrepancy has been documented in the EPIC progress notes.

## 2018-12-28 NOTE — PROGRESS NOTES
Taxol infused with no reactions or side effects. Port flushed and heparin locked per protocol. Covered with gauze and tegaderm. Discharged home to self care, next appointment confirmed.

## 2018-12-28 NOTE — PROGRESS NOTES
Pt arrived to [facility] ambulatory, here with friend for C6 Taxol. Pt with R chest port in place, EMLA applied by pt. EMLA wiped from site and accessed in sterile field. Port flushed, brisk blood return observed. Pt had labs done in MD office. Results reviewed and WNL for chemo to proceed. Premeds given. Taxol currently infusing. Report given to [facility] to complete pt's treatment.

## 2018-12-31 NOTE — OP THERAPY DAILY TREATMENT
Outpatient Physical Therapy  DAILY TREATMENT     [facility] [place]  [address], [address]  [place] [facility] [place] [address]  Phone:  [phone]  Fax:  [phone]    Date: 12/31/2018    Patient: Zuly Christian  YOB: 1950  MRN: [id number]     Time Calculation  Start time: 0850  Stop time: 0920 Time Calculation (min): 30 minutes     Chief Complaint: R hip pain, overall endurance    Visit #: 3    SUBJECTIVE:  Pt had chemo on Thursday last week. Reports doing well and she started doing her HEP on Saturday.    R hip pain with ambulation.     OBJECTIVE:  Current objective measures: ambulation with Shortend step. Limited weightweaing on R LE.  SP02 88% maintained throughout treatment.           Therapeutic Exercises (CPT 64182):     1. Nu step , 6min, lvl 3    2. Shuttle , 3c x 10, 2c x20, 2c x 2min     3. Calf rocking, 2min    4. Steps ups, x 12 no riser with L hand support    Therapeutic Treatments and Modalities:     1. Neuromuscular Re-education (CPT 72201), SPC walking x 20 feet x 2     Time-based treatments/modalities:  Therapeutic exercise minutes (CPT 73888): 25 minutes  Neuromusc re-ed, balance, coor, post minutes (CPT 88410): 5 minutes       Pain rating before treatment: 3  Pain rating after treatment: 3    ASSESSMENT:   Response to treatment: Pt easily tired during treatment today. Cane walking and training to continue to progress. Limited endurance today limited further training with cane.     PLAN/RECOMMENDATIONS:   Plan for treatment: therapy treatment to continue next visit.  Planned interventions for next visit: continue with current treatment.

## 2019-01-01 ENCOUNTER — DOCUMENTATION (OUTPATIENT)
Dept: HEMATOLOGY ONCOLOGY | Facility: MEDICAL CENTER | Age: 69
End: 2019-01-01

## 2019-01-01 ENCOUNTER — OUTPATIENT INFUSION SERVICES (OUTPATIENT)
Dept: ONCOLOGY | Facility: MEDICAL CENTER | Age: 69
End: 2019-01-01
Attending: RADIOLOGY
Payer: MEDICARE

## 2019-01-01 ENCOUNTER — HOME CARE VISIT (OUTPATIENT)
Dept: HOME HEALTH SERVICES | Facility: HOME HEALTHCARE | Age: 69
End: 2019-01-01
Payer: MEDICARE

## 2019-01-01 ENCOUNTER — TELEPHONE (OUTPATIENT)
Dept: HEMATOLOGY ONCOLOGY | Facility: MEDICAL CENTER | Age: 69
End: 2019-01-01

## 2019-01-01 ENCOUNTER — HOSPITAL ENCOUNTER (OUTPATIENT)
Dept: RADIATION ONCOLOGY | Facility: MEDICAL CENTER | Age: 69
End: 2019-02-05

## 2019-01-01 ENCOUNTER — PATIENT MESSAGE (OUTPATIENT)
Dept: PULMONOLOGY | Facility: HOSPICE | Age: 69
End: 2019-01-01

## 2019-01-01 ENCOUNTER — TELEPHONE (OUTPATIENT)
Dept: PHYSICAL THERAPY | Facility: REHABILITATION | Age: 69
End: 2019-01-01

## 2019-01-01 ENCOUNTER — HOSPITAL ENCOUNTER (OUTPATIENT)
Dept: RADIOLOGY | Facility: MEDICAL CENTER | Age: 69
End: 2019-01-11
Attending: NURSE PRACTITIONER
Payer: MEDICARE

## 2019-01-01 ENCOUNTER — TELEPHONE (OUTPATIENT)
Dept: HEALTH INFORMATION MANAGEMENT | Facility: OTHER | Age: 69
End: 2019-01-01

## 2019-01-01 ENCOUNTER — PHYSICAL THERAPY (OUTPATIENT)
Dept: PHYSICAL THERAPY | Facility: REHABILITATION | Age: 69
End: 2019-01-01
Attending: NURSE PRACTITIONER
Payer: MEDICARE

## 2019-01-01 ENCOUNTER — NON-PROVIDER VISIT (OUTPATIENT)
Dept: HEMATOLOGY ONCOLOGY | Facility: MEDICAL CENTER | Age: 69
End: 2019-01-01
Payer: MEDICARE

## 2019-01-01 ENCOUNTER — OUTPATIENT INFUSION SERVICES (OUTPATIENT)
Dept: ONCOLOGY | Facility: MEDICAL CENTER | Age: 69
End: 2019-01-01
Attending: INTERNAL MEDICINE
Payer: MEDICARE

## 2019-01-01 ENCOUNTER — HOSPITAL ENCOUNTER (OUTPATIENT)
Dept: RADIATION ONCOLOGY | Facility: MEDICAL CENTER | Age: 69
End: 2019-02-01

## 2019-01-01 ENCOUNTER — HOSPITAL ENCOUNTER (OUTPATIENT)
Dept: RADIATION ONCOLOGY | Facility: MEDICAL CENTER | Age: 69
End: 2019-02-28
Attending: RADIOLOGY
Payer: MEDICARE

## 2019-01-01 ENCOUNTER — OFFICE VISIT (OUTPATIENT)
Dept: HEMATOLOGY ONCOLOGY | Facility: MEDICAL CENTER | Age: 69
End: 2019-01-01
Payer: MEDICARE

## 2019-01-01 ENCOUNTER — HOSPITAL ENCOUNTER (OUTPATIENT)
Dept: RADIATION ONCOLOGY | Facility: MEDICAL CENTER | Age: 69
End: 2019-02-08

## 2019-01-01 ENCOUNTER — HOSPITAL ENCOUNTER (OUTPATIENT)
Facility: MEDICAL CENTER | Age: 69
End: 2019-02-22
Attending: NURSE PRACTITIONER
Payer: MEDICARE

## 2019-01-01 ENCOUNTER — HOSPITAL ENCOUNTER (OUTPATIENT)
Dept: RADIATION ONCOLOGY | Facility: MEDICAL CENTER | Age: 69
End: 2019-01-31
Attending: RADIOLOGY
Payer: MEDICARE

## 2019-01-01 ENCOUNTER — OFFICE VISIT (OUTPATIENT)
Dept: PULMONOLOGY | Facility: HOSPICE | Age: 69
End: 2019-01-01
Payer: MEDICARE

## 2019-01-01 ENCOUNTER — PATIENT MESSAGE (OUTPATIENT)
Dept: INTERNAL MEDICINE | Facility: MEDICAL CENTER | Age: 69
End: 2019-01-01

## 2019-01-01 ENCOUNTER — HOSPITAL ENCOUNTER (OUTPATIENT)
Facility: MEDICAL CENTER | Age: 69
End: 2019-01-16
Attending: INTERNAL MEDICINE
Payer: MEDICARE

## 2019-01-01 ENCOUNTER — HOSPITAL ENCOUNTER (OUTPATIENT)
Dept: RADIATION ONCOLOGY | Facility: MEDICAL CENTER | Age: 69
End: 2019-02-04

## 2019-01-01 ENCOUNTER — HOSPITAL ENCOUNTER (OUTPATIENT)
Dept: LAB | Facility: MEDICAL CENTER | Age: 69
End: 2019-02-21
Attending: NURSE PRACTITIONER
Payer: MEDICARE

## 2019-01-01 ENCOUNTER — HOSPITAL ENCOUNTER (OUTPATIENT)
Dept: RADIATION ONCOLOGY | Facility: MEDICAL CENTER | Age: 69
End: 2019-01-29

## 2019-01-01 ENCOUNTER — HOSPITAL ENCOUNTER (OUTPATIENT)
Dept: RADIATION ONCOLOGY | Facility: MEDICAL CENTER | Age: 69
End: 2019-02-07

## 2019-01-01 ENCOUNTER — HOSPITAL ENCOUNTER (OUTPATIENT)
Dept: RADIATION ONCOLOGY | Facility: MEDICAL CENTER | Age: 69
End: 2019-02-11

## 2019-01-01 ENCOUNTER — HOME HEALTH ADMISSION (OUTPATIENT)
Dept: HOME HEALTH SERVICES | Facility: HOME HEALTHCARE | Age: 69
End: 2019-01-01
Payer: MEDICARE

## 2019-01-01 ENCOUNTER — OFFICE VISIT (OUTPATIENT)
Dept: INTERNAL MEDICINE | Facility: MEDICAL CENTER | Age: 69
End: 2019-01-01
Payer: MEDICARE

## 2019-01-01 ENCOUNTER — OFFICE VISIT (OUTPATIENT)
Dept: CARDIOLOGY | Facility: MEDICAL CENTER | Age: 69
End: 2019-01-01
Payer: MEDICARE

## 2019-01-01 ENCOUNTER — HOSPITAL ENCOUNTER (OUTPATIENT)
Dept: RADIATION ONCOLOGY | Facility: MEDICAL CENTER | Age: 69
End: 2019-01-31

## 2019-01-01 VITALS
RESPIRATION RATE: 16 BRPM | OXYGEN SATURATION: 97 % | SYSTOLIC BLOOD PRESSURE: 110 MMHG | TEMPERATURE: 97.3 F | HEART RATE: 90 BPM | DIASTOLIC BLOOD PRESSURE: 58 MMHG

## 2019-01-01 VITALS
OXYGEN SATURATION: 93 % | SYSTOLIC BLOOD PRESSURE: 120 MMHG | TEMPERATURE: 97 F | HEART RATE: 103 BPM | DIASTOLIC BLOOD PRESSURE: 58 MMHG | HEIGHT: 61 IN | BODY MASS INDEX: 21.03 KG/M2 | WEIGHT: 111.4 LBS

## 2019-01-01 VITALS
HEART RATE: 106 BPM | OXYGEN SATURATION: 96 % | WEIGHT: 111 LBS | SYSTOLIC BLOOD PRESSURE: 90 MMHG | BODY MASS INDEX: 20.96 KG/M2 | DIASTOLIC BLOOD PRESSURE: 50 MMHG | HEIGHT: 61 IN

## 2019-01-01 VITALS
WEIGHT: 111.4 LBS | TEMPERATURE: 98.7 F | DIASTOLIC BLOOD PRESSURE: 58 MMHG | RESPIRATION RATE: 18 BRPM | HEIGHT: 62 IN | HEART RATE: 114 BPM | SYSTOLIC BLOOD PRESSURE: 92 MMHG | BODY MASS INDEX: 20.5 KG/M2

## 2019-01-01 VITALS
DIASTOLIC BLOOD PRESSURE: 60 MMHG | HEART RATE: 104 BPM | TEMPERATURE: 97.7 F | WEIGHT: 116.18 LBS | OXYGEN SATURATION: 97 % | RESPIRATION RATE: 16 BRPM | SYSTOLIC BLOOD PRESSURE: 126 MMHG | HEIGHT: 61 IN | BODY MASS INDEX: 21.94 KG/M2

## 2019-01-01 VITALS
SYSTOLIC BLOOD PRESSURE: 98 MMHG | DIASTOLIC BLOOD PRESSURE: 54 MMHG | TEMPERATURE: 98.1 F | RESPIRATION RATE: 18 BRPM | OXYGEN SATURATION: 90 % | HEART RATE: 102 BPM

## 2019-01-01 VITALS
BODY MASS INDEX: 21.71 KG/M2 | RESPIRATION RATE: 16 BRPM | DIASTOLIC BLOOD PRESSURE: 60 MMHG | HEART RATE: 144 BPM | TEMPERATURE: 97 F | SYSTOLIC BLOOD PRESSURE: 120 MMHG | HEIGHT: 61 IN | WEIGHT: 115 LBS | OXYGEN SATURATION: 90 %

## 2019-01-01 VITALS — OXYGEN SATURATION: 95 % | HEART RATE: 85 BPM | DIASTOLIC BLOOD PRESSURE: 58 MMHG | SYSTOLIC BLOOD PRESSURE: 100 MMHG

## 2019-01-01 VITALS
TEMPERATURE: 97.5 F | BODY MASS INDEX: 21.23 KG/M2 | HEIGHT: 61 IN | WEIGHT: 112.43 LBS | SYSTOLIC BLOOD PRESSURE: 120 MMHG | OXYGEN SATURATION: 95 % | DIASTOLIC BLOOD PRESSURE: 80 MMHG | RESPIRATION RATE: 16 BRPM | HEART RATE: 109 BPM

## 2019-01-01 VITALS
DIASTOLIC BLOOD PRESSURE: 60 MMHG | HEART RATE: 62 BPM | TEMPERATURE: 97.7 F | OXYGEN SATURATION: 98 % | RESPIRATION RATE: 16 BRPM | SYSTOLIC BLOOD PRESSURE: 100 MMHG

## 2019-01-01 VITALS
BODY MASS INDEX: 20.85 KG/M2 | SYSTOLIC BLOOD PRESSURE: 107 MMHG | DIASTOLIC BLOOD PRESSURE: 42 MMHG | HEART RATE: 117 BPM | HEIGHT: 61 IN | RESPIRATION RATE: 18 BRPM | WEIGHT: 110.45 LBS | OXYGEN SATURATION: 100 % | TEMPERATURE: 97.8 F

## 2019-01-01 VITALS
HEART RATE: 113 BPM | BODY MASS INDEX: 20.65 KG/M2 | OXYGEN SATURATION: 100 % | TEMPERATURE: 98.2 F | DIASTOLIC BLOOD PRESSURE: 60 MMHG | RESPIRATION RATE: 18 BRPM | WEIGHT: 109.35 LBS | HEIGHT: 61 IN | SYSTOLIC BLOOD PRESSURE: 121 MMHG

## 2019-01-01 VITALS
TEMPERATURE: 97.3 F | RESPIRATION RATE: 16 BRPM | DIASTOLIC BLOOD PRESSURE: 58 MMHG | HEART RATE: 90 BPM | SYSTOLIC BLOOD PRESSURE: 110 MMHG | OXYGEN SATURATION: 97 %

## 2019-01-01 VITALS
TEMPERATURE: 98.3 F | BODY MASS INDEX: 22.13 KG/M2 | SYSTOLIC BLOOD PRESSURE: 116 MMHG | DIASTOLIC BLOOD PRESSURE: 68 MMHG | HEIGHT: 61 IN | OXYGEN SATURATION: 96 % | WEIGHT: 117.2 LBS | HEART RATE: 104 BPM

## 2019-01-01 VITALS
BODY MASS INDEX: 20.1 KG/M2 | TEMPERATURE: 98.5 F | HEIGHT: 61 IN | RESPIRATION RATE: 18 BRPM | DIASTOLIC BLOOD PRESSURE: 58 MMHG | OXYGEN SATURATION: 94 % | HEART RATE: 102 BPM | WEIGHT: 106.48 LBS | SYSTOLIC BLOOD PRESSURE: 132 MMHG

## 2019-01-01 VITALS
SYSTOLIC BLOOD PRESSURE: 116 MMHG | WEIGHT: 114.53 LBS | BODY MASS INDEX: 21.62 KG/M2 | HEART RATE: 101 BPM | TEMPERATURE: 97.9 F | OXYGEN SATURATION: 95 % | DIASTOLIC BLOOD PRESSURE: 78 MMHG | RESPIRATION RATE: 16 BRPM | HEIGHT: 61 IN

## 2019-01-01 VITALS
RESPIRATION RATE: 18 BRPM | DIASTOLIC BLOOD PRESSURE: 60 MMHG | HEART RATE: 89 BPM | OXYGEN SATURATION: 96 % | TEMPERATURE: 98.5 F | SYSTOLIC BLOOD PRESSURE: 100 MMHG

## 2019-01-01 VITALS
HEIGHT: 61 IN | BODY MASS INDEX: 21.94 KG/M2 | OXYGEN SATURATION: 97 % | HEART RATE: 104 BPM | SYSTOLIC BLOOD PRESSURE: 126 MMHG | TEMPERATURE: 97.7 F | RESPIRATION RATE: 16 BRPM | DIASTOLIC BLOOD PRESSURE: 60 MMHG | WEIGHT: 116.18 LBS

## 2019-01-01 VITALS
DIASTOLIC BLOOD PRESSURE: 59 MMHG | HEIGHT: 61 IN | RESPIRATION RATE: 18 BRPM | WEIGHT: 111.99 LBS | OXYGEN SATURATION: 97 % | BODY MASS INDEX: 21.14 KG/M2 | HEART RATE: 125 BPM | SYSTOLIC BLOOD PRESSURE: 118 MMHG | TEMPERATURE: 97.7 F

## 2019-01-01 VITALS
TEMPERATURE: 97.7 F | RESPIRATION RATE: 18 BRPM | HEART RATE: 90 BPM | DIASTOLIC BLOOD PRESSURE: 56 MMHG | OXYGEN SATURATION: 96 % | SYSTOLIC BLOOD PRESSURE: 104 MMHG

## 2019-01-01 VITALS
RESPIRATION RATE: 16 BRPM | TEMPERATURE: 98 F | HEART RATE: 72 BPM | SYSTOLIC BLOOD PRESSURE: 88 MMHG | DIASTOLIC BLOOD PRESSURE: 42 MMHG | OXYGEN SATURATION: 97 %

## 2019-01-01 VITALS
HEART RATE: 72 BPM | TEMPERATURE: 97 F | DIASTOLIC BLOOD PRESSURE: 42 MMHG | SYSTOLIC BLOOD PRESSURE: 88 MMHG | OXYGEN SATURATION: 72 %

## 2019-01-01 VITALS
SYSTOLIC BLOOD PRESSURE: 88 MMHG | DIASTOLIC BLOOD PRESSURE: 42 MMHG | HEART RATE: 96 BPM | RESPIRATION RATE: 20 BRPM | WEIGHT: 108.91 LBS | OXYGEN SATURATION: 90 % | TEMPERATURE: 97.7 F | HEIGHT: 62 IN | BODY MASS INDEX: 20.04 KG/M2

## 2019-01-01 VITALS
HEIGHT: 62 IN | HEART RATE: 96 BPM | WEIGHT: 110.89 LBS | RESPIRATION RATE: 14 BRPM | DIASTOLIC BLOOD PRESSURE: 54 MMHG | OXYGEN SATURATION: 96 % | SYSTOLIC BLOOD PRESSURE: 98 MMHG | BODY MASS INDEX: 20.41 KG/M2 | TEMPERATURE: 98 F

## 2019-01-01 VITALS
TEMPERATURE: 97.5 F | OXYGEN SATURATION: 92 % | SYSTOLIC BLOOD PRESSURE: 118 MMHG | HEART RATE: 65 BPM | DIASTOLIC BLOOD PRESSURE: 62 MMHG

## 2019-01-01 DIAGNOSIS — T45.1X5A ANEMIA ASSOCIATED WITH CHEMOTHERAPY: ICD-10-CM

## 2019-01-01 DIAGNOSIS — C50.919 METASTATIC BREAST CANCER: ICD-10-CM

## 2019-01-01 DIAGNOSIS — R53.1 WEAKNESS: ICD-10-CM

## 2019-01-01 DIAGNOSIS — D72.828 OTHER ELEVATED WHITE BLOOD CELL (WBC) COUNT: ICD-10-CM

## 2019-01-01 DIAGNOSIS — J44.9 CHRONIC OBSTRUCTIVE PULMONARY DISEASE, UNSPECIFIED COPD TYPE (HCC): ICD-10-CM

## 2019-01-01 DIAGNOSIS — I27.20 PULMONARY HYPERTENSION (HCC): ICD-10-CM

## 2019-01-01 DIAGNOSIS — R09.02 HYPOXEMIA: ICD-10-CM

## 2019-01-01 DIAGNOSIS — M84.551S PATHOLOGICAL FRACTURE OF RIGHT FEMUR DUE TO NEOPLASTIC DISEASE, SEQUELA: ICD-10-CM

## 2019-01-01 DIAGNOSIS — C79.51 MALIGNANT NEOPLASM METASTATIC TO BONE (HCC): ICD-10-CM

## 2019-01-01 DIAGNOSIS — R63.4 WEIGHT LOSS: ICD-10-CM

## 2019-01-01 DIAGNOSIS — G47.33 OSA (OBSTRUCTIVE SLEEP APNEA): ICD-10-CM

## 2019-01-01 DIAGNOSIS — R53.0 NEOPLASTIC MALIGNANT RELATED FATIGUE: ICD-10-CM

## 2019-01-01 DIAGNOSIS — Z51.11 ENCOUNTER FOR ANTINEOPLASTIC CHEMOTHERAPY: ICD-10-CM

## 2019-01-01 DIAGNOSIS — R00.0 TACHYCARDIA: ICD-10-CM

## 2019-01-01 DIAGNOSIS — G89.3 PAIN OF METASTATIC MALIGNANCY: ICD-10-CM

## 2019-01-01 DIAGNOSIS — R30.0 DYSURIA: ICD-10-CM

## 2019-01-01 DIAGNOSIS — Z78.9 POOR TOLERANCE FOR ACTIVITY: ICD-10-CM

## 2019-01-01 DIAGNOSIS — Z79.891 USE OF OPIATES FOR THERAPEUTIC PURPOSES: ICD-10-CM

## 2019-01-01 DIAGNOSIS — G89.3 CANCER RELATED PAIN: ICD-10-CM

## 2019-01-01 DIAGNOSIS — I10 ESSENTIAL HYPERTENSION: ICD-10-CM

## 2019-01-01 DIAGNOSIS — C78.7 METASTASES TO THE LIVER (HCC): ICD-10-CM

## 2019-01-01 DIAGNOSIS — C79.51 PAIN DUE TO MALIGNANT NEOPLASM METASTATIC TO BONE (HCC): ICD-10-CM

## 2019-01-01 DIAGNOSIS — I95.89 OTHER SPECIFIED HYPOTENSION: ICD-10-CM

## 2019-01-01 DIAGNOSIS — R63.0 POOR APPETITE: ICD-10-CM

## 2019-01-01 DIAGNOSIS — R06.02 SOB (SHORTNESS OF BREATH): ICD-10-CM

## 2019-01-01 DIAGNOSIS — G89.3 PAIN DUE TO MALIGNANT NEOPLASM METASTATIC TO BONE (HCC): ICD-10-CM

## 2019-01-01 DIAGNOSIS — G89.3 NEOPLASM RELATED PAIN: ICD-10-CM

## 2019-01-01 DIAGNOSIS — M25.551 RIGHT HIP PAIN: ICD-10-CM

## 2019-01-01 DIAGNOSIS — Z79.899 ENCOUNTER FOR LONG-TERM (CURRENT) USE OF HIGH-RISK MEDICATION: ICD-10-CM

## 2019-01-01 DIAGNOSIS — Z87.891 HISTORY OF TOBACCO ABUSE: ICD-10-CM

## 2019-01-01 DIAGNOSIS — D64.81 ANTINEOPLASTIC CHEMOTHERAPY INDUCED ANEMIA: ICD-10-CM

## 2019-01-01 DIAGNOSIS — E78.5 HYPERLIPIDEMIA, UNSPECIFIED HYPERLIPIDEMIA TYPE: ICD-10-CM

## 2019-01-01 DIAGNOSIS — J30.9 ALLERGIC RHINITIS, UNSPECIFIED SEASONALITY, UNSPECIFIED TRIGGER: ICD-10-CM

## 2019-01-01 DIAGNOSIS — D64.81 ANEMIA ASSOCIATED WITH CHEMOTHERAPY: ICD-10-CM

## 2019-01-01 DIAGNOSIS — Z14.8 ALPHA-1-ANTITRYPSIN DEFICIENCY CARRIER: ICD-10-CM

## 2019-01-01 DIAGNOSIS — C50.919 STAGE IV BREAST CANCER IN FEMALE (HCC): ICD-10-CM

## 2019-01-01 DIAGNOSIS — T45.1X5A ANTINEOPLASTIC CHEMOTHERAPY INDUCED ANEMIA: ICD-10-CM

## 2019-01-01 DIAGNOSIS — R93.89 ABNORMAL CT OF THE CHEST: ICD-10-CM

## 2019-01-01 LAB
ABO GROUP BLD: NORMAL
ALBUMIN SERPL BCP-MCNC: 3.2 G/DL (ref 3.2–4.9)
ALBUMIN SERPL BCP-MCNC: 3.3 G/DL (ref 3.2–4.9)
ALBUMIN SERPL BCP-MCNC: 3.7 G/DL (ref 3.2–4.9)
ALBUMIN SERPL BCP-MCNC: 3.7 G/DL (ref 3.2–4.9)
ALBUMIN/GLOB SERPL: 1.1 G/DL
ALBUMIN/GLOB SERPL: 1.2 G/DL
ALBUMIN/GLOB SERPL: 1.3 G/DL
ALBUMIN/GLOB SERPL: 1.3 G/DL
ALP SERPL-CCNC: 113 U/L (ref 30–99)
ALP SERPL-CCNC: 117 U/L (ref 30–99)
ALP SERPL-CCNC: 119 U/L (ref 30–99)
ALP SERPL-CCNC: 128 U/L (ref 30–99)
ALT SERPL-CCNC: 13 U/L (ref 2–50)
ALT SERPL-CCNC: 13 U/L (ref 2–50)
ALT SERPL-CCNC: 15 U/L (ref 2–50)
ALT SERPL-CCNC: 15 U/L (ref 2–50)
AMORPH CRY #/AREA URNS HPF: PRESENT /HPF
ANION GAP SERPL CALC-SCNC: 11 MMOL/L (ref 0–11.9)
ANION GAP SERPL CALC-SCNC: 9 MMOL/L (ref 0–11.9)
ANISOCYTOSIS BLD QL SMEAR: ABNORMAL
APPEARANCE UR: CLEAR
AST SERPL-CCNC: 19 U/L (ref 12–45)
AST SERPL-CCNC: 21 U/L (ref 12–45)
BACTERIA #/AREA URNS HPF: NEGATIVE /HPF
BACTERIA UR CULT: NORMAL
BARCODED ABORH UBTYP: 5100
BARCODED PRD CODE UBPRD: NORMAL
BARCODED UNIT NUM UBUNT: NORMAL
BASO STIPL BLD QL SMEAR: NORMAL
BASOPHILS # BLD AUTO: 0 % (ref 0–1.8)
BASOPHILS # BLD AUTO: 0 % (ref 0–1.8)
BASOPHILS # BLD AUTO: 0.2 % (ref 0–1.8)
BASOPHILS # BLD AUTO: 0.4 % (ref 0–1.8)
BASOPHILS # BLD AUTO: 0.5 % (ref 0–1.8)
BASOPHILS # BLD: 0 K/UL (ref 0–0.12)
BASOPHILS # BLD: 0 K/UL (ref 0–0.12)
BASOPHILS # BLD: 0.02 K/UL (ref 0–0.12)
BASOPHILS # BLD: 0.03 K/UL (ref 0–0.12)
BASOPHILS # BLD: 0.03 K/UL (ref 0–0.12)
BILIRUB SERPL-MCNC: 0.2 MG/DL (ref 0.1–1.5)
BILIRUB SERPL-MCNC: 0.3 MG/DL (ref 0.1–1.5)
BILIRUB UR QL STRIP.AUTO: NEGATIVE
BLD GP AB SCN SERPL QL: NORMAL
BUN SERPL-MCNC: 15 MG/DL (ref 8–22)
BUN SERPL-MCNC: 22 MG/DL (ref 8–22)
BUN SERPL-MCNC: 23 MG/DL (ref 8–22)
BUN SERPL-MCNC: 24 MG/DL (ref 8–22)
BURR CELLS BLD QL SMEAR: NORMAL
CALCIUM SERPL-MCNC: 8.5 MG/DL (ref 8.5–10.5)
CALCIUM SERPL-MCNC: 8.7 MG/DL (ref 8.5–10.5)
CALCIUM SERPL-MCNC: 9.5 MG/DL (ref 8.5–10.5)
CALCIUM SERPL-MCNC: 9.9 MG/DL (ref 8.5–10.5)
CANCER AG27-29 SERPL-ACNC: 120 U/ML (ref 0–40)
CHLORIDE SERPL-SCNC: 102 MMOL/L (ref 96–112)
CHLORIDE SERPL-SCNC: 103 MMOL/L (ref 96–112)
CHLORIDE SERPL-SCNC: 106 MMOL/L (ref 96–112)
CHLORIDE SERPL-SCNC: 107 MMOL/L (ref 96–112)
CO2 SERPL-SCNC: 18 MMOL/L (ref 20–33)
CO2 SERPL-SCNC: 20 MMOL/L (ref 20–33)
COLOR UR: YELLOW
COMMENT 1642: NORMAL
COMMENT 1642: NORMAL
COMPONENT R 8504R: NORMAL
CREAT SERPL-MCNC: 0.44 MG/DL (ref 0.5–1.4)
CREAT SERPL-MCNC: 0.51 MG/DL (ref 0.5–1.4)
CREAT SERPL-MCNC: 0.57 MG/DL (ref 0.5–1.4)
CREAT SERPL-MCNC: 0.65 MG/DL (ref 0.5–1.4)
EKG IMPRESSION: NORMAL
EOSINOPHIL # BLD AUTO: 0 K/UL (ref 0–0.51)
EOSINOPHIL # BLD AUTO: 0.02 K/UL (ref 0–0.51)
EOSINOPHIL # BLD AUTO: 0.71 K/UL (ref 0–0.51)
EOSINOPHIL # BLD AUTO: 1.31 K/UL (ref 0–0.51)
EOSINOPHIL # BLD AUTO: 1.38 K/UL (ref 0–0.51)
EOSINOPHIL NFR BLD: 0 % (ref 0–6.9)
EOSINOPHIL NFR BLD: 0.3 % (ref 0–6.9)
EOSINOPHIL NFR BLD: 10.1 % (ref 0–6.9)
EOSINOPHIL NFR BLD: 10.5 % (ref 0–6.9)
EOSINOPHIL NFR BLD: 14.2 % (ref 0–6.9)
EPI CELLS #/AREA URNS HPF: NEGATIVE /HPF
ERYTHROCYTE [DISTWIDTH] IN BLOOD BY AUTOMATED COUNT: 55.2 FL (ref 35.9–50)
ERYTHROCYTE [DISTWIDTH] IN BLOOD BY AUTOMATED COUNT: 57.5 FL (ref 35.9–50)
ERYTHROCYTE [DISTWIDTH] IN BLOOD BY AUTOMATED COUNT: 57.9 FL (ref 35.9–50)
ERYTHROCYTE [DISTWIDTH] IN BLOOD BY AUTOMATED COUNT: 61.6 FL (ref 35.9–50)
ERYTHROCYTE [DISTWIDTH] IN BLOOD BY AUTOMATED COUNT: 64.8 FL (ref 35.9–50)
FASTING STATUS PATIENT QL REPORTED: NORMAL
GLOBULIN SER CALC-MCNC: 2.6 G/DL (ref 1.9–3.5)
GLOBULIN SER CALC-MCNC: 2.8 G/DL (ref 1.9–3.5)
GLOBULIN SER CALC-MCNC: 2.9 G/DL (ref 1.9–3.5)
GLOBULIN SER CALC-MCNC: 3 G/DL (ref 1.9–3.5)
GLUCOSE SERPL-MCNC: 104 MG/DL (ref 65–99)
GLUCOSE SERPL-MCNC: 107 MG/DL (ref 65–99)
GLUCOSE SERPL-MCNC: 133 MG/DL (ref 65–99)
GLUCOSE SERPL-MCNC: 98 MG/DL (ref 65–99)
GLUCOSE UR STRIP.AUTO-MCNC: NEGATIVE MG/DL
HCT VFR BLD AUTO: 25.4 % (ref 37–47)
HCT VFR BLD AUTO: 27.7 % (ref 37–47)
HCT VFR BLD AUTO: 29.6 % (ref 37–47)
HCT VFR BLD AUTO: 30.6 % (ref 37–47)
HCT VFR BLD AUTO: 31.5 % (ref 37–47)
HGB BLD-MCNC: 7.6 G/DL (ref 12–16)
HGB BLD-MCNC: 8.2 G/DL (ref 12–16)
HGB BLD-MCNC: 8.6 G/DL (ref 12–16)
HGB BLD-MCNC: 9.5 G/DL (ref 12–16)
HGB BLD-MCNC: 9.6 G/DL (ref 12–16)
HYALINE CASTS #/AREA URNS LPF: ABNORMAL /LPF
IMM GRANULOCYTES # BLD AUTO: 0.08 K/UL (ref 0–0.11)
IMM GRANULOCYTES # BLD AUTO: 0.11 K/UL (ref 0–0.11)
IMM GRANULOCYTES # BLD AUTO: 0.35 K/UL (ref 0–0.11)
IMM GRANULOCYTES NFR BLD AUTO: 1.6 % (ref 0–0.9)
IMM GRANULOCYTES NFR BLD AUTO: 1.9 % (ref 0–0.9)
IMM GRANULOCYTES NFR BLD AUTO: 2.6 % (ref 0–0.9)
KETONES UR STRIP.AUTO-MCNC: NEGATIVE MG/DL
LEUKOCYTE ESTERASE UR QL STRIP.AUTO: ABNORMAL
LG PLATELETS BLD QL SMEAR: NORMAL
LYMPHOCYTES # BLD AUTO: 0.36 K/UL (ref 1–4.8)
LYMPHOCYTES # BLD AUTO: 0.66 K/UL (ref 1–4.8)
LYMPHOCYTES # BLD AUTO: 0.79 K/UL (ref 1–4.8)
LYMPHOCYTES # BLD AUTO: 0.99 K/UL (ref 1–4.8)
LYMPHOCYTES # BLD AUTO: 1.43 K/UL (ref 1–4.8)
LYMPHOCYTES NFR BLD: 10.5 % (ref 22–41)
LYMPHOCYTES NFR BLD: 15.8 % (ref 22–41)
LYMPHOCYTES NFR BLD: 5.3 % (ref 22–41)
LYMPHOCYTES NFR BLD: 6.1 % (ref 22–41)
LYMPHOCYTES NFR BLD: 6.2 % (ref 22–41)
MACROCYTES BLD QL SMEAR: ABNORMAL
MANUAL DIFF BLD: ABNORMAL
MANUAL DIFF BLD: NORMAL
MCH RBC QN AUTO: 23.9 PG (ref 27–33)
MCH RBC QN AUTO: 24.3 PG (ref 27–33)
MCH RBC QN AUTO: 24.6 PG (ref 27–33)
MCH RBC QN AUTO: 24.9 PG (ref 27–33)
MCH RBC QN AUTO: 25.3 PG (ref 27–33)
MCHC RBC AUTO-ENTMCNC: 29.1 G/DL (ref 33.6–35)
MCHC RBC AUTO-ENTMCNC: 29.6 G/DL (ref 33.6–35)
MCHC RBC AUTO-ENTMCNC: 29.9 G/DL (ref 33.6–35)
MCHC RBC AUTO-ENTMCNC: 30.5 G/DL (ref 33.6–35)
MCHC RBC AUTO-ENTMCNC: 31 G/DL (ref 33.6–35)
MCV RBC AUTO: 80.8 FL (ref 81.4–97.8)
MCV RBC AUTO: 81.6 FL (ref 81.4–97.8)
MCV RBC AUTO: 82 FL (ref 81.4–97.8)
MCV RBC AUTO: 82.2 FL (ref 81.4–97.8)
MCV RBC AUTO: 83.3 FL (ref 81.4–97.8)
METAMYELOCYTES NFR BLD MANUAL: 0.9 %
METAMYELOCYTES NFR BLD MANUAL: 7 %
MICRO URNS: ABNORMAL
MICROCYTES BLD QL SMEAR: ABNORMAL
MONOCYTES # BLD AUTO: 0.29 K/UL (ref 0–0.85)
MONOCYTES # BLD AUTO: 0.63 K/UL (ref 0–0.85)
MONOCYTES # BLD AUTO: 1.2 K/UL (ref 0–0.85)
MONOCYTES # BLD AUTO: 1.27 K/UL (ref 0–0.85)
MONOCYTES # BLD AUTO: 1.29 K/UL (ref 0–0.85)
MONOCYTES NFR BLD AUTO: 12.6 % (ref 0–13.4)
MONOCYTES NFR BLD AUTO: 5 % (ref 0–13.4)
MONOCYTES NFR BLD AUTO: 7.8 % (ref 0–13.4)
MONOCYTES NFR BLD AUTO: 9.5 % (ref 0–13.4)
MONOCYTES NFR BLD AUTO: 9.6 % (ref 0–13.4)
MORPHOLOGY BLD-IMP: NORMAL
MYELOCYTES NFR BLD MANUAL: 1.7 %
MYELOCYTES NFR BLD MANUAL: 3.5 %
NEUTROPHILS # BLD AUTO: 12.32 K/UL (ref 2–7.15)
NEUTROPHILS # BLD AUTO: 2.77 K/UL (ref 2–7.15)
NEUTROPHILS # BLD AUTO: 5 K/UL (ref 2–7.15)
NEUTROPHILS # BLD AUTO: 9 K/UL (ref 2–7.15)
NEUTROPHILS # BLD AUTO: 9.16 K/UL (ref 2–7.15)
NEUTROPHILS NFR BLD: 55.4 % (ref 44–72)
NEUTROPHILS NFR BLD: 60 % (ref 44–72)
NEUTROPHILS NFR BLD: 66.7 % (ref 44–72)
NEUTROPHILS NFR BLD: 67.1 % (ref 44–72)
NEUTROPHILS NFR BLD: 86.1 % (ref 44–72)
NEUTS BAND NFR BLD MANUAL: 15.6 % (ref 0–10)
NEUTS BAND NFR BLD MANUAL: 5.3 % (ref 0–10)
NITRITE UR QL STRIP.AUTO: NEGATIVE
NRBC # BLD AUTO: 0 K/UL
NRBC # BLD AUTO: 0.02 K/UL
NRBC # BLD AUTO: 0.03 K/UL
NRBC # BLD AUTO: 0.03 K/UL
NRBC # BLD AUTO: 0.43 K/UL
NRBC BLD-RTO: 0 /100 WBC
NRBC BLD-RTO: 0.2 /100 WBC
NRBC BLD-RTO: 0.2 /100 WBC
NRBC BLD-RTO: 0.4 /100 WBC
NRBC BLD-RTO: 2.6 /100 WBC
OVALOCYTES BLD QL SMEAR: NORMAL
PH UR STRIP.AUTO: 6.5 [PH]
PLATELET # BLD AUTO: 268 K/UL (ref 164–446)
PLATELET # BLD AUTO: 401 K/UL (ref 164–446)
PLATELET # BLD AUTO: 487 K/UL (ref 164–446)
PLATELET # BLD AUTO: 489 K/UL (ref 164–446)
PLATELET # BLD AUTO: 499 K/UL (ref 164–446)
PLATELET BLD QL SMEAR: NORMAL
PMV BLD AUTO: 10 FL (ref 9–12.9)
PMV BLD AUTO: 10 FL (ref 9–12.9)
PMV BLD AUTO: 10.7 FL (ref 9–12.9)
PMV BLD AUTO: 9.6 FL (ref 9–12.9)
PMV BLD AUTO: 9.6 FL (ref 9–12.9)
POIKILOCYTOSIS BLD QL SMEAR: NORMAL
POIKILOCYTOSIS BLD QL SMEAR: NORMAL
POLYCHROMASIA BLD QL SMEAR: NORMAL
POTASSIUM SERPL-SCNC: 3.8 MMOL/L (ref 3.6–5.5)
POTASSIUM SERPL-SCNC: 4.2 MMOL/L (ref 3.6–5.5)
POTASSIUM SERPL-SCNC: 4.2 MMOL/L (ref 3.6–5.5)
POTASSIUM SERPL-SCNC: 4.3 MMOL/L (ref 3.6–5.5)
PRODUCT TYPE UPROD: NORMAL
PROT SERPL-MCNC: 5.8 G/DL (ref 6–8.2)
PROT SERPL-MCNC: 6.3 G/DL (ref 6–8.2)
PROT SERPL-MCNC: 6.5 G/DL (ref 6–8.2)
PROT SERPL-MCNC: 6.6 G/DL (ref 6–8.2)
PROT UR QL STRIP: NEGATIVE MG/DL
RBC # BLD AUTO: 3.05 M/UL (ref 4.2–5.4)
RBC # BLD AUTO: 3.38 M/UL (ref 4.2–5.4)
RBC # BLD AUTO: 3.6 M/UL (ref 4.2–5.4)
RBC # BLD AUTO: 3.75 M/UL (ref 4.2–5.4)
RBC # BLD AUTO: 3.9 M/UL (ref 4.2–5.4)
RBC # URNS HPF: ABNORMAL /HPF
RBC BLD AUTO: PRESENT
RBC UR QL AUTO: NEGATIVE
RH BLD: NORMAL
SIGNIFICANT IND 70042: NORMAL
SITE SITE: NORMAL
SODIUM SERPL-SCNC: 130 MMOL/L (ref 135–145)
SODIUM SERPL-SCNC: 133 MMOL/L (ref 135–145)
SODIUM SERPL-SCNC: 135 MMOL/L (ref 135–145)
SODIUM SERPL-SCNC: 136 MMOL/L (ref 135–145)
SOURCE SOURCE: NORMAL
SP GR UR STRIP.AUTO: 1.02
TOXIC GRANULES BLD QL SMEAR: SLIGHT
UNIT STATUS USTAT: NORMAL
UROBILINOGEN UR STRIP.AUTO-MCNC: 1 MG/DL
WBC # BLD AUTO: 12.5 K/UL (ref 4.8–10.8)
WBC # BLD AUTO: 13.6 K/UL (ref 4.8–10.8)
WBC # BLD AUTO: 16.3 K/UL (ref 4.8–10.8)
WBC # BLD AUTO: 5 K/UL (ref 4.8–10.8)
WBC # BLD AUTO: 5.8 K/UL (ref 4.8–10.8)
WBC #/AREA URNS HPF: ABNORMAL /HPF

## 2019-01-01 PROCEDURE — 99213 OFFICE O/P EST LOW 20 MIN: CPT | Mod: GE,24 | Performed by: INTERNAL MEDICINE

## 2019-01-01 PROCEDURE — 36415 COLL VENOUS BLD VENIPUNCTURE: CPT | Performed by: INTERNAL MEDICINE

## 2019-01-01 PROCEDURE — 700105 HCHG RX REV CODE 258

## 2019-01-01 PROCEDURE — 87086 URINE CULTURE/COLONY COUNT: CPT

## 2019-01-01 PROCEDURE — 665999 HH PPS REVENUE DEBIT

## 2019-01-01 PROCEDURE — 77427 RADIATION TX MANAGEMENT X5: CPT | Performed by: RADIOLOGY

## 2019-01-01 PROCEDURE — A9270 NON-COVERED ITEM OR SERVICE: HCPCS | Performed by: NURSE PRACTITIONER

## 2019-01-01 PROCEDURE — 700111 HCHG RX REV CODE 636 W/ 250 OVERRIDE (IP)

## 2019-01-01 PROCEDURE — 96401 CHEMO ANTI-NEOPL SQ/IM: CPT | Mod: XU

## 2019-01-01 PROCEDURE — 96409 CHEMO IV PUSH SNGL DRUG: CPT

## 2019-01-01 PROCEDURE — 665997 HH PPS REVENUE ADJ

## 2019-01-01 PROCEDURE — 665998 HH PPS REVENUE CREDIT

## 2019-01-01 PROCEDURE — G0152 HHCP-SERV OF OT,EA 15 MIN: HCPCS

## 2019-01-01 PROCEDURE — G0151 HHCP-SERV OF PT,EA 15 MIN: HCPCS

## 2019-01-01 PROCEDURE — 86900 BLOOD TYPING SEROLOGIC ABO: CPT

## 2019-01-01 PROCEDURE — 97110 THERAPEUTIC EXERCISES: CPT

## 2019-01-01 PROCEDURE — 99214 OFFICE O/P EST MOD 30 MIN: CPT | Mod: 25 | Performed by: NURSE PRACTITIONER

## 2019-01-01 PROCEDURE — 77014 PR CT GUIDANCE PLACEMENT RAD THERAPY FIELDS: CPT | Mod: 26 | Performed by: RADIOLOGY

## 2019-01-01 PROCEDURE — 97112 NEUROMUSCULAR REEDUCATION: CPT

## 2019-01-01 PROCEDURE — G0493 RN CARE EA 15 MIN HH/HOSPICE: HCPCS

## 2019-01-01 PROCEDURE — 77386 HCHG IMRT DELIVERY COMPLEX: CPT | Performed by: RADIOLOGY

## 2019-01-01 PROCEDURE — P9016 RBC LEUKOCYTES REDUCED: HCPCS

## 2019-01-01 PROCEDURE — 77336 RADIATION PHYSICS CONSULT: CPT | Performed by: RADIOLOGY

## 2019-01-01 PROCEDURE — 700111 HCHG RX REV CODE 636 W/ 250 OVERRIDE (IP): Performed by: NURSE PRACTITIONER

## 2019-01-01 PROCEDURE — 77263 THER RADIOLOGY TX PLNG CPLX: CPT | Performed by: RADIOLOGY

## 2019-01-01 PROCEDURE — G0495 RN CARE TRAIN/EDU IN HH: HCPCS

## 2019-01-01 PROCEDURE — 85025 COMPLETE CBC W/AUTO DIFF WBC: CPT

## 2019-01-01 PROCEDURE — G0155 HHCP-SVS OF CSW,EA 15 MIN: HCPCS

## 2019-01-01 PROCEDURE — 77338 DESIGN MLC DEVICE FOR IMRT: CPT | Mod: 26 | Performed by: RADIOLOGY

## 2019-01-01 PROCEDURE — 77370 RADIATION PHYSICS CONSULT: CPT | Performed by: RADIOLOGY

## 2019-01-01 PROCEDURE — A4212 NON CORING NEEDLE OR STYLET: HCPCS

## 2019-01-01 PROCEDURE — 700111 HCHG RX REV CODE 636 W/ 250 OVERRIDE (IP): Mod: JW

## 2019-01-01 PROCEDURE — 85027 COMPLETE CBC AUTOMATED: CPT

## 2019-01-01 PROCEDURE — 700105 HCHG RX REV CODE 258: Performed by: INTERNAL MEDICINE

## 2019-01-01 PROCEDURE — 77280 THER RAD SIMULAJ FIELD SMPL: CPT | Performed by: RADIOLOGY

## 2019-01-01 PROCEDURE — 80053 COMPREHEN METABOLIC PANEL: CPT

## 2019-01-01 PROCEDURE — 81001 URINALYSIS AUTO W/SCOPE: CPT

## 2019-01-01 PROCEDURE — 86300 IMMUNOASSAY TUMOR CA 15-3: CPT

## 2019-01-01 PROCEDURE — 77334 RADIATION TREATMENT AID(S): CPT | Performed by: RADIOLOGY

## 2019-01-01 PROCEDURE — 77301 RADIOTHERAPY DOSE PLAN IMRT: CPT | Mod: 26 | Performed by: RADIOLOGY

## 2019-01-01 PROCEDURE — 85007 BL SMEAR W/DIFF WBC COUNT: CPT

## 2019-01-01 PROCEDURE — 77300 RADIATION THERAPY DOSE PLAN: CPT | Performed by: RADIOLOGY

## 2019-01-01 PROCEDURE — 665001 SOC-HOME HEALTH

## 2019-01-01 PROCEDURE — 99215 OFFICE O/P EST HI 40 MIN: CPT | Performed by: NURSE PRACTITIONER

## 2019-01-01 PROCEDURE — 94761 N-INVAS EAR/PLS OXIMETRY MLT: CPT | Performed by: NURSE PRACTITIONER

## 2019-01-01 PROCEDURE — 77470 SPECIAL RADIATION TREATMENT: CPT | Performed by: RADIOLOGY

## 2019-01-01 PROCEDURE — 86850 RBC ANTIBODY SCREEN: CPT

## 2019-01-01 PROCEDURE — 700111 HCHG RX REV CODE 636 W/ 250 OVERRIDE (IP): Performed by: INTERNAL MEDICINE

## 2019-01-01 PROCEDURE — 71260 CT THORAX DX C+: CPT

## 2019-01-01 PROCEDURE — 700105 HCHG RX REV CODE 258: Performed by: NURSE PRACTITIONER

## 2019-01-01 PROCEDURE — 93000 ELECTROCARDIOGRAM COMPLETE: CPT | Performed by: INTERNAL MEDICINE

## 2019-01-01 PROCEDURE — 77334 RADIATION TREATMENT AID(S): CPT | Mod: 26 | Performed by: RADIOLOGY

## 2019-01-01 PROCEDURE — 700102 HCHG RX REV CODE 250 W/ 637 OVERRIDE(OP): Performed by: NURSE PRACTITIONER

## 2019-01-01 PROCEDURE — 700117 HCHG RX CONTRAST REV CODE 255: Performed by: NURSE PRACTITIONER

## 2019-01-01 PROCEDURE — 99215 OFFICE O/P EST HI 40 MIN: CPT | Performed by: INTERNAL MEDICINE

## 2019-01-01 PROCEDURE — 77301 RADIOTHERAPY DOSE PLAN IMRT: CPT | Performed by: RADIOLOGY

## 2019-01-01 PROCEDURE — 77290 THER RAD SIMULAJ FIELD CPLX: CPT | Performed by: RADIOLOGY

## 2019-01-01 PROCEDURE — 36415 COLL VENOUS BLD VENIPUNCTURE: CPT

## 2019-01-01 PROCEDURE — 36430 TRANSFUSION BLD/BLD COMPNT: CPT

## 2019-01-01 PROCEDURE — 77470 SPECIAL RADIATION TREATMENT: CPT | Mod: 26 | Performed by: RADIOLOGY

## 2019-01-01 PROCEDURE — G0180 MD CERTIFICATION HHA PATIENT: HCPCS | Performed by: INTERNAL MEDICINE

## 2019-01-01 PROCEDURE — 77300 RADIATION THERAPY DOSE PLAN: CPT | Mod: 26 | Performed by: RADIOLOGY

## 2019-01-01 PROCEDURE — 99214 OFFICE O/P EST MOD 30 MIN: CPT | Mod: GC | Performed by: INTERNAL MEDICINE

## 2019-01-01 PROCEDURE — 306780 HCHG STAT FOR TRANSFUSION PER CASE

## 2019-01-01 PROCEDURE — 77338 DESIGN MLC DEVICE FOR IMRT: CPT | Performed by: RADIOLOGY

## 2019-01-01 PROCEDURE — 86901 BLOOD TYPING SEROLOGIC RH(D): CPT

## 2019-01-01 PROCEDURE — 86923 COMPATIBILITY TEST ELECTRIC: CPT

## 2019-01-01 RX ORDER — ONDANSETRON 2 MG/ML
4 INJECTION INTRAMUSCULAR; INTRAVENOUS
Status: CANCELLED | OUTPATIENT
Start: 2019-01-01

## 2019-01-01 RX ORDER — 0.9 % SODIUM CHLORIDE 0.9 %
VIAL (ML) INJECTION PRN
Status: CANCELLED | OUTPATIENT
Start: 2019-01-01

## 2019-01-01 RX ORDER — DIPHENHYDRAMINE HCL 25 MG
25 TABLET ORAL ONCE
Status: COMPLETED | OUTPATIENT
Start: 2019-01-01 | End: 2019-01-01

## 2019-01-01 RX ORDER — 0.9 % SODIUM CHLORIDE 0.9 %
5 VIAL (ML) INJECTION PRN
Status: CANCELLED | OUTPATIENT
Start: 2019-01-01

## 2019-01-01 RX ORDER — SODIUM CHLORIDE 9 MG/ML
500 INJECTION, SOLUTION INTRAVENOUS ONCE
Status: CANCELLED | OUTPATIENT
Start: 2019-01-01 | End: 2019-01-01

## 2019-01-01 RX ORDER — ACETAMINOPHEN 325 MG/1
650 TABLET ORAL PRN
Status: CANCELLED | OUTPATIENT
Start: 2019-01-01

## 2019-01-01 RX ORDER — DIPHENHYDRAMINE HCL 25 MG
25 TABLET ORAL ONCE
Status: CANCELLED | OUTPATIENT
Start: 2019-01-01 | End: 2019-01-01

## 2019-01-01 RX ORDER — DRONABINOL 2.5 MG/1
2.5 CAPSULE ORAL 2 TIMES DAILY
Qty: 60 CAP | Refills: 0 | Status: SHIPPED | OUTPATIENT
Start: 2019-01-01 | End: 2019-03-13

## 2019-01-01 RX ORDER — ONDANSETRON 8 MG/1
8 TABLET, ORALLY DISINTEGRATING ORAL
Status: CANCELLED | OUTPATIENT
Start: 2019-01-01

## 2019-01-01 RX ORDER — LIDOCAINE HYDROCHLORIDE 10 MG/ML
INJECTION, SOLUTION EPIDURAL; INFILTRATION; INTRACAUDAL; PERINEURAL
Status: COMPLETED
Start: 2019-01-01 | End: 2019-01-01

## 2019-01-01 RX ORDER — SODIUM CHLORIDE 9 MG/ML
INJECTION, SOLUTION INTRAVENOUS CONTINUOUS
Status: CANCELLED | OUTPATIENT
Start: 2019-01-01

## 2019-01-01 RX ORDER — RANITIDINE 150 MG/1
150 TABLET ORAL 2 TIMES DAILY
Qty: 180 TAB | Refills: 0 | Status: SHIPPED | OUTPATIENT
Start: 2019-01-01

## 2019-01-01 RX ORDER — LIDOCAINE HYDROCHLORIDE 10 MG/ML
20 INJECTION, SOLUTION INFILTRATION; PERINEURAL
Status: CANCELLED | OUTPATIENT
Start: 2019-01-01

## 2019-01-01 RX ORDER — ZOLPIDEM TARTRATE 5 MG/1
5 TABLET ORAL NIGHTLY PRN
COMMUNITY
Start: 2018-01-01 | End: 2019-01-01

## 2019-01-01 RX ORDER — SODIUM CHLORIDE 9 MG/ML
INJECTION, SOLUTION INTRAVENOUS CONTINUOUS
Status: DISCONTINUED | OUTPATIENT
Start: 2019-01-01 | End: 2019-01-01 | Stop reason: HOSPADM

## 2019-01-01 RX ORDER — ACETAMINOPHEN 325 MG/1
650 TABLET ORAL ONCE
Status: CANCELLED | OUTPATIENT
Start: 2019-01-01 | End: 2019-01-01

## 2019-01-01 RX ORDER — PROCHLORPERAZINE MALEATE 10 MG
10 TABLET ORAL EVERY 6 HOURS PRN
Status: CANCELLED | OUTPATIENT
Start: 2019-01-01

## 2019-01-01 RX ORDER — OXYCODONE HYDROCHLORIDE AND ACETAMINOPHEN 5; 325 MG/1; MG/1
1 TABLET ORAL EVERY 6 HOURS PRN
Qty: 100 TAB | Refills: 0 | Status: SHIPPED | OUTPATIENT
Start: 2019-01-01 | End: 2019-01-01

## 2019-01-01 RX ORDER — CAPECITABINE 500 MG/1
TABLET, FILM COATED ORAL
Qty: 56 TAB | Refills: 1 | Status: SHIPPED | OUTPATIENT
Start: 2019-01-01

## 2019-01-01 RX ORDER — ACETAMINOPHEN 325 MG/1
650 TABLET ORAL ONCE
Status: COMPLETED | OUTPATIENT
Start: 2019-01-01 | End: 2019-01-01

## 2019-01-01 RX ORDER — SODIUM CHLORIDE 9 MG/ML
500 INJECTION, SOLUTION INTRAVENOUS ONCE
Status: COMPLETED | OUTPATIENT
Start: 2019-01-01 | End: 2019-01-01

## 2019-01-01 RX ADMIN — HEPARIN 500 UNITS: 100 SYRINGE at 16:01

## 2019-01-01 RX ADMIN — DIPHENHYDRAMINE HCL 25 MG: 25 TABLET ORAL at 15:29

## 2019-01-01 RX ADMIN — LIDOCAINE HYDROCHLORIDE 2 ML: 10 INJECTION, SOLUTION EPIDURAL; INFILTRATION; INTRACAUDAL; PERINEURAL at 13:31

## 2019-01-01 RX ADMIN — SODIUM CHLORIDE 36 MG: 9 INJECTION, SOLUTION INTRAVENOUS at 15:17

## 2019-01-01 RX ADMIN — HEPARIN 500 UNITS: 100 SYRINGE at 18:13

## 2019-01-01 RX ADMIN — Medication 500 UNITS: at 15:36

## 2019-01-01 RX ADMIN — SODIUM CHLORIDE 36.8 MG: 9 INJECTION, SOLUTION INTRAVENOUS at 15:34

## 2019-01-01 RX ADMIN — IOHEXOL 90 ML: 350 INJECTION, SOLUTION INTRAVENOUS at 15:30

## 2019-01-01 RX ADMIN — IOHEXOL 50 ML: 240 INJECTION, SOLUTION INTRATHECAL; INTRAVASCULAR; INTRAVENOUS; ORAL at 15:30

## 2019-01-01 RX ADMIN — LIDOCAINE HYDROCHLORIDE 0.2 ML: 10 INJECTION, SOLUTION EPIDURAL; INFILTRATION; INTRACAUDAL; PERINEURAL at 13:55

## 2019-01-01 RX ADMIN — LIDOCAINE HYDROCHLORIDE: 10 INJECTION, SOLUTION EPIDURAL; INFILTRATION; INTRACAUDAL; PERINEURAL at 14:15

## 2019-01-01 RX ADMIN — DENOSUMAB 120 MG: 120 INJECTION SUBCUTANEOUS at 15:05

## 2019-01-01 RX ADMIN — SODIUM CHLORIDE 37 MG: 9 INJECTION, SOLUTION INTRAVENOUS at 15:12

## 2019-01-01 RX ADMIN — SODIUM CHLORIDE: 9 INJECTION, SOLUTION INTRAVENOUS at 15:33

## 2019-01-01 RX ADMIN — SODIUM CHLORIDE 250 ML: 9 INJECTION, SOLUTION INTRAVENOUS at 15:30

## 2019-01-01 RX ADMIN — Medication 0.2 ML: at 13:55

## 2019-01-01 RX ADMIN — HEPARIN 500 UNITS: 100 SYRINGE at 16:05

## 2019-01-01 RX ADMIN — LIDOCAINE HYDROCHLORIDE 2 ML: 10 INJECTION, SOLUTION EPIDURAL; INFILTRATION; INTRACAUDAL; PERINEURAL at 14:31

## 2019-01-01 RX ADMIN — ACETAMINOPHEN 650 MG: 325 TABLET, FILM COATED ORAL at 15:29

## 2019-01-01 SDOH — ECONOMIC STABILITY: HOUSING INSECURITY: EVIDENCE OF SMOKING MATERIAL: 0

## 2019-01-01 SDOH — ECONOMIC STABILITY: HOUSING INSECURITY
HOME SAFETY: VE A FIRE ESCAPE PLAN DEVELOPED. PATIENT DOES NOT HAVE FLAMMABLE MATERIALS PRESENT IN THE HOME PRESENTING A FIRE HAZARD. NO EVIDENCE FOUND OF SMOKING MATERIALS PRESENT IN THE HOME.

## 2019-01-01 SDOH — ECONOMIC STABILITY: HOUSING INSECURITY
HOME SAFETY: OXYGEN SAFETY RISK ASSESSMENT PERFORMED. PATIENT DOES NOT HAVE A NO SMOKING SIGN POSTED IN THE HOME. PATIENT DOES HAVE A WORKING FIRE EXTINGUISHER PRESENT IN THE HOME. SMOKE ALARMS ARE PRESENT AND FUNCTIONAL ON EACH LEVEL OF THE HOME. PATIENT DOES HA

## 2019-01-01 ASSESSMENT — PATIENT HEALTH QUESTIONNAIRE - PHQ9
CLINICAL INTERPRETATION OF PHQ2 SCORE: 0
CLINICAL INTERPRETATION OF PHQ2 SCORE: 2
SUM OF ALL RESPONSES TO PHQ QUESTIONS 1-9: 5
1. LITTLE INTEREST OR PLEASURE IN DOING THINGS: 00
5. POOR APPETITE OR OVEREATING: 1 - SEVERAL DAYS
2. FEELING DOWN, DEPRESSED, IRRITABLE, OR HOPELESS: 00

## 2019-01-01 ASSESSMENT — ENCOUNTER SYMPTOMS
WEAKNESS: 1
DEPRESSED MOOD: 1
MYALGIAS: 1
MENTAL STATUS CHANGE: 0
PALPITATIONS: 0
FEVER: 0
TINGLING: 0
CHILLS: 0
HEADACHES: 0
HEADACHES: 0
NAUSEA: 0
VOMITING: DENIES
PALPITATIONS: 0
COUGH: 0
NAUSEA: DENIES
HEADACHES: 0
NAUSEA: DENIES
INSOMNIA: 0
CONSTIPATION: 0
DIARRHEA: 0
TINGLING: 0
SHORTNESS OF BREATH: 1
WHEEZING: 0
FEVER: 0
VOMITING: DENIES
DIZZINESS: 0
PALPITATIONS: 0
WEIGHT LOSS: 1
TINGLING: 0
VOMITING: 1
WHEEZING: 0
PND: 0
VOMITING: 0
NAUSEA: DENIES
COUGH: 0
DIZZINESS: 1
ABDOMINAL PAIN: 0
LOSS OF CONSCIOUSNESS: 0
VOMITING: DENIES
SHORTNESS OF BREATH: 1
DEBILITATING PAIN: 1
DEBILITATING PAIN: 1
CHILLS: 0
DIARRHEA: 1
DIARRHEA: 0
DIZZINESS: 0
MEMORY LOSS: 1
ORTHOPNEA: 0
SHORTNESS OF BREATH: 1
DIZZINESS: 1
PALPITATIONS: 0
CONSTIPATION: 1
CONSTIPATION: 1
NAUSEA: 0
NAUSEA: DENIES
FEVER: 0
WHEEZING: 0
MYALGIAS: 0
FALLS: 0
DEPRESSED MOOD: 1
VOMITING: DENIES
CHILLS: 0
INSOMNIA: 0
FALLS: 0
WEIGHT LOSS: 1
NAUSEA: 0
DEPRESSION: 0
INSOMNIA: 0
COUGH: 0
SHORTNESS OF BREATH: 1
VOMITING: 0
MYALGIAS: 0
WEIGHT LOSS: 1

## 2019-01-01 ASSESSMENT — PAIN SCALES - GENERAL
PAINLEVEL: 2=MINIMAL-SLIGHT
PAINLEVEL: 6=MODERATE PAIN
PAINLEVEL: NO PAIN
PAINLEVEL: 3=SLIGHT PAIN
PAINLEVEL: 3=SLIGHT PAIN
PAINLEVEL: NO PAIN

## 2019-01-01 ASSESSMENT — ACTIVITIES OF DAILY LIVING (ADL)
TRANSPORTATION_ASSISTANCE: 6
SHOPPING_ASSISTANCE: 6
OASIS_M1830: 03
HOUSEKEEPING_ASSISTANCE: 5
TOILETING_ASSISTANCE: 1
LAUNDRY_ASSISTANCE: 0
ORAL_CARE_ASSISTANCE: 0
DRESSING_LB_ASSISTANCE: 4
EATING_ASSISTANCE: 0
GROOMING_ASSISTANCE: 0
DRESSING_UB_ASSISTANCE: 0
TELEPHONE_ASSISTANCE: 0
BATHING_ASSISTANCE: 1
BATHING_ASSISTANCE: 2
ADLS_COMMENTS: <!--EPICS-->SEE OT EVALUATION <!--EPICE-->
MEAL_PREP_ASSISTANCE: 0

## 2019-01-02 NOTE — PATIENT INSTRUCTIONS
Plan:    1) Reviewed results of HST and recent titration study. Discussed pathophysiology of sleep apnea and treatment options. She is amendable to a trial of CPAP. Order for CPAP at 6 CM H20. CNOX on this setting and 2 month follow up with compliance download.   2) She was ordered 02 2 LPM nocturnal and prn exertion. However she admits to not wearing her 02 during the day. Repeat multi oximetry today in the office indicates a low 02 of 79%. She did well on 02 3 LPM. Discussed importance of consistent use of 02 during the day with exertion. Order for POC. She does have a pulse oximeter on hand to help assess daytime 02 needs.   3) Reviewed prior PFT results. DLCO is reduced at 49%. She is pending update CT chest and abdomen ordered by Oncology.   4) Continue work up with Cardiology. Unremarkable holter monitor.   5) Continue Anoro and Albuterol inhalers.   6) Patient is up to date on her Prevnar 13, Pneumovax 23 and Influenza vaccinations.  7) Continue to follow with Oncology and Radiation Oncology. She is pending radiation treatments.   8) Follow up in 2 months with CNOX and compliance card download, sooner OV if needed. Pending results of updated CT chest and abdomen ordered by Oncology.

## 2019-01-02 NOTE — OP THERAPY DAILY TREATMENT
Outpatient Physical Therapy  DAILY TREATMENT     [facility] [place]  [address]  [place] [address]  Phone:  [phone]  Fax:  [phone]    Date: 01/02/2019    Patient: Zuly Christian  YOB: 1950  MRN: [id number]     Time Calculation  Start time: 0945  Stop time: 1015 Time Calculation (min): 30 minutes     Chief Complaint: R hip pain, overall endurance    Visit #: 4    SUBJECTIVE:   Pt felt tired after last visit. Waiting to talk to her doctor about MRI results that she feels sounded much worse. R hip pain with ambulation remains the same.      OBJECTIVE:  Current objective measures: ambulation with Shortend step. Limited weightweaing on R LE.  SP02 93% maintained throughout treatment.           Therapeutic Exercises (CPT 12033):     1. Nu step , 5min, lvl 2    2. Shuttle , 3c x 2min, 2c x 2min     3. Calf rocking, 2min    4. Steps ups, x 12 no riser with L hand support    Therapeutic Treatments and Modalities:     1. Neuromuscular Re-education (CPT 72901), SPC walking x 20 feet x 2     Time-based treatments/modalities:  Therapeutic exercise minutes (CPT 06088): 25 minutes  Neuromusc re-ed, balance, coor, post minutes (CPT 88421): 2 minutes       Pain rating before treatment: 3  Pain rating after treatment: 2    ASSESSMENT:   Response to treatment:  Pt easily tired during treatment today. Cane walking and training to continue to progress but very limited. Walker use may be more appropriate.    PLAN/RECOMMENDATIONS:   Plan for treatment: therapy treatment to continue next visit.  Planned interventions for next visit: continue with current treatment.

## 2019-01-02 NOTE — PROGRESS NOTES
Chief Complaint   Patient presents with   • Results     SS   • COPD   • Apnea   • Allergic Rhinitis   • Hypoxemia       HPI:  Zuly Christian is a 68 y.o. year old female here today for follow-up on her chronic obstructive pulmonary disease, allergic rhinitis, obstructive sleep apnea and abnormal CT scan of the chest. She is here today to review results of her recent CPAP titration study.      CT scan of the chest was repeated on 8/16/2016 and indicates stable appearance of a 6 mm left upper lobe nodule dating back to August 2014. There is stable diffuse centrilobular emphysema and no pulmonary consolidation or adenopathy. She has over a 40 PYH of tobacco abuse, quitting in 2014. She was recommend annual CT imaging. She also has a family history of lung cancer. CT chest was updated 11/2/2017 and indicates;   Despite suspicious features, lingular 7 mm noncalcified nodule is stable  New left subclavian artery origin stent  Small pericardial effusion, borderline slight enlargement  Advanced emphysema     CTA was completed 10/2/2018 for complaints of increased shortness of breath indicates;  1. No evidence of pulmonary embolus.  2. Small opacity in the dependent right lower lobe, favor atelectasis or consolidation.  3. Numerous sclerotic and lytic osseous metastases.  4. Age-indeterminate T5 compression fracture, likely pathologic.  It may be acute. Correlate for point tenderness.     She has been diagnosed with metastatic breast cancer with bone involvement. She is undergoing chemotherapy. She has had tolerance issues with chemo and has been more short of breath. She was ordered an Echocardiogram as well as the CTA. Echo 10/2/2018 indicates;  CONCLUSIONS  Mild concentric left ventricular hypertrophy.  Normal left ventricular systolic function.  Left ventricular ejection fraction is visually estimated to be 60%.  Indeterminate diastolic function.  Normal inferior vena cava size and inspiratory collapse.  Estimated  right ventricular systolic pressure  is 58 mmHg.     Multi oximetry at her last office visit indicated a low 02 of 86% with exertion. She did well on 02 2 LPM.      Alpha 1 testing indicates an MF phenotype. Level was ordered through the lab and is normal 154 mg/dl. PFT's 8/22/2016 indicated an FEV1 of 1.59 L, 67% predicted with an FEV1/FVC ratio of 59 and a DLCO of 67% predicted. PFT's 8/16/2017 and indicates an FEV1 of 1.57 L, 67% predicted with an FEV1/FVC ratio of 51 with a DLCO of 82% predicted. She was switched from Spiriva to Anoro.     She had noted slowly worsening dyspnea with each chemo treatment. She was referred to Cardiology for further work up on her Pulmonary Hypertension. PFT's were also updated 11/7/2018 and indicated an FEV1 of 1.48 L, 65% predicted with an FEV1/FVC ratio of 60, TLC of 93% and a DLCO of 49% predicted.     She notes occasional runny nose which she feels is allergy triggered. She does use an OTC antihistamine as needed. She notes rare snoring usually when allergies are bad. She is on Elavil at bedtime for sleep. She tends to wake refreshed. She denies any morning headaches. Home sleep study was completed 11/1/2018 which indicates evidence of mild sleep apnea with an AHI of 9.7 with a low 02 of 80%. She was ordered an in lab dedicated titration study which was completed 11/18/2018 and indicates successful titration to CPAP of 6 CM H20 with a resultant AHI of 2.3 and a mean 02 of 93%.     She did see Cardiology in consult. She had an unremarkable holter monitor and has follow up pending.     She does feel her dyspnea has improved some. She denies current cough or mucous. She has clear sinus drainage. She denies wheezing. She denies any fevers or chills. She admits to not wearing her 02 during the day.     Past Medical History:   Diagnosis Date   • Allergic rhinitis    • Cancer (HCC)     breast   • Cataract    • COPD (chronic obstructive pulmonary disease) (HCC)    • Emphysema of lung  (HCC)    • Heart burn    • Hyperlipidemia    • Hypertension    • Osteopenia    • Peripheral vascular disease (HCC)        Past Surgical History:   Procedure Laterality Date   • CAROTID ENDARTERECTOMY  7/20/2017    Procedure: CAROTID ENDARTERECTOMY FOR : LEFT SUBCLAVIAN ARTERY ANGIOPLASTY / STENT BRACHIAL APPROACH WITH ULTRASOUND;  Surgeon: Dinora Saldana M.D.;  Location: SURGERY [facility];  Service:    • TUBAL LIGATION  1977   • GYN SURGERY      d+c 1979, 1980   • OTHER ORTHOPEDIC SURGERY      left knee surgery   • TONSILLECTOMY         Family History   Problem Relation Age of Onset   • Lung Cancer Father    • Cancer Father 71        lung   • Lung Cancer Mother    • Cancer Mother 91        lung   • Cancer Maternal Aunt 80        lung cancer, smoking       Social History     Social History   • Marital status:      Spouse name: N/A   • Number of children: N/A   • Years of education: N/A     Occupational History   • work in cardiology office Retired     Social History Main Topics   • Smoking status: Former Smoker     Packs/day: 1.00     Years: 50.00     Types: Cigarettes     Quit date: 5/1/2014   • Smokeless tobacco: Former User      Comment: started at age 18, quit at 64, 1.5-2 ppd for 46 years, continued abstinance   • Alcohol use Yes      Comment: 3 a month   • Drug use: No   • Sexual activity: No     Other Topics Concern   • Not on file     Social History Narrative   • No narrative on file         ROS:  Constitutional: Denies fevers, chills, sweats, weight loss  Eyes: Denies vision loss, pain, drainage, double vision. Wears glasses   Ears/Nose/Mouth/Throat: Denies rhinitis, nasal congestion, ear ache, difficulty hearing, sore throat, persistent hoarseness, decayed teeth/toothache  Cardiovascular: Denies chest pain, tightness, palpitations, swelling in feet/legs, fainting, difficulty breathing when laying down  Respiratory: See HPI   GI: Denies heartburn, difficulty swallowing, nausea, vomiting,  abdominal pain, diarrhea, constipation  : Denies frequent urination, painful urination  Integumentary: Denies rashes, lumps or color changes  MSK: Positive for hip pain   Neurological: Denies frequent headaches, dizziness, weakness  Sleep: See HPI       Current Outpatient Prescriptions   Medication Sig Dispense Refill   • tramadol (ULTRAM) 50 MG Tab      • FLUoxetine (PROZAC) 20 MG Cap Take 1 Cap by mouth every day. 90 Cap 0   • guaiFENesin LA (MUCINEX) 600 MG TABLET SR 12 HR Take 600 mg by mouth every 12 hours.     • benzonatate (TESSALON) 100 MG Cap Take 1 Cap by mouth 3 times a day as needed for Cough. 60 Cap 0   • umeclidinium-vilanterol (ANORO ELLIPTA) 62.5-25 MCG/INH AEROSOL POWDER, BREATH ACTIVATED inhaler USE 1 INHALATION EVERY DAY 3 Inhaler 3   • amitriptyline (ELAVIL) 100 MG Tab Take 1 Tab by mouth every bedtime. 90 Tab 1   • raNITidine (ZANTAC) 150 MG Tab Take 1 Tab by mouth 2 times a day. 60 Tab 3   • sennosides-docusate sodium (SENOKOT-S) 8.6-50 MG tablet Take 1 Tab by mouth 1 time daily as needed (for constipation. Do not take if LOOSE STOOLS). 30 Tab 3   • polyethylene glycol/lytes (MIRALAX) Pack Take 1 Packet by mouth every day. 30 Each 0   • omega-3 acid ethyl esters (LOVAZA) 1 GM capsule Take 1 Cap by mouth 2 Times a Day. 180 Cap 3   • lidocaine-prilocaine (EMLA) 2.5-2.5 % Cream Apply to port one hour prior to access and cover with plastic wrap. 1 Tube 3   • Calcium Polycarbophil (FIBER-CAPS PO) Take  by mouth.     • CHONDROITIN SULFATE PO Take  by mouth.     • albuterol (PROAIR HFA) 108 (90 Base) MCG/ACT Aero Soln inhalation aerosol Inhale 2 Puffs by mouth every four hours as needed for Shortness of Breath (wheezing). 3 Inhaler 3   • Lifitegrast (XIIDRA OP) by Ophthalmic route.     • naproxen (NAPROSYN) 500 MG Tab Take 1 Tab by mouth 2 times a day, with meals. 60 Tab 1   • Multiple Vitamin (MULTI VITAMIN DAILY PO) Take  by mouth.     • aspirin 81 MG tablet Take 81 mg by mouth every day. One  "tablet by mouth daily     • Coenzyme Q10 (COQ10) 100 MG Cap Take 100 mg by mouth.     • ezetimibe (ZETIA) 10 MG Tab Take 10 mg by mouth every day.     • rosuvastatin (CRESTOR) 40 MG tablet Take 40 mg by mouth every day.     • carvedilol (COREG) 25 MG Tab Take 12.5 mg by mouth 2 times a day, with meals.     • ramipril (ALTACE) 10 MG capsule Take 10 mg by mouth every day.     • Oxymetazoline HCl (NASAL SPRAY NA) Spray  in nose.       No current facility-administered medications for this visit.        Allergies   Allergen Reactions   • Pollen Extract    • Sulfa Drugs Hives       Blood pressure 120/60, pulse (!) 144, temperature 36.1 °C (97 °F), temperature source Temporal, resp. rate 16, height 1.549 m (5' 1\"), weight 52.2 kg (115 lb), SpO2 90 %, not currently breastfeeding.    PE:   Appearance: Well developed, well nourished, no acute distress  Eyes: PERRL, EOM intact, sclera white, conjunctiva moist  Ears: no lesions or deformities  Hearing: grossly intact  Nose: no lesions or deformities  Oropharynx: tongue normal, posterior pharynx without erythema or exudate  Neck: supple, trachea midline, no masses   Respiratory effort: no intercostal retractions or use of accessory muscles  Lung auscultation: no rales, rhonchi or wheezes  Heart auscultation: no murmur rub or gallop  Extremities: no cyanosis or edema  Abdomen: soft ,non tender, no masses  Gait and Station: normal  Digits and nails: no clubbing, cyanosis, petechiae or nodes.  Cranial nerves: grossly intact  Skin: no rashes, lesions or ulcers noted  Orientation: Oriented to time, person and place  Mood and affect: mood and affect appropriate, normal interaction with examiner  Judgement: Intact          Assessment:    1. Chronic obstructive pulmonary disease, unspecified COPD type (HCC)  DME O2 New Set Up   2. Hypoxemia  Multiple Oximetry    Multiple Oximetry    DME O2 New Set Up   3. Allergic rhinitis, unspecified seasonality, unspecified trigger     4. USHA " (obstructive sleep apnea)  DME CPAP    DME CNOX By DME [facility]   5. Abnormal CT of the chest     6. Alpha-1-antitrypsin deficiency carrier (HCC)     7. History of tobacco abuse     8. Pulmonary hypertension (HCC)     9. Metastatic breast cancer (HCC)           Plan:    1) Reviewed results of HST and recent titration study. Discussed pathophysiology of sleep apnea and treatment options. She is amendable to a trial of CPAP. Order for CPAP at 6 CM H20. CNOX on this setting and 2 month follow up with compliance download.   2) She was ordered 02 2 LPM nocturnal and prn exertion. However she admits to not wearing her 02 during the day. Repeat multi oximetry today in the office indicates a low 02 of 79%. She did well on 02 3 LPM. Discussed importance of consistent use of 02 during the day with exertion. Order for POC. She does have a pulse oximeter on hand to help assess daytime 02 needs.   3) Reviewed prior PFT results. DLCO is reduced at 49%. She is pending update CT chest and abdomen ordered by Oncology.   4) Continue work up with Cardiology. Unremarkable holter monitor.   5) Continue Anoro and Albuterol inhalers.   6) Patient is up to date on her Prevnar 13, Pneumovax 23 and Influenza vaccinations.  7) Continue to follow with Oncology and Radiation Oncology. She is pending radiation treatments.   8) Follow up in 2 months with CNOX and compliance card download, sooner OV if needed. Pending results of updated CT chest and abdomen ordered by Oncology.

## 2019-01-02 NOTE — PROCEDURES
Multi-Ox Readings  Multi Ox #1 Room air   O2 sat % at rest 94   O2 sat % on exertion 79   O2 sat average on exertion     Multi Ox #2 3 LPM   O2 sat % at rest 100   O2 sat % on exertion 95   O2 sat average on exertion       Oxygen Use 2   Oxygen Frequency Nocturnal   Duration of need     Is the patient mobile within the home?     CPAP Use?     BIPAP Use?     Servo Titration

## 2019-01-03 NOTE — TELEPHONE ENCOUNTER
From: Zuly Christian  To: Kaylee Mandujano M.D.  Sent: 1/3/2019 11:07 AM PST  Subject: Prescription Question    You were right, I need stronger pain meds. I know there are tests I need to have prior to my appt. on 1/28. Do I  orders at your office, are thet standing orders, or can you send them via my chart. Thank you.

## 2019-01-03 NOTE — TELEPHONE ENCOUNTER
Last seen: 11/05/18 by Dr. Martinez  Next appt: None     Was the patient seen in the last year in this department? Yes   Does patient have an active prescription for medications requested? No   Received Request Via: Pharmacy

## 2019-01-07 NOTE — PATIENT COMMUNICATION
Pharmacy hasn't filled Trazadone since 2017 and pt has never been rx'd Amlodipine.  I left pt a message to schedule an appt.

## 2019-01-07 NOTE — TELEPHONE ENCOUNTER
Please call patient's pharmacy and check whether her amlodipine and trazodone have been refilled. She has a different  here as compared to her pharmacy so that may be an issue.

## 2019-01-07 NOTE — OP THERAPY DAILY TREATMENT
Outpatient Physical Therapy  DAILY TREATMENT     [facility] [place]  [address], [address]  [place] [place] [address]  Phone:  [phone]  Fax:  [phone]    Date: 01/07/2019    Patient: Zuly Christian  YOB: 1950  MRN: [id number]     Time Calculation  Start time: 0845  Stop time: 0915 Time Calculation (min): 30 minutes     Chief Complaint: R hip pain, overall endurance    Visit #: 5    SUBJECTIVE: Pt feels better than last visit. Notes that she tried using her cane at home but doenst feel comfortable with it. Has a walker at home that she hasnt used. Pt has appt with radiation lateral today.     OBJECTIVE:  Current objective measures: ambulation with Shortend step. Limited weightweaing on R LE.  SP02 93% maintained throughout treatment.           Therapeutic Exercises (CPT 98765):     1. Nu step , 8min, lvl 2    2. Shuttle , 4c x 25, 3c x20    3. Supine ball rolls, 2min    Therapeutic Treatments and Modalities:     1. Neuromuscular Re-education (CPT 88535), FWW training and use of, place of during walking and transfers.      Time-based treatments/modalities:  Therapeutic exercise minutes (CPT 19072): 20 minutes  Neuromusc re-ed, balance, coor, post minutes (CPT 42848): 10 minutes       Pain rating before treatment: 3  Pain rating after treatment: 2    ASSESSMENT:   Response to treatment: Pt ambulates much better and safely with FWW. Recommended use of FWW for pain relief and decreased fall risk.       PLAN/RECOMMENDATIONS:   Plan for treatment: therapy treatment to continue next visit.  Planned interventions for next visit: continue with current treatment.

## 2019-01-08 NOTE — TELEPHONE ENCOUNTER
From: Zuly Christian  To: Kaylee Mandujano M.D.  Sent: 1/7/2019 5:17 PM PST  Subject: Non-Urgent Medical Question    I give up. I'll see you on the 28th

## 2019-01-09 PROBLEM — S32.9XXA PELVIC FRACTURE (HCC): Status: ACTIVE | Noted: 2018-01-01

## 2019-01-09 PROBLEM — I10 BENIGN HYPERTENSION: Status: ACTIVE | Noted: 2018-01-01

## 2019-01-09 PROBLEM — M85.80 SENILE OSTEOPENIA: Status: ACTIVE | Noted: 2019-01-01

## 2019-01-09 PROBLEM — E01.2 COLLOID GOITER: Status: ACTIVE | Noted: 2019-01-01

## 2019-01-09 PROBLEM — J30.1 ALLERGIC RHINITIS DUE TO POLLEN: Status: ACTIVE | Noted: 2019-01-01

## 2019-01-09 NOTE — TELEPHONE ENCOUNTER
Please reach out to patient on her cell phone ([phone]). She has been trying but is unable to have a scheduled appt for pain medication (new medication, tramadol is not working) if possible.

## 2019-01-09 NOTE — PROGRESS NOTES
Established Patient    Zuly presents today with the following:    CC: chronic pain    HPI: Zuly Christian is a 68 y.o. female with a history of stage IV invasive ductal carcinoma metastatic to bone, COPD on nocturnal home oxygen 3L, who presents today for the following concerns:    Stage IV invasive ductal carcinoma metastatic to bone  Chronic pain due to malignancy   Chronic use of opiates for therapeutic purposes   Is having right pain pain, a site of metastasis. Pain is usually controlled by naproxen and tramadol, but pain has been increasing recently. Going to start radiation and chemotherapy on Monday, last time started these had significant increase in pain, is worried about increasing pain at that time and that current medications will be insufficient. Is able to ambulate with walker but has pain and limp.    Chronic pain recheck:   Last dose of controlled substance: last night  Chronic pain treated with tramadol 50 mg every night  Current alcohol or substance use: no    Consequences of Chronic Opiate therapy:  (5 A's)  Analgesia:  improved  Activity:  improved  Adverse Events:  none  Aberrant Behaviors: no inappropriate refills requested, lost or stolen meds reported.   Affect/Mood: good grooming, full facial expressions, normal speech pattern and content, normal thought patterns, normal perception, good insight, normal reasoning    Nonnarcotic treatments that are being used: naproxen 500 mg every morning    Vicodin has caused crawling sensations in the past. Has taken Percocet after surgery and tolerated it well.    Patient Active Problem List    Diagnosis Date Noted   • Displaced fracture of lateral end of left clavicle, initial encounter for closed fracture 11/05/2018   • Fall 11/05/2018   • Loss of consciousness (HCC) 11/05/2018   • Dependence on nocturnal oxygen therapy 10/18/2018   • Malignant neoplasm metastatic to bone (HCC) 10/17/2018   • Pathological fracture 10/17/2018   • Solitary pulmonary  nodule 10/17/2018   • SOB (shortness of breath) 10/02/2018   • Abdominal aortic aneurysm (AAA) (Formerly [facility]) 09/12/2018   • Alpha 1-antitrypsin PiMS phenotype 09/12/2018   • Stenosis of carotid artery 09/12/2018   • Centrilobular emphysema (Formerly [facility]) 09/12/2018   • Gastroesophageal reflux disease 09/12/2018   • Subclavian steal syndrome 09/12/2018   • Mixed anxiety depressive disorder 09/12/2018   • Metastatic breast cancer (Formerly [facility]) 06/19/2018   • Arm swelling 06/07/2018   • Occlusion and stenosis of unspecified carotid artery 07/20/2017   • Abnormal CT scan, chest 08/22/2016   • COPD (chronic obstructive pulmonary disease) (Formerly [facility]) 08/22/2016   • Essential hypertension 08/22/2016   • Peripheral vascular disease (Former[facility]) 08/22/2016   • Dyslipidemia 08/22/2016   • Allergic rhinitis 08/22/2016       Current Outpatient Prescriptions   Medication Sig Dispense Refill   • raNITidine (ZANTAC) 150 MG Tab Take 1 Tab by mouth 2 times a day. 180 Tab 0   • tramadol (ULTRAM) 50 MG Tab      • FLUoxetine (PROZAC) 20 MG Cap Take 1 Cap by mouth every day. 90 Cap 0   • guaiFENesin LA (MUCINEX) 600 MG TABLET SR 12 HR Take 600 mg by mouth every 12 hours.     • benzonatate (TESSALON) 100 MG Cap Take 1 Cap by mouth 3 times a day as needed for Cough. 60 Cap 0   • umeclidinium-vilanterol (ANORO ELLIPTA) 62.5-25 MCG/INH AEROSOL POWDER, BREATH ACTIVATED inhaler USE 1 INHALATION EVERY DAY 3 Inhaler 3   • amitriptyline (ELAVIL) 100 MG Tab Take 1 Tab by mouth every bedtime. 90 Tab 1   • sennosides-docusate sodium (SENOKOT-S) 8.6-50 MG tablet Take 1 Tab by mouth 1 time daily as needed (for constipation. Do not take if LOOSE STOOLS). 30 Tab 3   • polyethylene glycol/lytes (MIRALAX) Pack Take 1 Packet by mouth every day. 30 Each 0   • omega-3 acid ethyl esters (LOVAZA) 1 GM capsule Take 1 Cap by mouth 2 Times a Day. 180 Cap 3   • lidocaine-prilocaine (EMLA) 2.5-2.5 % Cream Apply to port one hour prior to access and cover with plastic wrap. 1 Tube 3   • Calcium  "Polycarbophil (FIBER-CAPS PO) Take  by mouth.     • Oxymetazoline HCl (NASAL SPRAY NA) [place]  in nose.     • CHONDROITIN SULFATE PO Take  by mouth.     • albuterol (PROAIR HFA) 108 (90 Base) MCG/ACT Aero Soln inhalation aerosol Inhale 2 Puffs by mouth every four hours as needed for Shortness of Breath (wheezing). 3 Inhaler 3   • Lifitegrast (XIIDRA OP) by Ophthalmic route.     • naproxen (NAPROSYN) 500 MG Tab Take 1 Tab by mouth 2 times a day, with meals. 60 Tab 1   • Multiple Vitamin (MULTI VITAMIN DAILY PO) Take  by mouth.     • aspirin 81 MG tablet Take 81 mg by mouth every day. One tablet by mouth daily     • Coenzyme Q10 (COQ10) 100 MG Cap Take 100 mg by mouth.     • ezetimibe (ZETIA) 10 MG Tab Take 10 mg by mouth every day.     • rosuvastatin (CRESTOR) 40 MG tablet Take 40 mg by mouth every day.     • carvedilol (COREG) 25 MG Tab Take 12.5 mg by mouth 2 times a day, with meals.     • ramipril (ALTACE) 10 MG capsule Take 10 mg by mouth every day.       No current facility-administered medications for this visit.        ROS: As per HPI. Additional pertinent symptoms as noted below.    Constitutional: Denies weight loss, fever, chills, weakness, malaise.  Eyes: Denies blurred vision, double vision, yellow sclerae.  ENT: Denies hearing loss, congestion, runny nose, sore throat.  Cardiovascular: Denies chest pain, palpitations.  Respiratory: Denies dyspnea, productive cough.  GI: Denies nausea, vomiting, diarrhea, abdominal pain.  : Denies dysuria, urinary urgency or frequency.  Musculo-skeletal: Denies muscle spasms, joint stiffness.  Skin: Denies change in skin, hair, nails.  Neurological: Denies paresthesias, fasciculations, seizures, focal weakness.  Psychological: Denies change in personality, affect, depression.  All others negative    /68 (BP Location: Left arm, Patient Position: Sitting, BP Cuff Size: Adult)   Pulse (!) 104   Temp 36.8 °C (98.3 °F)   Ht 1.549 m (5' 1\")   Wt 53.2 kg (117 lb 3.2 " oz)   SpO2 96%   BMI 22.14 kg/m²     Physical Exam   General: No apparent distress.  Eyes: Extraocular movements grossly intact. No scleral icterus.  Throat: No erythema or exudates noted.  Neck: Supple. No lymphadenopathy noted. Thyroid not enlarged.  Lungs: Clear to auscultation bilaterally.  Cardiovascular: Regular rate and rhythm.  Abdomen: Soft, non-tender, non-distended.  Extremities: Right hip external and internal rotation limited due to pain with normal flexion and extension, normal left hip range of motion, tenderness to palpation over right anterior hip joint, gait slow with weight dependence on left leg  Skin: No rash or suspicious skin lesions noted on exposed skin.  Neurological: Alert and oriented.  Psychiatric: Normal mood and affect.    MRI of the pelvis without and with contrast 12/31/18  1.  Bony metastatic disease involving the entire visualized lumbar spine, sacrum, both iliac bone, bilateral ischium, right and left proximal femur primarily the intratrochanteric region.  2.  There is extensive bone marrow edema throughout the right femoral neck extending into the head and also the right acetabulum with associated right hip joint effusion and fluid anterior to the right acetabulum. This is likely to be symptomatic and   appears to be due to a combination of intertrochanteric metastatic disease, acetabular metastatic disease, an underlying severe right hip joint osteoarthritis.  3.  2 left proximal femur intertrochanteric metastases without pathologic fracture. Left acetabular metastasis producing mild bone marrow edema  4.  Overall, significant progression since whole body bone scan 11/19/2018 and PET CT scan 6/6/2018    Note: I have reviewed all pertinent labs and diagnostic tests associated with this visit with specific comments listed under the assessment and plan below    Assessment and Plan    1. Pain of metastatic malignancy  2. Use of opiates for therapeutic purposes  In prescribing  controlled substances to this patient, I certify that I have obtained and reviewed the medical history of Zuly Christian. I have also made a good amanda effort to obtain applicable records from other providers who have treated the patient and records did not demonstrate any increased risk of substance abuse that would prevent me from prescribing controlled substances.      I have conducted a physical exam and documented it. I have reviewed Zuly Christian’s prescription history as maintained by the [place] Prescription Monitoring Program.      I have assessed the patient’s risk for abuse, dependency, and addiction using the validated Opioid Risk Tool available at https://www.mdcalc.com/dzcdss-krtq-rpmp-ort-narcotic-abuse.     Opioid Risk Score: 1  Interpretation of Opioid Risk Score   Score 0-3 = Low risk of abuse. Do UDS at least once per year.  Score 4-7 = Moderate risk of abuse. Do UDS 1-4 times per year.  Score 8+ = High risk of abuse. Refer to specialist.    Pain management agreement initiated/updated and signed on: 9/17/18  Urine drug screen done: 9/19/18, which was reviewed today and is consistent with prescribed medications.     Given the above, I believe the benefits of controlled substance therapy outweigh the risks. The reasons for prescribing controlled substances include non-narcotic, oral analgesic alternatives have been inadequate for pain control. Accordingly, I have discussed the risk and benefits, treatment plan, and alternative therapies with the patient.    Patient counseled on side effect risks of respiratory depression and advised to avoid alcohol, other opiates, etc. and to call 911 for difficulty breathing. Advised to use stool softener as needed for constipation.    - oxyCODONE-acetaminophen (PERCOCET) 5-325 MG Tab; Take 1 Tab by mouth every 6 hours as needed for Severe Pain for up to 30 days.  Dispense: 100 Tab; Refill: 0  - Consent for Opiate Prescription      Followup: Keep existing  upcoming appointment with PCP Dr. Mandujano.      Signed by: Bradley Schultz M.D.

## 2019-01-09 NOTE — OP THERAPY DAILY TREATMENT
Outpatient Physical Therapy  DAILY TREATMENT     [facility] [place]  [address]  [place] [address]  Phone:  [phone]  Fax:  [phone]    Date: 01/09/2019    Patient: Zuly Christian  YOB: 1950  MRN: [id number]     Time Calculation  Start time: 0920  Stop time: 0950 Time Calculation (min): 30 minutes     Chief Complaint: R hip pain, overall endurance    Visit #: 6    SUBJECTIVE:  Pt notes that her pain is controlled right now but that she will likely need more medication soon. Waiting for doctors office to contact her.  Comes in with walker today. Used walker to go to movie theatre yesterday. Chemo starts again next Monday.     OBJECTIVE:  Current objective measures: ambulation with Shortend step. Limited weightweaing on R LE.  SP02 93% maintained throughout treatment.           Therapeutic Exercises (CPT 16637):     1. Nu step , 10min, lvl 1    Therapeutic Treatments and Modalities:     1. Neuromuscular Re-education (CPT 15310), FWW training and use of, place of during walking and transfers. in and out of bed transfers with SBA.      Time-based treatments/modalities:  Therapeutic exercise minutes (CPT 13150): 10 minutes  Neuromusc re-ed, balance, coor, post minutes (CPT 53055): 20 minutes       Pain rating before treatment: 3  Pain rating after treatment: 2    ASSESSMENT:   Response to treatment: continued recommendation of FWW use as she is compliant with it and ambulates much more safely. Pt also gets minor pain reduction with its use. Pt to f/u next week. Consider decreasing frequency of appts as pt has too many medical appts to keep track of and is currently safe and functional.      PLAN/RECOMMENDATIONS:   Plan for treatment: therapy treatment to continue next visit.  Planned interventions for next visit: continue with current treatment.

## 2019-01-11 NOTE — Clinical Note
Dr. Eng, Heads up, I know she is seeing you soon about palliative radiation - pathologic fracture at R acetabulum noted today per CT. I referred to her Ortho - please advise if you desire a different course

## 2019-01-12 NOTE — PROGRESS NOTES
Patient underwent monitoring CT, radiologist phoned to advise of pathological fracture at right acetabulum roof.  This finding correlates with increased pain at right hip.    Patient will be referred to Ortho for further evaluation and treatment        Ct-chest,abdomen,pelvis With    Result Date: 1/11/2019 1/11/2019 2:28 PM HISTORY/REASON FOR EXAM:  Follow-up breast cancer, metastatic to bone. TECHNIQUE/EXAM DESCRIPTION: CT scan of the chest, abdomen and pelvis with contrast. Thin-section helical scanning was obtained with intravenous contrast from the lung apices through the pubic symphysis to include the chest, abdomen and pelvis.  90ml mL of Omnipaque 350 nonionic contrast was administered intravenously without complication. Low dose optimization technique was utilized for this CT exam including automated exposure control and adjustment of the mA and/or kV according to patient size. COMPARISON: Prior CT chest 10/2/2018, PET/CT 6/6/2018, CT chest 12/15/2014 FINDINGS: CT CHEST: There is a Port-A-Cath in the right anterior chest wall with the catheter tip in the distal SVC. There is atherosclerosis of the aorta and great vessels with coronary artery calcification. There is no pericardial effusion. Heart is normal in size. There are no suspicious thyroid lesions. There is no axillary, mediastinal or hilar lymphadenopathy. There is no pleural effusion. Lungs demonstrate underlying changes of emphysema with parenchymal scarring in the left lung apex. The 7-8 mm lobular nodule in the anterior left upper lobe is unchanged. There is dependent bibasilar atelectasis or scar. There are no new suspicious lung nodules. CT ABDOMEN: Liver: There are apparent new ill-defined hypodense lesions in the liver. There is a 6 mm lesion in the superior medial segment left lobe and a 9 mm lesion in the inferior medial segment left lobe. There is a 13 mm lesion in the lateral segment left lobe. Tiny hypodensity in the right hepatic dome  is unchanged. Spleen: Unremarkable. Pancreas: Unremarkable. Gallbladder and biliary tree: Unremarkable. Adrenal glands: There are no new masses. There is a stable 2.2 cm left adrenal gland mass since 12/15/2014. Kidneys: Unremarkable. There is extensive atherosclerosis of the aorta. The bowel is unremarkable. There is no abdominal adenopathy. CT PELVIS: There is a 3.2 cm ovoid heterogeneous lesion with peripheral enhancement in the right iliopsoas muscle which appears new. There is a probable right hip joint effusion. There is otherwise no new pelvic lymphadenopathy. Bones: There are multifocal sclerotic metastases again seen throughout the bones. There is a pathologic fracture involving the right acetabular roof and anterior wall of the acetabulum. There is a possible impending fracture of the right superior ilium. There is no change in the T5 sclerotic mild pathologic compression deformity. The posterior left 8th rib fracture is unchanged. There is a fracture of the left clavicle again seen.     1.  There are new ill-defined hypodense hepatic lesions suspicious for metastases. 2.  There is a new pathologic fracture of the right acetabular roof and anterior wall of the acetabulum. 3.  There is a possible impending fracture of the right superior ilium within an area of metastases. 4.  There is a stable T5 pathologic compression deformity. 5.  There is a new heterogeneous peripherally enhancing right iliopsoas muscle mass, possibly related to the adjacent iliac bone metastases although could be a soft tissue metastases. 6.  Multifocal sclerotic bony metastases are again seen. Findings were discussed with LAKSHMI GRAMAJO on 1/11/2019 at 3:58 PM.    Dx-chest-2 Views    Result Date: 12/27/2018 12/27/2018 11:18 AM HISTORY/REASON FOR EXAM:  Shortness of Breath Breast cancer, emphysema. TECHNIQUE/EXAM DESCRIPTION AND NUMBER OF VIEWS: Two views of the chest. COMPARISON:  Chest x-ray 12/5/2013, CT chest 10/2/2018 FINDINGS:  Cardiac mediastinal contour is unchanged. Atherosclerotic calcification of thoracic aorta. LEFT superior mediastinal vascular stent noted. RIGHT chest infusion port in place. No focal pulmonary consolidation. Linear opacities at the LEFT lung apex. Faint nodular density at the LEFT mid chest peripherally, corresponding to chest CT findings from 10/2/2018. No pleural fluid collection or pneumothorax. Degenerative change of thoracic spine. LEFT mid clavicle fracture with apparent callus formation.     1.  LEFT apical scar. 2.  LEFT midlung nodule corresponding to prior CT findings. 3.  No lobar pneumonia or pneumothorax. 4.  Subacute mildly displaced LEFT mid clavicle fracture. 5.  Supportive tubing as described above.         Mr-pelvis-with & W/o And Sequences    Result Date: 12/31/2018 12/31/2018 4:06 PM HISTORY/REASON FOR EXAM:  Neoplasm: pelvis, metastatic, suspected/known Metastatic breast cancer, right hip pain TECHNIQUE/EXAM DESCRIPTION: MRI of the pelvis without and with contrast. The study was performed on a Manav 1.5 Mckenzie MRI scanner. T1 axial, T1 coronal, T2 fast spin-echo fat-suppressed axial, and T2 fast spin-echo fat-suppressed coronal images were obtained of the pelvis. Postcontrast fat-suppressed intravenous gadolinium enhanced sequences in the axial and coronal planes were then  performed. 5 mL Gadavist contrast was administered intravenously. COMPARISON: Whole body bone scan 11/19/2018 and PET/CT scan 6/6/2018 FINDINGS: BONES, BONE MARROW, and CARTILAGE: There are multiple areas of abnormal bone marrow consistent with metastatic disease. These involve the visualized lumbar spine, visualized sacrum, right iliac bone especially the acetabulum, left iliac bone especially the acetabulum, right femoral neck where there is bone marrow edema, left ilium, both ischium, and bilateral intertrochanteric region with right intertrochanteric mass measuring 2.9 cm and 2 left intertrochanteric mass measuring 1.6  and 1.1 cm. There is a right hip joint effusion. There is underlying osteoarthritis of the right hip joint which appears severe. There is fluid anterior to the right ilium and extending posterior to the right iliacus muscle. This is presumably related to the right acetabular metastatic disease. Within this fluid there are low signal intensity foci that are likely hemorrhage. Uterus and adnexa appear normal. TENDONS: Normal in appearance. LUMBAR SPINE: There is multilevel facet arthropathy. There is subluxation at L5-S1 likely secondary to bilateral L5 spondylolysis.     1.  Bony metastatic disease involving the entire visualized lumbar spine, sacrum, both iliac bone, bilateral ischium, right and left proximal femur primarily the intratrochanteric region. 2.  There is extensive bone marrow edema throughout the right femoral neck extending into the head and also the right acetabulum with associated right hip joint effusion and fluid anterior to the right acetabulum. This is likely to be symptomatic and appears to be due to a combination of intertrochanteric metastatic disease, acetabular metastatic disease, an underlying severe right hip joint osteoarthritis. 3.  2 left proximal femur intertrochanteric metastases without pathologic fracture. Left acetabular metastasis producing mild bone marrow edema 4.  Overall, significant progression since whole body bone scan 11/19/2018 and PET CT scan 6/6/2018

## 2019-01-14 NOTE — OP THERAPY DAILY TREATMENT
Outpatient Physical Therapy  DAILY TREATMENT     [facility] [place]  [address] [place], [address]  [place] [address]  Phone:  [phone]  Fax:  [phone]    Date: 01/14/2019    Patient: Zuly Christian  YOB: 1950  MRN: [id number]     Time Calculation  Start time: 0945  Stop time: 1015 Time Calculation (min): 30 minutes     Chief Complaint: R hip pain, overall endurance    Visit #: 7    SUBJECTIVE:   Pt continues to feel comfortable with FWW and is compliant with its use.     OBJECTIVE:  Current objective measures: ambulation with Shortend step. Limited weightweaing on R LE.  SP02 93% maintained throughout treatment.           Therapeutic Exercises (CPT 07019):     1. Nu step , 10min, lvl 1    Therapeutic Treatments and Modalities:     1. Neuromuscular Re-education (CPT 44733), FWW training and use of, place of during walking and transfers. in and out of bed transfers with SBA.       Time-based treatments/modalities:  Therapeutic exercise minutes (CPT 63908): 10 minutes  Neuromusc re-ed, balance, coor, post minutes (CPT 67935): 20 minutes       Pain rating before treatment: 3  Pain rating after treatment: 2    ASSESSMENT:   Response to treatment: continued recommendation of FWW use as she is compliant with it and ambulates much more safely. Pt also gets minor pain reduction with its use. Pt has partially met goals and and is ready for discharge.     PLAN/RECOMMENDATIONS:   Plan for treatment: discharge patient due to accomplished goals.

## 2019-01-14 NOTE — OP THERAPY DISCHARGE SUMMARY
Outpatient Physical Therapy  DISCHARGE SUMMARY NOTE      [facility] [place]  [address]  [place] [facility] [address]  Phone:  [phone]  Fax:  [phone]    Date of Visit: 01/14/2019    Patient: Zuly Christian  YOB: 1950  MRN: [id number]     Referring Provider: AIDA Lee Ade [address] [address]   Referring Diagnosis Other specified health status [Z78.9];Pain in right hip [M25.551]     Physical Therapy Occurrence Codes    Date physical therapy care plan established or reviewed:  12/24/18   Date physical therapy treatment started:  12/24/18          Functional Limitations and Severity Modifiers  PT Functional Assessment Tool Used: JACQUES  PT Functional Assessment Score: 46/56       Your patient is being discharged from Physical Therapy with the following comments:   · Goals partially met  · Progress plateau    Comments:  Zuly Christian has completed 7 physical therapy sessions on her current prescription. She has improved function, consistent pain, improved strength, consistent ROM, and she continues to progress with her home exercise program. She has transitioned to FWW use and is recommended to continue its use for decreased fall risk. She has overall met a progress plateau and frequent appointments in addition to chemotherapy and radiation are difficult for her to maintain. Thank you for the opportunity to assist you and your patient.        Limitations Remaining:  R hip pain    Recommendations:  Continued FWW use.     Morteza Mcknight, PT, DPT    Date: 1/14/2019

## 2019-01-15 NOTE — TELEPHONE ENCOUNTER
From: Zuly Christian  To: ADRIA Garcia  Sent: 1/14/2019 4:05 PM PST  Subject: Non-Urgent Medical Question    I tried to call [facility] on both Thurs. and Fri. All I got was an answering machine that was full. Could you please send all orders to [facility]? I notified [facility] that I was changing to [facility].    The girl I spoke to at [facility] said they hadn't received any orders about the C-PAP and the small Oxygen. Please send the first order for Oxygen & the order for a C-PAP & the smaller machine that is portible to [facility].

## 2019-01-15 NOTE — TELEPHONE ENCOUNTER
From: Zuly Christian  To: ADRIA Garcia  Sent: 1/14/2019 4:40 PM PST  Subject: Non-Urgent Medical Question    I am going to go with [facility] for Oxygen. Because [facility] cannot do C-PAP or the smaller Oxygen to use in the daytime. Please send the order to [facility] in [place].

## 2019-01-16 NOTE — PROGRESS NOTES
Date of visit: 1/16/2019  2:14 PM      Chief Complaint- metastatic triple negative breast cancer        Identification/Prior relevant history:   April 6, 2018. Patient was seen by primary care physician. Patient is complaining of hip pain and nipple lesion. Patient was documented to have a normal mammogram in June 2017. X-rays of the hip and mammogram were ordered. X-ray shows advanced degenerative disease of the hip  -May 23, 2018. MRI of the hip shows numerous lesions worrisome for metastatic disease. There is a 3.8 cm lesion along the right superior acetabulum with subtle break in the bony cortex worrisome for pathological fracture. Patient is at risk for developing full-thickness cortical fracture  -June 6, 2018. PET CT scan.Small spiculated mass in the outer left breast is highly suspicious for malignancy.FDG avid adenopathy in the left axilla is consistent with metastasis.Extensive FDG avid lytic and sclerotic lesions in the axial and appendicular skeleton, consistent with bony metastasis  -June 14, 2018. Left breast biopsy revealed ER/NY HER-2 negative invasive ductal carcinoma  -June 24,2018. A. Right iliac bone biopsy, 4 cores: Metastatic carcinoma in a background of fibrotic tissue demonstrating immunohistochemical profile most consistent with breast origin.  -July 9-24, 2018. Radiation therapy to hip  -July 25, 2018.  Started weekly paclitaxel  -8/21/2018 . Weekly Paclitaxel changed to Q 3 weeks for convenience/patient wish  Interim history  9/2018 established care with me  -Has noticed more right hip pain and she is limping more  -She had a fall and developed a clavicular fracture   Bone scan overall showed stable disease  -Subsequently developed worsening pain of the hip  -12/13/18-MRI hip-Bony metastatic disease involving the entire visualized lumbar spine, sacrum, both iliac bone, bilateral ischium, right and left proximal femur primarily the intratrochanteric region.    2.  There is extensive bone  marrow edema throughout the right femoral neck extending into the head and also the right acetabulum with associated right hip joint effusion and fluid anterior to the right acetabulum. This is likely to be symptomatic and   appears to be due to a combination of intertrochanteric metastatic disease, acetabular metastatic disease, an underlying severe right hip joint osteoarthritis.    3.  2 left proximal femur intertrochanteric metastases without pathologic fracture. Left acetabular metastasis producing mild bone marrow edema    Seen by Dr. Eng.  No radiation was given due to extensive amount of disease    1/11/19 CT chest abdomen pelvis- There are new ill-defined hypodense hepatic lesions suspicious for metastases.  2.  There is a new pathologic fracture of the right acetabular roof and anterior wall of the acetabulum.  3.  There is a possible impending fracture of the right superior ilium within an area of metastases.  4.  There is a stable T5 pathologic compression deformity.  5.  There is a new heterogeneous peripherally enhancing right iliopsoas muscle mass, possibly related to the adjacent iliac bone metastases although could be a soft tissue metastases.  6.  Multifocal sclerotic bony metastases are again seen.    She has developed worsening hip pain for which she is seeing pain medicine.  Currently using a walker.  She had significant decline in performance status however wants to pursue more treatment options if available.  I have requested androgen receptor staining/PDL 1 testing from her prior specimen however this was a bone biopsy with an adequate specimen.    Past Medical History:      Past Medical History:   Diagnosis Date   • Allergic rhinitis    • Cancer (HCC)     breast   • Cataract    • COPD (chronic obstructive pulmonary disease) (HCC)    • Emphysema of lung (HCC)    • Heart burn    • Hyperlipidemia    • Hypertension    • Osteopenia    • Peripheral vascular disease (HCC)        Past surgical  history:       Past Surgical History:   Procedure Laterality Date   • CAROTID ENDARTERECTOMY  7/20/2017    Procedure: CAROTID ENDARTERECTOMY FOR : LEFT SUBCLAVIAN ARTERY ANGIOPLASTY / STENT BRACHIAL APPROACH WITH ULTRASOUND;  Surgeon: Dinora Saldana M.D.;  Location: SURGERY [facility];  Service:    • TUBAL LIGATION  1977   • GYN SURGERY      d+c 1979, 1980   • OTHER ORTHOPEDIC SURGERY      left knee surgery   • TONSILLECTOMY         Allergies:       Pollen extract and Sulfa drugs    Medications:         Current Outpatient Prescriptions   Medication Sig Dispense Refill   • capecitabine (XELODA) 500 MG tablet 1000mg po bid 2 weeks on 1 week off 56 Tab 1   • raNITidine (ZANTAC) 150 MG Tab Take 1 Tab by mouth 2 times a day. 180 Tab 0   • FLUoxetine (PROZAC) 20 MG Cap Take 1 Cap by mouth every day. 90 Cap 0   • umeclidinium-vilanterol (ANORO ELLIPTA) 62.5-25 MCG/INH AEROSOL POWDER, BREATH ACTIVATED inhaler USE 1 INHALATION EVERY DAY 3 Inhaler 3   • amitriptyline (ELAVIL) 100 MG Tab Take 1 Tab by mouth every bedtime. 90 Tab 1   • polyethylene glycol/lytes (MIRALAX) Pack Take 1 Packet by mouth every day. 30 Each 0   • omega-3 acid ethyl esters (LOVAZA) 1 GM capsule Take 1 Cap by mouth 2 Times a Day. 180 Cap 3   • Calcium Polycarbophil (FIBER-CAPS PO) Take  by mouth.     • CHONDROITIN SULFATE PO Take  by mouth.     • Lifitegrast (XIIDRA OP) by Ophthalmic route.     • naproxen (NAPROSYN) 500 MG Tab Take 1 Tab by mouth 2 times a day, with meals. 60 Tab 1   • Multiple Vitamin (MULTI VITAMIN DAILY PO) Take  by mouth.     • aspirin 81 MG tablet Take 81 mg by mouth every day. One tablet by mouth daily     • Coenzyme Q10 (COQ10) 100 MG Cap Take 100 mg by mouth.     • ezetimibe (ZETIA) 10 MG Tab Take 10 mg by mouth every day.     • rosuvastatin (CRESTOR) 40 MG tablet Take 40 mg by mouth every day.     • carvedilol (COREG) 25 MG Tab Take 12.5 mg by mouth 2 times a day, with meals.     • ramipril (ALTACE) 10 MG capsule Take  10 mg by mouth every day.     • oxyCODONE-acetaminophen (PERCOCET) 5-325 MG Tab Take 1 Tab by mouth every 6 hours as needed for Severe Pain for up to 30 days. 100 Tab 0   • guaiFENesin LA (MUCINEX) 600 MG TABLET SR 12 HR Take 600 mg by mouth every 12 hours.     • benzonatate (TESSALON) 100 MG Cap Take 1 Cap by mouth 3 times a day as needed for Cough. (Patient not taking: Reported on 1/16/2019) 60 Cap 0   • sennosides-docusate sodium (SENOKOT-S) 8.6-50 MG tablet Take 1 Tab by mouth 1 time daily as needed (for constipation. Do not take if LOOSE STOOLS). 30 Tab 3   • lidocaine-prilocaine (EMLA) 2.5-2.5 % Cream Apply to port one hour prior to access and cover with plastic wrap. 1 Tube 3   • Oxymetazoline HCl (NASAL SPRAY NA) Spray  in nose.     • albuterol (PROAIR HFA) 108 (90 Base) MCG/ACT Aero Soln inhalation aerosol Inhale 2 Puffs by mouth every four hours as needed for Shortness of Breath (wheezing). 3 Inhaler 3     No current facility-administered medications for this visit.          Social History:     Social History     Social History   • Marital status:      Spouse name: N/A   • Number of children: N/A   • Years of education: N/A     Occupational History   • work in cardiology office Retired     Social History Main Topics   • Smoking status: Former Smoker     Packs/day: 1.00     Years: 50.00     Types: Cigarettes     Quit date: 5/1/2014   • Smokeless tobacco: Former User      Comment: started at age 18, quit at 64, 1.5-2 ppd for 46 years, continued abstinance   • Alcohol use Yes      Comment: 3 a month   • Drug use: No   • Sexual activity: No     Other Topics Concern   • Not on file     Social History Narrative   • No narrative on file       Family History:      Family History   Problem Relation Age of Onset   • Lung Cancer Father    • Cancer Father 71        lung   • Lung Cancer Mother    • Cancer Mother 91        lung   • Cancer Maternal Aunt 80        lung cancer, smoking       Review of Systems:  All  "other review of systems are negative except what was mentioned above in the HPI.    Constitutional: Negative for fever, chills, positive weight loss and malaise/fatigue.    HEENT: No new auditory or visual complaints. No sore throat and neck pain.     Respiratory: Negative for cough, sputum production, shortness of breath and wheezing.    Cardiovascular: Negative for chest pain, palpitations, orthopnea and leg swelling.    Gastrointestinal: Negative for heartburn, nausea, vomiting and abdominal pain.    Genitourinary: Negative for dysuria, hematuria    Musculoskeletal: Positive arthralgias or myalgias   Skin: Negative for rash and itching.    Neurological: Negative for focal weakness and headaches.    Endo/Heme/Allergies: No abnormal bleed/bruise.    Psychiatric/Behavioral: No new depression/anxiety.    Physical Exam:  Vitals: /60 (BP Location: Right arm, Patient Position: Sitting, BP Cuff Size: Small adult)   Pulse (!) 104   Temp 36.5 °C (97.7 °F) (Temporal)   Resp 16   Ht 1.549 m (5' 1\")   Wt 52.7 kg (116 lb 2.9 oz)   SpO2 97%   BMI 21.95 kg/m²     General: Not in acute distress, alert and oriented x 3, using a walker  HEENT: No pallor, icterus. Oropharynx clear.   Neck: Supple, no palpable masses.  Lymph nodes: No palpable cervical, supraclavicular, axillary or inguinal lymphadenopathy.    CVS: regular rate and rhythm, no rubs or gallops  RESP: Clear to auscultate bilaterally, no wheezing or crackles.   ABD: Soft, non tender, non distended, positive bowel sounds, no palpable organomegaly  EXT: No edema or cyanosis  CNS: Alert and oriented x3, No focal deficits.  Skin- No rash      Labs:   No visits with results within 1 Week(s) from this visit.   Latest known visit with results is:   Hospital Outpatient Visit on 12/27/2018   Component Date Value Ref Range Status   • Color 12/27/2018 Yellow   Final   • Character 12/27/2018 Cloudy*  Final   • Specific Gravity 12/27/2018 1.012  <1.035 Final   • Ph " 12/27/2018 6.5  5.0 - 8.0 Final   • Glucose 12/27/2018 Negative  Negative mg/dL Final   • Ketones 12/27/2018 Negative  Negative mg/dL Final   • Protein 12/27/2018 Negative  Negative mg/dL Final   • Bilirubin 12/27/2018 Negative  Negative Final   • Urobilinogen, Urine 12/27/2018 0.2  Negative Final   • Nitrite 12/27/2018 Negative  Negative Final   • Leukocyte Esterase 12/27/2018 Negative  Negative Final   • Occult Blood 12/27/2018 Negative  Negative Final   • Micro Urine Req 12/27/2018 Microscopic   Final   • WBC 12/27/2018 2-5  /hpf Final    Comment: Female  <12 Yr 0-2  >12 Yr 0-5  Male   None     • RBC 12/27/2018 0-2  /hpf Final    Comment: Female  >12 Yr 0-2  Male   None     • Bacteria 12/27/2018 Negative  None /hpf Final   • Epithelial Cells 12/27/2018 Negative  /hpf Final   • Hyaline Cast 12/27/2018 0-2  /lpf Final             Assessment and Plan:   KPS 60%      Metastatic triple negative breast carcinoma with progression-she is having significant disease progression mainly involving her bones however recent CT also shows significant liver disease.  She had significant decline in her performance status.  Again reviewed the goals of care.  Discussed the option of hospice however she wants to pursue active management.    Main issue remains pain from significant pelvic disease progression with most recent CT scan showing hip fracture involving the acetabulum.  She is not a candidate for any palliative radiation.  She is awaiting orthopedic appointment.    Given the tempo of her disease progression single agent chemotherapy is unlikely to be effective.  Discussed various options including Halaven, Xeloda, Navelbine and gemcitabine.  Given her frailty and significant disease burden I would prefer her receiving combination treatment employing Navelbine with Xeloda.    She will start vinorelbine day 1 day 8 cycle every 3 weeks.. Meanwhile will get on Xeloda approved for her and consider adding it with cycle #2  depending on her tolerance.    Unfortunately prior biopsy specimen did not have enough sample to do molecular testing.  We will obtain peripheral blood cell free DNA analysis to look for any other actionable mutation.    #2 worsening bony metastatic disease/hip fracture-she is not a good candidate for radiation due to the amount of disease.  She is awaiting orthopedic appointment for evaluation of her hip fracture.  We will hold chemo if she ends up needing ORIF.  She will continue Xgeva every 6 weeks.    #3 pain-she will continue Percocet.  She is following up with pain specialist.      Complex patient requiring complex decision making. Reviewed images/ lab with patient.  Antineoplastic therapy requiring close monitoring.  More than 80% of the 65-minute visit spent today discussing all the above face-to-face with the patient.    She agreed and verbalized  agreement and understanding with the current plan.  I answered all questions and concerns at this time         Please note that this dictation was created using voice recognition software. I have made every reasonable attempt to correct obvious errors, but I expect that there are errors of grammar and possibly content that I did not discover before finalizing the note.      SIGNATURES:  Abhinav Arellano    CC:  Kaylee Mandujano M.D.  No ref. provider foundNo ref. provider found

## 2019-01-16 NOTE — PATIENT COMMUNICATION
From what I am reading in this message she wants to turn in all her oxygen,  but your last note documents her need for o2 with exertion.  Would you like me to advise her that if she wants to turn it in  - it will be AMA?    I will forward the CPAP order to [facility] right now

## 2019-01-16 NOTE — PROGRESS NOTES
Zuly Christian is a 68 y.o. female here for a non-provider visit for: Lab Draws  on 1/16/2019 at 1:50 PM    Procedure Performed: Venipuncture     Anatomical site: Right Antecubital Area (AC)    Equipment used: 23g Butterfly    Labs drawn: CBC w/diff and CMP    Ordering Provider: Dr. Abhinav Martinez By: Arlen Rangel, Med Ass't    No complications.

## 2019-01-16 NOTE — PATIENT COMMUNICATION
Julee, Can you help Zuly? Let me know if I need to do anything further. Thanks!    Julee, I agree. She needs to be wearing oxygen with exertion at 3 LPM. We cannot send an order to D/C it because she was significantly low when tested in the office. Can we send her somewhere else for her oxygen?

## 2019-01-17 NOTE — TELEPHONE ENCOUNTER
From: Zuly Christian  To: Julee Kirk Med Ass't  Sent: 1/17/2019 12:34 PM PST  Subject: Non-Urgent Medical Question    Thank you. I already got a phone from [facility].    What about the small Oxygen Concenctrator? The woman who did my overnight sleep study said Medicare would not cover a C-PAP and oxygen at the same time.    I apologize for being such a pest.  ----- Message -----  From: Julee Kirk Med Ass't  Sent: 1/17/2019 8:50 AM PST  To: Zuly Christian  Subject: RE: Non-Urgent Medical Question  After discussion with Arlen Carreon, she advise that we can not approve you turning in your oxygen to [facility]. At your last OV it was determined that you need to use your oxygen during the day. We strongly recommend keeping your oxygen and using when you exert yourself. That last testing your oxygen levels dropped down to 79% and it is not healthy to have your oxygen levels drop below 90%. I have faxed the order for the CPAP to [facility], they will be contacting you for setup.     Julee CROSS  DME Coordinator      ----- Message -----   From: Zuly Christian   Sent: 1/15/2019 7:04 PM PST   To: Julee Kirk Med Ass't  Subject: RE: Non-Urgent Medical Question    When I saw Dr. Carreon she said she was going to order a C-PAC machine for me to use at night without oxygen. She was also going to order a smaller, lighter oxygen concetrater for me to use during the day. When I spoke to [facility] they did not have a order for either. I would like to use C-PAC from [facility].    I thought I would return all that I have to [facility] while I'm using the C-PAP machine. That way I can return everything to [facility]. If I need oxygen at night we can order it thru [facility] without worry about overlaping.    My next appt. is March 6, 2019.  ----- Message -----  From: Julee Kirk Med Ass't  Sent: 1/15/2019 12:29 PM PST  To: Zuly Christian  Subject: RE: Non-Urgent Medical Question  You can only  have your oxygen through one DME at a time. If you choose to use [facility] for your oxygen, you will need to accept the home concentrator and portable tanks and turn in all the equipment you received from [facility]. [facility] does not carry portable oxygen concentrators at this time, so if you choose to use them for oxygen, you will not receive one.  Please let us know if you wish to use [facility] for your oxygen    ----- Message -----   From: Zuly Christian   Sent: 1/14/2019 4:40 PM PST   To: ADRIA Garcia  Subject: Non-Urgent Medical Question    I am going to go with [facility] for Oxygen. Because [facility] cannot do C-PAP or the smaller Oxygen to use in the daytime. Please send the order to [facility] in [place].

## 2019-01-21 NOTE — NON-PROVIDER
Zuly Christian is a 68 y.o. female here for a non-provider visit for: Lab Draws  on 1/21/2019 at 2:48 PM    Procedure Performed: Venipuncture     Anatomical site: Right Antecubital Area (AC)    Equipment used: 23g    Labs drawn: Guardant 360     Ordering Provider: Dr. Abhinav Martinez By: Anaid Ryan, Med Ass't

## 2019-01-22 NOTE — TELEPHONE ENCOUNTER
RN called [facility] specialty pharmacy ([phone]) to see if the pt picked up her Xeloda.  Per the pharmacist, it was picked up on January 18, 2019.

## 2019-01-27 NOTE — PROGRESS NOTES
"Pharmacy Chemotherapy Calculations Note:    Patient Name: Zuly Christian      Dx: Metastatic Triple Negative Breast Cancer    Cycle 1, Day 1  Previous treatment: C6 paclitaxel Q3w, last 12/27/18     Protocol: Capecitabine + Vinorelbine    *Dosing Reference*  Capecitabine 1250 mg/m2 PO BID on Days 1-14 - will add cycle 2 per Dr. Arellano   Vinorelbine 25 mg/m2 IV on Days 1 and 8  Q3 weeks until disease progression or unacceptable toxicity    Jina KONG, et al. Capecitabine and Vinorelbine in Patients with Metastatic Breast Cancer Previously Treated with Anthracycline and Taxane.  J Welsh Med Sci 2004;19:547-53.    /59   Pulse (!) 125   Temp 36.5 °C (97.7 °F) (Temporal)   Resp 18   Ht 1.56 m (5' 1.42\")   Wt 50.8 kg (111 lb 15.9 oz)   SpO2 97%   BMI 20.87 kg/m²  Body surface area is 1.48 meters squared.    Allergies: Pollen extract and Sulfa drugs    Labs 01/27/19  ANC~ 9200 Plt = 487 k Hgb = 9.5 SCr = 0.57 mg/dL CrCl = 62  mL/min (min SCr 0.7 mg/dL used)  LFT's = WNL except alk phos 117 TBili = 0.2 Ca = 9.9     Vinorelbine (Navelbine) 25 mg/m² x 1.48 m² = 37 mg   <5% difference, ok to treat with final dose = 37 mg IV    Xgeva 120 mg - last dose 12/06/18 - ok for dose today       Joey Daley, PharmD, BCOP           "

## 2019-01-27 NOTE — PROGRESS NOTES
Chemotherapy Verification - PRIMARY RN      Height = 156 cm  Weight = 50.8 kg  BSA = 1.48 m^2       Medication: Venorelbine  Dose: 25 mg/m^2  Calculated Dose: 37 mg                             (In mg/m2, AUC, mg/kg)         I confirm this process was performed independently with the BSA and all final chemotherapy dosing calculations congruent.  Any discrepancies of 5% or greater have been addressed with the chemotherapy pharmacist. The resolution of the discrepancy has been documented in the EPIC progress notes.

## 2019-01-27 NOTE — PROGRESS NOTES
"Pharmacy chemotherapy verification:    Patient Name: Zuly Christian   Dx: metastatic triple negative breast cancer         Protocol: vinorelbine  + capecitabine   Vinorelbine 25mg/m2 IV on days 1 and 8  Capecitabine 1250mg/m2 PO BID on days 1-14 - home prescription to begin with cycle 2  q21 days until DP/UT  Jina KONGet al -Capecitabine and Vinorelbine in Patients with Metastatic Breast Cancer Previously Treated with Anthracycline and Taxane.   J Kinyarwanda Med Sci. 2004 Aug; 19(4): 547-553.     Allergies:  Pollen extract and Sulfa drugs     /59   Pulse (!) 125   Temp 36.5 °C (97.7 °F) (Temporal)   Resp 18   Ht 1.56 m (5' 1.42\")   Wt 50.8 kg (111 lb 15.9 oz)   SpO2 97%   BMI 20.87 kg/m²  Body surface area is 1.48 meters squared.     All lab results within treatment parameters.     Drug Order   (Drug name, dose, route, IV Fluid & volume, frequency, number of doses) Cycle: 1 Day 1  Previous treatment: 12/27/18- Taxol x 6 cycles   Medication = vinorelbine  Base Dose= 25mg/m2  Calc Dose: Base Dose x 1.48m2 = 37mg  Final Dose = 37mg  Route = IV  Fluid & Volume = NS 25 mL  Admin Duration = Over 6-10min          <5% difference, ok to treat with final dose   By my signature below, I confirm this process was performed independently with the BSA and all final chemotherapy dosing calculations congruent. I have reviewed the above chemotherapy order and that my calculation of the final dose and BSA (when applicable) corroborate those calculations of the  pharmacist. Discrepancies of 5% or greater in the written dose have been addressed and documented within the [facility] Progress notes.    AMERICA Melchor Pharm.D.          "

## 2019-01-27 NOTE — PROGRESS NOTES
Chemotherapy Verification - SECONDARY RN      Height = 156cm  Weight = 50.8kg  BSA = 1.48m2       Medication: Vinorelbine  Dose: 25mg/m2  Calculated Dose: 37mg                             (In mg/m2, AUC, mg/kg)           I confirm this process was performed independently with the BSA and all final chemotherapy dosing calculations congruent.  Any discrepancies of 5% or greater have been addressed with the chemotherapy pharmacist. The resolution of the discrepancy has been documented in the EPIC progress notes.

## 2019-01-28 NOTE — PROGRESS NOTES
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  She has been started on chronic narcotic treatment for bone pain associated with metastatic breast cancer and has recently switched to Percocet as prescribed by  on 1/9/19.  She is here for follow-up.     Stage IV invasive ductal carcinoma metastatic to bone  Chronic pain due to malignancy   Chronic use of opiates for therapeutic purposes   She was recently switched to a new antineoplastic medication per her oncologist .  They had discussed hospice as unfortunately she had failed previous chemotherapy (with new liver lesions noted on imaging) but Ms. Christian preferred to continue treatment and switch to a new medication.  She continues to take naproxen which she says is most helpful in treating her pain.  She is on ranitidine as well. Discussed risk of gastric ulcers due to NSAIDs.     She was previously on tramadol but has recently been started on Percocet, which she mostly takes at night.  Does not drive as this is a new narcotic medication.  Avoids alcohol.  Declines any refills at this time as she has enough left.   She reports decreasing mobility due to increasing pain in her hips from bony metastatic disease.  She has also degenerative disease of the hips and was seen by orthopedics.  She declined surgical intervention.   Bowel movements are regular.     Chronic pain recheck:   Last dose of controlled substance: last night  Chronic pain treated with Percocet 5-325 mg every night  Current alcohol or substance use: no    Nonnarcotic treatments that are being used: naproxen 500 mg every morning (taken with ranitidine)      Consequences of Chronic Opiate therapy:  (5 A's)  Analgesia:  improved   Activity:    Decreased mobility due to progressive cancer affecting her hips bilaterally but pain is better controlled with analgesia  Adverse Events:  none   Aberrant Behaviors: No inappropriate refills requested, lost or stolen meds reported.   Affect/Mood: good grooming, full facial expressions, normal speech pattern and content, normal thought patterns, normal perception, good insight, normal reasoning     Patient Active Problem List    Diagnosis Date Noted   • Allergic rhinitis due to pollen 01/09/2019   • Colloid goiter 01/09/2019   • Senile osteopenia 01/09/2019   • Displaced fracture of lateral end of left clavicle, initial encounter for closed fracture 11/05/2018   • Fall 11/05/2018   • Loss of consciousness ([facility]) 11/05/2018   • Dependence on nocturnal oxygen therapy 10/18/2018   • Malignant neoplasm metastatic to bone ([facility]) 10/17/2018   • Pathological fracture 10/17/2018   • Solitary pulmonary nodule 10/17/2018   • SOB (shortness of breath) 10/02/2018   • Abdominal aortic aneurysm (AAA) ([facility]) 09/12/2018   • Alpha 1-antitrypsin PiMS phenotype 09/12/2018   • Stenosis of carotid artery 09/12/2018   • Centrilobular emphysema ([facility]) 09/12/2018   • Gastroesophageal reflux disease 09/12/2018   • Subclavian steal syndrome 09/12/2018   • Mixed anxiety depressive disorder 09/12/2018   • Metastatic breast cancer ([facility]) 06/19/2018   • Arm swelling 06/07/2018   • Benign hypertension 06/07/2018   • BMI 23.0-23.9, adult 06/07/2018   • Pelvic fracture ([facility]) 06/07/2018   • Occlusion and stenosis of unspecified carotid artery 07/20/2017   • Abnormal CT scan, chest 08/22/2016   • COPD (chronic obstructive pulmonary disease) ([facility]) 08/22/2016   • Essential hypertension 08/22/2016   • Peripheral vascular disease ([facility]) 08/22/2016   • Dyslipidemia 08/22/2016   • Allergic rhinitis 08/22/2016   • Tobacco use disorder 12/09/2008   • Idiopathic osteoporosis 03/17/2004   • Hyperlipidemia 06/10/2002   • Nontoxic multinodular goiter 06/10/2002   •  Tachycardia 06/10/2002       Current Outpatient Prescriptions   Medication Sig Dispense Refill   • capecitabine (XELODA) 500 MG tablet 1000mg po bid 2 weeks on 1 week off 56 Tab 1   • oxyCODONE-acetaminophen (PERCOCET) 5-325 MG Tab Take 1 Tab by mouth every 6 hours as needed for Severe Pain for up to 30 days. 100 Tab 0   • raNITidine (ZANTAC) 150 MG Tab Take 1 Tab by mouth 2 times a day. 180 Tab 0   • FLUoxetine (PROZAC) 20 MG Cap Take 1 Cap by mouth every day. 90 Cap 0   • guaiFENesin LA (MUCINEX) 600 MG TABLET SR 12 HR Take 600 mg by mouth every 12 hours.     • benzonatate (TESSALON) 100 MG Cap Take 1 Cap by mouth 3 times a day as needed for Cough. 60 Cap 0   • umeclidinium-vilanterol (ANORO ELLIPTA) 62.5-25 MCG/INH AEROSOL POWDER, BREATH ACTIVATED inhaler USE 1 INHALATION EVERY DAY 3 Inhaler 3   • amitriptyline (ELAVIL) 100 MG Tab Take 1 Tab by mouth every bedtime. 90 Tab 1   • sennosides-docusate sodium (SENOKOT-S) 8.6-50 MG tablet Take 1 Tab by mouth 1 time daily as needed (for constipation. Do not take if LOOSE STOOLS). 30 Tab 3   • polyethylene glycol/lytes (MIRALAX) Pack Take 1 Packet by mouth every day. 30 Each 0   • lidocaine-prilocaine (EMLA) 2.5-2.5 % Cream Apply to port one hour prior to access and cover with plastic wrap. 1 Tube 3   • Calcium Polycarbophil (FIBER-CAPS PO) Take  by mouth.     • albuterol (PROAIR HFA) 108 (90 Base) MCG/ACT Aero Soln inhalation aerosol Inhale 2 Puffs by mouth every four hours as needed for Shortness of Breath (wheezing). 3 Inhaler 3   • Lifitegrast (XIIDRA OP) by Ophthalmic route.     • naproxen (NAPROSYN) 500 MG Tab Take 1 Tab by mouth 2 times a day, with meals. 60 Tab 1   • Multiple Vitamin (MULTI VITAMIN DAILY PO) Take  by mouth.     • aspirin 81 MG tablet Take 81 mg by mouth every day. One tablet by mouth daily     • Coenzyme Q10 (COQ10) 100 MG Cap Take 100 mg by mouth.     • carvedilol (COREG) 25 MG Tab Take 12.5 mg by mouth 2 times a day, with meals.     • ramipril  "(ALTACE) 10 MG capsule Take 10 mg by mouth every day.     • Oxymetazoline HCl (NASAL SPRAY NA) Spray  in nose.       No current facility-administered medications for this visit.        ROS: A 12 point ROS has been completed and is negative except as stated above in HPI.     /58 (BP Location: Right arm, Patient Position: Sitting, BP Cuff Size: Adult)   Pulse (!) 103   Temp 36.1 °C (97 °F) (Temporal)   Ht 1.549 m (5' 1\")   Wt 50.5 kg (111 lb 6.4 oz)   SpO2 93%   BMI 21.05 kg/m²     Physical Exam   Constitutional:  oriented to person, place, and time. No distress. Chronically ill appearing woman, pale   Eyes: Pupils are equal, round, and reactive to light. No scleral icterus.  Cardiovascular: Normal rate, regular rhythm and normal heart sounds.  Exam reveals no gallop and no friction rub.  No murmur heard.  Pulmonary/Chest: Breath sounds normal. Chemotherapy port over right chest wall without surrounding erythema or fluctuance.   Musculoskeletal:   no edema.   Lymphadenopathy: no cervical adenopathy  Neurological: alert and oriented to person, place, and time.   Skin: No cyanosis. Nails show no clubbing.        Assessment and Plan       1. Stage IV breast cancer metastatic to bone   2. Use of opiates for therapeutic purposes   - stable on current regimen with naproxen and ranitidine along with percocet as well as lidocaine cream. Multi modal approach to pain. Also seen by physical therapy.   - no new refills provided today      Opioid Risk Score: 1  Interpretation of Opioid Risk Score   Score 0-3 = Low risk of abuse. Do UDS at least once per year.  Score 4-7 = Moderate risk of abuse. Do UDS 1-4 times per year.  Score 8+ = High risk of abuse. Refer to specialist.    3. Pain due to malignant neoplasm metastatic to bone   Face to Face Supporting Documentation - Home Health    The encounter with this patient was in whole or in part the primary reason for home health admission.    Date of encounter:   Patient:   "                  MRN:                       YOB: 2019  Zuly Christian  [id number]  1950     Home health to see patient for:  Skilled Nursing care for assessment, interventions & education, Home health aide, Physical Therapy evaluation and treatment and Occupational therapy evaluation and treatment    Skilled need for:  Exacerbation of Chronic Disease State (metastatic breast cancer) and Recent Deterioration of Health Status (decreased mobility and increased debility)      Homebound status evidenced by:  Need the aid of supportive devices such as crutches, canes, wheelchairs or walkers or Needs the assistance of another person in order to leave the home. Leaving home requires a considerable and taxing effort. There is a normal inability to leave the home.    Community Physician to provide follow up care: Kaylee Mandujano M.D.     Optional Interventions? No      I certify the face to face encounter for this home health care referral meets the CMS requirements and the encounter/clinical assessment with the patient was, in whole, or in part, for the medical condition(s) listed above, which is the primary reason for home health care. Based on my clinical findings: the service(s) are medically necessary, support the need for home health care, and the homebound criteria are met.  I certify that this patient has had a face to face encounter by myself.  Kaylee Mandujano M.D. - NPI: [id number]      4. Stage IV breast cancer metastatic to bone and liver   5. Hyperlipidemia   - discussed indications for statin therapy and evidence for long term risk reduction in cardiovascular disease over 10 years with continued statin therapy. After discussing risks and benefits of statin therapy, Ms. Christian decided that, in light of her advanced breast cancer, she would like to stop taking rosuvastatin, Zetia, chondroitin sulfate and omega 3 fatty acid medications. She would like to decrease her pill burden   -   rosuvastatin, Zetia, chondroitin sulfate and Lovaza discontinued       Signed by: Kaylee Mandujano M.D.  F/bhavya in 5 weeks for Percocet refill

## 2019-01-28 NOTE — PROGRESS NOTES
Pt presented to infusion center for D1C1 Navelbine/Xgeva. She had previously been on Taxol. Oriented to new POC, educated on new chemo and possible side effects, handout given. Denied any s/s of infection or open wounds. Denied any recent or future dental surgery. Rt chest port in place, lidocaine used at pt request. Accessed in sterile fashion, brisk blood return observed, labs drawn as ordered. Results within parameters to treat. XGeva given in back of arm without issue. Navelbine infused over 10 mins with no s/s of adverse reaction. Port flushed, brisk blood return again observed, heparinized and de-accessed with needle intact, gauze drsg placed. Has next appt, left on foot with friend in great spirits.

## 2019-01-28 NOTE — PATIENT INSTRUCTIONS
Consider stopping rosuvastatin, fish oil pills, chondroitin sulfate and Zetia as the benefit is more in the long term rather than in the short term. It may help to decrease pill burden along with drug interactions   Naproxen to be taken twice a day with food and zantac twice daily   Home health evaluation

## 2019-01-29 NOTE — TELEPHONE ENCOUNTER
Patient was call severe time last week to be move up from the cancellation list for Chemotherapy. but we where unable to get hold of the patient so she is schduled on 02/03/19 for treatment.

## 2019-02-01 NOTE — PROGRESS NOTES
Subjective:      Zuly Christian is a 68 y.o. female who presents for Follow-Up (tox & day 8 pre-chemo) evaluation following cycle 1, day 1 prior to cycle 1, day 8 Vinorelbine for metastatic breast cancer      HPI   Ms. Christian is established with Dr. Arellano for treatment of stage IV high-grade, triple negative, invasive ductal cell carcinoma of the left breast. She most recently completed 6 cycles of q 21 day Taxol from 8/22/18-12/27/18.  MRI of pelvis completed 12/31/18 showed disease progression; CT completed 1/11/19 showed disease progression as well as pathological fracture at right hip.  She was referred to ortho and radiation oncology for further evaluation. She is recommended also to transition to Xeloda with Vinorelbine (day 1, day 8 - every 21 days).  She presents for toxicity evaluation following cycle 1, day 1 and prior to cycle 1, day 8 of treatment. She has not yet started Xeloda. She is accompanied by her friend for today's visit.    Patient continues with right hip pain that is somewhat improved with transition from tramadol to Percocet and scheduled Aleve -per PCP. She reports that she followed up with Dr. Amanda and was advised to undergo right hip replacement to address pathologic fracture but declined due to duration of recovery versus quality of life.  She has initiated palliative radiation per Dr. Eng, this past Tuesday, and is anticipated to complete 10 fractions. She is ambulating better with use of walker and feels more stable.  She continues with forgetfulness, which her friends still notice, but which also seems better since discontinuing tramadol.  Baseline constipation continues to be well managed with MiraLAX and senna.  She is experienced 2 pound weight loss since her visit on 1/21/19 and states that she does have a poor appetite but she thinks this is improving.  In regards to treatment she feels that she is tolerating it very well and is otherwise asymptomatic.        Allergies    Allergen Reactions   • Pollen Extract    • Sulfa Drugs Hives         Current Outpatient Prescriptions on File Prior to Visit   Medication Sig Dispense Refill   • capecitabine (XELODA) 500 MG tablet 1000mg po bid 2 weeks on 1 week off (Patient not taking: Reported on 2/3/2019) 56 Tab 1   • oxyCODONE-acetaminophen (PERCOCET) 5-325 MG Tab Take 1 Tab by mouth every 6 hours as needed for Severe Pain for up to 30 days. 100 Tab 0   • raNITidine (ZANTAC) 150 MG Tab Take 1 Tab by mouth 2 times a day. 180 Tab 0   • FLUoxetine (PROZAC) 20 MG Cap Take 1 Cap by mouth every day. 90 Cap 0   • guaiFENesin LA (MUCINEX) 600 MG TABLET SR 12 HR Take 600 mg by mouth every 12 hours.     • benzonatate (TESSALON) 100 MG Cap Take 1 Cap by mouth 3 times a day as needed for Cough. 60 Cap 0   • umeclidinium-vilanterol (ANORO ELLIPTA) 62.5-25 MCG/INH AEROSOL POWDER, BREATH ACTIVATED inhaler USE 1 INHALATION EVERY DAY 3 Inhaler 3   • amitriptyline (ELAVIL) 100 MG Tab Take 1 Tab by mouth every bedtime. 90 Tab 1   • sennosides-docusate sodium (SENOKOT-S) 8.6-50 MG tablet Take 1 Tab by mouth 1 time daily as needed (for constipation. Do not take if LOOSE STOOLS). 30 Tab 3   • polyethylene glycol/lytes (MIRALAX) Pack Take 1 Packet by mouth every day. 30 Each 0   • lidocaine-prilocaine (EMLA) 2.5-2.5 % Cream Apply to port one hour prior to access and cover with plastic wrap. 1 Tube 3   • Calcium Polycarbophil (FIBER-CAPS PO) Take 1 Cap by mouth every day at 6 PM.     • albuterol (PROAIR HFA) 108 (90 Base) MCG/ACT Aero Soln inhalation aerosol Inhale 2 Puffs by mouth every four hours as needed for Shortness of Breath (wheezing). 3 Inhaler 3   • Lifitegrast (XIIDRA OP) 1 Drop by Ophthalmic route 2 times a day.     • naproxen (NAPROSYN) 500 MG Tab Take 1 Tab by mouth 2 times a day, with meals. 60 Tab 1   • Multiple Vitamin (MULTI VITAMIN DAILY PO) Take 1 Tab by mouth every day at 6 PM.     • aspirin 81 MG tablet Take 81 mg by mouth every day. One tablet  "by mouth daily      • carvedilol (COREG) 25 MG Tab Take 12.5 mg by mouth 2 times a day, with meals.     • ramipril (ALTACE) 10 MG capsule Take 10 mg by mouth every day.       No current facility-administered medications on file prior to visit.          Review of Systems   Constitutional: Positive for weight loss (2 lbs. down since 1/21 - poor appetite). Negative for chills, fever and malaise/fatigue.   HENT: Positive for hearing loss (had both hearing aides turned up - which has helped).    Respiratory: Positive for shortness of breath (with activity, O2 at night). Negative for cough and wheezing.         O2 at night; followed by Pulmonology who recommends increasing use - they have ordered it already   Cardiovascular: Negative for chest pain, palpitations and leg swelling.   Gastrointestinal: Positive for constipation (tends towards consTIpation - has not been an issue (miralax and senna as needed)). Negative for diarrhea, nausea and vomiting.   Genitourinary: Negative for dysuria.        \"less force\" increased with positional change   Musculoskeletal: Positive for joint pain (R hip - percocet and aleve, less tramadol  - per PCP). Negative for myalgias.        Using a walker x 1 week - walking faster and feels safer   Neurological: Negative for dizziness, tingling and headaches.   Psychiatric/Behavioral: Positive for memory loss (stable, seems better with less tramadol). The patient does not have insomnia.           Objective:     /80 (BP Location: Right arm, Patient Position: Sitting, BP Cuff Size: Adult)   Pulse (!) 109   Temp 36.4 °C (97.5 °F) (Temporal)   Resp 16   Ht 1.549 m (5' 1\")   Wt 51 kg (112 lb 7 oz)   SpO2 95%   BMI 21.24 kg/m²      Physical Exam   Constitutional: She is oriented to person, place, and time. She appears well-developed and well-nourished. No distress.   HENT:   Head: Normocephalic and atraumatic.   Mouth/Throat: Oropharynx is clear and moist. No oropharyngeal exudate. "   Eyes: Pupils are equal, round, and reactive to light. Conjunctivae and EOM are normal. Right eye exhibits no discharge. Left eye exhibits no discharge. No scleral icterus.   Neck: Normal range of motion. Neck supple.   Cardiovascular: Normal rate, regular rhythm and normal heart sounds.  Exam reveals no gallop and no friction rub.    No murmur heard.  Pulmonary/Chest: Effort normal and breath sounds normal. No respiratory distress. She has no wheezes.   o2   Abdominal: Soft. Bowel sounds are normal. She exhibits no distension. There is no tenderness.   Musculoskeletal: Normal range of motion. She exhibits tenderness (R hip). She exhibits no edema.   Much steadier and quicker with walker   Neurological: She is alert and oriented to person, place, and time.   Skin: Skin is warm and dry. No rash noted. She is not diaphoretic. No erythema. No pallor.   Psychiatric: She has a normal mood and affect. Her behavior is normal.   A little forgetful   Vitals reviewed.      No visits with results within 1 Day(s) from this visit.   Latest known visit with results is:   [facility] on 01/27/2019   Component Date Value Ref Range Status   • WBC 01/27/2019 13.6* 4.8 - 10.8 K/uL Final   • RBC 01/27/2019 3.75* 4.20 - 5.40 M/uL Final   • Hemoglobin 01/27/2019 9.5* 12.0 - 16.0 g/dL Final   • Hematocrit 01/27/2019 30.6* 37.0 - 47.0 % Final   • MCV 01/27/2019 81.6  81.4 - 97.8 fL Final   • MCH 01/27/2019 25.3* 27.0 - 33.0 pg Final   • MCHC 01/27/2019 31.0* 33.6 - 35.0 g/dL Final   • RDW 01/27/2019 57.9* 35.9 - 50.0 fL Final   • Platelet Count 01/27/2019 487* 164 - 446 K/uL Final   • MPV 01/27/2019 9.6  9.0 - 12.9 fL Final   • Neutrophils-Polys 01/27/2019 67.10  44.00 - 72.00 % Final   • Lymphocytes 01/27/2019 10.50* 22.00 - 41.00 % Final   • Monocytes 01/27/2019 9.50  0.00 - 13.40 % Final   • Eosinophils 01/27/2019 10.10* 0.00 - 6.90 % Final   • Basophils 01/27/2019 0.20  0.00 - 1.80 % Final   • Immature Granulocytes  01/27/2019 2.60* 0.00 - 0.90 % Final   • Nucleated RBC 01/27/2019 0.20  /100 WBC Final   • Neutrophils (Absolute) 01/27/2019 9.16* 2.00 - 7.15 K/uL Final    Includes immature neutrophils, if present.   • Lymphs (Absolute) 01/27/2019 1.43  1.00 - 4.80 K/uL Final   • Monos (Absolute) 01/27/2019 1.29* 0.00 - 0.85 K/uL Final   • Eos (Absolute) 01/27/2019 1.38* 0.00 - 0.51 K/uL Final   • Baso (Absolute) 01/27/2019 0.03  0.00 - 0.12 K/uL Final   • Immature Granulocytes (abs) 01/27/2019 0.35* 0.00 - 0.11 K/uL Final   • NRBC (Absolute) 01/27/2019 0.03  K/uL Final   • Sodium 01/27/2019 133* 135 - 145 mmol/L Final   • Potassium 01/27/2019 3.8  3.6 - 5.5 mmol/L Final   • Chloride 01/27/2019 102  96 - 112 mmol/L Final   • Co2 01/27/2019 20  20 - 33 mmol/L Final   • Anion Gap 01/27/2019 11.0  0.0 - 11.9 Final   • Glucose 01/27/2019 133* 65 - 99 mg/dL Final   • Bun 01/27/2019 22  8 - 22 mg/dL Final   • Creatinine 01/27/2019 0.51  0.50 - 1.40 mg/dL Final   • Calcium 01/27/2019 9.9  8.5 - 10.5 mg/dL Final   • AST(SGOT) 01/27/2019 19  12 - 45 U/L Final   • ALT(SGPT) 01/27/2019 15  2 - 50 U/L Final   • Alkaline Phosphatase 01/27/2019 119* 30 - 99 U/L Final   • Total Bilirubin 01/27/2019 0.3  0.1 - 1.5 mg/dL Final   • Albumin 01/27/2019 3.7  3.2 - 4.9 g/dL Final   • Total Protein 01/27/2019 6.5  6.0 - 8.2 g/dL Final   • Globulin 01/27/2019 2.8  1.9 - 3.5 g/dL Final   • A-G Ratio 01/27/2019 1.3  g/dL Final   • GFR If  01/27/2019 >60  >60 mL/min/1.73 m 2 Final   • GFR If Non  01/27/2019 >60  >60 mL/min/1.73 m 2 Final         Bone Scan  11/19/2018 9:40 AM    HISTORY/REASON FOR EXAM:  Metastatic breast cancer.    TECHNIQUE/EXAM DESCRIPTION AND NUMBER OF VIEWS:  Radionuclide whole body bone scan.    COMPARISON: 9/18/2018    PROCEDURE:  26.1 mCi of Tc 99m-MDP was administered intravenously followed by delayed whole body planar imaging.    FINDINGS:  Residual activity in the bladder limits evaluation of the  pelvis.  Multiple foci of increased activity present in the ribs, sternum, proximal humeri, cervical, thoracic and lumbar spine as seen previously.  Increased activity in present in the frontal skull.  Multifocal increased activity in the pelvis.     Impression   Multiple foci of abnormal uptake in the axial and proximal appendicular skeleton consistent with bony metastatic disease, unchanged from prior exam.     Reading Provider Reading Date   Darron Zamora M.D. Nov 19, 2018   Signing Provider Signing Date Signing Time   Darron Zamora M.D. Nov 19, 2018 12:56 PM           MRI Pelvis  12/31/2018 4:06 PM    HISTORY/REASON FOR EXAM:  Neoplasm: pelvis, metastatic, suspected/known  Metastatic breast cancer, right hip pain    TECHNIQUE/EXAM DESCRIPTION:  MRI of the pelvis without and with contrast.    The study was performed on a Manav 1.5 Mckenzie MRI scanner.    T1 axial, T1 coronal, T2 fast spin-echo fat-suppressed axial, and T2 fast spin-echo fat-suppressed coronal images were obtained of the pelvis. Postcontrast fat-suppressed intravenous gadolinium enhanced sequences in the axial and coronal planes were then   performed.    5 mL Gadavist contrast was administered intravenously.    COMPARISON: Whole body bone scan 11/19/2018 and PET/CT scan 6/6/2018    FINDINGS:  BONES, BONE MARROW, and CARTILAGE: There are multiple areas of abnormal bone marrow consistent with metastatic disease. These involve the visualized lumbar spine, visualized sacrum, right iliac bone especially the acetabulum, left iliac bone especially   the acetabulum, right femoral neck where there is bone marrow edema, left ilium, both ischium, and bilateral intertrochanteric region with right intertrochanteric mass measuring 2.9 cm and 2 left intertrochanteric mass measuring 1.6 and 1.1 cm.    There is a right hip joint effusion.    There is underlying osteoarthritis of the right hip joint which appears severe.    There is fluid anterior to the right  ilium and extending posterior to the right iliacus muscle. This is presumably related to the right acetabular metastatic disease. Within this fluid there are low signal intensity foci that are likely hemorrhage.    Uterus and adnexa appear normal.    TENDONS: Normal in appearance.    LUMBAR SPINE: There is multilevel facet arthropathy. There is subluxation at L5-S1 likely secondary to bilateral L5 spondylolysis.     Impression   1.  Bony metastatic disease involving the entire visualized lumbar spine, sacrum, both iliac bone, bilateral ischium, right and left proximal femur primarily the intratrochanteric region.    2.  There is extensive bone marrow edema throughout the right femoral neck extending into the head and also the right acetabulum with associated right hip joint effusion and fluid anterior to the right acetabulum. This is likely to be symptomatic and   appears to be due to a combination of intertrochanteric metastatic disease, acetabular metastatic disease, an underlying severe right hip joint osteoarthritis.    3.  2 left proximal femur intertrochanteric metastases without pathologic fracture. Left acetabular metastasis producing mild bone marrow edema    4.  Overall, significant progression since whole body bone scan 11/19/2018 and PET CT scan 6/6/2018       Reading Provider Reading Date   Souleymane Spears M.D. Dec 31, 2018   Signing Provider Signing Date Signing Time         Ct-chest,abdomen,pelvis With    Result Date: 1/11/2019 1/11/2019 2:28 PM HISTORY/REASON FOR EXAM:  Follow-up breast cancer, metastatic to bone. TECHNIQUE/EXAM DESCRIPTION: CT scan of the chest, abdomen and pelvis with contrast. Thin-section helical scanning was obtained with intravenous contrast from the lung apices through the pubic symphysis to include the chest, abdomen and pelvis.  90ml mL of Omnipaque 350 nonionic contrast was administered intravenously without complication. Low dose optimization technique was utilized for  this CT exam including automated exposure control and adjustment of the mA and/or kV according to patient size. COMPARISON: Prior CT chest 10/2/2018, PET/CT 6/6/2018, CT chest 12/15/2014 FINDINGS: CT CHEST: There is a Port-A-Cath in the right anterior chest wall with the catheter tip in the distal SVC. There is atherosclerosis of the aorta and great vessels with coronary artery calcification. There is no pericardial effusion. Heart is normal in size. There are no suspicious thyroid lesions. There is no axillary, mediastinal or hilar lymphadenopathy. There is no pleural effusion. Lungs demonstrate underlying changes of emphysema with parenchymal scarring in the left lung apex. The 7-8 mm lobular nodule in the anterior left upper lobe is unchanged. There is dependent bibasilar atelectasis or scar. There are no new suspicious lung nodules. CT ABDOMEN: Liver: There are apparent new ill-defined hypodense lesions in the liver. There is a 6 mm lesion in the superior medial segment left lobe and a 9 mm lesion in the inferior medial segment left lobe. There is a 13 mm lesion in the lateral segment left lobe. Tiny hypodensity in the right hepatic dome is unchanged. Spleen: Unremarkable. Pancreas: Unremarkable. Gallbladder and biliary tree: Unremarkable. Adrenal glands: There are no new masses. There is a stable 2.2 cm left adrenal gland mass since 12/15/2014. Kidneys: Unremarkable. There is extensive atherosclerosis of the aorta. The bowel is unremarkable. There is no abdominal adenopathy. CT PELVIS: There is a 3.2 cm ovoid heterogeneous lesion with peripheral enhancement in the right iliopsoas muscle which appears new. There is a probable right hip joint effusion. There is otherwise no new pelvic lymphadenopathy. Bones: There are multifocal sclerotic metastases again seen throughout the bones. There is a pathologic fracture involving the right acetabular roof and anterior wall of the acetabulum. There is a possible impending  fracture of the right superior ilium. There is no change in the T5 sclerotic mild pathologic compression deformity. The posterior left 8th rib fracture is unchanged. There is a fracture of the left clavicle again seen.     1.  There are new ill-defined hypodense hepatic lesions suspicious for metastases. 2.  There is a new pathologic fracture of the right acetabular roof and anterior wall of the acetabulum. 3.  There is a possible impending fracture of the right superior ilium within an area of metastases. 4.  There is a stable T5 pathologic compression deformity. 5.  There is a new heterogeneous peripherally enhancing right iliopsoas muscle mass, possibly related to the adjacent iliac bone metastases although could be a soft tissue metastases. 6.  Multifocal sclerotic bony metastases are again seen. Findings were discussed with LAKSHMI GRAMAJO on 1/11/2019 at 3:58 PM.         Assessment/Plan:     1. Metastatic breast cancer (HCC)     2. Encounter for antineoplastic chemotherapy     3. Malignant neoplasm metastatic to bone (HCC)     4. Pathological fracture of right femur due to neoplastic disease, sequela     5. Poor appetite     6. Neoplasm related pain         1.  Poor appetite: She has lost 2 pounds since her visit on 1/21/19 but feels that her appetite is stabilizing.  She will consider Marinol should continue with poor appetite and weight loss.    2.  Bone metastases/pathologic fx/pain: Patient continues on monthly Xgeva for bone support.  She is noted to have pathological fracture at right acetabulum for which she was recommended to undergo right hip replacement.  She reportedly declined due to anticipated effect on quality of life vs. Longevity.  She has initiated palliative radiation per Dr. Eng and is to undergo 10 fractions.  Pain appears better controlled with transition to Percocet and Aleve -she will continue current regimen.    She has completed physical therapy and been released -she continues  with recommended exercises and ambulation with front wheel walker.    3.  Breast cancer: Patient with noted progression of disease per MRI in December and CT in January.  She is initiated day 1, day 8 Vinorelbine (every 21 days).  This is to be done concurrently with Xeloda, which remains pending.  She will initiate Xeloda with cycle 2.  CBC and CMP have been evaluated and found to be within acceptable limits.  She will proceed with cycle 1, day 8 of treatment.  As she continues with radiation therapy cycle 2 is delayed by 1 week.  She will return in 3 weeks for evaluation prior to cycle 2, sooner as needed.            The patient verbalized agreement and understanding of current plan. All questions and concerns were addressed at time of visit.    Please note that this dictation was created using voice recognition software. I have made every reasonable attempt to correct obvious errors, but I expect that there are errors of grammar and possibly content that I did not discover before finalizing the note.

## 2019-02-03 NOTE — PROGRESS NOTES
Chemotherapy Verification - SECONDARY RN       Height = 61.22in  Weight = 50.1kg  BSA = 1.47m2       Medication: Navelbine  Dose: 25mg/m2  Calculated Dose: 36.75mg                             (In mg/m2, AUC, mg/kg)         I confirm that this process was performed independently.

## 2019-02-03 NOTE — PROGRESS NOTES
"Pharmacy Chemotherapy Calculations Note:    Patient Name: Zuly Christian      Dx: Metastatic Triple Negative Breast Cancer    Cycle 1, Day 8  Previous treatment: 1/27/19 = C1D1  Paclitaxel Q3wk x 6, last 12/27/18     Protocol: Capecitabine + Vinorelbine    *Dosing Reference*  Capecitabine 1250 mg/m2 PO BID on Days 1-14 - will add cycle 2 per Dr. Arellano   Vinorelbine 25 mg/m2 IV on Days 1 and 8  Q3 weeks until disease progression or unacceptable toxicity    Jina KONG, et al. Capecitabine and Vinorelbine in Patients with Metastatic Breast Cancer Previously Treated with Anthracycline and Taxane.  J [place] Med Sci 2004;19:547-53.    /42   Pulse (!) 117   Temp 36.6 °C (97.8 °F) (Temporal)   Resp 18   Ht 1.555 m (5' 1.22\")   Wt 50.1 kg (110 lb 7.2 oz)   SpO2 100%   BMI 20.72 kg/m²  Body surface area is 1.47 meters squared.     Allergies: Pollen extract and Sulfa drugs    ANC~ 2770 Plt = 499 k Hgb = 8.6    Labs 01/27/19  SCr = 0.57 mg/dL CrCl = 62  mL/min (min SCr 0.7 mg/dL)  LFT's = WNL except alk phos 119 TBili = 0.3 Ca = 9.9     Vinorelbine (Navelbine) 25 mg/m² x 1.47 m² = 37 mg   <5% difference, ok to treat with final dose = 36.8 mg IV    Xgeva 120 mg - last dose 1/29/19     Oscar Fay, PharmD    "

## 2019-02-03 NOTE — PROGRESS NOTES
"Pharmacy chemotherapy verification note:    Patient Name: Zuly Christian   Dx: metastatic triple negative breast cancer         Protocol: Capecitabine + Vinorelbine     *Dosing Reference*  Capecitabine 1250 mg/m2 PO BID on Days 1-14 - will add cycle 2 per Dr. Arellano   Vinorelbine 25 mg/m2 IV on Days 1 and 8  Q3 weeks until disease progression or unacceptable toxicity     Jina KONG, et al. Capecitabine and Vinorelbine in Patients with Metastatic Breast Cancer Previously Treated with Anthracycline and Taxane.  J [place] Med Sci 2004;19:547-53.     Allergies:  Pollen extract and Sulfa drugs       /42   Pulse (!) 117   Temp 36.6 °C (97.8 °F) (Temporal)   Resp 18   Ht 1.555 m (5' 1.22\")   Wt 50.1 kg (110 lb 7.2 oz)   SpO2 100%   BMI 20.72 kg/m²  Body surface area is 1.47 meters squared.     Labs 02/03/19:  ANC ~2800 Plt = 499k Hgb = 8.6     Labs 01/27/19:   LFTs = WNL except alk phos 119  Tbili = 0.3    Drug Order   (Drug name, dose, route, IV Fluid & volume, frequency, number of doses) Cycle 1, Day 8  Previous treatment: C1D1 = 01/27/18; 12/27/18- Taxol x 6 cycles   Medication = vinorelbine (Navelbine)  Base Dose= 25mg/m2  Calc Dose: Base Dose x 1.47m2 = 36.7mg  Final Dose = 36.8mg  Route = IV  Fluid & Volume = NS 25 mL  Admin Duration = Over 6-10min          <5% difference, ok to treat with final dose     By my signature below, I confirm this process was performed independently with the BSA and all final chemotherapy dosing calculations congruent. I have reviewed the above chemotherapy order and that my calculation of the final dose and BSA (when applicable) corroborate those calculations of the  pharmacist. Discrepancies of 5% or greater in the written dose have been addressed and documented within the [facility] Progress notes.    Joey Daley, PharmD, BCOP            "

## 2019-02-03 NOTE — PROGRESS NOTES
Patient here for day 8 cycle 1 of Navelbine today. Denies any complaints. Port accessed in sterile fashion with brisk blood return. CBC sent per orders. Labs WNL for treatment today. Nivelbine given over 10 minutes as ordered. 125ml of saline post infusion per PI. Tolerated well. Port flushed per protocol and de-accessed. Gauze over site. Discharged home to self care with next appointment in place.

## 2019-02-03 NOTE — PROGRESS NOTES
Chemotherapy Verification - PRIMARY RN      Height = 155.5cm  Weight = 50.1kg  BSA = 1.47m2       Medication: Vinorelbine (Nevelbine)    Dose: 25mg/m2  Calculated Dose: 36.75mg                             (In mg/m2, AUC, mg/kg)           I confirm this process was performed independently with the BSA and all final chemotherapy dosing calculations congruent.  Any discrepancies of 5% or greater have been addressed with the chemotherapy pharmacist. The resolution of the discrepancy has been documented in the EPIC progress notes.

## 2019-02-04 NOTE — TELEPHONE ENCOUNTER
Received referral from [facility]. Medications reviewed. Interaction noted between fluoxetine and naproxen for increased risk of GI bleeding. [facility] RN please educate patient on signs/ symptoms of bleeding and when to seek medical attention. Interaction noted between fluoxetine and amitriptyline as fluoxetine may increase serum concentration of amitriptyline and increase side effects. Monitor for serotonin syndrome and QT prolongation with this combination. Last EKG on file on 11/12/18 showed QTc interval of 460. [facility] RN please educate patient on signs/ symptoms of serotonin syndrome.     Tawny Tamayo, PharmD

## 2019-02-05 NOTE — TELEPHONE ENCOUNTER
From: Zuly Christian  To: ADRIA Garcia  Sent: 2/5/2019 3:12 PM PST  Subject: Non-Urgent Medical Question    I think Julee called me last week. She left me her phone number so I could call her back. I think what she was advising me that all orders had to be from the same company. If so please cancel the order sent to [facility].    Please order the C-PAP mask and the small O2 concentrate. I will return the machine & tanks. I'm not sure if I keep the large conecntrator that I use at night or not.

## 2019-02-05 NOTE — PATIENT COMMUNICATION
Spoke to patient and clarified what was going on    She is going to go ahead and accept the CPAP through [facility]  She is NOT going to turn anything in and will wait for the switchout to go through with [facility] and then if she is unhappy with their services she is aware she can switch to another DME

## 2019-02-05 NOTE — PROGRESS NOTES
Nutrition Services: Update  Weight: 110# (Pt has experienced 8#/7% unintentional weight loss x 1 month, classified as Severe)    Met with pt to follow-up on current nutrition status. Pt reports she has not appetite and forgets to eat. We discussed previously having her [employer] to remind her at certain times in the day to eat, though pt forgot. Her daughter was with her this time and said she would set it up for her. Pt also reports she forgets to drink water. Discussed tips and encouraged hydration. She reports she likes shakes and smoothies, we discussed ways to make them high kcal and protein, provided recipes. Daughter to help encourage intake. Provided tips to help alleviate bland taste of foods. Pt denied any GI distress.     RD following and to make further recommendations as indicated.

## 2019-02-11 NOTE — LETTER
[facility]   [address]   [address]  [place] [address]  Phone: [phone]  Fax: [phone]              Zuly Christian  1950    Encounter Date: 2/11/2019    Laura Vazquez M.D.          PROGRESS NOTE:    Subjective:   Zuly Christian is a 68-year-old female presenting to clinic for follow-up on pulmonary hypertension.    As discussed previously, patient has stage IV breast cancer with metastases.  She underwent a CT scan last month which showed progression of her cancer.  She now has likely hepatic metastases along with known metastases to her bone.  She has previously received palliative radiation to her hip.  She still on chemotherapy.    She reports having shortness of breath which is at her baseline.  She has a diagnosis of COPD.  She is now using a walker for support with any long distance.  She denies any associated chest discomfort.    She was recently diagnosed with sleep apnea as well.  We will start CPAP therapy later today.    Her blood pressures are usually well controlled, systolics 110s-120s.    Past Medical History:   Diagnosis Date   • Allergic rhinitis    • Cancer (HCC)     breast   • Cataract    • COPD (chronic obstructive pulmonary disease) (HCC)    • Emphysema of lung (HCC)    • Heart burn    • Hyperlipidemia    • Hypertension    • Osteopenia    • Peripheral vascular disease (HCC)      Past Surgical History:   Procedure Laterality Date   • CAROTID ENDARTERECTOMY  7/20/2017    Procedure: CAROTID ENDARTERECTOMY FOR : LEFT SUBCLAVIAN ARTERY ANGIOPLASTY / STENT BRACHIAL APPROACH WITH ULTRASOUND;  Surgeon: Dinora Saldana M.D.;  Location: SURGERY [facility];  Service:    • TUBAL LIGATION  1977   • GYN SURGERY      d+c 1979, 1980   • OTHER ORTHOPEDIC SURGERY      left knee surgery   • TONSILLECTOMY       Family History   Problem Relation Age of Onset   • Lung Cancer Father    • Cancer Father 71        lung   • Lung  Cancer Mother    • Cancer Mother 91        lung   • Cancer Maternal Aunt 80        lung cancer, smoking     Social History     Social History   • Marital status:      Spouse name: N/A   • Number of children: N/A   • Years of education: N/A     Occupational History   • work in cardiology office Retired     Social History Main Topics   • Smoking status: Former Smoker     Packs/day: 1.00     Years: 50.00     Types: Cigarettes     Quit date: 5/1/2014   • Smokeless tobacco: Former User      Comment: started at age 18, quit at 64, 1.5-2 ppd for 46 years, continued abstinance   • Alcohol use Yes      Comment: 3 a month   • Drug use: No   • Sexual activity: No     Other Topics Concern   • Not on file     Social History Narrative   • No narrative on file     Allergies   Allergen Reactions   • Pollen Extract    • Sulfa Drugs Hives     Outpatient Encounter Prescriptions as of 2/11/2019   Medication Sig Dispense Refill   • fluticasone (FLONASE) 50 MCG/ACT nasal spray Spray 1 Spray in nose every day.     • PROCHLORPERAZINE MALEATE PO Take 1 Tab by mouth every 6 hours as needed (nausea).     • ondansetron (ZOFRAN ODT) 4 MG TABLET DISPERSIBLE Take 4 mg by mouth every four hours as needed for Nausea.     • raNITidine (ZANTAC) 150 MG Tab Take 1 Tab by mouth 2 times a day. 180 Tab 0   • FLUoxetine (PROZAC) 20 MG Cap Take 1 Cap by mouth every day. 90 Cap 0   • guaiFENesin LA (MUCINEX) 600 MG TABLET SR 12 HR Take 600 mg by mouth every 12 hours.     • benzonatate (TESSALON) 100 MG Cap Take 1 Cap by mouth 3 times a day as needed for Cough. 60 Cap 0   • umeclidinium-vilanterol (ANORO ELLIPTA) 62.5-25 MCG/INH AEROSOL POWDER, BREATH ACTIVATED inhaler USE 1 INHALATION EVERY DAY 3 Inhaler 3   • amitriptyline (ELAVIL) 100 MG Tab Take 1 Tab by mouth every bedtime. 90 Tab 1   • sennosides-docusate sodium (SENOKOT-S) 8.6-50 MG tablet Take 1 Tab by mouth 1 time daily as needed (for constipation. Do not take if LOOSE STOOLS). 30 Tab 3   •  "polyethylene glycol/lytes (MIRALAX) Pack Take 1 Packet by mouth every day. 30 Each 0   • lidocaine-prilocaine (EMLA) 2.5-2.5 % Cream Apply to port one hour prior to access and cover with plastic wrap. 1 Tube 3   • Calcium Polycarbophil (FIBER-CAPS PO) Take 1 Cap by mouth every day at 6 PM.     • albuterol (PROAIR HFA) 108 (90 Base) MCG/ACT Aero Soln inhalation aerosol Inhale 2 Puffs by mouth every four hours as needed for Shortness of Breath (wheezing). 3 Inhaler 3   • Lifitegrast (XIIDRA OP) 1 Drop by Ophthalmic route 2 times a day.     • naproxen (NAPROSYN) 500 MG Tab Take 1 Tab by mouth 2 times a day, with meals. 60 Tab 1   • Multiple Vitamin (MULTI VITAMIN DAILY PO) Take 1 Tab by mouth every day at 6 PM.     • aspirin 81 MG tablet Take 81 mg by mouth every day. One tablet by mouth daily      • carvedilol (COREG) 25 MG Tab Take 12.5 mg by mouth 2 times a day, with meals.     • ramipril (ALTACE) 10 MG capsule Take 10 mg by mouth every day.     • dronabinol (MARINOL) 2.5 MG Cap Take 1 Cap by mouth 2 times a day for 30 days. (Patient not taking: Reported on 2/11/2019) 60 Cap 0   • capecitabine (XELODA) 500 MG tablet 1000mg po bid 2 weeks on 1 week off (Patient not taking: Reported on 2/3/2019) 56 Tab 1     No facility-administered encounter medications on file as of 2/11/2019.      Review of Systems   Constitutional: Negative for malaise/fatigue.   Respiratory: Positive for shortness of breath.    Cardiovascular: Negative for chest pain, palpitations, orthopnea, leg swelling and PND.   Gastrointestinal: Negative for abdominal pain.   Musculoskeletal: Positive for joint pain. Negative for falls.   Neurological: Negative for dizziness and loss of consciousness.   Psychiatric/Behavioral: Negative for depression.   All other systems reviewed and are negative.       Objective:   BP (!) 90/50 (BP Location: Right arm, Patient Position: Sitting)   Pulse (!) 106   Ht 1.549 m (5' 1\")   Wt 50.3 kg (111 lb)   SpO2 96%   " BMI 20.97 kg/m²      Physical Exam   Constitutional: She is oriented to person, place, and time. She appears well-developed and well-nourished. No distress.   HENT:   Head: Normocephalic and atraumatic.   Eyes: Conjunctivae are normal. No scleral icterus.   Neck: Normal range of motion. Neck supple.   Cardiovascular: Regular rhythm and normal heart sounds.  Tachycardia present.  Exam reveals no gallop and no friction rub.    No murmur heard.  Pulmonary/Chest: Effort normal and breath sounds normal. No respiratory distress. She has no wheezes. She has no rales.   Abdominal: Soft. She exhibits no distension. There is no tenderness.   Musculoskeletal: She exhibits no edema.   Neurological: She is alert and oriented to person, place, and time.   Skin: Skin is warm and dry. She is not diaphoretic.   Psychiatric: She has a normal mood and affect. Her behavior is normal.   Nursing note and vitals reviewed.    Echocardiogram performed October 2018 showed normal LV systolic function.  RVSP 45 mmHg.  No major valvular pathology noted.    Labs performed in January 2019 were reviewed and showed normal creatinine.  Hemoglobin 9.5.    Ziopatch monitor performed December 2018 was personally reviewed and per my interpretation showed sinus rhythm with an average heart rate of 95 bpm.  No sustained arrhythmias.    EKG performed today was personally reviewed and per my interpretation shows sinus rhythm at 95 bpm.  Nonspecific T wave changes.    Assessment:     1. Tachycardia  EKG   2. SOB (shortness of breath)     3. Essential hypertension     4. Pulmonary hypertension (HCC)     5. USHA (obstructive sleep apnea)     6. Encounter for long-term (current) use of high-risk medication         Medical Decision Making:  Today's Assessment / Status / Plan:     Dyspnea:  Concern for pulmonary hypertension:  Obstructive sleep apnea:  Metastatic breast cancer:    Patient's dyspnea is likely secondary to underlying COPD.  Her cardiac workup so far  has been unremarkable.  However ischemia cannot be ruled out.  Ideally she should get ischemic workup however she has metastatic breast cancer and prefers quality of life.  Since her symptoms are relatively stable and she does not have any associated chest discomfort, I do not see any clear indication to proceed with stress testing especially given the patient's wishes and her overall prognosis.  Patient is in agreement.  If she starts having chest discomfort or worsening dyspnea, she will let us know.    Her elevated right-sided pressures are likely a combination of her underlying COPD and recent diagnosis of sleep apnea.  Patient will get her CPAP machine later today.  I have encouraged her to try to use it regularly as it may help with her symptoms.    Hypertension: Blood pressure is low today however usually blood pressure is within normal limits.  For now continue Coreg and ramipril at current dose.    Return to clinic if needed.    Thank you for allowing me to participate in the care of this patient. Please do not hesitate to contact me with any questions.    Laura Vazquez MD  Cardiologist  [facility]      PLEASE NOTE: This dictation was created using voice recognition software.         Kaylee Mandujano M.D.  [facility] [address]  VIA In Basket

## 2019-02-11 NOTE — PROGRESS NOTES
Subjective:   Zuly Christian is a 68-year-old female presenting to clinic for follow-up on pulmonary hypertension.    As discussed previously, patient has stage IV breast cancer with metastases.  She underwent a CT scan last month which showed progression of her cancer.  She now has likely hepatic metastases along with known metastases to her bone.  She has previously received palliative radiation to her hip.  She still on chemotherapy.    She reports having shortness of breath which is at her baseline.  She has a diagnosis of COPD.  She is now using a walker for support with any long distance.  She denies any associated chest discomfort.    She was recently diagnosed with sleep apnea as well.  We will start CPAP therapy later today.    Her blood pressures are usually well controlled, systolics 110s-120s.    Past Medical History:   Diagnosis Date   • Allergic rhinitis    • Cancer (HCC)     breast   • Cataract    • COPD (chronic obstructive pulmonary disease) (HCC)    • Emphysema of lung (HCC)    • Heart burn    • Hyperlipidemia    • Hypertension    • Osteopenia    • Peripheral vascular disease (HCC)      Past Surgical History:   Procedure Laterality Date   • CAROTID ENDARTERECTOMY  7/20/2017    Procedure: CAROTID ENDARTERECTOMY FOR : LEFT SUBCLAVIAN ARTERY ANGIOPLASTY / STENT BRACHIAL APPROACH WITH ULTRASOUND;  Surgeon: Dinora Saldana M.D.;  Location: SURGERY [facility];  Service:    • TUBAL LIGATION  1977   • GYN SURGERY      d+c 1979, 1980   • OTHER ORTHOPEDIC SURGERY      left knee surgery   • TONSILLECTOMY       Family History   Problem Relation Age of Onset   • Lung Cancer Father    • Cancer Father 71        lung   • Lung Cancer Mother    • Cancer Mother 91        lung   • Cancer Maternal Aunt 80        lung cancer, smoking     Social History     Social History   • Marital status:      Spouse name: N/A   • Number of children: N/A   • Years of education: N/A     Occupational History   • work  in cardiology office Retired     Social History Main Topics   • Smoking status: Former Smoker     Packs/day: 1.00     Years: 50.00     Types: Cigarettes     Quit date: 5/1/2014   • Smokeless tobacco: Former User      Comment: started at age 18, quit at 64, 1.5-2 ppd for 46 years, continued abstinance   • Alcohol use Yes      Comment: 3 a month   • Drug use: No   • Sexual activity: No     Other Topics Concern   • Not on file     Social History Narrative   • No narrative on file     Allergies   Allergen Reactions   • Pollen Extract    • Sulfa Drugs Hives     Outpatient Encounter Prescriptions as of 2/11/2019   Medication Sig Dispense Refill   • fluticasone (FLONASE) 50 MCG/ACT nasal spray Spray 1 Spray in nose every day.     • PROCHLORPERAZINE MALEATE PO Take 1 Tab by mouth every 6 hours as needed (nausea).     • ondansetron (ZOFRAN ODT) 4 MG TABLET DISPERSIBLE Take 4 mg by mouth every four hours as needed for Nausea.     • raNITidine (ZANTAC) 150 MG Tab Take 1 Tab by mouth 2 times a day. 180 Tab 0   • FLUoxetine (PROZAC) 20 MG Cap Take 1 Cap by mouth every day. 90 Cap 0   • guaiFENesin LA (MUCINEX) 600 MG TABLET SR 12 HR Take 600 mg by mouth every 12 hours.     • benzonatate (TESSALON) 100 MG Cap Take 1 Cap by mouth 3 times a day as needed for Cough. 60 Cap 0   • umeclidinium-vilanterol (ANORO ELLIPTA) 62.5-25 MCG/INH AEROSOL POWDER, BREATH ACTIVATED inhaler USE 1 INHALATION EVERY DAY 3 Inhaler 3   • amitriptyline (ELAVIL) 100 MG Tab Take 1 Tab by mouth every bedtime. 90 Tab 1   • sennosides-docusate sodium (SENOKOT-S) 8.6-50 MG tablet Take 1 Tab by mouth 1 time daily as needed (for constipation. Do not take if LOOSE STOOLS). 30 Tab 3   • polyethylene glycol/lytes (MIRALAX) Pack Take 1 Packet by mouth every day. 30 Each 0   • lidocaine-prilocaine (EMLA) 2.5-2.5 % Cream Apply to port one hour prior to access and cover with plastic wrap. 1 Tube 3   • Calcium Polycarbophil (FIBER-CAPS PO) Take 1 Cap by mouth every day  "at 6 PM.     • albuterol (PROAIR HFA) 108 (90 Base) MCG/ACT Aero Soln inhalation aerosol Inhale 2 Puffs by mouth every four hours as needed for Shortness of Breath (wheezing). 3 Inhaler 3   • Lifitegrast (XIIDRA OP) 1 Drop by Ophthalmic route 2 times a day.     • naproxen (NAPROSYN) 500 MG Tab Take 1 Tab by mouth 2 times a day, with meals. 60 Tab 1   • Multiple Vitamin (MULTI VITAMIN DAILY PO) Take 1 Tab by mouth every day at 6 PM.     • aspirin 81 MG tablet Take 81 mg by mouth every day. One tablet by mouth daily      • carvedilol (COREG) 25 MG Tab Take 12.5 mg by mouth 2 times a day, with meals.     • ramipril (ALTACE) 10 MG capsule Take 10 mg by mouth every day.     • dronabinol (MARINOL) 2.5 MG Cap Take 1 Cap by mouth 2 times a day for 30 days. (Patient not taking: Reported on 2/11/2019) 60 Cap 0   • capecitabine (XELODA) 500 MG tablet 1000mg po bid 2 weeks on 1 week off (Patient not taking: Reported on 2/3/2019) 56 Tab 1     No facility-administered encounter medications on file as of 2/11/2019.      Review of Systems   Constitutional: Negative for malaise/fatigue.   Respiratory: Positive for shortness of breath.    Cardiovascular: Negative for chest pain, palpitations, orthopnea, leg swelling and PND.   Gastrointestinal: Negative for abdominal pain.   Musculoskeletal: Positive for joint pain. Negative for falls.   Neurological: Negative for dizziness and loss of consciousness.   Psychiatric/Behavioral: Negative for depression.   All other systems reviewed and are negative.       Objective:   BP (!) 90/50 (BP Location: Right arm, Patient Position: Sitting)   Pulse (!) 106   Ht 1.549 m (5' 1\")   Wt 50.3 kg (111 lb)   SpO2 96%   BMI 20.97 kg/m²     Physical Exam   Constitutional: She is oriented to person, place, and time. She appears well-developed and well-nourished. No distress.   HENT:   Head: Normocephalic and atraumatic.   Eyes: Conjunctivae are normal. No scleral icterus.   Neck: Normal range of " motion. Neck supple.   Cardiovascular: Regular rhythm and normal heart sounds.  Tachycardia present.  Exam reveals no gallop and no friction rub.    No murmur heard.  Pulmonary/Chest: Effort normal and breath sounds normal. No respiratory distress. She has no wheezes. She has no rales.   Abdominal: Soft. She exhibits no distension. There is no tenderness.   Musculoskeletal: She exhibits no edema.   Neurological: She is alert and oriented to person, place, and time.   Skin: Skin is warm and dry. She is not diaphoretic.   Psychiatric: She has a normal mood and affect. Her behavior is normal.   Nursing note and vitals reviewed.    Echocardiogram performed October 2018 showed normal LV systolic function.  RVSP 45 mmHg.  No major valvular pathology noted.    Labs performed in January 2019 were reviewed and showed normal creatinine.  Hemoglobin 9.5.    Ziopatch monitor performed December 2018 was personally reviewed and per my interpretation showed sinus rhythm with an average heart rate of 95 bpm.  No sustained arrhythmias.    EKG performed today was personally reviewed and per my interpretation shows sinus rhythm at 95 bpm.  Nonspecific T wave changes.    Assessment:     1. Tachycardia  EKG   2. SOB (shortness of breath)     3. Essential hypertension     4. Pulmonary hypertension (HCC)     5. USHA (obstructive sleep apnea)     6. Encounter for long-term (current) use of high-risk medication         Medical Decision Making:  Today's Assessment / Status / Plan:     Dyspnea:  Concern for pulmonary hypertension:  Obstructive sleep apnea:  Metastatic breast cancer:    Patient's dyspnea is likely secondary to underlying COPD.  Her cardiac workup so far has been unremarkable.  However ischemia cannot be ruled out.  Ideally she should get ischemic workup however she has metastatic breast cancer and prefers quality of life.  Since her symptoms are relatively stable and she does not have any associated chest discomfort, I do not  see any clear indication to proceed with stress testing especially given the patient's wishes and her overall prognosis.  Patient is in agreement.  If she starts having chest discomfort or worsening dyspnea, she will let us know.    Her elevated right-sided pressures are likely a combination of her underlying COPD and recent diagnosis of sleep apnea.  Patient will get her CPAP machine later today.  I have encouraged her to try to use it regularly as it may help with her symptoms.    Hypertension: Blood pressure is low today however usually blood pressure is within normal limits.  For now continue Coreg and ramipril at current dose.    Return to clinic if needed.    Thank you for allowing me to participate in the care of this patient. Please do not hesitate to contact me with any questions.    Laura Vazquez MD  Cardiologist  [facility]      PLEASE NOTE: This dictation was created using voice recognition software.

## 2019-02-11 NOTE — TELEPHONE ENCOUNTER
Patient is now amenable to appetite stimulant - order for 2.5mg Marinol (dronabinol) placed - will attempt to call script to pharm otherwise patient will     30 days supply provided - will refill/adjust rx based on results

## 2019-02-11 NOTE — TELEPHONE ENCOUNTER
Spoke to Michelle and called in the Marinol prescription information.     Patient is aware that we called this in for her as well.

## 2019-02-14 NOTE — PROGRESS NOTES
Subjective:      Zuly Christian is a 68 y.o. female who presents for Follow-Up (prechemo (Dr. Arellano)) evaluation prior to cycle 2, day 1 vinorelbine with Xeloda for breast cancer        HPI   Ms. Christian presents for evaluation prior to cycle 2, day 1 vinorelbine with Xeloda for treatment of stage IV high-grade, triple negative, invasive ductal cell carcinoma of the left breast.  She is accompanied by her friend for today's visit.    Patient continues with overall fatigue and has lost 2 pounds since her visit on 2/1/19.  She continues to get short of breath with activity and is doing her physical therapy exercises when she remembers to.  She initiated CPAP this past Monday but has not noticed if she is more rested yet.  She states she was advised to use O2 continuously but she is not using it that way.  Intermittent diarrhea resolved with Imodium as needed.  Baseline constipation remains well managed with MiraLAX and senna.  She initiated Marinol for poor appetite.  Right hip pain is somewhat better since completing radiation therapy.  She experiences vertigo when she first awakens that is self-limiting.  She is walking better with walker and is more steady.        Allergies   Allergen Reactions   • Pollen Extract    • Sulfa Drugs Hives         Current Outpatient Prescriptions on File Prior to Visit   Medication Sig Dispense Refill   • dronabinol (MARINOL) 2.5 MG Cap Take 1 Cap by mouth 2 times a day for 30 days. 60 Cap 0   • raNITidine (ZANTAC) 150 MG Tab Take 1 Tab by mouth 2 times a day. 180 Tab 0   • FLUoxetine (PROZAC) 20 MG Cap Take 1 Cap by mouth every day. 90 Cap 0   • umeclidinium-vilanterol (ANORO ELLIPTA) 62.5-25 MCG/INH AEROSOL POWDER, BREATH ACTIVATED inhaler USE 1 INHALATION EVERY DAY 3 Inhaler 3   • amitriptyline (ELAVIL) 100 MG Tab Take 1 Tab by mouth every bedtime. 90 Tab 1   • Calcium Polycarbophil (FIBER-CAPS PO) Take 1 Cap by mouth every day at 6 PM.     • Lifitegrast (XIIDRA OP) 1 Drop by  Ophthalmic route 2 times a day.     • naproxen (NAPROSYN) 500 MG Tab Take 1 Tab by mouth 2 times a day, with meals. 60 Tab 1   • Multiple Vitamin (MULTI VITAMIN DAILY PO) Take 1 Tab by mouth every day at 6 PM.     • aspirin 81 MG tablet Take 81 mg by mouth every day. One tablet by mouth daily      • carvedilol (COREG) 25 MG Tab Take 12.5 mg by mouth 2 times a day, with meals.     • ramipril (ALTACE) 10 MG capsule Take 10 mg by mouth every day.     • fluticasone (FLONASE) 50 MCG/ACT nasal spray Spray 1 Spray in nose every day.     • PROCHLORPERAZINE MALEATE PO Take 1 Tab by mouth every 6 hours as needed (nausea).     • ondansetron (ZOFRAN ODT) 4 MG TABLET DISPERSIBLE Take 4 mg by mouth every four hours as needed for Nausea.     • capecitabine (XELODA) 500 MG tablet 1000mg po bid 2 weeks on 1 week off 56 Tab 1   • guaiFENesin LA (MUCINEX) 600 MG TABLET SR 12 HR Take 600 mg by mouth every 12 hours.     • benzonatate (TESSALON) 100 MG Cap Take 1 Cap by mouth 3 times a day as needed for Cough. 60 Cap 0   • sennosides-docusate sodium (SENOKOT-S) 8.6-50 MG tablet Take 1 Tab by mouth 1 time daily as needed (for constipation. Do not take if LOOSE STOOLS). 30 Tab 3   • polyethylene glycol/lytes (MIRALAX) Pack Take 1 Packet by mouth every day. 30 Each 0   • lidocaine-prilocaine (EMLA) 2.5-2.5 % Cream Apply to port one hour prior to access and cover with plastic wrap. 1 Tube 3   • albuterol (PROAIR HFA) 108 (90 Base) MCG/ACT Aero Soln inhalation aerosol Inhale 2 Puffs by mouth every four hours as needed for Shortness of Breath (wheezing). 3 Inhaler 3     No current facility-administered medications on file prior to visit.          Review of Systems   Constitutional: Positive for malaise/fatigue (somewhat) and weight loss (2 lbs. since 2/1). Negative for chills and fever.   Respiratory: Positive for shortness of breath (with activity). Negative for cough and wheezing.         CPAP started Monday - hasn't noticed a difference  "yet; supposed to use O2 continuous - is not using like that    Cardiovascular: Negative for chest pain, palpitations and leg swelling.   Gastrointestinal: Positive for constipation (baseline), diarrhea (a bit this week - resolves with Immodium PRN) and vomiting (gag reflex - taking marinol x 2 days for appetite). Negative for nausea.   Genitourinary: Negative for dysuria.   Musculoskeletal: Positive for joint pain (R hip - better with XRT). Negative for myalgias.   Neurological: Positive for dizziness (vertigo sometimes when first awakens). Negative for tingling and headaches.   Psychiatric/Behavioral: The patient does not have insomnia.           Objective:     BP (!) 98/54 (BP Location: Right arm, Patient Position: Sitting, BP Cuff Size: Adult)   Pulse 96   Temp 36.7 °C (98 °F) (Temporal)   Resp 14   Ht 1.57 m (5' 1.81\")   Wt 50.3 kg (110 lb 14.3 oz)   SpO2 96%   BMI 20.41 kg/m²      Physical Exam   Constitutional: She is oriented to person, place, and time. She appears well-developed and well-nourished. No distress.   fatigued   HENT:   Head: Normocephalic and atraumatic.   Mouth/Throat: Oropharynx is clear and moist. No oropharyngeal exudate.   Eyes: Pupils are equal, round, and reactive to light. Conjunctivae and EOM are normal. Right eye exhibits no discharge. Left eye exhibits no discharge. No scleral icterus.   Neck: Normal range of motion. Neck supple.   Cardiovascular: Normal rate, regular rhythm and normal heart sounds.  Exam reveals no gallop and no friction rub.    No murmur heard.  Pulmonary/Chest: Effort normal and breath sounds normal. No respiratory distress. She has no wheezes.   Abdominal: Soft. Bowel sounds are normal. She exhibits no distension. There is no tenderness.   Musculoskeletal: Normal range of motion. She exhibits no edema.   R hip pain - steadier gait with walker   Neurological: She is alert and oriented to person, place, and time.   Skin: Skin is warm and dry. No rash noted. " She is not diaphoretic. No erythema. No pallor.   Psychiatric: She has a normal mood and affect. Her behavior is normal.   Vitals reviewed.      Labs to be completed prior to treatment        No visits with results within 1 Day(s) from this visit.   Latest known visit with results is:   Office Visit on 2019   Component Date Value Ref Range Status   • Report 2019    Final                    Value:[facility]    Test Date:  2019  Pt Name:    DILEEP RODRÍGUEZ                Department: [facility]  MRN:        [id number]                      Room:  Gender:     Female                       Technician: LIZETTE  :        1950                   Requested By:GERARDO BRITO  Order #:    [id number]                    Reading MD: Gerardo Brito MD    Measurements  Intervals                                Axis  Rate:       95                           P:          85  FL:         132                          QRS:        81  QRSD:       86                           T:          109  QT:         352  QTc:        443    Interpretive Statements  BASELINE ARTIFACT  SINUS RHYTHM  NONSPECIFIC T ABNORMALITIES, ANT-LAT LEADS  Compared to ECG 2018 14:10:05  No significant changes    Electronically Signed On 2019 16:14:52 PST by Gerardo Brito MD                Assessment/Plan:     1. Metastatic breast cancer (HCC)     2. Malignant neoplasm metastatic to bone (HCC)     3. Encounter for antineoplastic chemotherapy     4. Weakness     5. Poor appetite     6. Cancer related pain         1.  Bone mets/Pain/weakness: Patient continues on monthly Xgeva for bone support.  She was recommended to undergo right hip replacement for pathological fracture at right acetabulum, which she declined.  She completed 10 fractions of palliative radiation to right hip/pubis from 19-19, per Dr. Eng.  Pain is improving.  She continues with Percocet and Aleve. She continues with PT per home health and is more ambulatory  with front wheel walker.    2.  Appetite: She has lost 2 additional pounds since her visit on 2/1/19, slowly steady loss continues.  She is amenable to Marinol, prescription provided, she will try to increase caloric intake of frequent snacks.    3.  Breast cancer: Patient with progression of disease and has initiated vinorelbine with Xeloda.  She appears to be tolerating treatment fairly well.  CBC and CMP will be evaluated prior to the dosing and if within acceptable limits she will proceed with cycle 2, day 1 of treatment: venorelbine (days 1 & 8); Xeloda (days 1-14) - every 21 days.  She will return in 1 week for evaluation prior to cycle 2, day 8, sooner as needed.            The patient verbalized agreement and understanding of current plan. All questions and concerns were addressed at time of visit.    Please note that this dictation was created using voice recognition software. I have made every reasonable attempt to correct obvious errors, but I expect that there are errors of grammar and possibly content that I did not discover before finalizing the note.                .

## 2019-02-15 NOTE — OP THERAPY DISCHARGE SUMMARY
Outpatient Physical Therapy  DISCHARGE SUMMARY NOTE      [facility] [address]  [address]  [place] [address]  Phone:  [phone]  Fax:  [phone]    Date of Visit: 02/15/2019    Patient: Zuly Christian  YOB: 1950  MRN: [id number]     Referring Provider: No referring provider defined for this encounter.   Referring Diagnosis No admission diagnoses are documented for this encounter.     Physical Therapy Occurrence Codes    Date of onset of impairment:  8/16/17   Date physical therapy care plan established or reviewed:  8/10/18   Date physical therapy treatment started:  8/10/18          Functional Limitations and Severity Modifiers      Goal:     Discharge:         Your patient is being discharged from Physical Therapy with the following comments:   · Patient has failed to schedule or reschedule follow-up visits    Comments:  Patient has not been seen in clinic for greater than 6 months. If further treatment is indicated please feel free to contact our office at [phone].       Limitations Remaining:  Unknown      Recommendations:  Patient is being followed by Home Health     Amrita Bryan, PT    Date: 2/15/2019

## 2019-02-17 NOTE — PROGRESS NOTES
Chemotherapy Verification - SECONDARY RN       Height = 61.42in  Weight = 49.6kg  BSA = 1.46m2       Medication: Navelbine  Dose: 25mg/m2  Calculated Dose: 36.5mg                             (In mg/m2, AUC, mg/kg)       I confirm that this process was performed independently.

## 2019-02-17 NOTE — PROGRESS NOTES
"Pharmacy Chemotherapy Calculations Note:    Patient Name: Zuly Christian      Dx: Metastatic Triple Negative Breast Cancer    Cycle 2, Day 1      Previous treatment: 1/27/19 = C1D8  Paclitaxel Q3wk x 6, last 12/27/18     Protocol: Capecitabine + Vinorelbine    *Dosing Reference*  Capecitabine 1250 mg/m2 PO BID on Days 1-14 - will add cycle 2 per Dr. Arellano   Vinorelbine 25 mg/m2 IV on Days 1 and 8  Q3 weeks until disease progression or unacceptable toxicity    Jina KONG, et al. Capecitabine and Vinorelbine in Patients with Metastatic Breast Cancer Previously Treated with Anthracycline and Taxane.  J Wolof Med Sci 2004;19:547-53.    /60   Pulse (!) 113   Temp 36.8 °C (98.2 °F) (Temporal)   Resp 18   Ht 1.56 m (5' 1.42\")   Wt 49.6 kg (109 lb 5.6 oz)   SpO2 100%   BMI 20.38 kg/m²  Body surface area is 1.47 meters squared.     Allergies: Pollen extract and Sulfa drugs    LABS 2/17/2019:  ANC: [id number]      WBC: 16.3     Plt: 401k   Hgb/Hct: 8.2/27.7     SCr: 0.44 mg/dL CrCl: 59.8 mL/min    K: 4.2  Na: 136          Glu: 107  LFT's: 19/13/128 TBili = 0.3      **MD aware of elevated white count, OK to give**    Vinorelbine (Navelbine) 25 mg/m² x Body surface area is 1.47 meters squared. m² = 36.75 mg   <5% difference, ok to treat with final dose = 36.8 mg IV    Xgeva 120 mg - last dose 1/29/19     MICH Topol, PharmD    "

## 2019-02-17 NOTE — PROGRESS NOTES
"Pharmacy Chemotherapy Verification    Patient Name: Zuly Christian   Dx: metastatic triple negative breast cancer         Protocol: Capecitabine + Vinorelbine     *Dosing Reference*  Capecitabine 1250 mg/m2 PO BID on Days 1-14 - will add starting cycle 2 per Dr. Arellano   Vinorelbine 25 mg/m2 IV on Days 1 and 8  Q3 weeks until disease progression or unacceptable toxicity     Jina KONG, et al. Capecitabine and Vinorelbine in Patients with Metastatic Breast Cancer Previously Treated with Anthracycline and Taxane.  J [employer] Med Sci 2004;19:547-53.     Allergies:  Pollen extract and Sulfa drugs    /60   Pulse (!) 113   Temp 36.8 °C (98.2 °F) (Temporal)   Resp 18   Ht 1.56 m (5' 1.42\")   Wt 49.6 kg (109 lb 5.6 oz)   SpO2 100%   BMI 20.38 kg/m²  Body surface area is 1.47 meters squared.     Labs 2/17/19  ANC ~88361 (MD aware) Plt = 401k Hgb = 8.2   AST/ALT/AP = 19/13/128 Tbili = 0.3    Drug Order   (Drug name, dose, route, IV Fluid & volume, frequency, number of doses) Cycle 2, Day 1  Previous treatment: C1D8 = 2/3/19; 12/27/18- Taxol x 6 cycles   Medication = Vinorelbine (Navelbine)  Base Dose = 25 mg/m2  Calc Dose: Base Dose x 1.47 m2 = 36.75 mg  Final Dose = 36.8 mg  Route = IV  Fluid & Volume = NS 25 mL  Admin Duration = Over 6-10 minutes          <5% difference, ok to treat with final dose     By my signature below, I confirm this process was performed independently with the BSA and all final chemotherapy dosing calculations congruent. I have reviewed the above chemotherapy order and that my calculation of the final dose and BSA (when applicable) corroborate those calculations of the  pharmacist. Discrepancies of 5% or greater in the written dose have been addressed and documented within the [facility] Progress notes.    Fauzia Weinstein, PharmD, BCOP              "

## 2019-02-18 NOTE — PROGRESS NOTES
Pt is here for her scheduled Navelbine. Voices no concerns except for fatigue and poor appetite. Pt states having started trial for Marinol trying to improve her food intake. Port accessed in sterile fashion after Lidocaine Sub-Q applied to the site (pt forgot to apply EMLA cream). Labs drawn and reviewed. WBC elevated (WBC 16.3/ANC 68690) - ok to proceed per MD. Navelbine infused followed by NS flush of 150ml. Port heparin locked per protocol and de-accessed. Discharged home to self care with her grandson. Next appointment confirmed.

## 2019-02-21 PROBLEM — T45.1X5A ANEMIA ASSOCIATED WITH CHEMOTHERAPY: Status: ACTIVE | Noted: 2019-01-01

## 2019-02-21 PROBLEM — D64.81 ANEMIA ASSOCIATED WITH CHEMOTHERAPY: Status: ACTIVE | Noted: 2019-01-01

## 2019-02-21 NOTE — Clinical Note
Dr. Mandujano, You see Jody on 3/7 - just aheads up her BP is running low. I am giving her a unit of blood on Sunday with her chemo and encouraged her to increase hydration but wanted to let you know in case you'd like to make changes to meds before you see her.  She says she's ok with MyChart follow up

## 2019-02-21 NOTE — Clinical Note
Darrick, Can you let her know that I'm going to give her a unit of RBC on Sunday along with her treatment - just a heads up  Renata is already handling the scheduled update - thank you

## 2019-02-21 NOTE — TELEPHONE ENCOUNTER
Spoke to patient letting her know per TIMBO Santos we are going to give patient 1 unit of red blood cells along with her treatment on Sunday. Patient verbalized understanding.

## 2019-02-21 NOTE — PROGRESS NOTES
Subjective:      Zuly Christian is a 68 y.o. female who presents for Follow-Up (prechemo (Dr. Arellano)) evaluation prior to cycle 2, day 8 vinorelbine with Xeloda for breast cancer        HPI   Ms. Christian presents for evaluation prior to cycle 2, day 8 vinorelbine with Xeloda for treatment of stage IV high-grade, triple negative, invasive ductal cell carcinoma of the left breast.  She is accompanied by her friend for today's visit.    She is more tired this week but states she had multiple visitors from out of town.  She has lost 2 pounds since her visit 1 week ago.  She is noted to have increased dry mouth.  She is short of breath with exertion or with prolonged activity but overall actually feels better.  She is using CPAP consistently and is getting about 9 hours each night.  Blood pressure is noted to be somewhat lower and she does get dizzy when she gets up too quickly.  Overall myalgia and arthralgia is improved.  Pain at rest is significantly improved, pain with mobility continues but is improving.  Patient is encouraged by effects of radiation therapy.  Although she continues with difficulty finding words at times she does feel more alert.      Allergies   Allergen Reactions   • Pollen Extract    • Sulfa Drugs Hives         Current Outpatient Prescriptions on File Prior to Visit   Medication Sig Dispense Refill   • capecitabine (XELODA) 500 MG tablet Take 1,000 mg by mouth 2 Times a Day.     • dronabinol (MARINOL) 2.5 MG Cap Take 1 Cap by mouth 2 times a day for 30 days. 60 Cap 0   • capecitabine (XELODA) 500 MG tablet 1000mg po bid 2 weeks on 1 week off 56 Tab 1   • raNITidine (ZANTAC) 150 MG Tab Take 1 Tab by mouth 2 times a day. 180 Tab 0   • FLUoxetine (PROZAC) 20 MG Cap Take 1 Cap by mouth every day. 90 Cap 0   • umeclidinium-vilanterol (ANORO ELLIPTA) 62.5-25 MCG/INH AEROSOL POWDER, BREATH ACTIVATED inhaler USE 1 INHALATION EVERY DAY 3 Inhaler 3   • amitriptyline (ELAVIL) 100 MG Tab Take 1 Tab by  mouth every bedtime. 90 Tab 1   • Calcium Polycarbophil (FIBER-CAPS PO) Take 1 Cap by mouth every day at 6 PM.     • Lifitegrast (XIIDRA OP) 1 Drop by Ophthalmic route 2 times a day.     • naproxen (NAPROSYN) 500 MG Tab Take 1 Tab by mouth 2 times a day, with meals. 60 Tab 1   • Multiple Vitamin (MULTI VITAMIN DAILY PO) Take 1 Tab by mouth every day at 6 PM.     • aspirin 81 MG tablet Take 81 mg by mouth every day. One tablet by mouth daily      • carvedilol (COREG) 25 MG Tab Take 12.5 mg by mouth 2 times a day, with meals.     • ramipril (ALTACE) 10 MG capsule Take 10 mg by mouth every day.     • fluticasone (FLONASE) 50 MCG/ACT nasal spray Spray 1 Spray in nose every day.     • PROCHLORPERAZINE MALEATE PO Take 1 Tab by mouth every 6 hours as needed (nausea).     • ondansetron (ZOFRAN ODT) 4 MG TABLET DISPERSIBLE Take 4 mg by mouth every four hours as needed for Nausea.     • guaiFENesin LA (MUCINEX) 600 MG TABLET SR 12 HR Take 600 mg by mouth every 12 hours.     • benzonatate (TESSALON) 100 MG Cap Take 1 Cap by mouth 3 times a day as needed for Cough. 60 Cap 0   • sennosides-docusate sodium (SENOKOT-S) 8.6-50 MG tablet Take 1 Tab by mouth 1 time daily as needed (for constipation. Do not take if LOOSE STOOLS). 30 Tab 3   • polyethylene glycol/lytes (MIRALAX) Pack Take 1 Packet by mouth every day. 30 Each 0   • lidocaine-prilocaine (EMLA) 2.5-2.5 % Cream Apply to port one hour prior to access and cover with plastic wrap. 1 Tube 3   • albuterol (PROAIR HFA) 108 (90 Base) MCG/ACT Aero Soln inhalation aerosol Inhale 2 Puffs by mouth every four hours as needed for Shortness of Breath (wheezing). 3 Inhaler 3     No current facility-administered medications on file prior to visit.          Review of Systems   Constitutional: Positive for malaise/fatigue (more tired; s/p multiple visitors for multiple days) and weight loss (2 lbs down since 2/14). Negative for chills and fever.   HENT:        Dry mouth   Respiratory:  "Positive for shortness of breath (with exertion or prolonged activity). Negative for cough and wheezing.         CPAP at night now - sleeping about 9 hours now   Cardiovascular: Negative for chest pain, palpitations and leg swelling.        Lower bp at home- reviewed HH log   Gastrointestinal: Negative for constipation (Last BM this am - formed), diarrhea, nausea and vomiting.   Genitourinary: Negative for dysuria.   Musculoskeletal: Positive for myalgias (more pain with right hip with walker; less pain at rest now which used to be significant). Negative for falls.        Less pain since toilet riser   Neurological: Positive for dizziness (if up too fast) and weakness. Negative for tingling and headaches.   Psychiatric/Behavioral: Memory loss: having a hard time finding words at times. The patient does not have insomnia.           Objective:     BP (!) 88/42 (BP Location: Right arm, Patient Position: Sitting, BP Cuff Size: Adult)   Pulse 96   Temp 36.5 °C (97.7 °F) (Temporal)   Resp 20   Ht 1.562 m (5' 1.5\")   Wt 49.4 kg (108 lb 14.5 oz)   SpO2 90%   BMI 20.24 kg/m²        Physical Exam   Constitutional: She is oriented to person, place, and time. She appears well-developed and well-nourished. No distress.   HENT:   Head: Normocephalic and atraumatic.   Mouth/Throat: Oropharynx is clear and moist. No oropharyngeal exudate.   Eyes: Pupils are equal, round, and reactive to light. Conjunctivae and EOM are normal. Right eye exhibits no discharge. Left eye exhibits no discharge. No scleral icterus.   Neck: Normal range of motion. Neck supple.   Cardiovascular: Normal rate, regular rhythm and normal heart sounds.  Exam reveals no gallop and no friction rub.    No murmur heard.  Low BP - still taking BP meds - reviewed HH log (running 90s/50-60s consistently)   Pulmonary/Chest: Effort normal and breath sounds normal. No respiratory distress. She has no wheezes.   Abdominal: Soft. Bowel sounds are normal. She " exhibits no distension. There is no tenderness.   Musculoskeletal: Normal range of motion. She exhibits no edema.   R hip pain - improving   Neurological: She is alert and oriented to person, place, and time.   Skin: Skin is warm and dry. No rash noted. She is not diaphoretic. No erythema. There is pallor.   Psychiatric: She has a normal mood and affect. Her behavior is normal.   Vitals reviewed.      Hospital Outpatient Visit on 02/21/2019   Component Date Value Ref Range Status   • WBC 02/21/2019 5.8  4.8 - 10.8 K/uL Final   • RBC 02/21/2019 3.05* 4.20 - 5.40 M/uL Final   • Hemoglobin 02/21/2019 7.6* 12.0 - 16.0 g/dL Final   • Hematocrit 02/21/2019 25.4* 37.0 - 47.0 % Final   • MCV 02/21/2019 83.3  81.4 - 97.8 fL Final   • MCH 02/21/2019 24.9* 27.0 - 33.0 pg Final   • MCHC 02/21/2019 29.9* 33.6 - 35.0 g/dL Final   • RDW 02/21/2019 64.8* 35.9 - 50.0 fL Final   • Platelet Count 02/21/2019 268  164 - 446 K/uL Final   • MPV 02/21/2019 10.7  9.0 - 12.9 fL Final   • Neutrophils-Polys 02/21/2019 86.10* 44.00 - 72.00 % Final   • Lymphocytes 02/21/2019 6.20* 22.00 - 41.00 % Final   • Monocytes 02/21/2019 5.00  0.00 - 13.40 % Final   • Eosinophils 02/21/2019 0.30  0.00 - 6.90 % Final   • Basophils 02/21/2019 0.50  0.00 - 1.80 % Final   • Immature Granulocytes 02/21/2019 1.90* 0.00 - 0.90 % Final   • Nucleated RBC 02/21/2019 0.00  /100 WBC Final   • Neutrophils (Absolute) 02/21/2019 5.00  2.00 - 7.15 K/uL Final    Includes immature neutrophils, if present.   • Lymphs (Absolute) 02/21/2019 0.36* 1.00 - 4.80 K/uL Final   • Monos (Absolute) 02/21/2019 0.29  0.00 - 0.85 K/uL Final   • Eos (Absolute) 02/21/2019 0.02  0.00 - 0.51 K/uL Final   • Baso (Absolute) 02/21/2019 0.03  0.00 - 0.12 K/uL Final   • Immature Granulocytes (abs) 02/21/2019 0.11  0.00 - 0.11 K/uL Final   • NRBC (Absolute) 02/21/2019 0.00  K/uL Final   • Anisocytosis 02/21/2019 1+   Final   • Microcytosis 02/21/2019 1+   Final   • Sodium 02/21/2019 130* 135 - 145  mmol/L Final   • Potassium 02/21/2019 4.3  3.6 - 5.5 mmol/L Final   • Chloride 02/21/2019 103  96 - 112 mmol/L Final   • Co2 02/21/2019 18* 20 - 33 mmol/L Final   • Anion Gap 02/21/2019 9.0  0.0 - 11.9 Final   • Glucose 02/21/2019 107* 65 - 99 mg/dL Final   • Bun 02/21/2019 23* 8 - 22 mg/dL Final   • Creatinine 02/21/2019 0.65  0.50 - 1.40 mg/dL Final   • Calcium 02/21/2019 8.5  8.5 - 10.5 mg/dL Final   • AST(SGOT) 02/21/2019 21  12 - 45 U/L Final   • ALT(SGPT) 02/21/2019 13  2 - 50 U/L Final   • Alkaline Phosphatase 02/21/2019 113* 30 - 99 U/L Final   • Total Bilirubin 02/21/2019 0.3  0.1 - 1.5 mg/dL Final   • Albumin 02/21/2019 3.3  3.2 - 4.9 g/dL Final   • Total Protein 02/21/2019 6.3  6.0 - 8.2 g/dL Final   • Globulin 02/21/2019 3.0  1.9 - 3.5 g/dL Final   • A-G Ratio 02/21/2019 1.1  g/dL Final   • Fasting Status 02/21/2019 Fasting   Final   • GFR If  02/21/2019 >60  >60 mL/min/1.73 m 2 Final   • GFR If Non  02/21/2019 >60  >60 mL/min/1.73 m 2 Final   • Peripheral Smear Review 02/21/2019 see below   Final    Comment: Due to instrument suspect flags, further review of peripheral smear is  indicated on this patient sample. This review may or may not result in  abnormal findings.     • RBC Morphology 02/21/2019 Present   Final   • Ovalocytes 02/21/2019 1+   Final   • Toxic Gran 02/21/2019 Slight   Final   • Comments-Diff 02/21/2019 see below   Final    Results have been verified by peripheral blood smear review.          Assessment/Plan:     1. Metastatic breast cancer (HCC)  CBC WITH DIFFERENTIAL    Comp Metabolic Panel    CA 27.29   2. Encounter for antineoplastic chemotherapy     3. Malignant neoplasm metastatic to bone (HCC)     4. Other elevated white blood cell (WBC) count     5. Antineoplastic chemotherapy induced anemia     6. Neoplastic malignant related fatigue     7. Other specified hypotension     8. Cancer related pain     9. Weight loss         1.  Bone mets/pain:  Patient continues on monthly Xgeva for bone support.  She reports pain at right hip is much improved since completing radiation on 2/11/19.  She continues with home health/PT and is feeling stronger overall.    2.  Hypotension/fatigue/anemia: Patient brought home health log of blood pressures today and has been running 90s/50-60s for the past couple of weeks.  She is a bit lower today but not tachycardic.  She continues with usual blood pressure medication is encouraged to discuss changes with PCP.  She is somewhat more fatigued this week although overall she feels better due to improved pain.  CBC was completed for further evaluation with hemoglobin noted to be 7.6.  She will be provided 1 unit RBCs with cycle 1, day 8 dosing.    3.  Elevated WBC: She was noted to have slightly elevated WBC on cycle 2, day 1 of treatment.  She was asymptomatic and afebrile proceeded with treatment.  WBC is normalized, she continues without fever/chills.    4.  Weight loss: Patient has noted to have lost 2 pounds since last week but she feels that she is been eating better with Marinol, we will continue to monitor.    5.  Breast cancer: She is tolerating treatment fairly well.  CBC and CMP have been evaluated and found to be within acceptable limits, except were addressed above.  She will proceed with cycle 1, day 8 of treatment  venorelbine (days 1 & 8); Xeloda (days 1-14) - every 21 days, on Sunday, and return in 2 weeks for evaluation prior to cycle 3, sooner as needed.            The patient verbalized agreement and understanding of current plan. All questions and concerns were addressed at time of visit.    Please note that this dictation was created using voice recognition software. I have made every reasonable attempt to correct obvious errors, but I expect that there are errors of grammar and possibly content that I did not discover before finalizing the note.

## 2019-02-24 NOTE — PROGRESS NOTES
"Pharmacy Chemotherapy Verification    Patient Name: Zuly Christian   Dx: metastatic triple negative breast cancer         Protocol: Capecitabine + Vinorelbine  Capecitabine 1250 mg/m2 PO BID on Days 1-14 - home prescription  Vinorelbine 25 mg/m2 IV on Days 1 and 8  Q3 weeks until disease progression or unacceptable toxicity     Jina KONG, et al. Capecitabine and Vinorelbine in Patients with Metastatic Breast Cancer Previously Treated with Anthracycline and Taxane.  J [place] Med Sci 2004;19:547-53.     Allergies:  Pollen extract and Sulfa drugs    /64   Pulse (!) 118   Temp 36.4 °C (97.5 °F) (Temporal)   Resp 18   Ht 1.55 m (5' 1.02\") Comment: took 2.5c off for shoes   Wt 48.3 kg (106 lb 7.7 oz)   SpO2 95%   BMI 20.10 kg/m²  Body surface area is 1.44 meters squared.     All lab results 2/21/19 within treatment parameters, except Hgb(transfuse 2/24/19)  MD aware of all current lab results. Orders received to proceed with treatment.     Drug Order   (Drug name, dose, route, IV Fluid & volume, frequency, number of doses) Cycle 2, Day 8  Previous treatment: C2D1 = 2/17/19 12/27/18- Taxol x 6 cycles   Medication = Vinorelbine (Navelbine)  Base Dose = 25 mg/m2  Calc Dose: Base Dose x 1.44m2 = 36 mg  Final Dose = 36mg  Route = IV  Fluid & Volume = NS 25 mL  Admin Duration = Over 6-10 minutes          <5% difference, ok to treat with final dose     By my signature below, I confirm this process was performed independently with the BSA and all final chemotherapy dosing calculations congruent. I have reviewed the above chemotherapy order and that my calculation of the final dose and BSA (when applicable) corroborate those calculations of the  pharmacist. Discrepancies of 5% or greater in the written dose have been addressed and documented within the [facility] Progress notes.    Benjamin EmanuelD.                "

## 2019-02-24 NOTE — PROGRESS NOTES
"Pharmacy Chemotherapy Calculations Note:    Dx: Metastatic Triple Negative Breast Cancer    Cycle 2, Day 8    Previous treatment: C2D1 on 2/17/19    Protocol: Capecitabine + Vinorelbine  Capecitabine 1250 mg/m2 PO BID on Days 1-14 - will add cycle 2 per Dr. Arellano   Vinorelbine 25 mg/m2 IV on Days 1 and 8  Q3 weeks until disease progression or unacceptable toxicity    Jina KONG, et al. Capecitabine and Vinorelbine in Patients with Metastatic Breast Cancer Previously Treated with Anthracycline and Taxane.  J [place] Med Sci 2004;19:547-53.    /64   Pulse (!) 118   Temp 36.4 °C (97.5 °F) (Temporal)   Resp 18   Ht 1.55 m (5' 1.02\") Comment: took 2.5c off for shoes   Wt 48.3 kg (106 lb 7.7 oz)   SpO2 95%   BMI 20.10 kg/m²  Body surface area is 1.44 meters squared.     Allergies: Pollen extract and Sulfa drugs    LABS 2/21/19 (no new labs today per MD):  ANC~ 5000 Plt = 268k   Hgb = 7.6 - pt also getting blood today, ok to treat per MD    SCr = 0.65mg/dL CrCl ~ 59mL/min   AST/ALT/AP = 21/13/113 TBili = 0.3       Vinorelbine (Navelbine) 25 mg/m² x 1.44 m² = 36 mg   <5% difference, ok to treat with final dose = 36 mg IV    Xgeva q 3 months in supportive plan - last dose 1/29/19     Greer Manzo, PharmD, BCOP    "

## 2019-02-24 NOTE — PROGRESS NOTES
Chemotherapy Verification - PRIMARY RN      Height = 155cm  Weight = 48.3 kg  BSA = 1.44 m2       Medication: Navelbine  Dose: 25 mg/m2  Calculated Dose: 36 mg                              I confirm this process was performed independently with the BSA and all final chemotherapy dosing calculations congruent.  Any discrepancies of 5% or greater have been addressed with the chemotherapy pharmacist. The resolution of the discrepancy has been documented in the EPIC progress notes.

## 2019-02-24 NOTE — PROGRESS NOTES
Chemotherapy Verification - SECONDARY RN       Height = 155cm  Weight = 48.3kg  BSA = 1.44m2       Medication: Navelbine  Dose: 25mg/m2  Calculated Dose: 36mg                             (In mg/m2, AUC, mg/kg)

## 2019-02-25 NOTE — PROGRESS NOTES
Patient arrived to the infusion center with her grandson for Navelbine and 1 Unit PRBC's today. Patient states she is feeling well, but she has lost her voice a little. Port-a-cath accessed using lidocaine, positive brisk blood return noted. COD sent to lab. Navelbine given without incident. Education given on blood transfusion since this is her first transfusion ever. Consents signed. Premedications given for blood. 1 unit PRBC infused without difficulty. Patient tolerated well. Port-a-cath flushed with saline and heparin per protocol prior to de-access. Site covered with gauze and tape. Patient left infusion center with no apparent distress. She states she has an appointment tomorrow with Dr. Eng. Patient confirms her next appointment for 3/17/19.

## 2019-02-28 NOTE — PATIENT COMMUNICATION
Julee will ER notes be sufficient or does she still need face to face to order Nebulizer? Pt has an apt 3/6/19

## 2019-02-28 NOTE — TELEPHONE ENCOUNTER
From: Zuly Christian  To: ADRIA Garcia  Sent: 2/27/2019 6:04 PM PST  Subject: Prescription Question    I went to [facility] on Tuesday. I couldn't talk because of my sore throat and was short of breath. The DrManuel prescribed a Z-PAC, cortisone and abuterol 0.83 neb neph. The problem is I don't have a nebulizer to use it with. Can you call either [facility] or [facility] and order me a nebulizer?    I've attached the documents from the ER visit.

## 2019-03-07 ENCOUNTER — APPOINTMENT (OUTPATIENT)
Dept: INTERNAL MEDICINE | Facility: MEDICAL CENTER | Age: 69
End: 2019-03-07
Payer: MEDICARE

## 2019-03-07 ENCOUNTER — TELEPHONE (OUTPATIENT)
Dept: HEMATOLOGY ONCOLOGY | Facility: MEDICAL CENTER | Age: 69
End: 2019-03-07

## 2019-03-07 PROCEDURE — 665999 HH PPS REVENUE DEBIT

## 2019-03-07 PROCEDURE — 665998 HH PPS REVENUE CREDIT

## 2019-03-07 NOTE — TELEPHONE ENCOUNTER
Received a call from the [facility] medical examiners office advising that patient passed 03/06/2019. Stated would be sending the death certificate to Dr. Arellano for signature.

## 2019-03-08 PROCEDURE — 665999 HH PPS REVENUE DEBIT

## 2019-03-08 PROCEDURE — 665998 HH PPS REVENUE CREDIT

## 2019-03-09 PROCEDURE — 665999 HH PPS REVENUE DEBIT

## 2019-03-09 PROCEDURE — 665998 HH PPS REVENUE CREDIT

## 2019-03-10 PROCEDURE — 665999 HH PPS REVENUE DEBIT

## 2019-03-10 PROCEDURE — 665998 HH PPS REVENUE CREDIT

## 2019-03-11 PROCEDURE — 665998 HH PPS REVENUE CREDIT

## 2019-03-11 PROCEDURE — 665999 HH PPS REVENUE DEBIT

## 2019-03-12 PROCEDURE — 665998 HH PPS REVENUE CREDIT

## 2019-03-12 PROCEDURE — 665999 HH PPS REVENUE DEBIT

## 2019-03-13 PROCEDURE — 665999 HH PPS REVENUE DEBIT

## 2019-03-13 PROCEDURE — 665998 HH PPS REVENUE CREDIT

## 2019-03-14 PROCEDURE — 665999 HH PPS REVENUE DEBIT

## 2019-03-14 PROCEDURE — 665998 HH PPS REVENUE CREDIT

## 2019-03-15 PROCEDURE — 665998 HH PPS REVENUE CREDIT

## 2019-03-15 PROCEDURE — 665999 HH PPS REVENUE DEBIT

## 2019-03-16 PROCEDURE — 665998 HH PPS REVENUE CREDIT

## 2019-03-16 PROCEDURE — 665999 HH PPS REVENUE DEBIT

## 2019-03-17 ENCOUNTER — APPOINTMENT (OUTPATIENT)
Dept: ONCOLOGY | Facility: MEDICAL CENTER | Age: 69
End: 2019-03-17
Attending: INTERNAL MEDICINE
Payer: MEDICARE

## 2019-03-17 PROCEDURE — 665999 HH PPS REVENUE DEBIT

## 2019-03-17 PROCEDURE — 665998 HH PPS REVENUE CREDIT

## 2019-03-18 PROCEDURE — 665999 HH PPS REVENUE DEBIT

## 2019-03-18 PROCEDURE — 665998 HH PPS REVENUE CREDIT

## 2019-03-19 PROCEDURE — 665999 HH PPS REVENUE DEBIT

## 2019-03-19 PROCEDURE — 665998 HH PPS REVENUE CREDIT

## 2019-03-20 PROCEDURE — 665998 HH PPS REVENUE CREDIT

## 2019-03-20 PROCEDURE — 665999 HH PPS REVENUE DEBIT

## 2019-03-21 PROCEDURE — 665998 HH PPS REVENUE CREDIT

## 2019-03-21 PROCEDURE — 665999 HH PPS REVENUE DEBIT

## 2019-03-22 PROCEDURE — 665999 HH PPS REVENUE DEBIT

## 2019-03-22 PROCEDURE — 665998 HH PPS REVENUE CREDIT

## 2019-03-23 PROCEDURE — 665999 HH PPS REVENUE DEBIT

## 2019-03-23 PROCEDURE — 665998 HH PPS REVENUE CREDIT

## 2019-03-24 PROCEDURE — 665998 HH PPS REVENUE CREDIT

## 2019-03-24 PROCEDURE — 665999 HH PPS REVENUE DEBIT

## 2019-03-25 PROCEDURE — 665998 HH PPS REVENUE CREDIT

## 2019-03-25 PROCEDURE — 665999 HH PPS REVENUE DEBIT
